# Patient Record
Sex: MALE | Race: BLACK OR AFRICAN AMERICAN | Employment: OTHER | URBAN - METROPOLITAN AREA
[De-identification: names, ages, dates, MRNs, and addresses within clinical notes are randomized per-mention and may not be internally consistent; named-entity substitution may affect disease eponyms.]

---

## 2018-04-27 ENCOUNTER — APPOINTMENT (OUTPATIENT)
Dept: CT IMAGING | Age: 78
DRG: 637 | End: 2018-04-27
Attending: EMERGENCY MEDICINE
Payer: MEDICARE

## 2018-04-27 ENCOUNTER — HOSPITAL ENCOUNTER (INPATIENT)
Age: 78
LOS: 10 days | Discharge: HOME HEALTH | DRG: 637 | End: 2018-05-07
Attending: EMERGENCY MEDICINE | Admitting: INTERNAL MEDICINE
Payer: MEDICARE

## 2018-04-27 DIAGNOSIS — G93.40 ACUTE ENCEPHALOPATHY: ICD-10-CM

## 2018-04-27 DIAGNOSIS — R73.9 HYPERGLYCEMIA: ICD-10-CM

## 2018-04-27 DIAGNOSIS — R41.3 MEMORY LOSS: ICD-10-CM

## 2018-04-27 DIAGNOSIS — E11.00 UNCONTROLLED TYPE 2 DM WITH HYPEROSMOLAR NONKETOTIC HYPERGLYCEMIA (HCC): ICD-10-CM

## 2018-04-27 DIAGNOSIS — N17.9 AKI (ACUTE KIDNEY INJURY) (HCC): Primary | ICD-10-CM

## 2018-04-27 DIAGNOSIS — I10 ESSENTIAL HYPERTENSION: ICD-10-CM

## 2018-04-27 DIAGNOSIS — E87.0 HYPEROSMOLAR SYNDROME: ICD-10-CM

## 2018-04-27 DIAGNOSIS — E87.1 HYPONATREMIA: ICD-10-CM

## 2018-04-27 LAB
ADMINISTERED INITIALS, ADMINIT: NORMAL
ALBUMIN SERPL-MCNC: 4.3 G/DL (ref 3.5–5)
ALBUMIN/GLOB SERPL: 1 {RATIO} (ref 1.1–2.2)
ALP SERPL-CCNC: 92 U/L (ref 45–117)
ALT SERPL-CCNC: 31 U/L (ref 12–78)
ANION GAP SERPL CALC-SCNC: 9 MMOL/L (ref 5–15)
ANION GAP SERPL CALC-SCNC: 9 MMOL/L (ref 5–15)
APPEARANCE UR: CLEAR
AST SERPL-CCNC: 11 U/L (ref 15–37)
BACTERIA URNS QL MICRO: NEGATIVE /HPF
BASOPHILS # BLD: 0 K/UL (ref 0–0.1)
BASOPHILS NFR BLD: 1 % (ref 0–1)
BILIRUB SERPL-MCNC: 0.7 MG/DL (ref 0.2–1)
BILIRUB UR QL: NEGATIVE
BUN SERPL-MCNC: 30 MG/DL (ref 6–20)
BUN SERPL-MCNC: 38 MG/DL (ref 6–20)
BUN/CREAT SERPL: 17 (ref 12–20)
BUN/CREAT SERPL: 19 (ref 12–20)
CALCIUM SERPL-MCNC: 8.3 MG/DL (ref 8.5–10.1)
CALCIUM SERPL-MCNC: 9.8 MG/DL (ref 8.5–10.1)
CARB RATIO, CHOR: 15
CARBOHYDRATE EATEN, CHO: 45 G
CHLORIDE SERPL-SCNC: 103 MMOL/L (ref 97–108)
CHLORIDE SERPL-SCNC: 90 MMOL/L (ref 97–108)
CO2 SERPL-SCNC: 24 MMOL/L (ref 21–32)
CO2 SERPL-SCNC: 27 MMOL/L (ref 21–32)
COLOR UR: ABNORMAL
CREAT SERPL-MCNC: 1.62 MG/DL (ref 0.7–1.3)
CREAT SERPL-MCNC: 2.27 MG/DL (ref 0.7–1.3)
D50 ADMINISTERED, D50ADM: 0 ML
D50 ORDER, D50ORD: 0 ML
DIFFERENTIAL METHOD BLD: NORMAL
EOSINOPHIL # BLD: 0.1 K/UL (ref 0–0.4)
EOSINOPHIL NFR BLD: 2 % (ref 0–7)
EPITH CASTS URNS QL MICRO: ABNORMAL /LPF
ERYTHROCYTE [DISTWIDTH] IN BLOOD BY AUTOMATED COUNT: 12.7 % (ref 11.5–14.5)
EST. AVERAGE GLUCOSE BLD GHB EST-MCNC: 318 MG/DL
GLOBULIN SER CALC-MCNC: 4.3 G/DL (ref 2–4)
GLSCOM COMMENTS: NORMAL
GLUCOSE BLD STRIP.AUTO-MCNC: 221 MG/DL (ref 65–100)
GLUCOSE BLD STRIP.AUTO-MCNC: 274 MG/DL (ref 65–100)
GLUCOSE BLD STRIP.AUTO-MCNC: 309 MG/DL (ref 65–100)
GLUCOSE BLD STRIP.AUTO-MCNC: 356 MG/DL (ref 65–100)
GLUCOSE BLD STRIP.AUTO-MCNC: 383 MG/DL (ref 65–100)
GLUCOSE BLD STRIP.AUTO-MCNC: 474 MG/DL (ref 65–100)
GLUCOSE BLD STRIP.AUTO-MCNC: 517 MG/DL (ref 65–100)
GLUCOSE BLD STRIP.AUTO-MCNC: >600 MG/DL (ref 65–100)
GLUCOSE SERPL-MCNC: 311 MG/DL (ref 65–100)
GLUCOSE SERPL-MCNC: 655 MG/DL (ref 65–100)
GLUCOSE UR STRIP.AUTO-MCNC: >1000 MG/DL
GLUCOSE, GLC: 221 MG/DL
GLUCOSE, GLC: 274 MG/DL
GLUCOSE, GLC: 309 MG/DL
GLUCOSE, GLC: 356 MG/DL
GLUCOSE, GLC: 383 MG/DL
GLUCOSE, GLC: 474 MG/DL
HBA1C MFR BLD: 12.7 % (ref 4.2–6.3)
HCT VFR BLD AUTO: 40 % (ref 36.6–50.3)
HGB BLD-MCNC: 14.1 G/DL (ref 12.1–17)
HGB UR QL STRIP: ABNORMAL
HIGH TARGET, HITG: 300 MG/DL
HYALINE CASTS URNS QL MICRO: ABNORMAL /LPF (ref 0–5)
IMM GRANULOCYTES # BLD: 0 K/UL (ref 0–0.04)
IMM GRANULOCYTES NFR BLD AUTO: 0 % (ref 0–0.5)
INSULIN ADMINSTERED, INSADM: 4.8 UNITS/HOUR
INSULIN ADMINSTERED, INSADM: 5.9 UNITS/HOUR
INSULIN ADMINSTERED, INSADM: 5.9 UNITS/HOUR
INSULIN ADMINSTERED, INSADM: 6.4 UNITS/HOUR
INSULIN ADMINSTERED, INSADM: 7.5 UNITS/HOUR
INSULIN ADMINSTERED, INSADM: 8.3 UNITS/HOUR
INSULIN BOLUS ADMINISTERED, INSBOLADM: 3 UNITS/HOUR
INSULIN BOLUS ORDERED, INSBOLORD: 3 UNITS/HOUR
INSULIN ORDER, INSORD: 4.8 UNITS/HOUR
INSULIN ORDER, INSORD: 5.9 UNITS/HOUR
INSULIN ORDER, INSORD: 5.9 UNITS/HOUR
INSULIN ORDER, INSORD: 6.4 UNITS/HOUR
INSULIN ORDER, INSORD: 7.5 UNITS/HOUR
INSULIN ORDER, INSORD: 8.3 UNITS/HOUR
KETONES UR QL STRIP.AUTO: NEGATIVE MG/DL
LEUKOCYTE ESTERASE UR QL STRIP.AUTO: NEGATIVE
LOW TARGET, LOT: 200 MG/DL
LYMPHOCYTES # BLD: 1 K/UL (ref 0.8–3.5)
LYMPHOCYTES NFR BLD: 22 % (ref 12–49)
MAGNESIUM SERPL-MCNC: 2.2 MG/DL (ref 1.6–2.4)
MCH RBC QN AUTO: 30.2 PG (ref 26–34)
MCHC RBC AUTO-ENTMCNC: 35.3 G/DL (ref 30–36.5)
MCV RBC AUTO: 85.7 FL (ref 80–99)
MINUTES UNTIL NEXT BG, NBG: 60 MIN
MONOCYTES # BLD: 0.3 K/UL (ref 0–1)
MONOCYTES NFR BLD: 6 % (ref 5–13)
MULTIPLIER, MUL: 0.02
MULTIPLIER, MUL: 0.03
NEUTS SEG # BLD: 3.1 K/UL (ref 1.8–8)
NEUTS SEG NFR BLD: 69 % (ref 32–75)
NITRITE UR QL STRIP.AUTO: NEGATIVE
NRBC # BLD: 0 K/UL (ref 0–0.01)
NRBC BLD-RTO: 0 PER 100 WBC
ORDER INITIALS, ORDINIT: NORMAL
PH UR STRIP: 5 [PH] (ref 5–8)
PHOSPHATE SERPL-MCNC: 4.2 MG/DL (ref 2.6–4.7)
PLATELET # BLD AUTO: 229 K/UL (ref 150–400)
PMV BLD AUTO: 9.8 FL (ref 8.9–12.9)
POTASSIUM SERPL-SCNC: 3.4 MMOL/L (ref 3.5–5.1)
POTASSIUM SERPL-SCNC: 4.3 MMOL/L (ref 3.5–5.1)
PROT SERPL-MCNC: 8.6 G/DL (ref 6.4–8.2)
PROT UR STRIP-MCNC: ABNORMAL MG/DL
RBC # BLD AUTO: 4.67 M/UL (ref 4.1–5.7)
RBC #/AREA URNS HPF: ABNORMAL /HPF (ref 0–5)
SERVICE CMNT-IMP: ABNORMAL
SODIUM SERPL-SCNC: 126 MMOL/L (ref 136–145)
SODIUM SERPL-SCNC: 136 MMOL/L (ref 136–145)
SP GR UR REFRACTOMETRY: 1.03 (ref 1–1.03)
UA: UC IF INDICATED,UAUC: ABNORMAL
UROBILINOGEN UR QL STRIP.AUTO: 0.2 EU/DL (ref 0.2–1)
WBC # BLD AUTO: 4.5 K/UL (ref 4.1–11.1)
WBC URNS QL MICRO: ABNORMAL /HPF (ref 0–4)

## 2018-04-27 PROCEDURE — 84100 ASSAY OF PHOSPHORUS: CPT | Performed by: INTERNAL MEDICINE

## 2018-04-27 PROCEDURE — 74011250636 HC RX REV CODE- 250/636: Performed by: EMERGENCY MEDICINE

## 2018-04-27 PROCEDURE — 83735 ASSAY OF MAGNESIUM: CPT | Performed by: INTERNAL MEDICINE

## 2018-04-27 PROCEDURE — 99285 EMERGENCY DEPT VISIT HI MDM: CPT

## 2018-04-27 PROCEDURE — 81001 URINALYSIS AUTO W/SCOPE: CPT | Performed by: EMERGENCY MEDICINE

## 2018-04-27 PROCEDURE — 85025 COMPLETE CBC W/AUTO DIFF WBC: CPT | Performed by: EMERGENCY MEDICINE

## 2018-04-27 PROCEDURE — 74011250636 HC RX REV CODE- 250/636: Performed by: INTERNAL MEDICINE

## 2018-04-27 PROCEDURE — 36415 COLL VENOUS BLD VENIPUNCTURE: CPT | Performed by: EMERGENCY MEDICINE

## 2018-04-27 PROCEDURE — 74011636637 HC RX REV CODE- 636/637: Performed by: INTERNAL MEDICINE

## 2018-04-27 PROCEDURE — 80048 BASIC METABOLIC PNL TOTAL CA: CPT | Performed by: INTERNAL MEDICINE

## 2018-04-27 PROCEDURE — 80053 COMPREHEN METABOLIC PANEL: CPT | Performed by: EMERGENCY MEDICINE

## 2018-04-27 PROCEDURE — 65660000000 HC RM CCU STEPDOWN

## 2018-04-27 PROCEDURE — 74011000258 HC RX REV CODE- 258: Performed by: INTERNAL MEDICINE

## 2018-04-27 PROCEDURE — 82962 GLUCOSE BLOOD TEST: CPT

## 2018-04-27 PROCEDURE — 70450 CT HEAD/BRAIN W/O DYE: CPT

## 2018-04-27 PROCEDURE — 83036 HEMOGLOBIN GLYCOSYLATED A1C: CPT | Performed by: EMERGENCY MEDICINE

## 2018-04-27 RX ORDER — SODIUM CHLORIDE 0.9 % (FLUSH) 0.9 %
5-10 SYRINGE (ML) INJECTION AS NEEDED
Status: DISCONTINUED | OUTPATIENT
Start: 2018-04-27 | End: 2018-05-07 | Stop reason: HOSPADM

## 2018-04-27 RX ORDER — MAGNESIUM SULFATE 100 %
4 CRYSTALS MISCELLANEOUS AS NEEDED
Status: DISCONTINUED | OUTPATIENT
Start: 2018-04-27 | End: 2018-04-28

## 2018-04-27 RX ORDER — ATORVASTATIN CALCIUM 10 MG/1
10 TABLET, FILM COATED ORAL DAILY
Status: DISCONTINUED | OUTPATIENT
Start: 2018-04-28 | End: 2018-05-07 | Stop reason: HOSPADM

## 2018-04-27 RX ORDER — INSULIN LISPRO 100 [IU]/ML
INJECTION, SOLUTION INTRAVENOUS; SUBCUTANEOUS
Status: DISCONTINUED | OUTPATIENT
Start: 2018-04-27 | End: 2018-04-27

## 2018-04-27 RX ORDER — INDAPAMIDE 2.5 MG/1
2.5 TABLET, FILM COATED ORAL DAILY
COMMUNITY
End: 2018-05-07

## 2018-04-27 RX ORDER — DEXTROSE 50 % IN WATER (D50W) INTRAVENOUS SYRINGE
12.5-25 AS NEEDED
Status: DISCONTINUED | OUTPATIENT
Start: 2018-04-27 | End: 2018-04-27

## 2018-04-27 RX ORDER — INDAPAMIDE 2.5 MG/1
2.5 TABLET, FILM COATED ORAL DAILY
Status: DISCONTINUED | OUTPATIENT
Start: 2018-04-28 | End: 2018-05-07 | Stop reason: HOSPADM

## 2018-04-27 RX ORDER — HEPARIN SODIUM 5000 [USP'U]/ML
5000 INJECTION, SOLUTION INTRAVENOUS; SUBCUTANEOUS EVERY 8 HOURS
Status: DISCONTINUED | OUTPATIENT
Start: 2018-04-27 | End: 2018-05-07 | Stop reason: HOSPADM

## 2018-04-27 RX ORDER — IMIPRAMINE HYDROCHLORIDE 10 MG/1
10 TABLET ORAL 3 TIMES DAILY
Status: DISCONTINUED | OUTPATIENT
Start: 2018-04-27 | End: 2018-04-27 | Stop reason: SDUPTHER

## 2018-04-27 RX ORDER — METFORMIN HYDROCHLORIDE 500 MG/1
1200 TABLET, FILM COATED, EXTENDED RELEASE ORAL 2 TIMES DAILY WITH MEALS
COMMUNITY
End: 2018-09-07

## 2018-04-27 RX ORDER — INSULIN LISPRO 100 [IU]/ML
INJECTION, SOLUTION INTRAVENOUS; SUBCUTANEOUS
Status: DISCONTINUED | OUTPATIENT
Start: 2018-04-27 | End: 2018-04-28

## 2018-04-27 RX ORDER — METOPROLOL SUCCINATE 50 MG/1
100 TABLET, EXTENDED RELEASE ORAL DAILY
Status: DISCONTINUED | OUTPATIENT
Start: 2018-04-28 | End: 2018-05-07 | Stop reason: HOSPADM

## 2018-04-27 RX ORDER — GUAIFENESIN 100 MG/5ML
81 LIQUID (ML) ORAL DAILY
Status: DISCONTINUED | OUTPATIENT
Start: 2018-04-28 | End: 2018-05-07 | Stop reason: HOSPADM

## 2018-04-27 RX ORDER — AMLODIPINE BESYLATE AND BENAZEPRIL HYDROCHLORIDE 10; 40 MG/1; MG/1
1 CAPSULE ORAL DAILY
COMMUNITY
End: 2018-05-07

## 2018-04-27 RX ORDER — METOPROLOL SUCCINATE 100 MG/1
100 TABLET, EXTENDED RELEASE ORAL DAILY
COMMUNITY

## 2018-04-27 RX ORDER — IMIPRAMINE HYDROCHLORIDE 10 MG/1
10 TABLET ORAL 3 TIMES DAILY
Status: DISCONTINUED | OUTPATIENT
Start: 2018-04-28 | End: 2018-05-07 | Stop reason: HOSPADM

## 2018-04-27 RX ORDER — AMLODIPINE BESYLATE 5 MG/1
10 TABLET ORAL DAILY
Status: DISCONTINUED | OUTPATIENT
Start: 2018-04-28 | End: 2018-05-07 | Stop reason: HOSPADM

## 2018-04-27 RX ORDER — SODIUM CHLORIDE 9 MG/ML
75 INJECTION, SOLUTION INTRAVENOUS CONTINUOUS
Status: DISCONTINUED | OUTPATIENT
Start: 2018-04-27 | End: 2018-04-28

## 2018-04-27 RX ORDER — DEXTROSE 50 % IN WATER (D50W) INTRAVENOUS SYRINGE
12.5-25 AS NEEDED
Status: DISCONTINUED | OUTPATIENT
Start: 2018-04-27 | End: 2018-04-28

## 2018-04-27 RX ORDER — ISOSORBIDE DINITRATE 30 MG/1
30 TABLET ORAL DAILY
Status: ON HOLD | COMMUNITY
End: 2019-07-09

## 2018-04-27 RX ORDER — MEGESTROL ACETATE 20 MG/1
20 TABLET ORAL 2 TIMES DAILY
COMMUNITY
End: 2018-12-04

## 2018-04-27 RX ORDER — MAGNESIUM SULFATE 100 %
4 CRYSTALS MISCELLANEOUS AS NEEDED
Status: DISCONTINUED | OUTPATIENT
Start: 2018-04-27 | End: 2018-04-27

## 2018-04-27 RX ORDER — SODIUM CHLORIDE 0.9 % (FLUSH) 0.9 %
5-10 SYRINGE (ML) INJECTION EVERY 8 HOURS
Status: DISCONTINUED | OUTPATIENT
Start: 2018-04-27 | End: 2018-05-07 | Stop reason: HOSPADM

## 2018-04-27 RX ORDER — IMIPRAMINE HYDROCHLORIDE 10 MG/1
10 TABLET ORAL 3 TIMES DAILY
COMMUNITY
End: 2018-12-04

## 2018-04-27 RX ADMIN — SODIUM CHLORIDE 1000 ML: 900 INJECTION, SOLUTION INTRAVENOUS at 16:22

## 2018-04-27 RX ADMIN — Medication 10 ML: at 18:50

## 2018-04-27 RX ADMIN — INSULIN LISPRO 3 UNITS: 100 INJECTION, SOLUTION INTRAVENOUS; SUBCUTANEOUS at 18:38

## 2018-04-27 RX ADMIN — SODIUM CHLORIDE 7.5 UNITS/HR: 900 INJECTION, SOLUTION INTRAVENOUS at 20:52

## 2018-04-27 RX ADMIN — SODIUM CHLORIDE 1000 ML: 900 INJECTION, SOLUTION INTRAVENOUS at 17:58

## 2018-04-27 RX ADMIN — HEPARIN SODIUM 5000 UNITS: 5000 INJECTION, SOLUTION INTRAVENOUS; SUBCUTANEOUS at 21:59

## 2018-04-27 RX ADMIN — Medication 10 ML: at 21:59

## 2018-04-27 RX ADMIN — SODIUM CHLORIDE 6.4 UNITS/HR: 900 INJECTION, SOLUTION INTRAVENOUS at 21:56

## 2018-04-27 RX ADMIN — SODIUM CHLORIDE 1000 ML: 900 INJECTION, SOLUTION INTRAVENOUS at 18:58

## 2018-04-27 RX ADMIN — SODIUM CHLORIDE 125 ML/HR: 900 INJECTION, SOLUTION INTRAVENOUS at 17:36

## 2018-04-27 RX ADMIN — SODIUM CHLORIDE 4.8 UNITS/HR: 900 INJECTION, SOLUTION INTRAVENOUS at 22:59

## 2018-04-27 RX ADMIN — SODIUM CHLORIDE 8.3 UNITS/HR: 900 INJECTION, SOLUTION INTRAVENOUS at 17:40

## 2018-04-27 NOTE — ED NOTES
Pharmacy called in regards to patients imipramine. Pharmacy stated they do not stock the dosage here and asked for the family to bring their supply from home. Spoke with pt's family and they stated they will bring it tomorrow morning.

## 2018-04-27 NOTE — ED NOTES
TRANSFER - OUT REPORT:    Verbal report given to Bard Arroyo RN(name) on Courtney Zamorano  being transferred to [address] [facility] for routine progression of care       Report consisted of patients Situation, Background, Assessment and   Recommendations(SBAR). Information from the following report(s) SBAR, Kardex, ED Summary, MAR, Recent Results and Cardiac Rhythm Sinus Tach was reviewed with the receiving nurse. Lines:   Peripheral IV 04/27/18 Right Antecubital (Active)   Site Assessment Clean, dry, & intact 4/27/2018  1:11 PM   Phlebitis Assessment 0 4/27/2018  1:11 PM   Infiltration Assessment 0 4/27/2018  1:11 PM   Dressing Type Transparent 4/27/2018  1:11 PM   Hub Color/Line Status Pink;Flushed 4/27/2018  1:11 PM   Action Taken Blood drawn 4/27/2018  1:11 PM       Peripheral IV 04/27/18 Right Hand (Active)   Site Assessment Clean, dry, & intact 4/27/2018  5:58 PM   Phlebitis Assessment 0 4/27/2018  5:58 PM   Infiltration Assessment 0 4/27/2018  5:58 PM   Dressing Status Clean, dry, & intact 4/27/2018  5:58 PM   Dressing Type Transparent 4/27/2018  5:58 PM        Opportunity for questions and clarification was provided.       Patient transported with:   Registered Nurse  Tech

## 2018-04-27 NOTE — IP AVS SNAPSHOT
[address] [facility] 
[phone] Patient: Leonardo Alejandra MRN: [id number] A check sue indicates which time of day the medication should be taken. My Medications START taking these medications Instructions Each Dose to Equal  
 Morning Noon Evening Bedtime  
 amLODIPine 10 mg tablet Commonly known as:  Ereashley Homer Start taking on:  5/8/2018 Your last dose was: Your next dose is: Take 1 Tab by mouth daily. Indications: hypertension 10 mg  
    
   
   
   
  
 linagliptin 5 mg tablet Commonly known as:  Estanislado Gala Start taking on:  5/8/2018 Your last dose was: Your next dose is: Take 1 Tab by mouth Daily (before breakfast). Indications: type 2 diabetes mellitus 5 mg  
    
   
   
   
  
 melatonin 3 mg tablet Your last dose was: Your next dose is: Take 2 Tabs by mouth nightly. 6 mg QUEtiapine 50 mg tablet Commonly known as:  SEROquel Your last dose was: Your next dose is: Take 1 Tab by mouth nightly. Take Half a pill in the morning and a FULL Pill at Bedtime 50 mg CHANGE how you take these medications Instructions Each Dose to Equal  
 Morning Noon Evening Bedtime  
 glipiZIDE 10 mg tablet Commonly known as:  Juanjo Jones What changed:   
- medication strength 
- how much to take - when to take this Your last dose was: Your next dose is: Take 2 Tabs by mouth Before breakfast and dinner. 20 mg  
    
   
   
   
  
 metFORMIN 500 mg Tg24 24 hour tablet Commonly known Juana HORTA What changed:  Another medication with the same name was removed. Continue taking this medication, and follow the directions you see here. Your last dose was: Your next dose is: Take 1,200 mg by mouth two (2) times daily (with meals). 1200 mg CONTINUE taking these medications Instructions Each Dose to Equal  
 Morning Noon Evening Bedtime ALLEREST NO DROWSINESS PO Your last dose was: Your next dose is: Take 1 Tab by mouth daily as needed (allergies). 1 Tab  
    
   
   
   
  
 aspirin 81 mg tablet Your last dose was: Your next dose is: Take 81 mg by mouth daily. 81 mg  
    
   
   
   
  
 CINNAMON 500 mg Cap Generic drug:  cinnamon bark Your last dose was: Your next dose is: Take 2,000 mg by mouth daily. 2000 mg FISH OIL 1,000 mg Cap Generic drug:  omega-3 fatty acids-vitamin e Your last dose was: Your next dose is: Take 1 Cap by mouth daily. 1 Cap  
    
   
   
   
  
 imipramine 10 mg tablet Commonly known as:  TOFRANIL Your last dose was: Your next dose is: Take 10 mg by mouth three (3) times daily. 10 mg  
    
   
   
   
  
 isosorbide dinitrate 30 mg tablet Commonly known as:  ISORDIL Your last dose was: Your next dose is: Take 30 mg by mouth daily. 30 mg  
    
   
   
   
  
 LIPITOR 10 mg tablet Generic drug:  atorvastatin Your last dose was: Your next dose is: Take 10 mg by mouth daily. 10 mg  
    
   
   
   
  
 megestrol 20 mg tablet Commonly known as:  MEGACE Your last dose was: Your next dose is: Take 20 mg by mouth two (2) times a day. 20 mg  
    
   
   
   
  
 metoprolol succinate 100 mg tablet Commonly known as:  TOPROL-XL Your last dose was: Your next dose is: Take 100 mg by mouth daily. 100 mg  
    
   
   
   
  
  
STOP taking these medications   
 amLODIPine-benazepril 10-40 mg per capsule Commonly known as:  LOTREL  
   
  
 indapamide 2.5 mg tablet Commonly known as:  Izabel Barrington NovoLOG Mix 70-30 U-100 Insuln 100 unit/mL (70-30) injection Generic drug:  insulin aspart protamine/insulin aspart ASK your doctor about these medications Instructions Each Dose to Equal  
 Morning Noon Evening Bedtime  
 metoprolol tartrate 100 mg IR tablet Commonly known as:  LOPRESSOR Your last dose was: Your next dose is: Take 100 mg by mouth daily. 100 mg Where to Get Your Medications Information on where to get these meds will be given to you by the nurse or doctor. ! Ask your nurse or doctor about these medications  
  amLODIPine 10 mg tablet  
 glipiZIDE 10 mg tablet  
 linagliptin 5 mg tablet  
 melatonin 3 mg tablet QUEtiapine 50 mg tablet

## 2018-04-27 NOTE — IP AVS SNAPSHOT
[address] [facility] 
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 
[phone] Discharge Orders None A check sue indicates which time of day the medication should be taken. My Medications START taking these medications Instructions Each Dose to Equal  
 Morning Noon Evening Bedtime  
 amLODIPine 10 mg tablet Commonly known as:  Erenest Homer Start taking on:  5/8/2018 Your last dose was: Your next dose is: Take 1 Tab by mouth daily. Indications: hypertension 10 mg  
    
   
   
   
  
 linagliptin 5 mg tablet Commonly known as:  Vannesa Fisher Start taking on:  5/8/2018 Your last dose was: Your next dose is: Take 1 Tab by mouth Daily (before breakfast). Indications: type 2 diabetes mellitus 5 mg  
    
   
   
   
  
 melatonin 3 mg tablet Your last dose was: Your next dose is: Take 2 Tabs by mouth nightly.   
 6 mg  
    
   
   
   
  
 QUEtiapine 50 mg tablet Commonly known as:  SEROquel Your last dose was: Your next dose is: Take 1 Tab by mouth nightly. Take Half a pill in the morning and a FULL Pill at Bedtime 50 mg CHANGE how you take these medications Instructions Each Dose to Equal  
 Morning Noon Evening Bedtime  
 glipiZIDE 10 mg tablet Commonly known as:  Guillermo Wyatt What changed:   
- medication strength 
- how much to take - when to take this Your last dose was: Your next dose is: Take 2 Tabs by mouth Before breakfast and dinner. 20 mg  
    
   
   
   
  
 metFORMIN 500 mg Tg24 24 hour tablet Commonly known Ivelisse HORTA What changed:  Another medication with the same name was removed. Continue taking this medication, and follow the directions you see here. Your last dose was: Your next dose is: Take 1,200 mg by mouth two (2) times daily (with meals). 1200 mg CONTINUE taking these medications Instructions Each Dose to Equal  
 Morning Noon Evening Bedtime ALLEREST NO DROWSINESS PO Your last dose was: Your next dose is: Take 1 Tab by mouth daily as needed (allergies). 1 Tab  
    
   
   
   
  
 aspirin 81 mg tablet Your last dose was: Your next dose is: Take 81 mg by mouth daily. 81 mg  
    
   
   
   
  
 CINNAMON 500 mg Cap Generic drug:  cinnamon bark Your last dose was: Your next dose is: Take 2,000 mg by mouth daily. 2000 mg FISH OIL 1,000 mg Cap Generic drug:  omega-3 fatty acids-vitamin e Your last dose was: Your next dose is: Take 1 Cap by mouth daily. 1 Cap  
    
   
   
   
  
 imipramine 10 mg tablet Commonly known as:  TOFRANIL Your last dose was: Your next dose is: Take 10 mg by mouth three (3) times daily. 10 mg  
    
   
   
   
  
 isosorbide dinitrate 30 mg tablet Commonly known as:  ISORDIL Your last dose was: Your next dose is: Take 30 mg by mouth daily. 30 mg  
    
   
   
   
  
 LIPITOR 10 mg tablet Generic drug:  atorvastatin Your last dose was: Your next dose is: Take 10 mg by mouth daily. 10 mg  
    
   
   
   
  
 megestrol 20 mg tablet Commonly known as:  MEGACE Your last dose was: Your next dose is: Take 20 mg by mouth two (2) times a day. 20 mg  
    
   
   
   
  
 metoprolol succinate 100 mg tablet Commonly known as:  TOPROL-XL Your last dose was: Your next dose is: Take 100 mg by mouth daily. 100 mg  
    
   
   
   
  
  
STOP taking these medications   
 amLODIPine-benazepril 10-40 mg per capsule Commonly known as:  LOTREL  
   
  
 indapamide 2.5 mg tablet Commonly known as:  Wandra Lanes NovoLOG Mix 70-30 U-100 Insuln 100 unit/mL (70-30) injection Generic drug:  insulin aspart protamine/insulin aspart ASK your doctor about these medications Instructions Each Dose to Equal  
 Morning Noon Evening Bedtime  
 metoprolol tartrate 100 mg IR tablet Commonly known as:  LOPRESSOR Your last dose was: Your next dose is: Take 100 mg by mouth daily. 100 mg Where to Get Your Medications Information on where to get these meds will be given to you by the nurse or doctor. ! Ask your nurse or doctor about these medications  
  amLODIPine 10 mg tablet  
 glipiZIDE 10 mg tablet  
 linagliptin 5 mg tablet  
 melatonin 3 mg tablet QUEtiapine 50 mg tablet Discharge Instructions HOSPITALIST DISCHARGE INSTRUCTIONS 
 
NAME: Larry Neville :  1940 MRN:  [id number] Date/Time:  5/7/2018 10:26 AM 
 
ADMIT DATE: 4/27/2018 DISCHARGE DATE: 5/7/2018 · It is important that you take the medication exactly as they are prescribed. · Keep your medication in the bottles provided by the pharmacist and keep a list of the medication names, dosages, and times to be taken in your wallet. · Do not take other medications without consulting your doctor. What to do at [facility] Recommended diet:  Diabetic Diet   NO CONCENTRATED SWEETS. PUSH  WATER / FLUIDS   NO SUGAR  DRINKS  TO STAY HYDRATED Recommended activity: Activity as tolerated If you have questions regarding the hospital related prescriptions or hospital related issues please call [facility] at [phone]. You can always direct your questions to your primary care doctor if you are unable to reach your hospital physician; your PCP works as an extension of your hospital doctor just like your hospital doctor is an extension of your PCP for your time at the hospital [facility]) If you experience any of the following symptoms then please call your primary care physician or return to the emergency room if you cannot get hold of your doctor: 
 
Fever, chills, nausea, vomiting, or persistent diarrhea Worsening weakness or new problems with your speech or balance Dark stools or visible blood in your stools New Leg swelling or shortness of breath as these could be signs of a clot Additional Instructions: HIS SUGARS will NOT be well controlled on ORAL MEDICATIONS but it is still Better than  NO MEDICATIONS/ INSULIN You may have to tolerate Blood sugars in the 200 to low 300 range AS his DEMENTIA PROGRESSES he may get more  Willing for you to monitor his Blood sugar at which time Dr. Ilir Ragland can reassess And Reintroduce Insuli. ENCOURAGE  Increase WATER intake  As he could be loosing fluids more via Urine and cause higher risk for dehydration Bring these papers with you to your follow up appointments. The papers will help your doctors be sure to continue the care plan from the hospital. 
 
 
 
 
 
 
Information obtained by : 
I understand that if any problems occur once I am at home I am to contact my physician. I understand and acknowledge receipt of the instructions indicated above. Physician's or R.N.'s Signature                                                                  Date/Time Patient or Representative Signature Lolabox Announcement We are excited to announce that we are making your provider's discharge notes available to you in Lolabox. You will see these notes when they are completed and signed by the physician that discharged you from your recent hospital stay. If you have any questions or concerns about any information you see in Lolabox, please call the Health Information Department where you were seen or reach out to your Primary Care Provider for more information about your plan of care. Introducing [facility] SERVICES! [facility] introduces Lolabox patient portal. Now you can access parts of your medical record, email your doctor's office, and request medication refills online. 1. In your internet browser, go to [email or web address] 2. Click on the First Time User? Click Here link in the Sign In box. You will see the New Member Sign Up page. 3. Enter your Lolabox Access Code exactly as it appears below. You will not need to use this code after youve completed the sign-up process. If you do not sign up before the expiration date, you must request a new code. · ChargeBee Access Code: [email or web address] Expires: 8/5/2018 11:37 AM 
 
4. Enter the last four digits of your Social Security Number (xxxx) and Date of Birth (mm/dd/yyyy) as indicated and click Submit. You will be taken to the next sign-up page. 5. Create a ChargeBee ID. This will be your ChargeBee login ID and cannot be changed, so think of one that is secure and easy to remember. 6. Create a ChargeBee password. You can change your password at any time. 7. Enter your Password Reset Question and Answer. This can be used at a later time if you forget your password. 8. Enter your e-mail address. You will receive e-mail notification when new information is available in [address] 9. Click Sign Up. You can now view and download portions of your medical record. 10. Click the Download Summary menu link to download a portable copy of your medical information. If you have questions, please visit the Frequently Asked Questions section of the ChargeBee website. Remember, ChargeBee is NOT to be used for urgent needs. For medical emergencies, dial 911. Now available from your iPhone and Android! Introducing Obdulio Darden As a [facility] patient, I wanted to make you aware of our electronic visit tool called Obdulio Nickocorinnechasidy. [facility] 24/7 allows you to connect within minutes with a medical provider 24 hours a day, seven days a week via a mobile device or tablet or logging into a secure website from your computer. You can access Obdulio Darden from anywhere in the [facility] Ne. A virtual visit might be right for you when you have a simple condition and feel like you just dont want to get out of bed, or cant get away from work for an appointment, when your regular [facility] provider is not available (evenings, weekends or holidays), or when youre out of town and need minor care.   Electronic visits cost only $49 and if the [facility] 24/7 provider determines a prescription is needed to treat your condition, one can be electronically transmitted to a nearby pharmacy*. Please take a moment to enroll today if you have not already done so. The enrollment process is free and takes just a few minutes. To enroll, please download the [facility] 24/7 gee to your tablet or phone, or visit [email or web address] to enroll on your computer. And, as an [address] [place] [address] patient with a [employer] account, the results of your visits will be scanned into your electronic medical record and your primary care provider will be able to view the scanned results. We urge you to continue to see your regular [employer] provider for your ongoing medical care. And while your primary care provider may not be the one available when you seek a TMS virtual visit, the peace of mind you get from getting a real diagnosis real time can be priceless. For more information on TMS, view our Frequently Asked Questions (FAQs) at [email or web address]. Sincerely, 
 
Xochitl Gomez MD 
Chief Medical Officer [employer] *:  certain medications cannot be prescribed via TMS Providers Seen During Your Hospitalization Provider Specialty Primary office phone Marty Posada MD Emergency Medicine [phone] Rupa Tapia MD Internal Medicine [phone] Estefani Richmond MD Internal Medicine [phone] Kassandra Quiles MD Internal Medicine [phone] Aleksandr Dash MD Internal Medicine [phone] Your Primary Care Physician (PCP) Primary Care Physician Office Phone Office Fax [address] [phone] You are allergic to the following No active allergies Recent Documentation Height Weight BMI Smoking Status 1.727 m 86.1 kg 28.86 kg/m2 Former Smoker Emergency Contacts Name Discharge Info Relation Home Work Mobile Julissa Hamm DISCHARGE CAREGIVER [3] Spouse [3] [phone] Patient Belongings The following personal items are in your possession at time of discharge: 
  Dental Appliances: None  Visual Aid: Glasses, At bedside      Home Medications: None   Jewelry: None  Clothing: At bedside Please provide this summary of care documentation to your next provider. Signatures-by signing, you are acknowledging that this After Visit Summary has been reviewed with you and you have received a copy. Patient Signature:  ____________________________________________________________ Date:  ____________________________________________________________  
  
Claudene Born Provider Signature:  ____________________________________________________________ Date:  ____________________________________________________________

## 2018-04-27 NOTE — ED NOTES
Spoke with Dr. Ray Bates about plan for patient in regards to insulin drip. He stated per him and the hospitalist, they want to initiate the insulin drip.

## 2018-04-27 NOTE — H&P
Hospitalist Admission Note    NAME: Benny Keller   :  1940   MRN:  [id number]     Date/Time:  2018 5:00 PM    Patient PCP: Orion Caludio MD Cardio= Dr Gricel Russo  (at [facility]), Urology- at [facility]  ______________________________________________________________________  Given the patient's current clinical presentation, I have a high level of concern for decompensation if discharged from the emergency department. Complex decision making was performed, which includes reviewing the patient's available past medical records, laboratory results, and x-ray films. My assessment of this patient's clinical condition and my plan of care is as follows. Assessment / Plan:  Hyperosmolar Non ketotic Hyperglycemia in Type 2 DM uncontrolled without coma POA  HERMILO/dehydration POA  Hyponatremia POA  Acute encephalopathy POA due to above  UA neg ketones  CT head neg acute    Admit to Stepdown unit- pt is going to be on IV insulin drip as per HHS protocol  Check BMP Q 4 hrs, check Mag, Phos- replenish if low  S/p 1 L NS in ER, give 2 L NS bolus x 1 then cont IVF at 125ml/hr  Transition to SQ insulin (is on 70/30 insulin 56 units BID at home as per wife) soon  Follow Na+ correction with correction of hyperglycemia  Follow MS improvement with correction of above abnormalities. Holding Benazepril for HERMILO  Check HbA1c  IP DTC consult  Holding Metformin, glipizide BID while on IV insulin      HTN  CAD s/p CABG  Hypercholesterolemia  Cont amlodipine, indapamide, Metoprolol  Cont ASA, statin, isordil    H/o Prostate Ca s/p resection, s/p radiation therapy  Cont Imipramine as at home  Pt is on Lupron shot as per the urologist as per the wife  OP follow up as before.         Code Status: Full  Surrogate Decision Maker: wife Lin Traylor # [phone], [phone] (cell)    DVT Prophylaxis: Sq heparin  GI Prophylaxis: not indicated    Baseline: Pt is independent at home with ADLs, lives with wife Subjective:   CHIEF COMPLAINT: AMS with High sugar     HISTORY OF PRESENT ILLNESS:     Sarbjit Cuello is a 69 y.o.  male who presents with above complains from home ambulatory. Pt was brought in by family with CC of worsening Confusion  X days/weeks as per wife. H/o High sugar on the glucometer- unable to get a reading today as per wife  H/o pt not taking his insulin for weeks as per pt's wife- pt denies it but is encephalopathic  H/o UTI 1-2m ago- took kelfex & cipro for it in feb as per PCP  Pt had missed his appointment with PCP in march due to ?storm as per pt/family & were in the process of getting another appointment but had not made it yet. Pt was found to have FS in 600's in ER with HERMILO with Cr 2.2 & low na at 126 in ER on workup with neg CT head, UA. We were asked to admit for work up and evaluation of the above problems. Past Medical History:   Diagnosis Date    CAD (coronary artery disease)     Cancer ([facility] [facility]     prostate    Diabetes ([facility] [facility]     GERD (gastroesophageal reflux disease)     Hypertension     Other ill-defined conditions(799.89)     elevated cholesterol        Past Surgical History:   Procedure Laterality Date    CARDIAC SURG PROCEDURE UNLIST      cabg x5 vessels,cardiologist  and david at [facility]    COLONOSCOPY N/A 8/31/2016    COLONOSCOPY performed by Lolita Mccauley MD at [address] [place] [address]; HI RISK IND  8/31/2016         HX PROSTATECTOMY      [place] COLONOSCOPY FLX DX W/COLLJ SPEC WHEN PFRMD  4/13/2011            Social History   Substance Use Topics    Smoking status: Former Smoker     Packs/day: 0.50     Years: 20.00     Quit date: 4/12/1982    Smokeless tobacco: Never Used    Alcohol use No      Family History  Unable to obtain as pt has AMS/confusion    No Known Allergies     Prior to Admission medications    Medication Sig Start Date End Date Taking?  Authorizing Provider   megestrol (MEGACE) 20 mg tablet Take 20 mg by mouth two (2) times a day. Sarah Hawk MD   indapamide (LOZOL) 2.5 mg tablet Take 2.5 mg by mouth daily. Sarah Hawk MD   [facility] AT [facility] ER) 500 mg TG24 24 hour tablet Take 1,000 mg by mouth two (2) times daily (with meals). Sarah Hawk MD   metoprolol succinate (TOPROL-XL) 100 mg tablet Take 100 mg by mouth daily. Sarah Hawk MD   amLODIPine-benazepril (LOTREL) 10-40 mg per capsule Take 1 Cap by mouth daily. Sarah Hawk MD   imipramine (TOFRANIL) 10 mg tablet Take 10 mg by mouth three (3) times daily. Sarah Hawk MD   isosorbide dinitrate (ISORDIL) 30 mg tablet Take 30 mg by mouth daily. Sarah Hawk MD   pseudoephedrine/acetaminophen (ALLEREST NO DROWSINESS PO) Take 1 Tab by mouth daily as needed (allergies). Sarah Hawk MD   insulin aspart protamine/insulin aspart (NOVOLOG MIX 70-30) 100 unit/mL (70-30) injection 56 Units by SubCUTAneous route Before breakfast and dinner. Historical Provider   aspirin 81 mg tablet Take 81 mg by mouth daily. 4/12/11   Historical Provider   atorvastatin (LIPITOR) 10 mg tablet Take 10 mg by mouth daily. Historical Provider   glipiZIDE (GLUCOTROL) 5 mg tablet Take 5 mg by mouth two (2) times a day. Historical Provider   omega-3 fatty acids-vitamin e (FISH OIL) 1,000 mg Cap Take 1 Cap by mouth daily. 4/12/11   Historical Provider   cinnamon bark (CINNAMON) 500 mg Cap Take 2,000 mg by mouth daily. 4/12/11   Historical Provider       REVIEW OF SYSTEMS:      limited review of systems because:    The patient is intubated and sedated   x The patient has altered mental status due to his acute medical problems    The patient has baseline aphasia from prior stroke(s)    The patient has baseline dementia and is not reliable historian    The patient is in acute medical distress and unable to provide information           Total of 12 systems reviewed as follows:       POSITIVE= underlined text  Negative = text not underlined  General:  fever, chills, sweats, generalized weakness, weight loss/gain,      loss of appetite   Eyes:    blurred vision, eye pain, loss of vision, double vision  ENT:    rhinorrhea, pharyngitis   Respiratory:   cough, sputum production, SOB, ANGUIANO, wheezing, pleuritic pain   Cardiology:   chest pain, palpitations, orthopnea, PND, edema, syncope   Gastrointestinal:  abdominal pain , N/V, diarrhea, dysphagia, constipation, bleeding   Genitourinary:  frequency, urgency, dysuria, hematuria, incontinence   Muskuloskeletal :  arthralgia, myalgia, back pain  Hematology:  easy bruising, nose or gum bleeding, lymphadenopathy   Dermatological: rash, ulceration, pruritis, color change / jaundice  Endocrine:   hot flashes or polydipsia   Neurological:  headache, dizziness, confusion, focal weakness, paresthesia,     Speech difficulties, memory loss, gait difficulty  Psychological: Feelings of anxiety, depression, agitation    Objective:   VITALS:    Visit Vitals    BP (!) 167/98    Pulse (!) 113    Temp 97.4 °F (36.3 °C)    Resp 17    Ht 5' 8\" (1.727 m)    Wt 85.8 kg (189 lb 2.5 oz)    SpO2 (!) 82%    BMI 28.76 kg/m2       PHYSICAL EXAM:    General:    Alert, cooperative, no distress, appears stated age, Obese +. HEENT: Atraumatic, anicteric sclerae, pink conjunctivae     No oral ulcers, mucosa dry +, throat clear, dentition fair  Neck:  Supple, symmetrical,  thyroid: non tender  Lungs:   Clear to auscultation bilaterally. No Wheezing or Rhonchi. No rales. Chest wall:  No tenderness  No Accessory muscle use. Heart:   Regular  rhythm,  No  murmur   No edema  Abdomen:   Soft, non-tender. Not distended. Bowel sounds normal  Extremities: No cyanosis. No clubbing,      Skin turgor normal, Capillary refill normal, Radial dial pulse 2+  Skin:     Not pale. Not Jaundiced  No rashes   Psych:  ? insight. Not depressed. Not anxious or agitated. Neurologic: EOMs intact. No facial asymmetry.  No aphasia or slurred speech. Symmetrical strength, Sensation grossly intact. Alert and oriented X 1, confused/encephalopathic, not making much sense when talking .     _______________________________________________________________________  Care Plan discussed with:    Comments   Patient x    Family  x Wife & daughter at bedside   RN x    Care Manager                    Consultant:  x ED MD Aneta Allen   _______________________________________________________________________  Expected  Disposition:   Home with Family x   HH/PT/OT/RN x   SNF/LTC    MARVIN    ________________________________________________________________________  TOTAL TIME:  64 Minutes    Critical Care Provided     Minutes non procedure based      Comments    x Reviewed previous records   >50% of visit spent in counseling and coordination of care x Discussion with patient and family and questions answered       ________________________________________________________________________  Signed: Livier Juarez MD    Procedures: see electronic medical records for all procedures/Xrays and details which were not copied into this note but were reviewed prior to creation of Plan.     LAB DATA REVIEWED:    Recent Results (from the past 24 hour(s))   GLUCOSE, POC    Collection Time: 04/27/18 12:54 PM   Result Value Ref Range    Glucose (POC) >600 (HH) 65 - 100 mg/dL    Performed by Jordan [employer]AUR WITH AUTOMATED DIFF    Collection Time: 04/27/18  1:17 PM   Result Value Ref Range    WBC 4.5 4.1 - 11.1 K/uL    RBC 4.67 4.10 - 5.70 M/uL    HGB 14.1 12.1 - 17.0 g/dL    HCT 40.0 36.6 - 50.3 %    MCV 85.7 80.0 - 99.0 FL    MCH 30.2 26.0 - 34.0 PG    MCHC 35.3 30.0 - 36.5 g/dL    RDW 12.7 11.5 - 14.5 %    PLATELET 145 848 - 760 K/uL    MPV 9.8 8.9 - 12.9 FL    NRBC 0.0 0  WBC    ABSOLUTE NRBC 0.00 0.00 - 0.01 K/uL    NEUTROPHILS 69 32 - 75 %    LYMPHOCYTES 22 12 - 49 %    MONOCYTES 6 5 - 13 %    EOSINOPHILS 2 0 - 7 %    BASOPHILS 1 0 - 1 %    IMMATURE GRANULOCYTES 0 0.0 - 0.5 %    ABS. NEUTROPHILS 3.1 1.8 - 8.0 K/UL    ABS. LYMPHOCYTES 1.0 0.8 - 3.5 K/UL    ABS. MONOCYTES 0.3 0.0 - 1.0 K/UL    ABS. EOSINOPHILS 0.1 0.0 - 0.4 K/UL    ABS. BASOPHILS 0.0 0.0 - 0.1 K/UL    ABS. IMM. GRANS. 0.0 0.00 - 0.04 K/UL    DF AUTOMATED     METABOLIC PANEL, COMPREHENSIVE    Collection Time: 04/27/18  1:17 PM   Result Value Ref Range    Sodium 126 (L) 136 - 145 mmol/L    Potassium 4.3 3.5 - 5.1 mmol/L    Chloride 90 (L) 97 - 108 mmol/L    CO2 27 21 - 32 mmol/L    Anion gap 9 5 - 15 mmol/L    Glucose 655 (HH) 65 - 100 mg/dL    BUN 38 (H) 6 - 20 MG/DL    Creatinine 2.27 (H) 0.70 - 1.30 MG/DL    BUN/Creatinine ratio 17 12 - 20      GFR est AA 34 (L) >60 ml/min/1.73m2    GFR est non-AA 28 (L) >60 ml/min/1.73m2    Calcium 9.8 8.5 - 10.1 MG/DL    Bilirubin, total 0.7 0.2 - 1.0 MG/DL    ALT (SGPT) 31 12 - 78 U/L    AST (SGOT) 11 (L) 15 - 37 U/L    Alk.  phosphatase 92 45 - 117 U/L    Protein, total 8.6 (H) 6.4 - 8.2 g/dL    Albumin 4.3 3.5 - 5.0 g/dL    Globulin 4.3 (H) 2.0 - 4.0 g/dL    A-G Ratio 1.0 (L) 1.1 - 2.2     URINALYSIS W/ REFLEX CULTURE    Collection Time: 04/27/18  1:32 PM   Result Value Ref Range    Color YELLOW/STRAW      Appearance CLEAR CLEAR      Specific gravity 1.029 1.003 - 1.030      pH (UA) 5.0 5.0 - 8.0      Protein TRACE (A) NEG mg/dL    Glucose >1000 (A) NEG mg/dL    Ketone NEGATIVE  NEG mg/dL    Bilirubin NEGATIVE  NEG      Blood TRACE (A) NEG      Urobilinogen 0.2 0.2 - 1.0 EU/dL    Nitrites NEGATIVE  NEG      Leukocyte Esterase NEGATIVE  NEG      WBC 0-4 0 - 4 /hpf    RBC 0-5 0 - 5 /hpf    Epithelial cells FEW FEW /lpf    Bacteria NEGATIVE  NEG /hpf    UA:UC IF INDICATED CULTURE NOT INDICATED BY UA RESULT CNI      Hyaline cast 0-2 0 - 5 /lpf   GLUCOSE, POC    Collection Time: 04/27/18  3:27 PM   Result Value Ref Range    Glucose (POC) 517 (H) 65 - 100 mg/dL    Performed by [employer]

## 2018-04-27 NOTE — PROGRESS NOTES
Pharmacy Clarification of Prior to Admission Medication Regimen     The patient was not interviewed regarding clarification of the prior to admission medication regimen due to AMS. Patient's wife was present in room and obtained permission from patient to discuss drug regimen with visitor(s) present. Patient's wife was questioned regarding use of any other inhalers, topical products, over the counter medications, herbal medications, vitamin products or ophthalmic/nasal/otic medication use. MHT called the patient's two outpatient's pharmacies to clarify the patein's refill history. MHT called [facility], [phone], and spoke with Melly London PharmD, and called [facility] [address] [facility], [phone], and spoke with Chaka Sheppard PharmD, who were able to confirm the patient's refill history. Information Obtained From: Patient's wife, personal med list, RX Query    Pertinent Pharmacy Findings:   Updated patients preferred outpatient pharmacy to: [facility] #[id number]- [address]. [address] Patient was not able to clarify the last time he took his medications.  Per patient's wife, the patient has not taken his insulin in '5 weeks'. PTA medication list was corrected to the following:     Prior to Admission Medications   Prescriptions Last Dose Informant Patient Reported? Taking? amLODIPine-benazepril (LOTREL) 10-40 mg per capsule Unknown at Unknown time Other Yes No   Sig: Take 1 Cap by mouth daily. aspirin 81 mg tablet Unknown at Unknown time Significant Other Yes No   Sig: Take 81 mg by mouth daily. atorvastatin (LIPITOR) 10 mg tablet Unknown at Unknown time Significant Other Yes No   Sig: Take 10 mg by mouth daily. cinnamon bark (CINNAMON) 500 mg Cap Unknown at Unknown time Significant Other Yes No   Sig: Take 2,000 mg by mouth daily. glipiZIDE (GLUCOTROL) 5 mg tablet Unknown at Unknown time Significant Other Yes No   Sig: Take 5 mg by mouth two (2) times a day.    imipramine (TOFRANIL) 10 mg tablet Unknown at Unknown time Significant Other Yes No   Sig: Take 10 mg by mouth three (3) times daily. indapamide (LOZOL) 2.5 mg tablet Unknown at Unknown time Other Yes No   Sig: Take 2.5 mg by mouth daily. insulin aspart protamine/insulin aspart (NOVOLOG MIX 70-30) 100 unit/mL (70-30) injection Unknown at Unknown time Other Yes No   Si Units by SubCUTAneous route Before breakfast and dinner. isosorbide dinitrate (ISORDIL) 30 mg tablet Unknown at Unknown time Other Yes No   Sig: Take 30 mg by mouth daily. megestrol (MEGACE) 20 mg tablet Unknown at Unknown time Significant Other Yes No   Sig: Take 20 mg by mouth two (2) times a day. metFORMIN (GLUMETZA ER) 500 mg TG24 24 hour tablet Unknown at Unknown time Other Yes No   Sig: Take 1,000 mg by mouth two (2) times daily (with meals). metoprolol succinate (TOPROL-XL) 100 mg tablet Unknown at Unknown time Other Yes No   Sig: Take 100 mg by mouth daily. omega-3 fatty acids-vitamin e (FISH OIL) 1,000 mg Cap Unknown at Unknown time Significant Other Yes No   Sig: Take 1 Cap by mouth daily. pseudoephedrine/acetaminophen (ALLEREST NO DROWSINESS PO) Unknown at Unknown time Significant Other Yes No   Sig: Take 1 Tab by mouth daily as needed (allergies).       Facility-Administered Medications: None          Thank you,  Alex Denise CPhT  Medication History Pharmacy Technician

## 2018-04-27 NOTE — ED NOTES
Pharmacy tech asking pt about his medicines; came out to get one of us nurses to check his blood sugar  As he's coming in and out of it and not making any since right now.

## 2018-04-27 NOTE — ED NOTES
Assumed care of pt from Wilbert Montilla RN at bedside with verbal report consisting of Situation, Background, Assessment, and Recommendations (SBAR). Pt is A&O x 4. Pt resting comfortably on the stretcher in a position of comfort. Call bell within reach. Side rails x 2. Cardiac monitor x 3. Stretcher locked in the lowest position. Concerns and questions addressed at this time. Pt in no acute distress at this the time. Will continue to monitor.

## 2018-04-27 NOTE — ED NOTES
Pt reports he has a wet depend; asked pt if he would like to go to the bathroom to empty his bladder  He said that he does not have his usual depend. Pt trying to explain that he thought his pants were wet. Assist pt to bathroom steady on his feet  He had a full wet depend  He did void; as well as changed his depend to adult diaper. Wife and daughter report he gets agitated easily sometimes.

## 2018-04-27 NOTE — ED PROVIDER NOTES
EMERGENCY DEPARTMENT HISTORY AND PHYSICAL EXAM      Date: 4/27/2018  Patient Name: Alistair Zamarripa    History of Presenting Illness     Chief Complaint   Patient presents with    High Blood Sugar     Ambulatory into the ED with c/o hyperglycemia. Per family, \"it wouldn't register. \" Has not been taking his insulin x several weeks.  Altered mental status     Per family x 2 months       History Provided By: Patient, Patient's Wife and Patient's Daughter    HPI: Alistair Zamarripa, 69 y.o. male with PMHx significant for HTN, HLD, diabetes, CAD, and prostate cancer s/p prostatectomy, presents ambulatory to the ED with cc of gradually worsening altered mental status over the past 24 hours. Per wife, the patient has been altered over the past 2 months, but she noticed a significant chance since yesterday. Per daughter, the patient is unable to make complete, logical sentences or recall daily events. Per daughter, the patient also c/o intermittent episodes of tremors to his right hand since yesterday. Per wife, the patient's BG was evaluated, and it was > 600 on his home glucometer. Per wife, the patient denies taking any doses of his insulin over the past 2 weeks. Per wife, the patient was diagnosed with a UTI 1 month ago. The patient's daughter called the patient's PCP 1 week ago for an appointment, but was unable to schedule one. Per wife, the patient denies taking anticoagulants. He specifically denies any fevers, chills, nausea, vomiting, chest pain, shortness of breath, headache, rash, diarrhea, sweating or weight loss. Chief Complaint: Altered mental status  Duration: 24 Hours  Timing:  Gradual  Severity: Moderate  Modifying Factors: Denies modifying factors  Associated Symptoms: denies any other associated signs or symptoms    There are no other complaints, changes, or physical findings at this time.     PCP: Estefany Leiva MD    Current Facility-Administered Medications   Medication Dose Route Frequency Provider Last Rate Last Dose    amLODIPine (NORVASC) tablet 10 mg  10 mg Oral DAILY Zoe Alvarez MD        aspirin chewable tablet 81 mg  81 mg Oral DAILY Zoe Alvarez MD        atorvastatin (LIPITOR) tablet 10 mg  10 mg Oral DAILY Zoe Alvarez MD        indapamide (LOZOL) tablet 2.5 mg  2.5 mg Oral DAILY Zoe Alvarez MD        isosorbide dinitrate (ISORDIL) tablet 30 mg  30 mg Oral DAILY Zoe Alvarez MD        metoprolol succinate (TOPROL-XL) XL tablet 100 mg  100 mg Oral DAILY Zoe Alvarez MD        sodium chloride (NS) flush 5-10 mL  5-10 mL IntraVENous Q8H Zoe Alvarez MD   10 mL at 04/28/18 0413    sodium chloride (NS) flush 5-10 mL  5-10 mL IntraVENous PRN Zoe Alvarez MD        insulin regular (NOVOLIN R, HUMULIN R) 100 Units in 0.9% sodium chloride 100 mL infusion  0-50 Units/hr IntraVENous TITRATE Zoe Alvarez MD 2.6 mL/hr at 04/28/18 0634 2.6 Units/hr at 04/28/18 5855    insulin lispro (HUMALOG) injection   SubCUTAneous Faina Morales MD   3 Units at 04/27/18 1838    glucose chewable tablet 16 g  4 Tab Oral PRN Zoe Alvarez MD        dextrose (D50W) injection syrg 12.5-25 g  12.5-25 g IntraVENous PRN Zoe Alvarez MD        glucagon (GLUCAGEN) injection 1 mg  1 mg IntraMUSCular PRN Zoe Alvarez MD        heparin (porcine) injection 5,000 Units  5,000 Units SubCUTAneous Vianney Pacheco MD   5,000 Units at 04/28/18 6140    0.9% sodium chloride infusion  125 mL/hr IntraVENous CONTINUOUS Zoe Alvarez  mL/hr at 04/28/18 0107 125 mL/hr at 04/28/18 0107    imipramine (TOFRANIL) tablet 10 mg  10 mg Oral TID Zoe Alvarez MD           Past History     Past Medical History:  Past Medical History:   Diagnosis Date    CAD (coronary artery disease)     Cancer ([facility] [address]     prostate    [facility]     GERD (gastroesophageal reflux disease)     Hypertension     Other ill-defined conditions(799.89) elevated cholesterol       Past Surgical History:  Past Surgical History:   Procedure Laterality Date    CARDIAC SURG PROCEDURE UNLIST      cabg x5 vessels,cardiologist  and david at [facility]    COLONOSCOPY N/A 8/31/2016    COLONOSCOPY performed by Nani Grady MD at [facility]OSCOPY    COLORECTAL SCRN; HI RISK IND  8/31/2016         HX PROSTATECTOMY      [place] COLONOSCOPY FLX DX W/COLLJ SPEC WHEN PFRMD  4/13/2011            Family History:  History reviewed. No pertinent family history. Social History:  Social History   Substance Use Topics    Smoking status: Former Smoker     Packs/day: 0.50     Years: 20.00     Quit date: 4/12/1982    Smokeless tobacco: Never Used    Alcohol use No       Allergies:  No Known Allergies    Review of Systems   Review of Systems   Constitutional: Negative. Negative for activity change, appetite change, chills, fatigue, fever and unexpected weight change. HENT: Negative. Negative for congestion, hearing loss, rhinorrhea, sneezing and voice change. Eyes: Negative. Negative for pain and visual disturbance. Respiratory: Negative. Negative for apnea, cough, choking, chest tightness and shortness of breath. Cardiovascular: Negative. Negative for chest pain and palpitations. Gastrointestinal: Negative. Negative for abdominal distention, abdominal pain, blood in stool, diarrhea, nausea and vomiting. Endocrine:        Positive for hyperglycemia   Genitourinary: Negative. Negative for difficulty urinating, flank pain, frequency and urgency. No discharge   Musculoskeletal: Negative. Negative for arthralgias, back pain, myalgias and neck stiffness. Skin: Negative. Negative for color change and rash. Neurological: Positive for tremors (Right hand). Negative for dizziness, seizures, syncope, speech difficulty, weakness, numbness and headaches. Hematological: Negative for adenopathy.    Psychiatric/Behavioral: Positive for confusion. Negative for agitation, behavioral problems, dysphoric mood and suicidal ideas. The patient is not nervous/anxious. Physical Exam   Physical Exam   Constitutional: He appears well-developed and well-nourished. No distress. HENT:   Head: Normocephalic and atraumatic. Mouth/Throat: Oropharynx is clear and moist. No oropharyngeal exudate. Eyes: Conjunctivae and EOM are normal. Pupils are equal, round, and reactive to light. Right eye exhibits no discharge. Left eye exhibits no discharge. Neck: Normal range of motion. Neck supple. Cardiovascular: Regular rhythm and intact distal pulses. Tachycardia present. Exam reveals no gallop and no friction rub. No murmur heard. Pulmonary/Chest: Effort normal and breath sounds normal. No respiratory distress. He has no wheezes. He has no rales. He exhibits no tenderness. Abdominal: Soft. Bowel sounds are normal. He exhibits no distension and no mass. There is no tenderness. There is no rebound and no guarding. Musculoskeletal: Normal range of motion. He exhibits no edema. Lymphadenopathy:     He has no cervical adenopathy. Neurological: He is alert. No cranial nerve deficit. Coordination normal.   Oriented x 1, no neurological embarrassment noted to right hand. Skin: Skin is warm and dry. No rash noted. No erythema. Psychiatric: He has a normal mood and affect. Nursing note and vitals reviewed.       Diagnostic Study Results   Labs -     Recent Results (from the past 12 hour(s))   GLUCOSE, POC    Collection Time: 04/27/18  7:52 PM   Result Value Ref Range    Glucose (POC) 383 (H) 65 - 100 mg/dL    Performed by Magdaleno Reyes    Collection Time: 04/27/18  7:52 PM   Result Value Ref Range    Glucose 383 mg/dL    Insulin order 5.9 units/hour    Insulin adminstered 5.9 units/hour    Multiplier 0.020     Low target 200 mg/dL    High target 300 mg/dL    D50 order 0.0 ml    D50 administered 0.00 ml    Minutes until next BG 60 min    Order initials RKJ     Administered initials BE     GLSCOM Comments     GLUCOSE, POC    Collection Time: 04/27/18  8:52 PM   Result Value Ref Range    Glucose (POC) 309 (H) 65 - 100 mg/dL    Performed by Tanya Tse    Collection Time: 04/27/18  8:54 PM   Result Value Ref Range    Glucose 309 mg/dL    Insulin order 7.5 units/hour    Insulin adminstered 7.5 units/hour    Multiplier 0.030     Low target 200 mg/dL    High target 300 mg/dL    D50 order 0.0 ml    D50 administered 0.00 ml    Minutes until next BG 60 min    Order initials sbh     Administered initials [facility]     GLSCOM Comments     METABOLIC PANEL, BASIC    Collection Time: 04/27/18  9:03 PM   Result Value Ref Range    Sodium 136 136 - 145 mmol/L    Potassium 3.4 (L) 3.5 - 5.1 mmol/L    Chloride 103 97 - 108 mmol/L    CO2 24 21 - 32 mmol/L    Anion gap 9 5 - 15 mmol/L    Glucose 311 (H) 65 - 100 mg/dL    BUN 30 (H) 6 - 20 MG/DL    Creatinine 1.62 (H) 0.70 - 1.30 MG/DL    BUN/Creatinine ratio 19 12 - 20      GFR est AA 50 (L) >60 ml/min/1.73m2    GFR est non-AA 42 (L) >60 ml/min/1.73m2    Calcium 8.3 (L) 8.5 - 10.1 MG/DL   MAGNESIUM    Collection Time: 04/27/18  9:03 PM   Result Value Ref Range    Magnesium 2.2 1.6 - 2.4 mg/dL   GLUCOSE, POC    Collection Time: 04/27/18  9:54 PM   Result Value Ref Range    Glucose (POC) 274 (H) 65 - 100 mg/dL    Performed by Enrique Garcia    Collection Time: 04/27/18  9:55 PM   Result Value Ref Range    Glucose 274 mg/dL    Insulin order 6.4 units/hour    Insulin adminstered 6.4 units/hour    Multiplier 0.030     Low target 200 mg/dL    High target 300 mg/dL    D50 order 0.0 ml    D50 administered 0.00 ml    Minutes until next BG 60 min    Order initials sbh     Administered initials sbh     GLSCOM Comments     GLUCOSE, POC    Collection Time: 04/27/18 10:56 PM   Result Value Ref Range    Glucose (POC) 221 (H) 65 - 100 mg/dL    Performed by Verenice Cleveland GLUCOSTABILIZER    Collection Time: 04/27/18 10:58 PM   Result Value Ref Range    Glucose 221 mg/dL    Insulin order 4.8 units/hour    Insulin adminstered 4.8 units/hour    Multiplier 0.030     Low target 200 mg/dL    High target 300 mg/dL    D50 order 0.0 ml    D50 administered 0.00 ml    Minutes until next BG 60 min    Order initials sbh     Administered initials sb     GLSCOM Comments     GLUCOSE, POC    Collection Time: 04/27/18 11:59 PM   Result Value Ref Range    Glucose (POC) 208 (H) 65 - 100 mg/dL    Performed by Garlin Geneva    Collection Time: 04/28/18 12:00 AM   Result Value Ref Range    Glucose 208 mg/dL    Insulin order 4.4 units/hour    Insulin adminstered 4.4 units/hour    Multiplier 0.030     Low target 200 mg/dL    High target 300 mg/dL    D50 order 0.0 ml    D50 administered 0.00 ml    Minutes until next BG 60 min    Order initials [facility]     Administered initials [facility]     GLSCOM Comments     GLUCOSE, POC    Collection Time: 04/28/18  1:04 AM   Result Value Ref Range    Glucose (POC) 170 (H) 65 - 100 mg/dL    Performed by Garlin Geneva    Collection Time: 04/28/18  1:05 AM   Result Value Ref Range    Glucose 170 mg/dL    Insulin order 2.2 units/hour    Insulin adminstered 2.2 units/hour    Multiplier 0.020     Low target 200 mg/dL    High target 300 mg/dL    D50 order 0.0 ml    D50 administered 0.00 ml    Minutes until next BG 60 min    Order initials      Administered initials      GLSCOM Comments     METABOLIC PANEL, BASIC    Collection Time: 04/28/18  1:09 AM   Result Value Ref Range    Sodium 137 136 - 145 mmol/L    Potassium 3.2 (L) 3.5 - 5.1 mmol/L    Chloride 106 97 - 108 mmol/L    CO2 24 21 - 32 mmol/L    Anion gap 7 5 - 15 mmol/L    Glucose 171 (H) 65 - 100 mg/dL    BUN 25 (H) 6 - 20 MG/DL    Creatinine 1.31 (H) 0.70 - 1.30 MG/DL    BUN/Creatinine ratio 19 12 - 20      GFR est AA >60 >60 ml/min/1.73m2    GFR est non-AA 53 (L) >60 ml/min/1.73m2 Calcium 8.6 8.5 - 10.1 MG/DL   MAGNESIUM    Collection Time: 04/28/18  1:09 AM   Result Value Ref Range    Magnesium 2.1 1.6 - 2.4 mg/dL   GLUCOSE, POC    Collection Time: 04/28/18  2:08 AM   Result Value Ref Range    Glucose (POC) 186 (H) 65 - 100 mg/dL    Performed by Jannette Camarena    Collection Time: 04/28/18  2:09 AM   Result Value Ref Range    Glucose 186 mg/dL    Insulin order 1.3 units/hour    Insulin adminstered 1.3 units/hour    Multiplier 0.010     Low target 200 mg/dL    High target 300 mg/dL    D50 order 0.0 ml    D50 administered 0.00 ml    Minutes until next BG 60 min    Order initials      Administered initials      GLSCOM Comments     GLUCOSE, POC    Collection Time: 04/28/18  3:18 AM   Result Value Ref Range    Glucose (POC) 174 (H) 65 - 100 mg/dL    Performed by Leo Loja    Collection Time: 04/28/18  3:19 AM   Result Value Ref Range    Glucose 174 mg/dL    Insulin order 0.0 units/hour    Insulin adminstered 0.0 units/hour    Multiplier 0.000     Low target 200 mg/dL    High target 300 mg/dL    D50 order 0.0 ml    D50 administered 0.00 ml    Minutes until next BG 60 min    Order initials      Administered initials      GLSCOM Comments     GLUCOSE, POC    Collection Time: 04/28/18  4:18 AM   Result Value Ref Range    Glucose (POC) 251 (H) 65 - 100 mg/dL    Performed by Leo Loja    Collection Time: 04/28/18  4:19 AM   Result Value Ref Range    Glucose 251 mg/dL    Insulin order 0.1 units/hour    Insulin adminstered 0.1 units/hour    Multiplier 0.000     Low target 200 mg/dL    High target 300 mg/dL    D50 order 0.0 ml    D50 administered 0.00 ml    Minutes until next BG 60 min    Order initials      Administered initials      GLSCOM Comments     GLUCOSE, POC    Collection Time: 04/28/18  5:28 AM   Result Value Ref Range    Glucose (POC) 272 (H) 65 - 100 mg/dL    Performed by Leo Loja    Collection Time: 04/28/18  5:29 AM   Result Value Ref Range    Glucose 272 mg/dL    Insulin order 0.1 units/hour    Insulin adminstered 0.1 units/hour    Multiplier 0.000     Low target 200 mg/dL    High target 300 mg/dL    D50 order 0.0 ml    D50 administered 0.00 ml    Minutes until next BG 60 min    Order initials      Administered initials      GLSCOM Comments     METABOLIC PANEL, BASIC    Collection Time: 04/28/18  6:06 AM   Result Value Ref Range    Sodium 134 (L) 136 - 145 mmol/L    Potassium 3.8 3.5 - 5.1 mmol/L    Chloride 102 97 - 108 mmol/L    CO2 23 21 - 32 mmol/L    Anion gap 9 5 - 15 mmol/L    Glucose 272 (H) 65 - 100 mg/dL    BUN 22 (H) 6 - 20 MG/DL    Creatinine 1.37 (H) 0.70 - 1.30 MG/DL    BUN/Creatinine ratio 16 12 - 20      GFR est AA >60 >60 ml/min/1.73m2    GFR est non-AA 51 (L) >60 ml/min/1.73m2    Calcium 8.8 8.5 - 10.1 MG/DL   MAGNESIUM    Collection Time: 04/28/18  6:06 AM   Result Value Ref Range    Magnesium 1.9 1.6 - 2.4 mg/dL   GLUCOSE, POC    Collection Time: 04/28/18  6:33 AM   Result Value Ref Range    Glucose (POC) 315 (H) 65 - 100 mg/dL    Performed by Chiquis Oshea    Collection Time: 04/28/18  6:33 AM   Result Value Ref Range    Glucose 315 mg/dL    Insulin order 2.6 units/hour    Insulin adminstered 2.6 units/hour    Multiplier 0.010     Low target 200 mg/dL    High target 300 mg/dL    D50 order 0.0 ml    D50 administered 0.00 ml    Minutes until next BG 60 min    Order initials      Administered initials      GLSCOM Comments     GLUCOSE, POC    Collection Time: 04/28/18  7:30 AM   Result Value Ref Range    Glucose (POC) 279 (H) 65 - 100 mg/dL    Performed by Diane Ballard        Radiologic Studies -     CT Results  (Last 48 hours)               04/27/18 1519  CT HEAD WO CONT Final result    Impression:  Impression:    1. No evidence of acute infarct or intracranial hemorrhage.    2. Minimal periventricular white matter disease is likely secondary to chronic small vessel ischemic changes. Narrative: Indication:  Confusion, delirium        Comparison: CT 2/27/2016       Findings: 5 mm axial images were obtained from the skull base through the   vertex. CT dose reduction was achieved through the use of a standardized protocol   tailored for this examination and automatic exposure control for dose   modulation. The ventricles and cortical sulci are prominent, compatible with age related   volume loss. There is no evidence of intracranial hemorrhage, mass, mass effect,   or acute infarct. There is periventricular white matter disease. No extra-axial   fluid collections are seen. There is chronic partial opacification of the left   maxillary sinus. The orbital structures are unremarkable. No osseous   abnormalities are seen. Medical Decision Making   I am the first provider for this patient. I reviewed the vital signs, available nursing notes, past medical history, past surgical history, family history and social history. Vital Signs-Reviewed the patient's vital signs. Patient Vitals for the past 12 hrs:   Temp Pulse Resp BP SpO2   04/28/18 0306 98.8 °F (37.1 °C) 68 18 168/89 100 %   04/28/18 0014 98.9 °F (37.2 °C) 77 18 157/79 97 %   04/27/18 2015 99 °F (37.2 °C) (!) 109 18 156/64 99 %   04/27/18 2000 - (!) 106 17 149/73 100 %   04/27/18 1939 - (!) 104 18 137/61 99 %       Pulse Oximetry Analysis - 100% on RA    Cardiac Monitor:   Rate: 109 bpm  Rhythm: Sinus Tachycardia     Records Reviewed: Nursing Notes, Old Medical Records, Previous Radiology Studies and Previous Laboratory Studies    Provider Notes (Medical Decision Making):   DDx: CVA, electrolyte abnormality, UTI, ARF, hyperglycemia, DKA, HHS. ED Course:   Initial assessment performed. The patients presenting problems have been discussed, and they are in agreement with the care plan formulated and outlined with them.   I have encouraged them to ask questions as they arise throughout their visit. Progress Note:  1:00 PM  The patient's POC BG is > 600. Written by ANYI Lindsey, as dictated by Jasmin Patel MD.    Progress Note:  2:20 PM  The patient's BG is 655. Written by ANYI Lindsey, as dictated by Jasmin Patel MD.    Progress Note:  4:21 PM  The patient and family were updated on the plan of care. They convey their understanding and agreement. Written by ANYI Lindsey, as dictated by Jasmin Patel MD.    CONSULT NOTE:   4:30 PM  Gilberto Patel MD spoke with Dr. Rashad Langley,   Specialty: Hospitalist  Discussed pt's hx, disposition, and available diagnostic and imaging results. Reviewed care plans. Consultant will evaluate pt for admission. Dr. Rashad Langley requests that the pt is started on an insulin drip. Written by ANYI Lindsey, as dictated by Jasmin Patel MD.    CRITICAL CARE NOTE :  IMPENDING DETERIORATION -CNS and Metabolic  ASSOCIATED RISK FACTORS - Metabolic changes and CNS Decompensation  MANAGEMENT- Bedside Assessment and Supervision of Care  INTERPRETATION -  CT Scan, ECG and Blood Pressure  INTERVENTIONS - hemodynamic mngmt, Neurologic interventions  and Metobolic interventions  CASE REVIEW - Hospitalist  TREATMENT RESPONSE -Stable  PERFORMED BY - Self    NOTES   :  I have spent 45 minutes of critical care time involved in lab review, consultations with specialist, family decision- making, bedside attention and documentation. During this entire length of time I was immediately available to the patient . Disposition:  Admit Note:  4:30 PM  Pt is being admitted by Dr. Rashad Langley. The results of their tests and reason(s) for their admission have been discussed with pt and/or available family. They convey agreement and understanding for the need to be admitted and for admission diagnosis. Diagnosis     Clinical Impression:   1. HERMILO (acute kidney injury) ([facility] [address]    2. Acute encephalopathy    3. Hyponatremia    4. Hyperglycemia    5. Hyperosmolar syndrome        Attestations: This note is prepared by Magdalena Garcia, acting as a Scribe for [employer] Ryan Ding, [address] [facility] Ryan Ding MD: The scribe's documentation has been prepared under my direction and personally reviewed by me in its entirety. I confirm that the notes above accurately reflects all work, treatment, procedures, and medical decision making performed by me. This note will not be viewable in [address]

## 2018-04-27 NOTE — ED NOTES
Attempted to give report to [facility]. She stated she would call me back after receiving her report.

## 2018-04-28 LAB
ADMINISTERED INITIALS, ADMINIT: NORMAL
AMMONIA PLAS-SCNC: 16 UMOL/L
ANION GAP SERPL CALC-SCNC: 7 MMOL/L (ref 5–15)
ANION GAP SERPL CALC-SCNC: 9 MMOL/L (ref 5–15)
BUN SERPL-MCNC: 22 MG/DL (ref 6–20)
BUN SERPL-MCNC: 25 MG/DL (ref 6–20)
BUN/CREAT SERPL: 16 (ref 12–20)
BUN/CREAT SERPL: 19 (ref 12–20)
CALCIUM SERPL-MCNC: 8.6 MG/DL (ref 8.5–10.1)
CALCIUM SERPL-MCNC: 8.8 MG/DL (ref 8.5–10.1)
CHLORIDE SERPL-SCNC: 102 MMOL/L (ref 97–108)
CHLORIDE SERPL-SCNC: 106 MMOL/L (ref 97–108)
CO2 SERPL-SCNC: 23 MMOL/L (ref 21–32)
CO2 SERPL-SCNC: 24 MMOL/L (ref 21–32)
CREAT SERPL-MCNC: 1.31 MG/DL (ref 0.7–1.3)
CREAT SERPL-MCNC: 1.37 MG/DL (ref 0.7–1.3)
D50 ADMINISTERED, D50ADM: 0 ML
D50 ADMINISTERED, D50ADM: NORMAL ML
D50 ORDER, D50ORD: 0 ML
D50 ORDER, D50ORD: NORMAL ML
GLSCOM COMMENTS: NORMAL
GLUCOSE BLD STRIP.AUTO-MCNC: 170 MG/DL (ref 65–100)
GLUCOSE BLD STRIP.AUTO-MCNC: 174 MG/DL (ref 65–100)
GLUCOSE BLD STRIP.AUTO-MCNC: 186 MG/DL (ref 65–100)
GLUCOSE BLD STRIP.AUTO-MCNC: 208 MG/DL (ref 65–100)
GLUCOSE BLD STRIP.AUTO-MCNC: 250 MG/DL (ref 65–100)
GLUCOSE BLD STRIP.AUTO-MCNC: 251 MG/DL (ref 65–100)
GLUCOSE BLD STRIP.AUTO-MCNC: 272 MG/DL (ref 65–100)
GLUCOSE BLD STRIP.AUTO-MCNC: 279 MG/DL (ref 65–100)
GLUCOSE BLD STRIP.AUTO-MCNC: 315 MG/DL (ref 65–100)
GLUCOSE BLD STRIP.AUTO-MCNC: 322 MG/DL (ref 65–100)
GLUCOSE BLD STRIP.AUTO-MCNC: 360 MG/DL (ref 65–100)
GLUCOSE BLD STRIP.AUTO-MCNC: 365 MG/DL (ref 65–100)
GLUCOSE BLD STRIP.AUTO-MCNC: 376 MG/DL (ref 65–100)
GLUCOSE BLD STRIP.AUTO-MCNC: 455 MG/DL (ref 65–100)
GLUCOSE BLD STRIP.AUTO-MCNC: 485 MG/DL (ref 65–100)
GLUCOSE SERPL-MCNC: 171 MG/DL (ref 65–100)
GLUCOSE SERPL-MCNC: 272 MG/DL (ref 65–100)
GLUCOSE, GLC: 170 MG/DL
GLUCOSE, GLC: 174 MG/DL
GLUCOSE, GLC: 186 MG/DL
GLUCOSE, GLC: 208 MG/DL
GLUCOSE, GLC: 250 MG/DL
GLUCOSE, GLC: 251 MG/DL
GLUCOSE, GLC: 272 MG/DL
GLUCOSE, GLC: 279 MG/DL
GLUCOSE, GLC: 315 MG/DL
GLUCOSE, GLC: 365 MG/DL
GLUCOSE, GLC: 376 MG/DL
GLUCOSE, GLC: 455 MG/DL
GLUCOSE, GLC: NORMAL MG/DL
HIGH TARGET, HITG: 300 MG/DL
HIGH TARGET, HITG: NORMAL MG/DL
INSULIN ADMINSTERED, INSADM: 0 UNITS/HOUR
INSULIN ADMINSTERED, INSADM: 0.1 UNITS/HOUR
INSULIN ADMINSTERED, INSADM: 0.1 UNITS/HOUR
INSULIN ADMINSTERED, INSADM: 1.3 UNITS/HOUR
INSULIN ADMINSTERED, INSADM: 1.9 UNITS/HOUR
INSULIN ADMINSTERED, INSADM: 12.6 UNITS/HOUR
INSULIN ADMINSTERED, INSADM: 2.2 UNITS/HOUR
INSULIN ADMINSTERED, INSADM: 2.2 UNITS/HOUR
INSULIN ADMINSTERED, INSADM: 2.6 UNITS/HOUR
INSULIN ADMINSTERED, INSADM: 4.4 UNITS/HOUR
INSULIN ADMINSTERED, INSADM: 7.9 UNITS/HOUR
INSULIN ADMINSTERED, INSADM: 9.2 UNITS/HOUR
INSULIN ADMINSTERED, INSADM: NORMAL UNITS/HOUR
INSULIN ORDER, INSORD: 0 UNITS/HOUR
INSULIN ORDER, INSORD: 0.1 UNITS/HOUR
INSULIN ORDER, INSORD: 0.1 UNITS/HOUR
INSULIN ORDER, INSORD: 1.3 UNITS/HOUR
INSULIN ORDER, INSORD: 1.9 UNITS/HOUR
INSULIN ORDER, INSORD: 12.6 UNITS/HOUR
INSULIN ORDER, INSORD: 2.2 UNITS/HOUR
INSULIN ORDER, INSORD: 2.2 UNITS/HOUR
INSULIN ORDER, INSORD: 2.6 UNITS/HOUR
INSULIN ORDER, INSORD: 4.4 UNITS/HOUR
INSULIN ORDER, INSORD: 7.9 UNITS/HOUR
INSULIN ORDER, INSORD: 9.2 UNITS/HOUR
INSULIN ORDER, INSORD: NORMAL UNITS/HOUR
LOW TARGET, LOT: 200 MG/DL
LOW TARGET, LOT: NORMAL MG/DL
MAGNESIUM SERPL-MCNC: 1.9 MG/DL (ref 1.6–2.4)
MAGNESIUM SERPL-MCNC: 2.1 MG/DL (ref 1.6–2.4)
MINUTES UNTIL NEXT BG, NBG: 60 MIN
MINUTES UNTIL NEXT BG, NBG: NORMAL MIN
MULTIPLIER, MUL: 0
MULTIPLIER, MUL: 0.01
MULTIPLIER, MUL: 0.02
MULTIPLIER, MUL: 0.02
MULTIPLIER, MUL: 0.03
MULTIPLIER, MUL: 0.03
MULTIPLIER, MUL: 0.04
MULTIPLIER, MUL: NORMAL
ORDER INITIALS, ORDINIT: NORMAL
POTASSIUM SERPL-SCNC: 3.2 MMOL/L (ref 3.5–5.1)
POTASSIUM SERPL-SCNC: 3.8 MMOL/L (ref 3.5–5.1)
SERVICE CMNT-IMP: ABNORMAL
SODIUM SERPL-SCNC: 134 MMOL/L (ref 136–145)
SODIUM SERPL-SCNC: 137 MMOL/L (ref 136–145)

## 2018-04-28 PROCEDURE — 80048 BASIC METABOLIC PNL TOTAL CA: CPT | Performed by: INTERNAL MEDICINE

## 2018-04-28 PROCEDURE — 82140 ASSAY OF AMMONIA: CPT | Performed by: INTERNAL MEDICINE

## 2018-04-28 PROCEDURE — 74011250636 HC RX REV CODE- 250/636

## 2018-04-28 PROCEDURE — 36415 COLL VENOUS BLD VENIPUNCTURE: CPT | Performed by: INTERNAL MEDICINE

## 2018-04-28 PROCEDURE — 74011636637 HC RX REV CODE- 636/637: Performed by: INTERNAL MEDICINE

## 2018-04-28 PROCEDURE — 74011250636 HC RX REV CODE- 250/636: Performed by: INTERNAL MEDICINE

## 2018-04-28 PROCEDURE — 74011000258 HC RX REV CODE- 258: Performed by: INTERNAL MEDICINE

## 2018-04-28 PROCEDURE — 65660000000 HC RM CCU STEPDOWN

## 2018-04-28 PROCEDURE — 74011250637 HC RX REV CODE- 250/637: Performed by: INTERNAL MEDICINE

## 2018-04-28 PROCEDURE — 83735 ASSAY OF MAGNESIUM: CPT | Performed by: INTERNAL MEDICINE

## 2018-04-28 PROCEDURE — 82962 GLUCOSE BLOOD TEST: CPT

## 2018-04-28 RX ORDER — INSULIN GLARGINE 100 [IU]/ML
60 INJECTION, SOLUTION SUBCUTANEOUS
Status: DISCONTINUED | OUTPATIENT
Start: 2018-04-28 | End: 2018-04-28

## 2018-04-28 RX ORDER — LORAZEPAM 2 MG/ML
INJECTION INTRAMUSCULAR
Status: COMPLETED
Start: 2018-04-28 | End: 2018-04-28

## 2018-04-28 RX ORDER — METOPROLOL TARTRATE 5 MG/5ML
1.25 INJECTION INTRAVENOUS
Status: DISCONTINUED | OUTPATIENT
Start: 2018-04-28 | End: 2018-05-07 | Stop reason: HOSPADM

## 2018-04-28 RX ORDER — HALOPERIDOL 5 MG/ML
1 INJECTION INTRAMUSCULAR
Status: DISCONTINUED | OUTPATIENT
Start: 2018-04-28 | End: 2018-04-29

## 2018-04-28 RX ORDER — SODIUM CHLORIDE 9 MG/ML
75 INJECTION, SOLUTION INTRAVENOUS CONTINUOUS
Status: DISCONTINUED | OUTPATIENT
Start: 2018-04-28 | End: 2018-04-29

## 2018-04-28 RX ORDER — GLIPIZIDE 5 MG/1
5 TABLET ORAL
Status: DISCONTINUED | OUTPATIENT
Start: 2018-04-28 | End: 2018-05-03

## 2018-04-28 RX ORDER — LORAZEPAM 2 MG/ML
2 INJECTION INTRAMUSCULAR ONCE
Status: COMPLETED | OUTPATIENT
Start: 2018-04-28 | End: 2018-04-28

## 2018-04-28 RX ORDER — INSULIN GLARGINE 100 [IU]/ML
60 INJECTION, SOLUTION SUBCUTANEOUS DAILY
Status: DISCONTINUED | OUTPATIENT
Start: 2018-04-28 | End: 2018-04-29

## 2018-04-28 RX ORDER — DEXTROSE 50 % IN WATER (D50W) INTRAVENOUS SYRINGE
12.5-25 AS NEEDED
Status: DISCONTINUED | OUTPATIENT
Start: 2018-04-28 | End: 2018-05-07 | Stop reason: HOSPADM

## 2018-04-28 RX ORDER — INSULIN LISPRO 100 [IU]/ML
INJECTION, SOLUTION INTRAVENOUS; SUBCUTANEOUS
Status: DISPENSED | OUTPATIENT
Start: 2018-04-28 | End: 2018-05-05

## 2018-04-28 RX ORDER — HALOPERIDOL 5 MG/ML
1 INJECTION INTRAMUSCULAR
Status: DISCONTINUED | OUTPATIENT
Start: 2018-04-28 | End: 2018-04-28

## 2018-04-28 RX ORDER — INSULIN LISPRO 100 [IU]/ML
11 INJECTION, SOLUTION INTRAVENOUS; SUBCUTANEOUS
Status: DISCONTINUED | OUTPATIENT
Start: 2018-04-28 | End: 2018-05-03

## 2018-04-28 RX ORDER — MAGNESIUM SULFATE 100 %
4 CRYSTALS MISCELLANEOUS AS NEEDED
Status: DISCONTINUED | OUTPATIENT
Start: 2018-04-28 | End: 2018-05-07 | Stop reason: HOSPADM

## 2018-04-28 RX ADMIN — ISOSORBIDE DINITRATE 30 MG: 20 TABLET ORAL at 08:38

## 2018-04-28 RX ADMIN — HALOPERIDOL LACTATE 1 MG: 5 INJECTION, SOLUTION INTRAMUSCULAR at 14:48

## 2018-04-28 RX ADMIN — INSULIN LISPRO 4 UNITS: 100 INJECTION, SOLUTION INTRAVENOUS; SUBCUTANEOUS at 21:24

## 2018-04-28 RX ADMIN — INDAPAMIDE 2.5 MG: 2.5 TABLET, FILM COATED ORAL at 08:31

## 2018-04-28 RX ADMIN — INSULIN LISPRO 7 UNITS: 100 INJECTION, SOLUTION INTRAVENOUS; SUBCUTANEOUS at 17:09

## 2018-04-28 RX ADMIN — HALOPERIDOL LACTATE 1 MG: 5 INJECTION, SOLUTION INTRAMUSCULAR at 20:08

## 2018-04-28 RX ADMIN — SODIUM CHLORIDE 125 ML/HR: 900 INJECTION, SOLUTION INTRAVENOUS at 01:07

## 2018-04-28 RX ADMIN — SODIUM CHLORIDE 75 ML/HR: 900 INJECTION, SOLUTION INTRAVENOUS at 11:45

## 2018-04-28 RX ADMIN — AMLODIPINE BESYLATE 10 MG: 5 TABLET ORAL at 08:32

## 2018-04-28 RX ADMIN — SODIUM CHLORIDE 9.2 UNITS/HR: 900 INJECTION, SOLUTION INTRAVENOUS at 13:18

## 2018-04-28 RX ADMIN — GLIPIZIDE 5 MG: 5 TABLET ORAL at 11:43

## 2018-04-28 RX ADMIN — Medication 10 ML: at 13:17

## 2018-04-28 RX ADMIN — HEPARIN SODIUM 5000 UNITS: 5000 INJECTION, SOLUTION INTRAVENOUS; SUBCUTANEOUS at 21:24

## 2018-04-28 RX ADMIN — Medication 10 ML: at 21:24

## 2018-04-28 RX ADMIN — ASPIRIN 81 MG CHEWABLE TABLET 81 MG: 81 TABLET CHEWABLE at 08:32

## 2018-04-28 RX ADMIN — INSULIN LISPRO 11 UNITS: 100 INJECTION, SOLUTION INTRAVENOUS; SUBCUTANEOUS at 13:15

## 2018-04-28 RX ADMIN — HEPARIN SODIUM 5000 UNITS: 5000 INJECTION, SOLUTION INTRAVENOUS; SUBCUTANEOUS at 13:17

## 2018-04-28 RX ADMIN — SODIUM CHLORIDE 0.1 UNITS/HR: 900 INJECTION, SOLUTION INTRAVENOUS at 04:20

## 2018-04-28 RX ADMIN — LORAZEPAM 2 MG: 2 INJECTION, SOLUTION INTRAMUSCULAR; INTRAVENOUS at 20:50

## 2018-04-28 RX ADMIN — LORAZEPAM 2 MG: 2 INJECTION INTRAMUSCULAR at 20:50

## 2018-04-28 RX ADMIN — SODIUM CHLORIDE 2.2 UNITS/HR: 900 INJECTION, SOLUTION INTRAVENOUS at 01:05

## 2018-04-28 RX ADMIN — INSULIN GLARGINE 60 UNITS: 100 INJECTION, SOLUTION SUBCUTANEOUS at 13:15

## 2018-04-28 RX ADMIN — METOPROLOL SUCCINATE 100 MG: 50 TABLET, EXTENDED RELEASE ORAL at 08:32

## 2018-04-28 RX ADMIN — SODIUM CHLORIDE 1.3 UNITS/HR: 900 INJECTION, SOLUTION INTRAVENOUS at 02:09

## 2018-04-28 RX ADMIN — ATORVASTATIN CALCIUM 10 MG: 40 TABLET, FILM COATED ORAL at 08:32

## 2018-04-28 RX ADMIN — SODIUM CHLORIDE 4.4 UNITS/HR: 900 INJECTION, SOLUTION INTRAVENOUS at 00:00

## 2018-04-28 RX ADMIN — GLIPIZIDE 5 MG: 5 TABLET ORAL at 17:10

## 2018-04-28 RX ADMIN — INSULIN LISPRO 2 UNITS: 100 INJECTION, SOLUTION INTRAVENOUS; SUBCUTANEOUS at 08:46

## 2018-04-28 RX ADMIN — SODIUM CHLORIDE 2.6 UNITS/HR: 900 INJECTION, SOLUTION INTRAVENOUS at 06:34

## 2018-04-28 RX ADMIN — SODIUM CHLORIDE 0 UNITS/HR: 900 INJECTION, SOLUTION INTRAVENOUS at 03:20

## 2018-04-28 RX ADMIN — Medication 10 ML: at 04:13

## 2018-04-28 RX ADMIN — HEPARIN SODIUM 5000 UNITS: 5000 INJECTION, SOLUTION INTRAVENOUS; SUBCUTANEOUS at 04:12

## 2018-04-28 RX ADMIN — INSULIN LISPRO 11 UNITS: 100 INJECTION, SOLUTION INTRAVENOUS; SUBCUTANEOUS at 17:09

## 2018-04-28 NOTE — PROGRESS NOTES
0720: Bedside shift change report given to Keyana Duque RN (oncoming nurse) by West Dance, RN (offgoing nurse). Report included the following information SBAR, Kardex, ED Summary, Procedure Summary, Intake/Output, MAR, Recent Results and Cardiac Rhythm NSR.     1130: Patient confused and attempting to get out of bed. Patient has a bed alarm. 1140: Patient's wife at bedside. Patient still remains anxious and attempting to get out of bed. Bed alarm under patient and tele sitter in room. 1330: Patient stating he is going to [employer] and is leaving. Patient is agitated and not following commands. Spoke with Dr. Chiqui Bowden and updated on patient situation. Orders received for Haldol IM. 1448: Haldol given, will continue to monitor patient. 1515: Patient states he is leaving and walking home. Dr. Chiqui Bowden updated that patient remains agitated and not following commands. Per MD will continue haldol and if pt remain non complaints PRN sitter order is placed. 1600: Patient visiting with family and is less agitated. 1930: Bedside shift change report given to Dia Berry RN (oncoming nurse) by Keyana Duque RN  (offgoing nurse). Report included the following information SBAR, Kardex, ED Summary, Procedure Summary, Intake/Output, MAR, Recent Results and Cardiac Rhythm NSR. '    2000: Code atlas called, patient is refusing to stay in room and walking through the halls and entered into CCU. Patient has become combative. Patient was escorted back to room. Patient remains agitated and not wanting to follow commands or be redirected.

## 2018-04-28 NOTE — PROGRESS NOTES
Hospitalist Progress Note    NAME: Meet Friend   :  1940   MRN:  [id number]       Assessment / Plan:  Hyperosmolar Non ketotic Hyperglycemia in Type 2 DM uncontrolled without coma POA  HERMILO/dehydration POA  psueudohyponatremia POA, resolved  Acute encephalopathy POA  -due to noncompliance with meds. Wife reports pt stopped taking insulin for over 6 weeks  -UA neg  -CT head neg  -bg improved on  Insulin gtt. Will resume home dose insulin and glipizide. Wean insulin gtt  -decrease IVF  -repeat bmp in the AM  -DTC consulted    HERMILO  -resolved with IVF  -cont' gentle IVF for another 24 hr    HTN  CAD s/p CABG  Hypercholesterolemia  -cont amlodipine, indapamide, Metoprolol  -cont ASA, statin, isordil     H/o Prostate Ca s/p resection, s/p radiation therapy  -cont Imipramine as at home  -pt is on Lupron shot monthyly with urologist (Dr Flaco Cardenas) as per the wife  -OP follow up as before.         Code Status: Full  Surrogate Decision Maker: wife Odalys Stager # [phone]   DVT Prophylaxis: Sq heparin  GI Prophylaxis: not indicated   Baseline: Pt is independent at home with ADLs, lives with wife  Body mass index is 28.76 kg/(m^2). Subjective:     Chief Complaint / Reason for Physician Visit  Patient seen at bedside. Mildly confused with slow speech. He denies complaints. I called his wife who states pt hasn't been taking any of his meds for over 6 weeks. Wife reports pt usually manages his own meds. Discussed with RN events overnight. Review of Systems:  Symptom Y/N Comments  Symptom Y/N Comments   Fever/Chills    Chest Pain     Poor Appetite    Edema     Cough    Abdominal Pain     Sputum    Joint Pain     SOB/ANGUIANO    Pruritis/Rash     Nausea/vomit    Tolerating PT/OT     Diarrhea    Tolerating Diet     Constipation    Other       Could NOT obtain due to: confused     Objective:     VITALS:   Last 24hrs VS reviewed since prior progress note.  Most recent are:  Patient Vitals for the past 24 hrs:   Temp Pulse Resp BP SpO2   04/28/18 0746 98.6 °F (37 °C) 100 16 171/87 100 %   04/28/18 0306 98.8 °F (37.1 °C) 68 18 168/89 100 %   04/28/18 0014 98.9 °F (37.2 °C) 77 18 157/79 97 %   04/27/18 2015 99 °F (37.2 °C) (!) 109 18 156/64 99 %   04/27/18 2000 - (!) 106 17 149/73 100 %   04/27/18 1939 - (!) 104 18 137/61 99 %   04/27/18 1900 - (!) 119 19 148/80 100 %   04/27/18 1800 - (!) 102 15 144/89 100 %   04/27/18 1758 - 88 16 144/89 100 %   04/27/18 1757 - 96 16 153/89 100 %   04/27/18 1700 - (!) 105 14 (!) 168/92 99 %   04/27/18 1628 - (!) 113 17 - 100 %   04/27/18 1626 - - - (!) 167/98 -   04/27/18 1600 - 87 15 160/90 100 %   04/27/18 1530 - 86 15 177/88 100 %   04/27/18 1430 - 84 16 (!) 148/93 100 %   04/27/18 1330 - 76 13 141/71 100 %   04/27/18 1252 97.4 °F (36.3 °C) (!) 106 14 (!) 153/100 99 %       Intake/Output Summary (Last 24 hours) at 04/28/18 0945  Last data filed at 04/28/18 1206   Gross per 24 hour   Intake             2240 ml   Output              850 ml   Net             1390 ml        PHYSICAL EXAM:  General: WD, WN. Alert, cooperative, no acute distress    EENT:  EOMI. Anicteric sclerae. MMM  Resp:  CTA bilaterally, no wheezing or rales. No accessory muscle use  CV:  tachycardic,  No edema  GI:  Soft, Non distended, Non tender.  +Bowel sounds  Neurologic:  Alert and oriented X 2, speech is slow but normal   Psych:    Not anxious nor agitated  Skin:  No rashes.   No jaundice    Reviewed most current lab test results and cultures  YES  Reviewed most current radiology test results   YES  Review and summation of old records today    NO  Reviewed patient's current orders and MAR    YES  PMH/SH reviewed - no change compared to H&P  ________________________________________________________________________  Care Plan discussed with:    Comments   Patient x    Family  x Wife via phone   RN x    Care Manager     Consultant                        Multidiciplinary team rounds were held today with , nursing, pharmacist and clinical coordinator. Patient's plan of care was discussed; medications were reviewed and discharge planning was addressed. ________________________________________________________________________  Total NON critical care TIME:  35   Minutes    Total CRITICAL CARE TIME Spent:   Minutes non procedure based      Comments   >50% of visit spent in counseling and coordination of care     ________________________________________________________________________  Shefali Brooks MD     Procedures: see electronic medical records for all procedures/Xrays and details which were not copied into this note but were reviewed prior to creation of Plan. LABS:  I reviewed today's most current labs and imaging studies.   Pertinent labs include:  Recent Labs      04/27/18   1317   WBC  4.5   HGB  14.1   HCT  40.0   PLT  229     Recent Labs      04/28/18   0606  04/28/18   0109  04/27/18   2103  04/27/18   1317   NA  134*  137  136  126*   K  3.8  3.2*  3.4*  4.3   CL  102  106  103  90*   CO2  23  24  24  27   GLU  272*  171*  311*  655*   BUN  22*  25*  30*  38*   CREA  1.37*  1.31*  1.62*  2.27*   CA  8.8  8.6  8.3*  9.8   MG  1.9  2.1  2.2   --    PHOS   --    --    --   4.2   ALB   --    --    --   4.3   TBILI   --    --    --   0.7   SGOT   --    --    --   11*   ALT   --    --    --   31       Signed: Shefali Brooks MD

## 2018-04-28 NOTE — PROGRESS NOTES
PCU SHIFT NURSING NOTE      Bedside and Verbal shift change report given to Tushar Ayon RN (oncoming nurse) by Jessica Toth ER (offgoing nurse). Report included the following information SBAR, Kardex, ED Summary, Intake/Output, MAR, Recent Results and Cardiac Rhythm Sinus Tach. Shift Summary: Received pt on er stretcher and pt moved to bed 2251. Wife and dght with pt but leaving to go home. Admission Date 4/27/2018   Admission Diagnosis Uncontrolled type 2 DM with hyperosmolar nonketotic hyperglycemia (HCC)  Hyponatremia  HERMILO (acute kidney injury) ([facility] [address]  Acute encephalopathy   Consults None        Consults   []PT   []OT   []Speech   []Case Management      [] Palliative      Cardiac Monitoring Order   []Yes   []No     IV drips   []Yes    Drip:                            Dose:  Drip:                            Dose:  Drip:                            Dose:   []No     GI Prophylaxis   []Yes   []No         DVT Prophylaxis   SCDs:             Jas stockings:         [] Medication   []Contraindicated   []None      Activity Level Activity Level: Bed Rest     Activity Assistance: Partial (one person)   Purposeful Rounding every 1-2 hour? []Yes   Kumar Score  Total Score: 4   Bed Alarm (If score 3 or >)   []Yes   [] Refused (See signed refusal form in chart)   Finn Score  Finn Score: 17   Finn Score (if score 14 or less)   []PMT consult   []Wound Care consult      []Specialty bed   [] Nutrition consult          Needs prior to discharge:   Home O2 required:    []Yes   []No    If yes, how much O2 required?     Other:    Last Bowel Movement: Last Bowel Movement Date: 04/26/18      Influenza Vaccine          Pneumonia Vaccine           Diet Active Orders   Diet    DIET DIABETIC CONSISTENT CARB Regular; AHA-LOW-CHOL FAT      LDAs               Peripheral IV 04/27/18 Right Antecubital (Active)   Site Assessment Clean, dry, & intact 4/27/2018  8:15 PM   Phlebitis Assessment 0 4/27/2018  8:15 PM   Infiltration Assessment 0 4/27/2018  8:15 PM   Dressing Status Clean, dry, & intact 4/27/2018  8:15 PM   Dressing Type Transparent 4/27/2018  8:15 PM   Hub Color/Line Status Pink; Infusing;Flushed 4/27/2018  8:15 PM   Action Taken Blood drawn 4/27/2018  1:11 PM   Alcohol Cap Used Yes 4/27/2018  8:15 PM       Peripheral IV 04/27/18 Right Hand (Active)   Site Assessment Clean, dry, & intact 4/27/2018  8:15 PM   Phlebitis Assessment 0 4/27/2018  8:15 PM   Infiltration Assessment 0 4/27/2018  8:15 PM   Dressing Status Clean, dry, & intact 4/27/2018  8:15 PM   Dressing Type Transparent 4/27/2018  8:15 PM   Hub Color/Line Status Pink;Flushed 4/27/2018  8:15 PM   Alcohol Cap Used Yes 4/27/2018  8:15 PM                      Urinary Catheter      Intake & Output   Date 04/27/18 0700 - 04/28/18 0659 04/28/18 0700 - 04/29/18 0659   Shift 4554-9664 2839-6312 24 Hour Total 2477-3649 9989-5963 24 Hour Total   I  N  T  A  K  E   P.O.  240 240         P. O.  240 240       I.V.  (mL/kg/hr)  2000 2000         I.V.  2000 2000       Shift Total  (mL/kg)  2240  (26.1) 2240  (26.1)      O  U  T  P  U  T   Urine  (mL/kg/hr)  400 400         Urine Voided  400 400         Urine Occurrence(s)  2 x 2 x       Shift Total  (mL/kg)  400  (4.7) 400  (4.7)      NET  1840 1840      Weight (kg) 85.8 85.8 85.8 85.8 85.8 85.8         Readmission Risk Assessment Tool Score Low Risk            9       Total Score        5 Pt. Coverage (Medicare=5 , Medicaid, or Self-Pay=4)    4 Charlson Comorbidity Score (Age + Comorbid Conditions)        Criteria that do not apply:    Has Seen PCP in Last 6 Months (Yes=3, No=0)    . Living with Significant Other. Assisted Living. LTAC. SNF.  or   Rehab    Patient Length of Stay (>5 days = 3)    IP Visits Last 12 Months (1-3=4, 4=9, >4=11)       Expected Length of Stay - - -   Actual Length of Stay 1

## 2018-04-29 LAB
ANION GAP SERPL CALC-SCNC: 9 MMOL/L (ref 5–15)
BASOPHILS # BLD: 0 K/UL (ref 0–0.1)
BASOPHILS NFR BLD: 1 % (ref 0–1)
BUN SERPL-MCNC: 20 MG/DL (ref 6–20)
BUN/CREAT SERPL: 15 (ref 12–20)
CALCIUM SERPL-MCNC: 9.3 MG/DL (ref 8.5–10.1)
CHLORIDE SERPL-SCNC: 103 MMOL/L (ref 97–108)
CO2 SERPL-SCNC: 25 MMOL/L (ref 21–32)
CREAT SERPL-MCNC: 1.3 MG/DL (ref 0.7–1.3)
DIFFERENTIAL METHOD BLD: ABNORMAL
EOSINOPHIL # BLD: 0.1 K/UL (ref 0–0.4)
EOSINOPHIL NFR BLD: 2 % (ref 0–7)
ERYTHROCYTE [DISTWIDTH] IN BLOOD BY AUTOMATED COUNT: 13.1 % (ref 11.5–14.5)
GLUCOSE BLD STRIP.AUTO-MCNC: 215 MG/DL (ref 65–100)
GLUCOSE BLD STRIP.AUTO-MCNC: 293 MG/DL (ref 65–100)
GLUCOSE BLD STRIP.AUTO-MCNC: 334 MG/DL (ref 65–100)
GLUCOSE BLD STRIP.AUTO-MCNC: 352 MG/DL (ref 65–100)
GLUCOSE BLD STRIP.AUTO-MCNC: 366 MG/DL (ref 65–100)
GLUCOSE SERPL-MCNC: 192 MG/DL (ref 65–100)
HCT VFR BLD AUTO: 39.8 % (ref 36.6–50.3)
HGB BLD-MCNC: 13.7 G/DL (ref 12.1–17)
IMM GRANULOCYTES # BLD: 0 K/UL (ref 0–0.04)
IMM GRANULOCYTES NFR BLD AUTO: 1 % (ref 0–0.5)
LYMPHOCYTES # BLD: 1.4 K/UL (ref 0.8–3.5)
LYMPHOCYTES NFR BLD: 32 % (ref 12–49)
MCH RBC QN AUTO: 29.7 PG (ref 26–34)
MCHC RBC AUTO-ENTMCNC: 34.4 G/DL (ref 30–36.5)
MCV RBC AUTO: 86.1 FL (ref 80–99)
MONOCYTES # BLD: 0.3 K/UL (ref 0–1)
MONOCYTES NFR BLD: 8 % (ref 5–13)
NEUTS SEG # BLD: 2.5 K/UL (ref 1.8–8)
NEUTS SEG NFR BLD: 57 % (ref 32–75)
NRBC # BLD: 0 K/UL (ref 0–0.01)
NRBC BLD-RTO: 0 PER 100 WBC
PLATELET # BLD AUTO: 219 K/UL (ref 150–400)
PMV BLD AUTO: 9.9 FL (ref 8.9–12.9)
POTASSIUM SERPL-SCNC: 3.3 MMOL/L (ref 3.5–5.1)
RBC # BLD AUTO: 4.62 M/UL (ref 4.1–5.7)
SERVICE CMNT-IMP: ABNORMAL
SODIUM SERPL-SCNC: 137 MMOL/L (ref 136–145)
WBC # BLD AUTO: 4.3 K/UL (ref 4.1–11.1)

## 2018-04-29 PROCEDURE — 65660000000 HC RM CCU STEPDOWN

## 2018-04-29 PROCEDURE — 74011636637 HC RX REV CODE- 636/637: Performed by: INTERNAL MEDICINE

## 2018-04-29 PROCEDURE — 74011250636 HC RX REV CODE- 250/636: Performed by: INTERNAL MEDICINE

## 2018-04-29 PROCEDURE — 36415 COLL VENOUS BLD VENIPUNCTURE: CPT | Performed by: INTERNAL MEDICINE

## 2018-04-29 PROCEDURE — 80048 BASIC METABOLIC PNL TOTAL CA: CPT | Performed by: INTERNAL MEDICINE

## 2018-04-29 PROCEDURE — 74011250637 HC RX REV CODE- 250/637: Performed by: INTERNAL MEDICINE

## 2018-04-29 PROCEDURE — 85025 COMPLETE CBC W/AUTO DIFF WBC: CPT | Performed by: INTERNAL MEDICINE

## 2018-04-29 PROCEDURE — 82962 GLUCOSE BLOOD TEST: CPT

## 2018-04-29 RX ORDER — LANOLIN ALCOHOL/MO/W.PET/CERES
6 CREAM (GRAM) TOPICAL
Status: DISCONTINUED | OUTPATIENT
Start: 2018-04-29 | End: 2018-04-29

## 2018-04-29 RX ORDER — QUETIAPINE FUMARATE 25 MG/1
50 TABLET, FILM COATED ORAL
Status: DISCONTINUED | OUTPATIENT
Start: 2018-04-29 | End: 2018-05-07 | Stop reason: HOSPADM

## 2018-04-29 RX ORDER — LANOLIN ALCOHOL/MO/W.PET/CERES
6 CREAM (GRAM) TOPICAL
Status: DISCONTINUED | OUTPATIENT
Start: 2018-04-29 | End: 2018-05-06

## 2018-04-29 RX ORDER — LORAZEPAM 2 MG/ML
1 INJECTION INTRAMUSCULAR
Status: DISCONTINUED | OUTPATIENT
Start: 2018-04-29 | End: 2018-05-07 | Stop reason: HOSPADM

## 2018-04-29 RX ORDER — INSULIN GLARGINE 100 [IU]/ML
65 INJECTION, SOLUTION SUBCUTANEOUS DAILY
Status: DISCONTINUED | OUTPATIENT
Start: 2018-04-30 | End: 2018-05-01

## 2018-04-29 RX ORDER — POTASSIUM CHLORIDE 20 MEQ/1
40 TABLET, EXTENDED RELEASE ORAL
Status: COMPLETED | OUTPATIENT
Start: 2018-04-29 | End: 2018-04-29

## 2018-04-29 RX ADMIN — AMLODIPINE BESYLATE 10 MG: 5 TABLET ORAL at 10:18

## 2018-04-29 RX ADMIN — ASPIRIN 81 MG CHEWABLE TABLET 81 MG: 81 TABLET CHEWABLE at 10:19

## 2018-04-29 RX ADMIN — Medication 10 ML: at 21:04

## 2018-04-29 RX ADMIN — GLIPIZIDE 5 MG: 5 TABLET ORAL at 17:00

## 2018-04-29 RX ADMIN — IMIPRAMINE HYDROCHLORIDE 10 MG: 10 TABLET ORAL at 21:05

## 2018-04-29 RX ADMIN — ISOSORBIDE DINITRATE 30 MG: 20 TABLET ORAL at 10:19

## 2018-04-29 RX ADMIN — INSULIN GLARGINE 60 UNITS: 100 INJECTION, SOLUTION SUBCUTANEOUS at 12:17

## 2018-04-29 RX ADMIN — MELATONIN TAB 3 MG 6 MG: 3 TAB at 21:18

## 2018-04-29 RX ADMIN — METOPROLOL SUCCINATE 100 MG: 50 TABLET, EXTENDED RELEASE ORAL at 10:18

## 2018-04-29 RX ADMIN — Medication 10 ML: at 17:02

## 2018-04-29 RX ADMIN — INSULIN LISPRO 11 UNITS: 100 INJECTION, SOLUTION INTRAVENOUS; SUBCUTANEOUS at 12:16

## 2018-04-29 RX ADMIN — INSULIN LISPRO 11 UNITS: 100 INJECTION, SOLUTION INTRAVENOUS; SUBCUTANEOUS at 09:27

## 2018-04-29 RX ADMIN — LORAZEPAM 1 MG: 2 INJECTION INTRAMUSCULAR; INTRAVENOUS at 23:50

## 2018-04-29 RX ADMIN — POTASSIUM CHLORIDE 40 MEQ: 20 TABLET, EXTENDED RELEASE ORAL at 10:18

## 2018-04-29 RX ADMIN — QUETIAPINE FUMARATE 50 MG: 25 TABLET ORAL at 21:04

## 2018-04-29 RX ADMIN — INSULIN LISPRO 4 UNITS: 100 INJECTION, SOLUTION INTRAVENOUS; SUBCUTANEOUS at 21:17

## 2018-04-29 RX ADMIN — IMIPRAMINE HYDROCHLORIDE 10 MG: 10 TABLET ORAL at 17:02

## 2018-04-29 RX ADMIN — INSULIN LISPRO 5 UNITS: 100 INJECTION, SOLUTION INTRAVENOUS; SUBCUTANEOUS at 17:00

## 2018-04-29 RX ADMIN — INSULIN LISPRO 11 UNITS: 100 INJECTION, SOLUTION INTRAVENOUS; SUBCUTANEOUS at 17:00

## 2018-04-29 RX ADMIN — HEPARIN SODIUM 5000 UNITS: 5000 INJECTION, SOLUTION INTRAVENOUS; SUBCUTANEOUS at 06:00

## 2018-04-29 RX ADMIN — INDAPAMIDE 2.5 MG: 2.5 TABLET, FILM COATED ORAL at 10:19

## 2018-04-29 RX ADMIN — Medication 10 ML: at 06:00

## 2018-04-29 RX ADMIN — HEPARIN SODIUM 5000 UNITS: 5000 INJECTION, SOLUTION INTRAVENOUS; SUBCUTANEOUS at 21:04

## 2018-04-29 RX ADMIN — ATORVASTATIN CALCIUM 10 MG: 40 TABLET, FILM COATED ORAL at 10:19

## 2018-04-29 RX ADMIN — INSULIN LISPRO 3 UNITS: 100 INJECTION, SOLUTION INTRAVENOUS; SUBCUTANEOUS at 09:27

## 2018-04-29 RX ADMIN — GLIPIZIDE 5 MG: 5 TABLET ORAL at 10:19

## 2018-04-29 RX ADMIN — INSULIN LISPRO 7 UNITS: 100 INJECTION, SOLUTION INTRAVENOUS; SUBCUTANEOUS at 12:15

## 2018-04-29 RX ADMIN — HEPARIN SODIUM 5000 UNITS: 5000 INJECTION, SOLUTION INTRAVENOUS; SUBCUTANEOUS at 16:59

## 2018-04-29 NOTE — PROGRESS NOTES
PCU SHIFT NURSING NOTE      Bedside and Verbal shift change report given to Hitesh Terry RN (oncoming nurse) by Chidi Ashley RN (offgoing nurse). Report included the following information SBAR, Kardex, Intake/Output, MAR, Recent Results and Cardiac Rhythm SR. Shift Summary:   1000 - Pt very confused; oriented to self only and trying to get out of bed. Provided incontinence care. Pt requesting to walk in hallway and still trying to get out of bed despite nursing tech's redirection. This nurse intervened and attempted to redirect pt's behavior to remain in pt's room as previous nurse stated that pt requested a walk in the hallway last night and disappeared from staff member walking in the foote. Pt requires a lot of encouragement to be redirected. Pt assisted back in bed to eat breakfast; call bell within reach and bed alarm intact. Telesitter remains at the bedside to promote safety. 1116 - Paged Dr. Micaela Henry regarding new neurology consult regarding confusion, unclear etiology; waiting for return call. 1120 - Dr. Micaela Henry notified of new consult and need to see pt. Admission Date 4/27/2018   Admission Diagnosis Uncontrolled type 2 DM with hyperosmolar nonketotic hyperglycemia (HCC)  Hyponatremia  HERMILO (acute kidney injury) ([facility] [address]  Acute encephalopathy   Consults None        Consults   []PT   []OT   []Speech   []Case Management      [] Palliative      Cardiac Monitoring Order   []Yes   []No     IV drips   []Yes    Drip:                            Dose:  Drip:                            Dose:  Drip:                            Dose:   []No     GI Prophylaxis   []Yes   []No         DVT Prophylaxis   SCDs:             Jas stockings:         [] Medication   []Contraindicated   []None      Activity Level Activity Level: Up with Assistance     Activity Assistance: Partial (one person)   Purposeful Rounding every 1-2 hour?    []Yes   Kumar Score  Total Score: 4   Bed Alarm (If score 3 or >)   []Yes   [] Refused (See signed refusal form in chart)   Finn Score  Finn Score: 18   Finn Score (if score 14 or less)   []PMT consult   []Wound Care consult      []Specialty bed   [] Nutrition consult          Needs prior to discharge:   Home O2 required:    []Yes   []No    If yes, how much O2 required? Other:    Last Bowel Movement: Last Bowel Movement Date: 04/26/17      Influenza Vaccine Received Flu Vaccine for Current Season (usually Sept-March): Yes        Pneumonia Vaccine           Diet Active Orders   Diet    DIET DIABETIC CONSISTENT CARB Regular; AHA-LOW-CHOL FAT      LDAs               Peripheral IV 04/27/18 Right Antecubital (Active)   Site Assessment Clean, dry, & intact 4/28/2018  8:54 PM   Phlebitis Assessment 0 4/28/2018  8:54 PM   Infiltration Assessment 0 4/28/2018  8:54 PM   Dressing Status Clean, dry, & intact 4/28/2018  8:54 PM   Dressing Type Transparent;Tape 4/28/2018  8:54 PM   Hub Color/Line Status Patent; Flushed; Infusing 4/28/2018  8:54 PM   Action Taken Open ports on tubing capped 4/28/2018  8:54 PM   Alcohol Cap Used Yes 4/28/2018  8:54 PM       Peripheral IV 04/27/18 Right Hand (Active)   Site Assessment Clean, dry, & intact 4/28/2018  8:54 PM   Phlebitis Assessment 0 4/28/2018  8:54 PM   Infiltration Assessment 0 4/28/2018  8:54 PM   Dressing Status Clean, dry, & intact 4/28/2018  8:54 PM   Dressing Type Transparent 4/28/2018  8:54 PM   Hub Color/Line Status Patent 4/28/2018  8:54 PM   Action Taken Open ports on tubing capped 4/28/2018  8:54 PM   Alcohol Cap Used Yes 4/28/2018  8:54 PM                      Urinary Catheter [REMOVED] Condom Catheter 04/28/18-Indications for Use: Strict I/Os, every 1-2 hours    Intake & Output   Date 04/28/18 0700 - 04/29/18 0659 04/29/18 0700 - 04/30/18 0659   Shift 7945-1769 7267-9943 24 Hour Total 1260-4266 1615-5885 24 Hour Total   I  N  T  A  K  E   Other 500  500         Irrigation Volume Input (mL) ([REMOVED] Condom Catheter 04/28/18) 500  500       Shift Total  (mL/kg) 500  (5.8)  500  (5.8)      O  U  T  P  U  T   Urine  (mL/kg/hr) 350  (0.3)  350  (0.2)         Urine Occurrence(s) 3 x  3 x         Urine Output (mL) ([REMOVED] Condom Catheter 04/28/18) 350  350       Stool            Stool Occurrence(s) 1 x  1 x       Shift Total  (mL/kg) 350  (4.1)  350  (4.1)        150      Weight (kg) 85.8 85.8 85.8 85.8 85.8 85.8         Readmission Risk Assessment Tool Score Low Risk            9       Total Score        5 Pt. Coverage (Medicare=5 , Medicaid, or Self-Pay=4)    4 Charlson Comorbidity Score (Age + Comorbid Conditions)        Criteria that do not apply:    Has Seen PCP in Last 6 Months (Yes=3, No=0)    . Living with Significant Other. Assisted Living. LTAC. SNF.  or   Rehab    Patient Length of Stay (>5 days = 3)    IP Visits Last 12 Months (1-3=4, 4=9, >4=11)       Expected Length of Stay - - -   Actual Length of Stay 2

## 2018-04-29 NOTE — PROGRESS NOTES
Hospitalist Progress Note    NAME: Sandie Medina   :  1940   MRN:  [id number]       Assessment / Plan:  Hyperosmolar Non ketotic Hyperglycemia in Type 2 DM uncontrolled without coma POA  HERMILO/dehydration POA  Psueudohyponatremia POA, resolved  -due to noncompliance with meds. Wife reports pt stopped taking insulin for over 6 weeks  -UA neg  -CT head neg  -cont' SSQ insulin, SSI  -decrease IVF  -repeat bmp in the AM  -DTC consulted    HERMILO, improving  Hypokalemia  -KCl repleted  -encourage PO intake    Acute encephalopathy POA]  ? Underlying dementia  -unlikely CVA as there is no neurological deficit other than confusion  -CT head neg, ammonia neg, no evidence of infection  -check B12, TSH  -start seroquel, ativan prn, melatonin qhs prn  -cont' tele sitter  -will consult neurology for further evaluation    HTN  CAD s/p CABG  Hypercholesterolemia  -cont amlodipine, indapamide, Metoprolol  -cont ASA, statin, isordil     H/o Prostate Ca s/p resection, s/p radiation therapy  -cont Imipramine as at home  -pt is on Lupron shot monthyly with urologist (Dr Hema Lucas) as per the wife  -OP follow up as before.         Code Status: Full  Surrogate Decision Maker: wife Claribel Carrera # [phone]   DVT Prophylaxis: Sq heparin  GI Prophylaxis: not indicated   Baseline: Pt is independent at home with ADLs, lives with wife  Body mass index is 28.76 kg/(m^2). Subjective:     Chief Complaint / Reason for Physician Visit  Patient seen at bedside. Confused overnight. Code atlas called. Pt received IV ativan, rested well afterward. Remains disoriented today. Discussed with RN events overnight.      Review of Systems:  Symptom Y/N Comments  Symptom Y/N Comments   Fever/Chills    Chest Pain     Poor Appetite    Edema     Cough    Abdominal Pain     Sputum    Joint Pain     SOB/ANGUIANO    Pruritis/Rash     Nausea/vomit    Tolerating PT/OT     Diarrhea    Tolerating Diet     Constipation    Other       Could NOT obtain due to: confused Objective:     VITALS:   Last 24hrs VS reviewed since prior progress note. Most recent are:  Patient Vitals for the past 24 hrs:   Temp Pulse Resp BP SpO2   04/29/18 0732 98 °F (36.7 °C) (!) 102 18 (!) 148/93 98 %   04/29/18 0354 97.8 °F (36.6 °C) 79 16 142/70 98 %   04/28/18 2325 97.8 °F (36.6 °C) 78 16 - 99 %   04/28/18 1944 98.3 °F (36.8 °C) 89 16 159/90 100 %   04/28/18 1429 98.6 °F (37 °C) 80 16 150/74 100 %   04/28/18 1123 98.7 °F (37.1 °C) 84 16 (!) 142/95 100 %       Intake/Output Summary (Last 24 hours) at 04/29/18 1104  Last data filed at 04/29/18 0826   Gross per 24 hour   Intake          2051.25 ml   Output              350 ml   Net          1701.25 ml        PHYSICAL EXAM:  General: WD, WN. Alert, cooperative, no acute distress    EENT:  EOMI. Anicteric sclerae. MMM  Resp:  CTA bilaterally, no wheezing or rales. No accessory muscle use  CV:  tachycardic,  No edema  GI:  Soft, Non distended, Non tender.  +BS  Neurologic:  Alert and oriented X 2, speech is slow but normal   Psych:    Not anxious nor agitated  Skin:  No rashes. No jaundice    Reviewed most current lab test results and cultures  YES  Reviewed most current radiology test results   YES  Review and summation of old records today    NO  Reviewed patient's current orders and MAR    YES  PMH/SH reviewed - no change compared to H&P  ________________________________________________________________________  Care Plan discussed with:    Comments   Patient x    Family      RN x    Care Manager     Consultant                        Multidiciplinary team rounds were held today with , nursing, pharmacist and clinical coordinator. Patient's plan of care was discussed; medications were reviewed and discharge planning was addressed.      ________________________________________________________________________  Total NON critical care TIME:  35   Minutes    Total CRITICAL CARE TIME Spent:   Minutes non procedure based      Comments   >50% of visit spent in counseling and coordination of care     ________________________________________________________________________  Gabi Hughes MD     Procedures: see electronic medical records for all procedures/Xrays and details which were not copied into this note but were reviewed prior to creation of Plan. LABS:  I reviewed today's most current labs and imaging studies.   Pertinent labs include:  Recent Labs      04/29/18   0235  04/27/18   1317   WBC  4.3  4.5   HGB  13.7  14.1   HCT  39.8  40.0   PLT  219  229     Recent Labs      04/29/18   0235  04/28/18   0606  04/28/18   0109  04/27/18   2103  04/27/18   1317   NA  137  134*  137  136  126*   K  3.3*  3.8  3.2*  3.4*  4.3   CL  103  102  106  103  90*   CO2  25  23  24  24  27   GLU  192*  272*  171*  311*  655*   BUN  20  22*  25*  30*  38*   CREA  1.30  1.37*  1.31*  1.62*  2.27*   CA  9.3  8.8  8.6  8.3*  9.8   MG   --   1.9  2.1  2.2   --    PHOS   --    --    --    --   4.2   ALB   --    --    --    --   4.3   TBILI   --    --    --    --   0.7   SGOT   --    --    --    --   11*   ALT   --    --    --    --   31       Signed: Gabi Hughes MD

## 2018-04-29 NOTE — PROGRESS NOTES

## 2018-04-29 NOTE — PROGRESS NOTES
1900  Bedside shift report received from [facility]. MILO, MAR,VS, KARDEX reviewed. Family at bedside visiting. 2015  Family left for the night. Pt is confused and agitated. Attempts to leave his room and floor. Code EMMANUEL called. Pt ran into CCU. Several staffers followed and brought him back to his room. All attempts to reorient pt futile. Hospitalist paged. New orders received. 2130 Ativan 2mg IV given x1 per orders. Pt assisted to bed, incontinence care given. Resting at this time. 0000 VSS. Pt sleeping/snoring at this time. 0400 Labs drawn and sent. Incontinence care given. Pt continues to be confused. 0715  Bedside shift report report given to Argenis ZAMORA. MILO,MAR, VS, KARDEX reviewed.

## 2018-04-29 NOTE — CONSULTS
NEUROLOGY NOTE     CONSULTED BY: Tomasita Lennox, MD    Chief Complaint   Patient presents with    High Blood Sugar     Ambulatory into the ED with c/o hyperglycemia. Per family, \"it wouldn't register. \" Has not been taking his insulin x several weeks.  Altered mental status     Per family x 2 months       Reason for Consult  I have been asked to see the patient in neurological consultation to render advice and opinion regarding altered mental status    HPI  Dex Acevedo is a 69 y.o. male who presents to the hospital because of altered mental status. Pt cannot provide any hx and states that he was sick and he came here and he is doing better. Hx obtained by chart review - \"Pt was brought in by family with CC of worsening Confusion  X days/weeks as per wife. H/o High sugar on the glucometer- unable to get a reading today as per wife H/o pt not taking his insulin for weeks as per pt's wife- pt denies it but is encephalopathic  H/o UTI 1-2m ago- took kelfex & cipro for it in feb as per PCP\"    Pt was hx with DKA and that has resolved. He continues to have memory problem and is agitated. I tried calling the wife to get collateral hx but the call was not answered. ROS  A ten system review of constitutional, cardiovascular, respiratory, musculoskeletal, endocrine, skin, SHEENT, genitourinary, psychiatric and neurologic systems was obtained and is unremarkable except as stated in HPI     PMH  Past Medical History:   Diagnosis Date    CAD (coronary artery disease)     Cancer ([facility]     prostate    Diabetes ([facility]     GERD (gastroesophageal reflux disease)     Hypertension     Other ill-defined conditions(799.89)     elevated cholesterol       FH  History reviewed. No pertinent family history.     SH  Social History     Social History    Marital status:      Spouse name: N/A    Number of children: N/A    Years of education: N/A     Social History Main Topics    Smoking status: Former Smoker Packs/day: 0.50     Years: 20.00     Quit date: 4/12/1982    Smokeless tobacco: Never Used    Alcohol use No    Drug use: No    Sexual activity: Not Asked     Other Topics Concern    None     Social History Narrative       ALLERGIES  No Known Allergies    PHYSICAL EXAM  EXAMINATION:   Patient Vitals for the past 24 hrs:   Temp Pulse Resp BP SpO2   04/29/18 1222 98.2 °F (36.8 °C) 77 20 131/80 97 %   04/29/18 0732 98 °F (36.7 °C) (!) 102 18 (!) 148/93 98 %   04/29/18 0354 97.8 °F (36.6 °C) 79 16 142/70 98 %   04/28/18 2325 97.8 °F (36.6 °C) 78 16 - 99 %   04/28/18 1944 98.3 °F (36.8 °C) 89 16 159/90 100 %   04/28/18 1429 98.6 °F (37 °C) 80 16 150/74 100 %        General:   General appearance: Pt is in no acute distress   Distal pulses are preserved  Fundoscopic exam: attempted    Neurological Examination:   Mental Status:  AAO x2 (place and self). Speech is fluent. Follows commands, has poor fund of knowledge, attention, short term recall, comprehension and insight. Cranial Nerves: Visual fields are full. PERRL, Extraocular movements are full. Facial sensation intact. Facial movement intact. Hearing intact to conversation. Palate elevates symmetrically. Shoulder shrug symmetric. Tongue midline. Motor: Strength is 5/5 in all 4 ext. Normal tone. No atrophy. Sensation: Normal to light touch    Reflexes: DTRs 2+ throughout. Coordination/Cerebellar: Intact to finger-nose-finger     Gait: deferred    Skin: No significant bruising or lacerations.     Mini Mental State Exam 4/29/2018   What is the Year 0   What is the Season 0   What is the Date 0   What is the Day 0   What is the Month 0   Where are we State 1   Where are we Country 1   Where are we [place] [facility] 1   [place] are we Floor 1   Name three objects, then ask the patient to say them 2   Serial sevens Subtract 7 from 100 in increments 2   Ask for the three objects repeated above 0   Name a pencil 1   Name a watch 1   Have the patient repeat this phrase \"No ifs, ands, or buts\" 0   Three stage command: Take the paper in your right hand 1   Fold the paper in half 1   Put the paper on the floor 0   Read and obey the following: CLOSE YOUR EYES 1   Have the patient write a sentence 0   Have the patient copy a figure 0   Mini Mental Score 13   Some recent data might be hidden       LAB DATA REVIEWED:    Recent Results (from the past 24 hour(s))   GLUCOSE, POC    Collection Time: 04/28/18  2:39 PM   Result Value Ref Range    Glucose (POC) 376 (H) 65 - 100 mg/dL    Performed by [facility]    Collection Time: 04/28/18  2:40 PM   Result Value Ref Range    Glucose 376 mg/dL    Insulin order 12.6 units/hour    Insulin adminstered 12.6 units/hour    Multiplier 0.040     Low target 200 mg/dL    High target 300 mg/dL    D50 order 0.0 ml    D50 administered 0.00 ml    Minutes until next BG 60 min    Order initials LF     Administered initials LF     GLSCOM Comments     GLUCOSE, POC    Collection Time: 04/28/18  4:58 PM   Result Value Ref Range    Glucose (POC) 322 (H) 65 - 100 mg/dL    Performed by Carlitos Zepeda    AMMONIA    Collection Time: 04/28/18  6:01 PM   Result Value Ref Range    Ammonia 16 <32 UMOL/L   GLUCOSE, POC    Collection Time: 04/28/18  8:21 PM   Result Value Ref Range    Glucose (POC) 352 (H) 65 - 100 mg/dL    Performed by Dakota Cortez, BASIC    Collection Time: 04/29/18  2:35 AM   Result Value Ref Range    Sodium 137 136 - 145 mmol/L    Potassium 3.3 (L) 3.5 - 5.1 mmol/L    Chloride 103 97 - 108 mmol/L    CO2 25 21 - 32 mmol/L    Anion gap 9 5 - 15 mmol/L    Glucose 192 (H) 65 - 100 mg/dL    BUN 20 6 - 20 MG/DL    Creatinine 1.30 0.70 - 1.30 MG/DL    BUN/Creatinine ratio 15 12 - 20      GFR est AA >60 >60 ml/min/1.73m2    GFR est non-AA 54 (L) >60 ml/min/1.73m2    Calcium 9.3 8.5 - 10.1 MG/DL   CBC WITH AUTOMATED DIFF    Collection Time: 04/29/18  2:35 AM   Result Value Ref Range    WBC 4.3 4.1 - 11.1 K/uL    RBC 4.62 4.10 - 5.70 M/uL    HGB 13.7 12.1 - 17.0 g/dL    HCT 39.8 36.6 - 50.3 %    MCV 86.1 80.0 - 99.0 FL    MCH 29.7 26.0 - 34.0 PG    MCHC 34.4 30.0 - 36.5 g/dL    RDW 13.1 11.5 - 14.5 %    PLATELET 759 805 - 315 K/uL    MPV 9.9 8.9 - 12.9 FL    NRBC 0.0 0  WBC    ABSOLUTE NRBC 0.00 0.00 - 0.01 K/uL    NEUTROPHILS 57 32 - 75 %    LYMPHOCYTES 32 12 - 49 %    MONOCYTES 8 5 - 13 %    EOSINOPHILS 2 0 - 7 %    BASOPHILS 1 0 - 1 %    IMMATURE GRANULOCYTES 1 (H) 0.0 - 0.5 %    ABS. NEUTROPHILS 2.5 1.8 - 8.0 K/UL    ABS. LYMPHOCYTES 1.4 0.8 - 3.5 K/UL    ABS. MONOCYTES 0.3 0.0 - 1.0 K/UL    ABS. EOSINOPHILS 0.1 0.0 - 0.4 K/UL    ABS. BASOPHILS 0.0 0.0 - 0.1 K/UL    ABS. IMM. GRANS. 0.0 0.00 - 0.04 K/UL    DF AUTOMATED     GLUCOSE, POC    Collection Time: 04/29/18  7:29 AM   Result Value Ref Range    Glucose (POC) 215 (H) 65 - 100 mg/dL    Performed by [address] [place] [address], POC    Collection Time: 04/29/18 11:33 AM   Result Value Ref Range    Glucose (POC) 334 (H) 65 - 100 mg/dL    Performed by [facility]         Imaging review:  CT Head  1. No evidence of acute infarct or intracranial hemorrhage. 2. Minimal periventricular white matter disease is likely secondary to chronic  small vessel ischemic changes. HOME MEDS  Prior to Admission Medications   Prescriptions Last Dose Informant Patient Reported? Taking? amLODIPine-benazepril (LOTREL) 10-40 mg per capsule Unknown at Unknown time Other Yes No   Sig: Take 1 Cap by mouth daily. aspirin 81 mg tablet Unknown at Unknown time Significant Other Yes No   Sig: Take 81 mg by mouth daily. atorvastatin (LIPITOR) 10 mg tablet Unknown at Unknown time Significant Other Yes No   Sig: Take 10 mg by mouth daily. cinnamon bark (CINNAMON) 500 mg Cap Unknown at Unknown time Significant Other Yes No   Sig: Take 2,000 mg by mouth daily.    glipiZIDE (GLUCOTROL) 5 mg tablet Unknown at Unknown time Significant Other Yes No   Sig: Take 5 mg by mouth two (2) times a day. imipramine (TOFRANIL) 10 mg tablet Unknown at Unknown time Significant Other Yes No   Sig: Take 10 mg by mouth three (3) times daily. indapamide (LOZOL) 2.5 mg tablet Unknown at Unknown time Other Yes No   Sig: Take 2.5 mg by mouth daily. insulin aspart protamine/insulin aspart (NOVOLOG MIX 70-30) 100 unit/mL (70-30) injection Unknown at Unknown time Other Yes No   Si Units by SubCUTAneous route Before breakfast and dinner. isosorbide dinitrate (ISORDIL) 30 mg tablet Unknown at Unknown time Other Yes No   Sig: Take 30 mg by mouth daily. megestrol (MEGACE) 20 mg tablet Unknown at Unknown time Significant Other Yes No   Sig: Take 20 mg by mouth two (2) times a day. metFORMIN (GLUMETZA ER) 500 mg TG24 24 hour tablet Unknown at Unknown time Other Yes No   Sig: Take 1,200 mg by mouth two (2) times daily (with meals). metoprolol succinate (TOPROL-XL) 100 mg tablet Unknown at Unknown time Other Yes No   Sig: Take 100 mg by mouth daily. omega-3 fatty acids-vitamin e (FISH OIL) 1,000 mg Cap Unknown at Unknown time Significant Other Yes No   Sig: Take 1 Cap by mouth daily. pseudoephedrine/acetaminophen (ALLEREST NO DROWSINESS PO) Unknown at Unknown time Significant Other Yes No   Sig: Take 1 Tab by mouth daily as needed (allergies).       Facility-Administered Medications: None       CURRENT MEDS  Current Facility-Administered Medications   Medication Dose Route Frequency    QUEtiapine (SEROquel) tablet 50 mg  50 mg Oral QHS    glipiZIDE (GLUCOTROL) tablet 5 mg  5 mg Oral ACB&D    insulin lispro (HUMALOG) injection   SubCUTAneous AC&HS    insulin lispro (HUMALOG) injection 11 Units  11 Units SubCUTAneous TIDAC    insulin glargine (LANTUS) injection 60 Units  60 Units SubCUTAneous DAILY    amLODIPine (NORVASC) tablet 10 mg  10 mg Oral DAILY    aspirin chewable tablet 81 mg  81 mg Oral DAILY    atorvastatin (LIPITOR) tablet 10 mg  10 mg Oral DAILY    indapamide (LOZOL) tablet 2.5 mg 2.5 mg Oral DAILY    isosorbide dinitrate (ISORDIL) tablet 30 mg  30 mg Oral DAILY    metoprolol succinate (TOPROL-XL) XL tablet 100 mg  100 mg Oral DAILY    sodium chloride (NS) flush 5-10 mL  5-10 mL IntraVENous Q8H    heparin (porcine) injection 5,000 Units  5,000 Units SubCUTAneous Q8H    imipramine (TOFRANIL) tablet 10 mg  10 mg Oral TID       IMPRESSION:  Shraddha Frausto is a 69 y.o. male who presents with increased confusion and agitation and was found to have hyperglycemai. Now that has resolved. Pt continues to have confusion. I tried calling wife but the call was not answered and hence could not get addition hx. His mmse is 13/30. I do suspect underlying dementia which was worsened by hyperglycemia and HERMILO. RECOMMENDATIONS:  1. Will get MRI brain  2. EEG  3. Neuropsychological testing as outpatient with Dr. Hi Wells or [facility], [address] [place] [address]. Phone: [phone] x [phone] -   Fax [phone]    Will follow up on imaging and eeg and then see him as outpatient. Thank you very much for this consultation.      Jimmie Rocha MD  Neurologist

## 2018-04-30 ENCOUNTER — APPOINTMENT (OUTPATIENT)
Dept: MRI IMAGING | Age: 78
DRG: 637 | End: 2018-04-30
Attending: PSYCHIATRY & NEUROLOGY
Payer: MEDICARE

## 2018-04-30 LAB
GLUCOSE BLD STRIP.AUTO-MCNC: 155 MG/DL (ref 65–100)
GLUCOSE BLD STRIP.AUTO-MCNC: 286 MG/DL (ref 65–100)
GLUCOSE BLD STRIP.AUTO-MCNC: 310 MG/DL (ref 65–100)
GLUCOSE BLD STRIP.AUTO-MCNC: 327 MG/DL (ref 65–100)
GLUCOSE BLD STRIP.AUTO-MCNC: >600 MG/DL (ref 65–100)
MAGNESIUM SERPL-MCNC: 2 MG/DL (ref 1.6–2.4)
SERVICE CMNT-IMP: ABNORMAL
TSH SERPL DL<=0.05 MIU/L-ACNC: 3.13 UIU/ML (ref 0.36–3.74)
VIT B12 SERPL-MCNC: 909 PG/ML (ref 193–986)

## 2018-04-30 PROCEDURE — 74011250637 HC RX REV CODE- 250/637: Performed by: INTERNAL MEDICINE

## 2018-04-30 PROCEDURE — 65660000000 HC RM CCU STEPDOWN

## 2018-04-30 PROCEDURE — 82607 VITAMIN B-12: CPT | Performed by: INTERNAL MEDICINE

## 2018-04-30 PROCEDURE — 84443 ASSAY THYROID STIM HORMONE: CPT | Performed by: INTERNAL MEDICINE

## 2018-04-30 PROCEDURE — 36415 COLL VENOUS BLD VENIPUNCTURE: CPT | Performed by: INTERNAL MEDICINE

## 2018-04-30 PROCEDURE — 70551 MRI BRAIN STEM W/O DYE: CPT

## 2018-04-30 PROCEDURE — 83735 ASSAY OF MAGNESIUM: CPT | Performed by: INTERNAL MEDICINE

## 2018-04-30 PROCEDURE — 74011636637 HC RX REV CODE- 636/637: Performed by: INTERNAL MEDICINE

## 2018-04-30 PROCEDURE — 74011250636 HC RX REV CODE- 250/636: Performed by: INTERNAL MEDICINE

## 2018-04-30 RX ADMIN — Medication 10 ML: at 12:04

## 2018-04-30 RX ADMIN — Medication 10 ML: at 21:30

## 2018-04-30 RX ADMIN — METOPROLOL SUCCINATE 100 MG: 50 TABLET, EXTENDED RELEASE ORAL at 08:01

## 2018-04-30 RX ADMIN — Medication 10 ML: at 05:33

## 2018-04-30 RX ADMIN — HEPARIN SODIUM 5000 UNITS: 5000 INJECTION, SOLUTION INTRAVENOUS; SUBCUTANEOUS at 16:27

## 2018-04-30 RX ADMIN — INSULIN GLARGINE 65 UNITS: 100 INJECTION, SOLUTION SUBCUTANEOUS at 12:03

## 2018-04-30 RX ADMIN — INSULIN LISPRO 5 UNITS: 100 INJECTION, SOLUTION INTRAVENOUS; SUBCUTANEOUS at 12:04

## 2018-04-30 RX ADMIN — INSULIN LISPRO 7 UNITS: 100 INJECTION, SOLUTION INTRAVENOUS; SUBCUTANEOUS at 16:44

## 2018-04-30 RX ADMIN — GLIPIZIDE 5 MG: 5 TABLET ORAL at 16:27

## 2018-04-30 RX ADMIN — HEPARIN SODIUM 5000 UNITS: 5000 INJECTION, SOLUTION INTRAVENOUS; SUBCUTANEOUS at 05:20

## 2018-04-30 RX ADMIN — INSULIN LISPRO 11 UNITS: 100 INJECTION, SOLUTION INTRAVENOUS; SUBCUTANEOUS at 16:44

## 2018-04-30 RX ADMIN — Medication 10 ML: at 05:20

## 2018-04-30 RX ADMIN — INSULIN LISPRO 11 UNITS: 100 INJECTION, SOLUTION INTRAVENOUS; SUBCUTANEOUS at 12:04

## 2018-04-30 RX ADMIN — IMIPRAMINE HYDROCHLORIDE 10 MG: 10 TABLET ORAL at 08:01

## 2018-04-30 RX ADMIN — HEPARIN SODIUM 5000 UNITS: 5000 INJECTION, SOLUTION INTRAVENOUS; SUBCUTANEOUS at 21:30

## 2018-04-30 RX ADMIN — LORAZEPAM 1 MG: 2 INJECTION INTRAMUSCULAR; INTRAVENOUS at 22:43

## 2018-04-30 RX ADMIN — ISOSORBIDE DINITRATE 30 MG: 20 TABLET ORAL at 08:00

## 2018-04-30 RX ADMIN — INSULIN LISPRO 11 UNITS: 100 INJECTION, SOLUTION INTRAVENOUS; SUBCUTANEOUS at 07:57

## 2018-04-30 RX ADMIN — INSULIN LISPRO 2 UNITS: 100 INJECTION, SOLUTION INTRAVENOUS; SUBCUTANEOUS at 07:58

## 2018-04-30 RX ADMIN — AMLODIPINE BESYLATE 10 MG: 5 TABLET ORAL at 08:00

## 2018-04-30 RX ADMIN — IMIPRAMINE HYDROCHLORIDE 10 MG: 10 TABLET ORAL at 16:27

## 2018-04-30 RX ADMIN — QUETIAPINE FUMARATE 50 MG: 25 TABLET ORAL at 21:30

## 2018-04-30 RX ADMIN — IMIPRAMINE HYDROCHLORIDE 10 MG: 10 TABLET ORAL at 21:30

## 2018-04-30 RX ADMIN — ASPIRIN 81 MG CHEWABLE TABLET 81 MG: 81 TABLET CHEWABLE at 08:00

## 2018-04-30 RX ADMIN — GLIPIZIDE 5 MG: 5 TABLET ORAL at 07:59

## 2018-04-30 RX ADMIN — INDAPAMIDE 2.5 MG: 2.5 TABLET, FILM COATED ORAL at 08:01

## 2018-04-30 RX ADMIN — ATORVASTATIN CALCIUM 10 MG: 40 TABLET, FILM COATED ORAL at 08:01

## 2018-04-30 RX ADMIN — INSULIN LISPRO 2 UNITS: 100 INJECTION, SOLUTION INTRAVENOUS; SUBCUTANEOUS at 21:30

## 2018-04-30 NOTE — PROGRESS NOTES
Reason for Admission:   Patient came to hospital for worsening ams and elevated blood sugar. Per wife patient has been altered over past two months but she noticed significant change since day before admission. Blood glucose was evaluated and was reading over 600 on his home glucometer. He has not been taking his insulin per wife. She states prior to this patient was independent and able to ambulate without using assistive devices. He does not use home oxygen. He has never had home health or rehab in the past. She states he was driving up until Thursday. He gets his prescriptions filled at [facility] in [place]. RRAT Score:    9                 Plan for utilizing home health:    He and his family is open to home health/snf if needed when assessed. Likelihood of Readmission:  Low                         Transition of Care Plan:        Patient will follow up with pcp and other healthcare team when discharged. Care Management Interventions  PCP Verified by CM:  Yes  Transition of Care Consult (CM Consult): Discharge Planning  Discharge Durable Medical Equipment:  (Patient has glaucometer at home. )  Current Support Network: Lives with Spouse  Confirm Follow Up Transport: Family  Plan discussed with Pt/Family/Caregiver: Yes  Discharge Location  Discharge Placement: Home

## 2018-04-30 NOTE — PROGRESS NOTES
PCU SHIFT NURSING NOTE      Bedside and Verbal shift change report given to Beth Duverney, RN (oncoming nurse) by Jordan Jacobson RN (offgoing nurse). Report included the following information SBAR, Kardex, Intake/Output, MAR, Recent Results and Cardiac Rhythm SR. Shift Summary:   200 - Spoke with pt's wife via phone; wife unable to answer MRI screening questions at this time. She said she would be here around 1115 and answer those questions upon arrival. Made Dr. Brett Pabon aware. 1209 - MRI screening completed with wife at bedside and faxed to MRI. Admission Date 4/27/2018   Admission Diagnosis Uncontrolled type 2 DM with hyperosmolar nonketotic hyperglycemia (HCC)  Hyponatremia  HERMILO (acute kidney injury) ([facility]  Acute encephalopathy   Consults IP CONSULT TO NEUROLOGY        Consults   []PT   []OT   []Speech   []Case Management      [] Palliative      Cardiac Monitoring Order   []Yes   []No     IV drips   []Yes    Drip:                            Dose:  Drip:                            Dose:  Drip:                            Dose:   []No     GI Prophylaxis   []Yes   []No         DVT Prophylaxis   SCDs:             Jas stockings:         [] Medication   []Contraindicated   []None      Activity Level Activity Level: Up with Assistance     Activity Assistance: Partial (one person)   Purposeful Rounding every 1-2 hour? []Yes   Kumar Score  Total Score: 4   Bed Alarm (If score 3 or >)   []Yes   [] Refused (See signed refusal form in chart)   Finn Score  Finn Score: 18   Finn Score (if score 14 or less)   []PMT consult   []Wound Care consult      []Specialty bed   [] Nutrition consult          Needs prior to discharge:   Home O2 required:    []Yes   []No    If yes, how much O2 required?     Other:    Last Bowel Movement: Last Bowel Movement Date: 04/26/17      Influenza Vaccine Received Flu Vaccine for Current Season (usually Sept-March): Yes        Pneumonia Vaccine           Diet Active Orders   Diet    DIET DIABETIC CONSISTENT CARB Regular; AHA-LOW-CHOL FAT      LDAs               Peripheral IV 04/27/18 Right Antecubital (Active)   Site Assessment Clean, dry, & intact 4/29/2018  3:12 PM   Phlebitis Assessment 0 4/29/2018  3:12 PM   Infiltration Assessment 0 4/29/2018  3:12 PM   Dressing Status Clean, dry, & intact 4/29/2018  3:12 PM   Dressing Type Tape;Transparent 4/29/2018  3:12 PM   Hub Color/Line Status Pink;Capped 4/29/2018  3:12 PM   Action Taken Open ports on tubing capped 4/28/2018  8:54 PM   Alcohol Cap Used Yes 4/28/2018  8:54 PM       Peripheral IV 04/27/18 Right Hand (Active)   Site Assessment Clean, dry, & intact 4/29/2018  3:12 PM   Phlebitis Assessment 0 4/29/2018  3:12 PM   Infiltration Assessment 0 4/29/2018  3:12 PM   Dressing Status Clean, dry, & intact 4/29/2018  3:12 PM   Dressing Type Tape;Transparent 4/29/2018  3:12 PM   Hub Color/Line Status Pink;Capped 4/29/2018  3:12 PM   Action Taken Open ports on tubing capped 4/28/2018  8:54 PM   Alcohol Cap Used Yes 4/28/2018  8:54 PM                      Urinary Catheter [REMOVED] Condom Catheter 04/28/18-Indications for Use: Strict I/Os, every 1-2 hours    Intake & Output   Date 04/29/18 0700 - 04/30/18 0659 04/30/18 0700 - 05/01/18 0659   Shift 0700-1859 1900-0659 24 Hour Total 0700-1859 1900-0659 24 Hour Total   I  N  T  A  K  E   I.V.  (mL/kg/hr) 108.8  (0.1)  108.8  (0.1)         Volume (0.9% sodium chloride infusion) 108.8  108.8       Shift Total  (mL/kg) 108.8  (1.3)  108.8  (1.3)      O  U  T  P  U  T   Urine  (mL/kg/hr)            Urine Occurrence(s)  3 x 3 x       Shift Total  (mL/kg)         .8  108.8      Weight (kg) 85.8 85.8 85.8 85.8 85.8 85.8         Readmission Risk Assessment Tool Score Low Risk            9       Total Score        5 Pt.  Coverage (Medicare=5 , Medicaid, or Self-Pay=4)    4 Charlson Comorbidity Score (Age + Comorbid Conditions)        Criteria that do not apply:    Has Seen PCP in Last 6 Months (Yes=3, No=0) . Living with Significant Other. Assisted Living. LTAC. SNF.  or   Rehab    Patient Length of Stay (>5 days = 3)    IP Visits Last 12 Months (1-3=4, 4=9, >4=11)       Expected Length of Stay - - -   Actual Length of Stay 3

## 2018-04-30 NOTE — PROGRESS NOTES

## 2018-04-30 NOTE — PROGRESS NOTES
NEUROLOGY NOTE       Chief Complaint   Patient presents with    High Blood Sugar     Ambulatory into the ED with c/o hyperglycemia. Per family, \"it wouldn't register. \" Has not been taking his insulin x several weeks.  Altered mental status     Per family x 2 months       SUBJECTIVE:  Memory slightly better    HPI  Sandie Medina is a 69 y.o. male who presents to the hospital because of altered mental status. Pt cannot provide any hx and states that he was sick and he came here and he is doing better. Hx obtained by chart review - \"Pt was brought in by family with CC of worsening Confusion  X days/weeks as per wife. H/o High sugar on the glucometer- unable to get a reading today as per wife H/o pt not taking his insulin for weeks as per pt's wife- pt denies it but is encephalopathic  H/o UTI 1-2m ago- took kelfex & cipro for it in feb as per PCP\"    Pt was hx with DKA and that has resolved. He continues to have memory problem and is agitated. I tried calling the wife to get collateral hx but the call was not answered. ROS  A ten system review of constitutional, cardiovascular, respiratory, musculoskeletal, endocrine, skin, SHEENT, genitourinary, psychiatric and neurologic systems was obtained and is unremarkable except as stated in HPI     PMH  Past Medical History:   Diagnosis Date    CAD (coronary artery disease)     Cancer ([facility]     prostate    Diabetes ([facility]     GERD (gastroesophageal reflux disease)     Hypertension     Other ill-defined conditions(799.89)     elevated cholesterol       FH  History reviewed. No pertinent family history.     SH  Social History     Social History    Marital status:      Spouse name: N/A    Number of children: N/A    Years of education: N/A     Social History Main Topics    Smoking status: Former Smoker     Packs/day: 0.50     Years: 20.00     Quit date: 4/12/1982    Smokeless tobacco: Never Used    Alcohol use No    Drug use: No    Sexual activity: Not Asked     Other Topics Concern    None     Social History Narrative       ALLERGIES  No Known Allergies    PHYSICAL EXAM  EXAMINATION:   Patient Vitals for the past 24 hrs:   Temp Pulse Resp BP SpO2   04/30/18 1056 98.2 °F (36.8 °C) 85 20 101/61 -   04/30/18 0711 98 °F (36.7 °C) 78 20 150/81 -   04/30/18 0307 97.8 °F (36.6 °C) 81 20 138/79 -   04/29/18 2309 98.2 °F (36.8 °C) (!) 107 20 111/62 98 %   04/29/18 1914 98.6 °F (37 °C) (!) 108 20 154/80 -   04/29/18 1512 98 °F (36.7 °C) (!) 104 20 142/82 97 %   04/29/18 1222 98.2 °F (36.8 °C) 77 20 131/80 97 %        General:   General appearance: Pt is in no acute distress   Distal pulses are preserved  Fundoscopic exam: attempted    Neurological Examination:   Mental Status:  AAO x3 (place and self and year. Does not know the month). Speech is fluent. Follows commands, has poor fund of knowledge, attention, short term recall, comprehension and insight. Cranial Nerves: Visual fields are full. PERRL, Extraocular movements are full. Facial sensation intact. Facial movement intact. Hearing intact to conversation. Palate elevates symmetrically. Shoulder shrug symmetric. Tongue midline. Motor: Strength is 5/5 in all 4 ext. Normal tone. No atrophy. Sensation: Normal to light touch    Coordination/Cerebellar: Intact to finger-nose-finger     Gait: deferred    Skin: No significant bruising or lacerations.     Mini Mental State Exam 4/29/2018   What is the Year 0   What is the Season 0   What is the Date 0   What is the Day 0   What is the Month 0   Where are we State 1   Where are we Country 1   Where are we [place] or [facility] ED 1   [place] are we Floor 1   Name three objects, then ask the patient to say them 2   Serial sevens Subtract 7 from 100 in increments 2   Ask for the three objects repeated above 0   Name a pencil 1   Name a watch 1   Have the patient repeat this phrase \"No ifs, ands, or buts\" 0   Three stage command: Take the paper in your right hand 1   Fold the paper in half 1   Put the paper on the floor 0   Read and obey the following: CLOSE YOUR EYES 1   Have the patient write a sentence 0   Have the patient copy a figure 0   Mini Mental Score 13   Some recent data might be hidden       LAB DATA REVIEWED:    Recent Results (from the past 24 hour(s))   GLUCOSE, POC    Collection Time: 04/29/18 11:33 AM   Result Value Ref Range    Glucose (POC) 334 (H) 65 - 100 mg/dL    Performed by [address] [place] [address], POC    Collection Time: 04/29/18  4:50 PM   Result Value Ref Range    Glucose (POC) 293 (H) 65 - 100 mg/dL    Performed by Alejandro Michelle, POC    Collection Time: 04/29/18  9:11 PM   Result Value Ref Range    Glucose (POC) 366 (H) 65 - 100 mg/dL    Performed by Edison Watts    MAGNESIUM    Collection Time: 04/30/18  3:04 AM   Result Value Ref Range    Magnesium 2.0 1.6 - 2.4 mg/dL   VITAMIN B12    Collection Time: 04/30/18  3:04 AM   Result Value Ref Range    Vitamin B12 909 193 - 986 pg/mL   TSH 3RD GENERATION    Collection Time: 04/30/18  3:04 AM   Result Value Ref Range    TSH 3.13 0.36 - 3.74 uIU/mL   GLUCOSE, POC    Collection Time: 04/30/18  7:14 AM   Result Value Ref Range    Glucose (POC) 155 (H) 65 - 100 mg/dL    Performed by Barbara Davis    GLUCOSE, POC    Collection Time: 04/30/18 11:03 AM   Result Value Ref Range    Glucose (POC) 286 (H) 65 - 100 mg/dL    Performed by Barbara Davis         Imaging review:  CT Head  1. No evidence of acute infarct or intracranial hemorrhage. 2. Minimal periventricular white matter disease is likely secondary to chronic  small vessel ischemic changes. HOME MEDS  Prior to Admission Medications   Prescriptions Last Dose Informant Patient Reported? Taking? amLODIPine-benazepril (LOTREL) 10-40 mg per capsule Unknown at Unknown time Other Yes No   Sig: Take 1 Cap by mouth daily. aspirin 81 mg tablet Unknown at Unknown time Significant Other Yes No   Sig: Take 81 mg by mouth daily.    atorvastatin (LIPITOR) 10 mg tablet Unknown at Unknown time Significant Other Yes No   Sig: Take 10 mg by mouth daily. cinnamon bark (CINNAMON) 500 mg Cap Unknown at Unknown time Significant Other Yes No   Sig: Take 2,000 mg by mouth daily. glipiZIDE (GLUCOTROL) 5 mg tablet Unknown at Unknown time Significant Other Yes No   Sig: Take 5 mg by mouth two (2) times a day. imipramine (TOFRANIL) 10 mg tablet Unknown at Unknown time Significant Other Yes No   Sig: Take 10 mg by mouth three (3) times daily. indapamide (LOZOL) 2.5 mg tablet Unknown at Unknown time Other Yes No   Sig: Take 2.5 mg by mouth daily. insulin aspart protamine/insulin aspart (NOVOLOG MIX 70-30) 100 unit/mL (70-30) injection Unknown at Unknown time Other Yes No   Si Units by SubCUTAneous route Before breakfast and dinner. isosorbide dinitrate (ISORDIL) 30 mg tablet Unknown at Unknown time Other Yes No   Sig: Take 30 mg by mouth daily. megestrol (MEGACE) 20 mg tablet Unknown at Unknown time Significant Other Yes No   Sig: Take 20 mg by mouth two (2) times a day. metFORMIN (GLUMETZA ER) 500 mg TG24 24 hour tablet Unknown at Unknown time Other Yes No   Sig: Take 1,200 mg by mouth two (2) times daily (with meals). metoprolol succinate (TOPROL-XL) 100 mg tablet Unknown at Unknown time Other Yes No   Sig: Take 100 mg by mouth daily. omega-3 fatty acids-vitamin e (FISH OIL) 1,000 mg Cap Unknown at Unknown time Significant Other Yes No   Sig: Take 1 Cap by mouth daily. pseudoephedrine/acetaminophen (ALLEREST NO DROWSINESS PO) Unknown at Unknown time Significant Other Yes No   Sig: Take 1 Tab by mouth daily as needed (allergies).       Facility-Administered Medications: None       CURRENT MEDS  Current Facility-Administered Medications   Medication Dose Route Frequency    QUEtiapine (SEROquel) tablet 50 mg  50 mg Oral QHS    insulin glargine (LANTUS) injection 65 Units  65 Units SubCUTAneous DAILY    glipiZIDE (GLUCOTROL) tablet 5 mg  5 mg Oral ACB&D    insulin lispro (HUMALOG) injection   SubCUTAneous AC&HS    insulin lispro (HUMALOG) injection 11 Units  11 Units SubCUTAneous TIDAC    amLODIPine (NORVASC) tablet 10 mg  10 mg Oral DAILY    aspirin chewable tablet 81 mg  81 mg Oral DAILY    atorvastatin (LIPITOR) tablet 10 mg  10 mg Oral DAILY    indapamide (LOZOL) tablet 2.5 mg  2.5 mg Oral DAILY    isosorbide dinitrate (ISORDIL) tablet 30 mg  30 mg Oral DAILY    metoprolol succinate (TOPROL-XL) XL tablet 100 mg  100 mg Oral DAILY    sodium chloride (NS) flush 5-10 mL  5-10 mL IntraVENous Q8H    heparin (porcine) injection 5,000 Units  5,000 Units SubCUTAneous Q8H    imipramine (TOFRANIL) tablet 10 mg  (Patient Supplied)  10 mg Oral TID       IMPRESSION:  Monica Patterson is a 69 y.o. male who presents with increased confusion and agitation and was found to have hyperglycemai. Now that has resolved. Pt continues to have confusion. I tried calling wife but the call was not answered and hence could not get addition hx. His mmse is 13/30. I do suspect underlying dementia which was worsened by hyperglycemia and HERMILO. RECOMMENDATIONS:  1. MRI brain - pending  2. EEG - pending  3. Neuropsychological testing as outpatient with Dr. Regla Cuevas or Dr. VIDYA Carrillo at :  [facility], [address] [place] [address]. Phone: [phone]   Fax [phone]    Will follow up on imaging and eeg and then see him as outpatient. Call with questions.          Connie Tracey MD  Neurologist

## 2018-04-30 NOTE — PROGRESS NOTES
Hospitalist Progress Note    NAME:  Darnell Meza  :  1940  MRN:  [id number]  PCP:  Suzanne Pendleton MD  Date of Service:  2018    Assessment & Plan:     Hyperosmolar Non ketotic Hyperglycemia in Type 2 DM uncontrolled without coma POA  HERMILO/dehydration POA  Psueudohyponatremia POA, resolved  - Due to medication noncompliance for over 6 weeks per wife. - Diabetic management with accuchecks with ISS coverage.  - Continue IVF  - Monitor labs  - DTC consulted     HERMILO, resolved  Hypokalemia  - monitor labs  - replace lytes as needed     Acute encephalopathy POA possible undiagnosed dementia  - Unlikely CVA as there is no neurological deficit other than confusion  - CT head neg, ammonia neg, no evidence of infection  - check RPR  - UA neg  - CT head neg  -  MRI brain: Mild atrophy & nonspecific white matter disease. No acute findings. - Start seroquel, ativan prn, melatonin qhs prn  - Continue tele-sitter  - Neurology consulted     HTN  CAD s/p CABG  Hypercholesterolemia  - Cont amlodipine, indapamide, Metoprolol  - Continue ASA, statin, isordil      H/o Prostate Ca s/p resection, s/p radiation therapy  - Cont Imipramine as at home  - Pt is on Lupron shot monthyly with urologist (Dr Eloisa Aquino) as per the wife  - OP follow up as before.      Code Status: Full  Surrogate Decision Maker: wife Hamzah Freire # [phone]   DVT Prophylaxis: Sq heparin  GI Prophylaxis: not indicated   Baseline: Pt is independent at home with ADLs, lives with wife  Body mass index is 28.76 kg/(m^2). Reason for visit:   Recheck AMS, DM, HERMILO    Interval history / Subjective:     Seen and examined. He is in NAD but very confused. MRI done with no acute changes.      Review of Systems:     Symptom Y/N Comments   Symptom Y/N Comments   Fever/Chills n     Chest Pain n      Poor Appetite       Edema        Cough       Abdominal Pain n      Sputum       Joint Pain        SOB/ANGUIANO n     Pruritis/Rash        Nausea/vomit  n     Tolerating PT/OT        Diarrhea      Tolerating Diet        Constipation       Other           Could NOT obtain due to:       Vital Signs:   Last 24hrs VS reviewed since prior progress note. Most recent are:  Visit Vitals    /81    Pulse 78    Temp 98 °F (36.7 °C)    Resp 20    Ht 5' 8\" (1.727 m)    Wt 85.8 kg (189 lb 2.5 oz)    SpO2 98%    BMI 28.76 kg/m2       No intake or output data in the 24 hours ending 04/30/18 1028     Physical Examination:   General: Sleepy, cooperative, NAD, confused   EENT:          EOMI. Anicteric sclerae. MMM  Resp:  CTA bilaterally with normal effort and good BS  CV:                 S1-S2, RRR, No edema  GI:                 SNTBS+, No HSM  Neurologic: Oriented only to self, normal speech  Psych:         Not anxious nor agitated  Skin:        No rashes. No jaundice  Additional pertinent findings:    Data Review:   Review and/or order of clinical lab test    Labs:     Recent Labs      04/29/18   0235  04/27/18   1317   WBC  4.3  4.5   HGB  13.7  14.1   HCT  39.8  40.0   PLT  219  229     Recent Labs      04/30/18   0304  04/29/18   0235  04/28/18   0606  04/28/18   0109   04/27/18   1317   NA   --   137  134*  137   < >  126*   K   --   3.3*  3.8  3.2*   < >  4.3   CL   --   103  102  106   < >  90*   CO2   --   25  23  24   < >  27   BUN   --   20  22*  25*   < >  38*   CREA   --   1.30  1.37*  1.31*   < >  2.27*   GLU   --   192*  272*  171*   < >  655*   CA   --   9.3  8.8  8.6   < >  9.8   MG  2.0   --   1.9  2.1   < >   --    PHOS   --    --    --    --    --   4.2    < > = values in this interval not displayed. Recent Labs      04/27/18   1317   SGOT  11*   ALT  31   AP  92   TBILI  0.7   TP  8.6*   ALB  4.3   GLOB  4.3*     No results for input(s): INR, PTP, APTT in the last 72 hours. No lab exists for component: INREXT   No results for input(s): FE, TIBC, PSAT, FERR in the last 72 hours.    No results found for: FOL, RBCF   No results for input(s): PH, PCO2, PO2 in the last 72 hours. No results for input(s): CPK, CKNDX, TROIQ in the last 72 hours.     No lab exists for component: CPKMB  No results found for: CHOL, CHOLX, CHLST, CHOLV, HDL, LDL, LDLC, DLDLP, TGLX, TRIGL, TRIGP, CHHD, CHHDX  Lab Results   Component Value Date/Time    Glucose (POC) 155 (H) 04/30/2018 07:14 AM    Glucose (POC) 366 (H) 04/29/2018 09:11 PM    Glucose (POC) 293 (H) 04/29/2018 04:50 PM    Glucose (POC) 334 (H) 04/29/2018 11:33 AM    Glucose (POC) 215 (H) 04/29/2018 07:29 AM     Lab Results   Component Value Date/Time    Color YELLOW/STRAW 04/27/2018 01:32 PM    Appearance CLEAR 04/27/2018 01:32 PM    Specific gravity 1.029 04/27/2018 01:32 PM    pH (UA) 5.0 04/27/2018 01:32 PM    Protein TRACE (A) 04/27/2018 01:32 PM    Glucose >1000 (A) 04/27/2018 01:32 PM    Ketone NEGATIVE  04/27/2018 01:32 PM    Bilirubin NEGATIVE  04/27/2018 01:32 PM    Urobilinogen 0.2 04/27/2018 01:32 PM    Nitrites NEGATIVE  04/27/2018 01:32 PM    Leukocyte Esterase NEGATIVE  04/27/2018 01:32 PM    Epithelial cells FEW 04/27/2018 01:32 PM    Bacteria NEGATIVE  04/27/2018 01:32 PM    WBC 0-4 04/27/2018 01:32 PM    RBC 0-5 04/27/2018 01:32 PM       Medications Reviewed:     Current Facility-Administered Medications   Medication Dose Route Frequency    QUEtiapine (SEROquel) tablet 50 mg  50 mg Oral QHS    LORazepam (ATIVAN) injection 1 mg  1 mg IntraVENous Q6H PRN    melatonin tablet 6 mg  6 mg Oral QHS PRN    insulin glargine (LANTUS) injection 65 Units  65 Units SubCUTAneous DAILY    glipiZIDE (GLUCOTROL) tablet 5 mg  5 mg Oral ACB&D    insulin lispro (HUMALOG) injection   SubCUTAneous AC&HS    glucose chewable tablet 16 g  4 Tab Oral PRN    dextrose (D50W) injection syrg 12.5-25 g  12.5-25 g IntraVENous PRN    glucagon (GLUCAGEN) injection 1 mg  1 mg IntraMUSCular PRN    metoprolol (LOPRESSOR) injection 1.25 mg  1.25 mg IntraVENous Q6H PRN    insulin lispro (HUMALOG) injection 11 Units  11 Units SubCUTAneous TIDAC    amLODIPine (NORVASC) tablet 10 mg  10 mg Oral DAILY    aspirin chewable tablet 81 mg  81 mg Oral DAILY    atorvastatin (LIPITOR) tablet 10 mg  10 mg Oral DAILY    indapamide (LOZOL) tablet 2.5 mg  2.5 mg Oral DAILY    isosorbide dinitrate (ISORDIL) tablet 30 mg  30 mg Oral DAILY    metoprolol succinate (TOPROL-XL) XL tablet 100 mg  100 mg Oral DAILY    sodium chloride (NS) flush 5-10 mL  5-10 mL IntraVENous Q8H    sodium chloride (NS) flush 5-10 mL  5-10 mL IntraVENous PRN    heparin (porcine) injection 5,000 Units  5,000 Units SubCUTAneous Q8H    imipramine (TOFRANIL) tablet 10 mg  (Patient Supplied)  10 mg Oral TID     ______________________________________________________________________  EXPECTED LENGTH OF STAY: - - -  ACTUAL LENGTH OF STAY:          3                 Alley Chicas MD

## 2018-05-01 LAB
ANION GAP SERPL CALC-SCNC: 11 MMOL/L (ref 5–15)
BASOPHILS # BLD: 0 K/UL (ref 0–0.1)
BASOPHILS NFR BLD: 1 % (ref 0–1)
BUN SERPL-MCNC: 24 MG/DL (ref 6–20)
BUN/CREAT SERPL: 16 (ref 12–20)
CALCIUM SERPL-MCNC: 9.5 MG/DL (ref 8.5–10.1)
CHLORIDE SERPL-SCNC: 103 MMOL/L (ref 97–108)
CO2 SERPL-SCNC: 25 MMOL/L (ref 21–32)
CREAT SERPL-MCNC: 1.53 MG/DL (ref 0.7–1.3)
DIFFERENTIAL METHOD BLD: NORMAL
EOSINOPHIL # BLD: 0.1 K/UL (ref 0–0.4)
EOSINOPHIL NFR BLD: 3 % (ref 0–7)
ERYTHROCYTE [DISTWIDTH] IN BLOOD BY AUTOMATED COUNT: 13.3 % (ref 11.5–14.5)
GLUCOSE BLD STRIP.AUTO-MCNC: 182 MG/DL (ref 65–100)
GLUCOSE BLD STRIP.AUTO-MCNC: 224 MG/DL (ref 65–100)
GLUCOSE BLD STRIP.AUTO-MCNC: 228 MG/DL (ref 65–100)
GLUCOSE BLD STRIP.AUTO-MCNC: 385 MG/DL (ref 65–100)
GLUCOSE BLD STRIP.AUTO-MCNC: 408 MG/DL (ref 65–100)
GLUCOSE BLD STRIP.AUTO-MCNC: 432 MG/DL (ref 65–100)
GLUCOSE SERPL-MCNC: 211 MG/DL (ref 65–100)
HCT VFR BLD AUTO: 39.4 % (ref 36.6–50.3)
HGB BLD-MCNC: 13.2 G/DL (ref 12.1–17)
IMM GRANULOCYTES # BLD: 0 K/UL (ref 0–0.04)
IMM GRANULOCYTES NFR BLD AUTO: 0 % (ref 0–0.5)
LYMPHOCYTES # BLD: 1.3 K/UL (ref 0.8–3.5)
LYMPHOCYTES NFR BLD: 30 % (ref 12–49)
MAGNESIUM SERPL-MCNC: 2.1 MG/DL (ref 1.6–2.4)
MAGNESIUM SERPL-MCNC: 2.1 MG/DL (ref 1.6–2.4)
MCH RBC QN AUTO: 29.3 PG (ref 26–34)
MCHC RBC AUTO-ENTMCNC: 33.5 G/DL (ref 30–36.5)
MCV RBC AUTO: 87.6 FL (ref 80–99)
MONOCYTES # BLD: 0.3 K/UL (ref 0–1)
MONOCYTES NFR BLD: 8 % (ref 5–13)
NEUTS SEG # BLD: 2.6 K/UL (ref 1.8–8)
NEUTS SEG NFR BLD: 59 % (ref 32–75)
NRBC # BLD: 0 K/UL (ref 0–0.01)
NRBC BLD-RTO: 0 PER 100 WBC
PLATELET # BLD AUTO: 204 K/UL (ref 150–400)
PMV BLD AUTO: 10 FL (ref 8.9–12.9)
POTASSIUM SERPL-SCNC: 3.8 MMOL/L (ref 3.5–5.1)
RBC # BLD AUTO: 4.5 M/UL (ref 4.1–5.7)
RPR SER QL: NONREACTIVE
SERVICE CMNT-IMP: ABNORMAL
SODIUM SERPL-SCNC: 139 MMOL/L (ref 136–145)
WBC # BLD AUTO: 4.4 K/UL (ref 4.1–11.1)

## 2018-05-01 PROCEDURE — 80048 BASIC METABOLIC PNL TOTAL CA: CPT | Performed by: INTERNAL MEDICINE

## 2018-05-01 PROCEDURE — 65660000000 HC RM CCU STEPDOWN

## 2018-05-01 PROCEDURE — 82962 GLUCOSE BLOOD TEST: CPT

## 2018-05-01 PROCEDURE — 77030008027

## 2018-05-01 PROCEDURE — 85025 COMPLETE CBC W/AUTO DIFF WBC: CPT | Performed by: INTERNAL MEDICINE

## 2018-05-01 PROCEDURE — 74011250637 HC RX REV CODE- 250/637: Performed by: INTERNAL MEDICINE

## 2018-05-01 PROCEDURE — 74011636637 HC RX REV CODE- 636/637: Performed by: INTERNAL MEDICINE

## 2018-05-01 PROCEDURE — 86592 SYPHILIS TEST NON-TREP QUAL: CPT | Performed by: INTERNAL MEDICINE

## 2018-05-01 PROCEDURE — 36415 COLL VENOUS BLD VENIPUNCTURE: CPT | Performed by: INTERNAL MEDICINE

## 2018-05-01 PROCEDURE — 83735 ASSAY OF MAGNESIUM: CPT | Performed by: INTERNAL MEDICINE

## 2018-05-01 PROCEDURE — 74011636637 HC RX REV CODE- 636/637: Performed by: HOSPITALIST

## 2018-05-01 PROCEDURE — 74011250636 HC RX REV CODE- 250/636: Performed by: HOSPITALIST

## 2018-05-01 PROCEDURE — 74011250636 HC RX REV CODE- 250/636: Performed by: INTERNAL MEDICINE

## 2018-05-01 PROCEDURE — 86592 SYPHILIS TEST NON-TREP QUAL: CPT

## 2018-05-01 RX ORDER — INSULIN LISPRO 100 [IU]/ML
9 INJECTION, SOLUTION INTRAVENOUS; SUBCUTANEOUS ONCE
Status: COMPLETED | OUTPATIENT
Start: 2018-05-01 | End: 2018-05-01

## 2018-05-01 RX ORDER — SODIUM CHLORIDE AND POTASSIUM CHLORIDE .9; .15 G/100ML; G/100ML
SOLUTION INTRAVENOUS CONTINUOUS
Status: DISCONTINUED | OUTPATIENT
Start: 2018-05-01 | End: 2018-05-02

## 2018-05-01 RX ORDER — INSULIN LISPRO 100 [IU]/ML
10 INJECTION, SOLUTION INTRAVENOUS; SUBCUTANEOUS ONCE
Status: COMPLETED | OUTPATIENT
Start: 2018-05-01 | End: 2018-05-01

## 2018-05-01 RX ORDER — INSULIN GLARGINE 100 [IU]/ML
75 INJECTION, SOLUTION SUBCUTANEOUS
Status: DISCONTINUED | OUTPATIENT
Start: 2018-05-01 | End: 2018-05-04

## 2018-05-01 RX ADMIN — HEPARIN SODIUM 5000 UNITS: 5000 INJECTION, SOLUTION INTRAVENOUS; SUBCUTANEOUS at 14:10

## 2018-05-01 RX ADMIN — IMIPRAMINE HYDROCHLORIDE 10 MG: 10 TABLET ORAL at 22:23

## 2018-05-01 RX ADMIN — INSULIN GLARGINE 75 UNITS: 100 INJECTION, SOLUTION SUBCUTANEOUS at 22:22

## 2018-05-01 RX ADMIN — ASPIRIN 81 MG CHEWABLE TABLET 81 MG: 81 TABLET CHEWABLE at 08:37

## 2018-05-01 RX ADMIN — IMIPRAMINE HYDROCHLORIDE 10 MG: 10 TABLET ORAL at 08:46

## 2018-05-01 RX ADMIN — INSULIN LISPRO 11 UNITS: 100 INJECTION, SOLUTION INTRAVENOUS; SUBCUTANEOUS at 16:49

## 2018-05-01 RX ADMIN — INSULIN LISPRO 9 UNITS: 100 INJECTION, SOLUTION INTRAVENOUS; SUBCUTANEOUS at 14:10

## 2018-05-01 RX ADMIN — INSULIN LISPRO 3 UNITS: 100 INJECTION, SOLUTION INTRAVENOUS; SUBCUTANEOUS at 08:36

## 2018-05-01 RX ADMIN — GLIPIZIDE 5 MG: 5 TABLET ORAL at 16:49

## 2018-05-01 RX ADMIN — INSULIN LISPRO 10 UNITS: 100 INJECTION, SOLUTION INTRAVENOUS; SUBCUTANEOUS at 16:49

## 2018-05-01 RX ADMIN — Medication 10 ML: at 22:23

## 2018-05-01 RX ADMIN — IMIPRAMINE HYDROCHLORIDE 10 MG: 10 TABLET ORAL at 16:52

## 2018-05-01 RX ADMIN — HEPARIN SODIUM 5000 UNITS: 5000 INJECTION, SOLUTION INTRAVENOUS; SUBCUTANEOUS at 06:00

## 2018-05-01 RX ADMIN — INDAPAMIDE 2.5 MG: 2.5 TABLET, FILM COATED ORAL at 08:37

## 2018-05-01 RX ADMIN — QUETIAPINE FUMARATE 50 MG: 25 TABLET ORAL at 22:10

## 2018-05-01 RX ADMIN — AMLODIPINE BESYLATE 10 MG: 5 TABLET ORAL at 08:38

## 2018-05-01 RX ADMIN — GLIPIZIDE 5 MG: 5 TABLET ORAL at 08:38

## 2018-05-01 RX ADMIN — ISOSORBIDE DINITRATE 30 MG: 20 TABLET ORAL at 08:37

## 2018-05-01 RX ADMIN — INSULIN GLARGINE 65 UNITS: 100 INJECTION, SOLUTION SUBCUTANEOUS at 14:08

## 2018-05-01 RX ADMIN — LORAZEPAM 1 MG: 2 INJECTION INTRAMUSCULAR; INTRAVENOUS at 04:27

## 2018-05-01 RX ADMIN — MELATONIN TAB 3 MG 6 MG: 3 TAB at 22:10

## 2018-05-01 RX ADMIN — Medication 10 ML: at 14:10

## 2018-05-01 RX ADMIN — MELATONIN TAB 3 MG 6 MG: 3 TAB at 01:01

## 2018-05-01 RX ADMIN — INSULIN LISPRO 2 UNITS: 100 INJECTION, SOLUTION INTRAVENOUS; SUBCUTANEOUS at 22:22

## 2018-05-01 RX ADMIN — INSULIN LISPRO 11 UNITS: 100 INJECTION, SOLUTION INTRAVENOUS; SUBCUTANEOUS at 14:09

## 2018-05-01 RX ADMIN — METOPROLOL SUCCINATE 100 MG: 50 TABLET, EXTENDED RELEASE ORAL at 08:37

## 2018-05-01 RX ADMIN — Medication 10 ML: at 04:28

## 2018-05-01 RX ADMIN — ATORVASTATIN CALCIUM 10 MG: 40 TABLET, FILM COATED ORAL at 08:38

## 2018-05-01 RX ADMIN — INSULIN LISPRO 11 UNITS: 100 INJECTION, SOLUTION INTRAVENOUS; SUBCUTANEOUS at 08:38

## 2018-05-01 RX ADMIN — SODIUM CHLORIDE AND POTASSIUM CHLORIDE: 9; 1.49 INJECTION, SOLUTION INTRAVENOUS at 17:20

## 2018-05-01 RX ADMIN — HEPARIN SODIUM 5000 UNITS: 5000 INJECTION, SOLUTION INTRAVENOUS; SUBCUTANEOUS at 22:10

## 2018-05-01 NOTE — PROCEDURES
Patient Name: Malik Mora  : 1940  Age: 69 y.o. Ordering physician: No ref. provider found  Date of EE18  EEG procedure number: [id number]  Diagnosis: altered mental status  Interpreting physician: Ranulfo Mccormack MD      ELECTROENCEPHALOGRAM REPORT     PROCEDURE: EEG. CLINICAL INDICATION: The patient is a 69 y.o. male who is being evaluated for baseline electro cerebral activities and to rule out seizure focus. EEG CLASSIFICATION: Normal    DESCRIPTION OF THE RECORD:   The background of this recording contains a posteriorly-located occipital alpha rhythm of 8 Hz that attenuates with eye opening. Throughout the recording, there were no clear areas of focal slowing nor spike or spike-and-wave discharges seen. Hyperventilation was not performed. Photic stimulation produced no response in the posterior head regions. During the recording the patient did not achieve stage II sleep    INTERPRETATION: This is a normal electroencephalogram with the patient awake, showing no clear focal abnormalities or epileptiform activity. A normal EEG doesn't not rule out seizures. Clinical correlation recommended.       Ranulfo Mccormack MD  Neurologist

## 2018-05-01 NOTE — PROGRESS NOTES
Hospitalist Progress Note    NAME:  Alec Flores  :  1940  MRN:  [id number]  PCP:  Jj Jean MD  Date of Service:  2018    Assessment & Plan:     Hyperosmolar Non ketotic Hyperglycemia in Type 2 DM uncontrolled without coma POA  HERMILO/dehydration POA  Psueudohyponatremia POA, resolved  - Due to medication noncompliance for over 6 weeks per wife. - Diabetic management with accuchecks with ISS coverage and basal coverage  - Incrase lantus to 75 units and change to QHS  - Monitor labs  - DTC consulted     HERMILO:  worsening  Hypokalemia  - monitor labs  - Restart IVFs  - replace lytes as needed     Acute encephalopathy POA possible undiagnosed dementia  - Unlikely CVA as there is no neurological deficit other than confusion  - CT head neg, ammonia neg, no evidence of infection  - RPR pending  - UA neg  - CT head neg  -  MRI brain: Mild atrophy & nonspecific white matter disease. No acute findings. - Start seroquel, ativan prn, melatonin qhs prn  - Continue tele-sitter  - Neurology consulted     HTN  CAD s/p CABG  Hypercholesterolemia  - Cont amlodipine, indapamide, Metoprolol  - Continue ASA, statin, isordil      H/o Prostate Ca s/p resection, s/p radiation therapy  - Cont Imipramine as at home  - Pt is on Lupron shot monthyly with urologist (Dr Leesa Reese) as per the wife  - OP follow up as before.      Code Status: Full  Surrogate Decision Maker: wife Karmen Arias # [phone]   DVT Prophylaxis: Sq heparin  GI Prophylaxis: not indicated   Baseline: Pt is independent at home with ADLs, lives with wife  Body mass index is 28.76 kg/(m^2). Reason for visit:   Recheck AMS, DM, HERMILO    Interval history / Subjective:     Still confused but denies complaints. BS elevated.   Seen and examined. He is in NAD but very confused. MRI done with no acute changes.      Review of Systems:     Symptom Y/N Comments   Symptom Y/N Comments   Fever/Chills n     Chest Pain n      Poor Appetite       Edema        Cough       Abdominal Pain n      Sputum       Joint Pain        SOB/ANGUIANO n     Pruritis/Rash        Nausea/vomit  n     Tolerating PT/OT        Diarrhea      Tolerating Diet        Constipation       Other           Could NOT obtain due to:       Vital Signs:   Last 24hrs VS reviewed since prior progress note. Most recent are:  Visit Vitals    /73 (BP 1 Location: Left arm, BP Patient Position: At rest)    Pulse 78    Temp 98.4 °F (36.9 °C)    Resp 18    Ht 5' 8\" (1.727 m)    Wt 85.8 kg (189 lb 2.5 oz)    SpO2 97%    BMI 28.76 kg/m2         Intake/Output Summary (Last 24 hours) at 05/01/18 1242  Last data filed at 04/30/18 1659   Gross per 24 hour   Intake                0 ml   Output              150 ml   Net             -150 ml        Physical Examination:   General: Sleeping but wakes up, cooperative, NAD, confused   EENT:          EOMI. Anicteric sclerae. MMM  Resp:  CTA bilaterally with normal effort and good BS  CV:                 S1-S2, RRR, No edema  GI:                 SNTBS+, No HSM  Neurologic: Oriented only to self, normal speech  Psych:         Not anxious nor agitated  Skin:        No rashes. No jaundice  Additional pertinent findings:    Data Review:   Review and/or order of clinical lab test    Labs:     Recent Labs      05/01/18   0320  04/29/18   0235   WBC  4.4  4.3   HGB  13.2  13.7   HCT  39.4  39.8   PLT  204  219     Recent Labs      05/01/18   0317  04/30/18   0304  04/29/18   0235   NA  139   --   137   K  3.8   --   3.3*   CL  103   --   103   CO2  25   --   25   BUN  24*   --   20   CREA  1.53*   --   1.30   GLU  211*   --   192*   CA  9.5   --   9.3   MG  2.1  2.0   --      No results for input(s): SGOT, GPT, ALT, AP, TBIL, TBILI, TP, ALB, GLOB, GGT, AML, LPSE in the last 72 hours. No lab exists for component: AMYP, HLPSE  No results for input(s): INR, PTP, APTT in the last 72 hours.     No lab exists for component: INREXT, INREXT   No results for input(s): FE, TIBC, PSAT, FERR in the last 72 hours. No results found for: FOL, RBCF   No results for input(s): PH, PCO2, PO2 in the last 72 hours. No results for input(s): CPK, CKNDX, TROIQ in the last 72 hours.     No lab exists for component: CPKMB  No results found for: CHOL, CHOLX, CHLST, CHOLV, HDL, LDL, LDLC, DLDLP, TGLX, TRIGL, TRIGP, CHHD, CHHDX  Lab Results   Component Value Date/Time    Glucose (POC) 408 (H) 05/01/2018 12:23 PM    Glucose (POC) 432 (H) 05/01/2018 12:08 PM    Glucose (POC) 182 (H) 05/01/2018 11:36 AM    Glucose (POC) 224 (H) 05/01/2018 07:55 AM    Glucose (POC) 327 (H) 04/30/2018 08:26 PM     Lab Results   Component Value Date/Time    Color YELLOW/STRAW 04/27/2018 01:32 PM    Appearance CLEAR 04/27/2018 01:32 PM    Specific gravity 1.029 04/27/2018 01:32 PM    pH (UA) 5.0 04/27/2018 01:32 PM    Protein TRACE (A) 04/27/2018 01:32 PM    Glucose >1000 (A) 04/27/2018 01:32 PM    Ketone NEGATIVE  04/27/2018 01:32 PM    Bilirubin NEGATIVE  04/27/2018 01:32 PM    Urobilinogen 0.2 04/27/2018 01:32 PM    Nitrites NEGATIVE  04/27/2018 01:32 PM    Leukocyte Esterase NEGATIVE  04/27/2018 01:32 PM    Epithelial cells FEW 04/27/2018 01:32 PM    Bacteria NEGATIVE  04/27/2018 01:32 PM    WBC 0-4 04/27/2018 01:32 PM    RBC 0-5 04/27/2018 01:32 PM       Medications Reviewed:     Current Facility-Administered Medications   Medication Dose Route Frequency    QUEtiapine (SEROquel) tablet 50 mg  50 mg Oral QHS    LORazepam (ATIVAN) injection 1 mg  1 mg IntraVENous Q6H PRN    melatonin tablet 6 mg  6 mg Oral QHS PRN    insulin glargine (LANTUS) injection 65 Units  65 Units SubCUTAneous DAILY    glipiZIDE (GLUCOTROL) tablet 5 mg  5 mg Oral ACB&D    insulin lispro (HUMALOG) injection   SubCUTAneous AC&HS    glucose chewable tablet 16 g  4 Tab Oral PRN    dextrose (D50W) injection syrg 12.5-25 g  12.5-25 g IntraVENous PRN    glucagon (GLUCAGEN) injection 1 mg  1 mg IntraMUSCular PRN    metoprolol (LOPRESSOR) injection 1.25 mg  1.25 mg IntraVENous Q6H PRN    insulin lispro (HUMALOG) injection 11 Units  11 Units SubCUTAneous TIDAC    amLODIPine (NORVASC) tablet 10 mg  10 mg Oral DAILY    aspirin chewable tablet 81 mg  81 mg Oral DAILY    atorvastatin (LIPITOR) tablet 10 mg  10 mg Oral DAILY    indapamide (LOZOL) tablet 2.5 mg  2.5 mg Oral DAILY    isosorbide dinitrate (ISORDIL) tablet 30 mg  30 mg Oral DAILY    metoprolol succinate (TOPROL-XL) XL tablet 100 mg  100 mg Oral DAILY    sodium chloride (NS) flush 5-10 mL  5-10 mL IntraVENous Q8H    sodium chloride (NS) flush 5-10 mL  5-10 mL IntraVENous PRN    heparin (porcine) injection 5,000 Units  5,000 Units SubCUTAneous Q8H    imipramine (TOFRANIL) tablet 10 mg  (Patient Supplied)  10 mg Oral TID     ______________________________________________________________________  EXPECTED LENGTH OF STAY: 3d 19h  ACTUAL LENGTH OF STAY:          4                 Tom Ding MD

## 2018-05-01 NOTE — DIABETES MGMT
[facility]:    Called and spoke with pt wife. Plan to meet tomorrow, 5/2/18 around 10:30am to complete DM education.      Thank you,  Penny Wilhelm, [address] [place] [address], [address]  Office: [phone]

## 2018-05-01 NOTE — DIABETES MGMT
DTC Consult/Progress Note    Recommendations/ Comments: Attempted to discuss DM management with pt's wife. No family in room at time of attempted consult. DTC to follow-up when pt more appropriate or family available to discuss DM. If appropriate, please consider:  1. Increasing mealtime insulin dose to Humalog 15 units ac tid   2. Also may consider discontinuing Glipizide at this time as it is likely not providing any additional benefit   3. Please add no concentrated sweet restrictions to diet   4. Please document PO intake to better assess DM management needs    Current hospital DM medication:   -Glipizide 5mg bid  -Humalog normal sensitivity correction + 11 units ac tid  -lantus 65 units daily     Chart reviewed on Hanna Tiwari for hyperglycemia. Patient is a 69 y.o. male with known history of Type 2 Diabetes on Metformin ER 500mg bid, Novolog Mix 70-30 56 units before breakfast and dinner, and Glipizide 5mg bid at home. A1c:   Lab Results   Component Value Date/Time    Hemoglobin A1c 12.7 (H) 04/27/2018 01:17 PM       Recent Glucose Results:   Lab Results   Component Value Date/Time     (H) 05/01/2018 03:17 AM    GLUCPOC 182 (H) 05/01/2018 11:36 AM    GLUCPOC 224 (H) 05/01/2018 07:55 AM    GLUCPOC 327 (H) 04/30/2018 08:26 PM        Lab Results   Component Value Date/Time    Creatinine 1.53 (H) 05/01/2018 03:17 AM     Estimated Creatinine Clearance: 43.8 mL/min (based on Cr of 1.53). Active Orders   Diet    DIET DIABETIC CONSISTENT CARB Regular; AHA-LOW-CHOL FAT        PO intake: No data found. Will continue to follow as needed.     Thank you    Patti Peterson, [address] [place] [address]  Office: [phone]

## 2018-05-01 NOTE — PROGRESS NOTES
NEUROLOGY NOTE       Chief Complaint   Patient presents with    High Blood Sugar     Ambulatory into the ED with c/o hyperglycemia. Per family, \"it wouldn't register. \" Has not been taking his insulin x several weeks.  Altered mental status     Per family x 2 months       SUBJECTIVE:  No overnight events    HPI  Leonardo Alejandra is a 69 y.o. male who presents to the hospital because of altered mental status. Pt cannot provide any hx and states that he was sick and he came here and he is doing better. Hx obtained by chart review - \"Pt was brought in by family with CC of worsening Confusion  X days/weeks as per wife. H/o High sugar on the glucometer- unable to get a reading today as per wife H/o pt not taking his insulin for weeks as per pt's wife- pt denies it but is encephalopathic  H/o UTI 1-2m ago- took kelfex & cipro for it in feb as per PCP\"    Pt was hx with DKA and that has resolved. He continues to have memory problem and is agitated. I tried calling the wife to get collateral hx but the call was not answered. ROS  A ten system review of constitutional, cardiovascular, respiratory, musculoskeletal, endocrine, skin, SHEENT, genitourinary, psychiatric and neurologic systems was obtained and is unremarkable except as stated in HPI     PMH  Past Medical History:   Diagnosis Date    CAD (coronary artery disease)     Cancer ([facility]     prostate    Diabetes ([facility]     GERD (gastroesophageal reflux disease)     Hypertension     Other ill-defined conditions(799.89)     elevated cholesterol       FH  History reviewed. No pertinent family history.     SH  Social History     Social History    Marital status:      Spouse name: N/A    Number of children: N/A    Years of education: N/A     Social History Main Topics    Smoking status: Former Smoker     Packs/day: 0.50     Years: 20.00     Quit date: 4/12/1982    Smokeless tobacco: Never Used    Alcohol use No    Drug use: No    Sexual activity: Not Asked     Other Topics Concern    None     Social History Narrative       ALLERGIES  No Known Allergies    PHYSICAL EXAM  EXAMINATION:   Patient Vitals for the past 24 hrs:   Temp Pulse Resp BP SpO2   05/01/18 0710 - 78 18 132/73 -   05/01/18 0325 98.4 °F (36.9 °C) 85 20 114/75 -   04/30/18 2245 98.2 °F (36.8 °C) (!) 117 20 153/89 -   04/30/18 1928 98.4 °F (36.9 °C) (!) 112 20 111/76 97 %   04/30/18 1625 98 °F (36.7 °C) (!) 107 20 135/79 97 %        General:   General appearance: Pt is in no acute distress   Distal pulses are preserved  Fundoscopic exam: attempted    Neurological Examination:   Mental Status:  AAO x1 (self). Speech is slow. Follows commands, has poor fund of knowledge, attention, short term recall, comprehension and insight. Cranial Nerves: Visual fields are full. PERRL, Extraocular movements are full. Facial sensation intact. Facial movement intact. Hearing intact to conversation. Palate elevates symmetrically. Shoulder shrug symmetric. Tongue midline. Motor: Strength is 5/5 in all 4 ext. Normal tone. No atrophy. Sensation: Normal to light touch    Coordination/Cerebellar: Intact to finger-nose-finger     Gait: deferred    Skin: No significant bruising or lacerations.     Mini Mental State Exam 4/29/2018   What is the Year 0   What is the Season 0   What is the Date 0   What is the Day 0   What is the Month 0   Where are we State 1   Where are we Country 1   Where are we [address] [place] [address] or [facility] 1   [facility] are we Floor 1   Name three objects, then ask the patient to say them 2   Serial sevens Subtract 7 from 100 in increments 2   Ask for the three objects repeated above 0   Name a pencil 1   Name a watch 1   Have the patient repeat this phrase \"No ifs, ands, or buts\" 0   Three stage command: Take the paper in your right hand 1   Fold the paper in half 1   Put the paper on the floor 0   Read and obey the following: CLOSE YOUR EYES 1   Have the patient write a sentence 0   Have the patient copy a figure 0   Mini Mental Score 13   Some recent data might be hidden       LAB DATA REVIEWED:    Recent Results (from the past 24 hour(s))   GLUCOSE, POC    Collection Time: 04/30/18  4:29 PM   Result Value Ref Range    Glucose (POC) 310 (H) 65 - 100 mg/dL    Performed by TENA GALINDO(CON)    GLUCOSE, POC    Collection Time: 04/30/18  8:26 PM   Result Value Ref Range    Glucose (POC) 327 (H) 65 - 100 mg/dL    Performed by TENA GALINDO(CON)    MAGNESIUM    Collection Time: 05/01/18  3:17 AM   Result Value Ref Range    Magnesium 2.1 1.6 - 2.4 mg/dL   METABOLIC PANEL, BASIC    Collection Time: 05/01/18  3:17 AM   Result Value Ref Range    Sodium 139 136 - 145 mmol/L    Potassium 3.8 3.5 - 5.1 mmol/L    Chloride 103 97 - 108 mmol/L    CO2 25 21 - 32 mmol/L    Anion gap 11 5 - 15 mmol/L    Glucose 211 (H) 65 - 100 mg/dL    BUN 24 (H) 6 - 20 MG/DL    Creatinine 1.53 (H) 0.70 - 1.30 MG/DL    BUN/Creatinine ratio 16 12 - 20      GFR est AA 54 (L) >60 ml/min/1.73m2    GFR est non-AA 44 (L) >60 ml/min/1.73m2    Calcium 9.5 8.5 - 10.1 MG/DL   CBC WITH AUTOMATED DIFF    Collection Time: 05/01/18  3:20 AM   Result Value Ref Range    WBC 4.4 4.1 - 11.1 K/uL    RBC 4.50 4. 10 - 5.70 M/uL    HGB 13.2 12.1 - 17.0 g/dL    HCT 39.4 36.6 - 50.3 %    MCV 87.6 80.0 - 99.0 FL    MCH 29.3 26.0 - 34.0 PG    MCHC 33.5 30.0 - 36.5 g/dL    RDW 13.3 11.5 - 14.5 %    PLATELET 958 874 - 049 K/uL    MPV 10.0 8.9 - 12.9 FL    NRBC 0.0 0  WBC    ABSOLUTE NRBC 0.00 0.00 - 0.01 K/uL    NEUTROPHILS 59 32 - 75 %    LYMPHOCYTES 30 12 - 49 %    MONOCYTES 8 5 - 13 %    EOSINOPHILS 3 0 - 7 %    BASOPHILS 1 0 - 1 %    IMMATURE GRANULOCYTES 0 0.0 - 0.5 %    ABS. NEUTROPHILS 2.6 1.8 - 8.0 K/UL    ABS. LYMPHOCYTES 1.3 0.8 - 3.5 K/UL    ABS. MONOCYTES 0.3 0.0 - 1.0 K/UL    ABS. EOSINOPHILS 0.1 0.0 - 0.4 K/UL    ABS. BASOPHILS 0.0 0.0 - 0.1 K/UL    ABS. IMM.  GRANS. 0.0 0.00 - 0.04 K/UL    DF AUTOMATED     GLUCOSE, POC    Collection Time: 05/01/18  7:55 AM   Result Value Ref Range    Glucose (POC) 224 (H) 65 - 100 mg/dL    Performed by [address], POC    Collection Time: 05/01/18 11:36 AM   Result Value Ref Range    Glucose (POC) 182 (H) 65 - 100 mg/dL    Performed by Jesi Hester    GLUCOSE, POC    Collection Time: 05/01/18 12:08 PM   Result Value Ref Range    Glucose (POC) 432 (H) 65 - 100 mg/dL    Performed by Julius Garduno    GLUCOSE, POC    Collection Time: 05/01/18 12:23 PM   Result Value Ref Range    Glucose (POC) 408 (H) 65 - 100 mg/dL    Performed by Abbie Menendez         Imaging review:  CT Head  1. No evidence of acute infarct or intracranial hemorrhage. 2. Minimal periventricular white matter disease is likely secondary to chronic  small vessel ischemic changes. MRI brain  Mild atrophy and nonspecific white matter disease. No other  significant no acute findings. HOME MEDS  Prior to Admission Medications   Prescriptions Last Dose Informant Patient Reported? Taking? amLODIPine-benazepril (LOTREL) 10-40 mg per capsule Unknown at Unknown time Other Yes No   Sig: Take 1 Cap by mouth daily. aspirin 81 mg tablet Unknown at Unknown time Significant Other Yes No   Sig: Take 81 mg by mouth daily. atorvastatin (LIPITOR) 10 mg tablet Unknown at Unknown time Significant Other Yes No   Sig: Take 10 mg by mouth daily. cinnamon bark (CINNAMON) 500 mg Cap Unknown at Unknown time Significant Other Yes No   Sig: Take 2,000 mg by mouth daily. glipiZIDE (GLUCOTROL) 5 mg tablet Unknown at Unknown time Significant Other Yes No   Sig: Take 5 mg by mouth two (2) times a day. imipramine (TOFRANIL) 10 mg tablet Unknown at Unknown time Significant Other Yes No   Sig: Take 10 mg by mouth three (3) times daily. indapamide (LOZOL) 2.5 mg tablet Unknown at Unknown time Other Yes No   Sig: Take 2.5 mg by mouth daily.    insulin aspart protamine/insulin aspart (NOVOLOG MIX 70-30) 100 unit/mL (70-30) injection Unknown at Unknown time Other Yes No   Si Units by SubCUTAneous route Before breakfast and dinner. isosorbide dinitrate (ISORDIL) 30 mg tablet Unknown at Unknown time Other Yes No   Sig: Take 30 mg by mouth daily. megestrol (MEGACE) 20 mg tablet Unknown at Unknown time Significant Other Yes No   Sig: Take 20 mg by mouth two (2) times a day. metFORMIN (GLUMETZA ER) 500 mg TG24 24 hour tablet Unknown at Unknown time Other Yes No   Sig: Take 1,200 mg by mouth two (2) times daily (with meals). metoprolol succinate (TOPROL-XL) 100 mg tablet Unknown at Unknown time Other Yes No   Sig: Take 100 mg by mouth daily. omega-3 fatty acids-vitamin e (FISH OIL) 1,000 mg Cap Unknown at Unknown time Significant Other Yes No   Sig: Take 1 Cap by mouth daily. pseudoephedrine/acetaminophen (ALLEREST NO DROWSINESS PO) Unknown at Unknown time Significant Other Yes No   Sig: Take 1 Tab by mouth daily as needed (allergies).       Facility-Administered Medications: None       CURRENT MEDS  Current Facility-Administered Medications   Medication Dose Route Frequency    insulin lispro (HUMALOG) injection 9 Units  9 Units SubCUTAneous ONCE    QUEtiapine (SEROquel) tablet 50 mg  50 mg Oral QHS    insulin glargine (LANTUS) injection 65 Units  65 Units SubCUTAneous DAILY    glipiZIDE (GLUCOTROL) tablet 5 mg  5 mg Oral ACB&D    insulin lispro (HUMALOG) injection   SubCUTAneous AC&HS    insulin lispro (HUMALOG) injection 11 Units  11 Units SubCUTAneous TIDAC    amLODIPine (NORVASC) tablet 10 mg  10 mg Oral DAILY    aspirin chewable tablet 81 mg  81 mg Oral DAILY    atorvastatin (LIPITOR) tablet 10 mg  10 mg Oral DAILY    indapamide (LOZOL) tablet 2.5 mg  2.5 mg Oral DAILY    isosorbide dinitrate (ISORDIL) tablet 30 mg  30 mg Oral DAILY    metoprolol succinate (TOPROL-XL) XL tablet 100 mg  100 mg Oral DAILY    sodium chloride (NS) flush 5-10 mL  5-10 mL IntraVENous Q8H    heparin (porcine) injection 5,000 Units  5,000 Units SubCUTAneous Q8H    imipramine (TOFRANIL) tablet 10 mg  (Patient Supplied)  10 mg Oral TID       IMPRESSION:  Corin Horowitz is a 69 y.o. male who presents with increased confusion and agitation and was found to have hyperglycemai. Now that has resolved. Pt continues to have confusion. His mmse is 13/30. I do suspect underlying dementia which was worsened by hyperglycemia and HERMILO. RECOMMENDATIONS:  1. MRI brain - Normal  2. EEG - Normal  3. Neuropsychological testing as outpatient with Dr. Nela Perez or Dr. Tameka Daniel at :  [facility], [address] [place] [address]. Phone: [phone] x [phone]   Fax [phone]    F/U as outpatient after neuropsych testing.         Jessica Kaminski MD  Neurologist

## 2018-05-01 NOTE — PROGRESS NOTES
0700: Bedside shift change report given to Miguelito Payan RN (oncoming nurse) by Ender Babb RN (offgoing nurse). Report included the following information SBAR, Kardex, ED Summary, Procedure Summary, Intake/Output, MAR, Recent Results and Cardiac Rhythm NSR.

## 2018-05-02 LAB
ANION GAP SERPL CALC-SCNC: 8 MMOL/L (ref 5–15)
BASOPHILS # BLD: 0 K/UL (ref 0–0.1)
BASOPHILS NFR BLD: 0 % (ref 0–1)
BUN SERPL-MCNC: 30 MG/DL (ref 6–20)
BUN/CREAT SERPL: 17 (ref 12–20)
CALCIUM SERPL-MCNC: 9.4 MG/DL (ref 8.5–10.1)
CHLORIDE SERPL-SCNC: 103 MMOL/L (ref 97–108)
CO2 SERPL-SCNC: 26 MMOL/L (ref 21–32)
CREAT SERPL-MCNC: 1.77 MG/DL (ref 0.7–1.3)
DIFFERENTIAL METHOD BLD: ABNORMAL
EOSINOPHIL # BLD: 0.1 K/UL (ref 0–0.4)
EOSINOPHIL NFR BLD: 2 % (ref 0–7)
ERYTHROCYTE [DISTWIDTH] IN BLOOD BY AUTOMATED COUNT: 13.3 % (ref 11.5–14.5)
GLUCOSE BLD STRIP.AUTO-MCNC: 129 MG/DL (ref 65–100)
GLUCOSE BLD STRIP.AUTO-MCNC: 151 MG/DL (ref 65–100)
GLUCOSE BLD STRIP.AUTO-MCNC: 198 MG/DL (ref 65–100)
GLUCOSE BLD STRIP.AUTO-MCNC: 263 MG/DL (ref 65–100)
GLUCOSE SERPL-MCNC: 156 MG/DL (ref 65–100)
HCT VFR BLD AUTO: 38.7 % (ref 36.6–50.3)
HGB BLD-MCNC: 12.9 G/DL (ref 12.1–17)
IMM GRANULOCYTES # BLD: 0 K/UL (ref 0–0.04)
IMM GRANULOCYTES NFR BLD AUTO: 0 % (ref 0–0.5)
LYMPHOCYTES # BLD: 0.8 K/UL (ref 0.8–3.5)
LYMPHOCYTES NFR BLD: 15 % (ref 12–49)
MAGNESIUM SERPL-MCNC: 2.1 MG/DL (ref 1.6–2.4)
MCH RBC QN AUTO: 29.7 PG (ref 26–34)
MCHC RBC AUTO-ENTMCNC: 33.3 G/DL (ref 30–36.5)
MCV RBC AUTO: 89.2 FL (ref 80–99)
MONOCYTES # BLD: 0.3 K/UL (ref 0–1)
MONOCYTES NFR BLD: 6 % (ref 5–13)
NEUTS SEG # BLD: 4.1 K/UL (ref 1.8–8)
NEUTS SEG NFR BLD: 76 % (ref 32–75)
NRBC # BLD: 0 K/UL (ref 0–0.01)
NRBC BLD-RTO: 0 PER 100 WBC
PHOSPHATE SERPL-MCNC: 4 MG/DL (ref 2.6–4.7)
PLATELET # BLD AUTO: 192 K/UL (ref 150–400)
PMV BLD AUTO: 9.4 FL (ref 8.9–12.9)
POTASSIUM SERPL-SCNC: 4.2 MMOL/L (ref 3.5–5.1)
RBC # BLD AUTO: 4.34 M/UL (ref 4.1–5.7)
RPR SER QL: NONREACTIVE
SERVICE CMNT-IMP: ABNORMAL
SODIUM SERPL-SCNC: 137 MMOL/L (ref 136–145)
WBC # BLD AUTO: 5.4 K/UL (ref 4.1–11.1)

## 2018-05-02 PROCEDURE — 84100 ASSAY OF PHOSPHORUS: CPT | Performed by: INTERNAL MEDICINE

## 2018-05-02 PROCEDURE — 36415 COLL VENOUS BLD VENIPUNCTURE: CPT | Performed by: INTERNAL MEDICINE

## 2018-05-02 PROCEDURE — 80048 BASIC METABOLIC PNL TOTAL CA: CPT | Performed by: INTERNAL MEDICINE

## 2018-05-02 PROCEDURE — 85025 COMPLETE CBC W/AUTO DIFF WBC: CPT | Performed by: INTERNAL MEDICINE

## 2018-05-02 PROCEDURE — 74011250637 HC RX REV CODE- 250/637: Performed by: INTERNAL MEDICINE

## 2018-05-02 PROCEDURE — 74011636637 HC RX REV CODE- 636/637: Performed by: HOSPITALIST

## 2018-05-02 PROCEDURE — 65660000000 HC RM CCU STEPDOWN

## 2018-05-02 PROCEDURE — 82962 GLUCOSE BLOOD TEST: CPT

## 2018-05-02 PROCEDURE — 74011250636 HC RX REV CODE- 250/636: Performed by: INTERNAL MEDICINE

## 2018-05-02 PROCEDURE — 74011250636 HC RX REV CODE- 250/636: Performed by: HOSPITALIST

## 2018-05-02 PROCEDURE — 83735 ASSAY OF MAGNESIUM: CPT | Performed by: INTERNAL MEDICINE

## 2018-05-02 PROCEDURE — 74011636637 HC RX REV CODE- 636/637: Performed by: INTERNAL MEDICINE

## 2018-05-02 RX ORDER — LORAZEPAM 2 MG/ML
1 INJECTION INTRAMUSCULAR
Status: DISCONTINUED | OUTPATIENT
Start: 2018-05-02 | End: 2018-05-07 | Stop reason: HOSPADM

## 2018-05-02 RX ORDER — SODIUM CHLORIDE 450 MG/100ML
50 INJECTION, SOLUTION INTRAVENOUS CONTINUOUS
Status: DISCONTINUED | OUTPATIENT
Start: 2018-05-02 | End: 2018-05-05

## 2018-05-02 RX ADMIN — HEPARIN SODIUM 5000 UNITS: 5000 INJECTION, SOLUTION INTRAVENOUS; SUBCUTANEOUS at 06:36

## 2018-05-02 RX ADMIN — ASPIRIN 81 MG CHEWABLE TABLET 81 MG: 81 TABLET CHEWABLE at 09:09

## 2018-05-02 RX ADMIN — INSULIN LISPRO 5 UNITS: 100 INJECTION, SOLUTION INTRAVENOUS; SUBCUTANEOUS at 12:42

## 2018-05-02 RX ADMIN — HEPARIN SODIUM 5000 UNITS: 5000 INJECTION, SOLUTION INTRAVENOUS; SUBCUTANEOUS at 15:38

## 2018-05-02 RX ADMIN — GLIPIZIDE 5 MG: 5 TABLET ORAL at 17:54

## 2018-05-02 RX ADMIN — INDAPAMIDE 2.5 MG: 2.5 TABLET, FILM COATED ORAL at 09:08

## 2018-05-02 RX ADMIN — HEPARIN SODIUM 5000 UNITS: 5000 INJECTION, SOLUTION INTRAVENOUS; SUBCUTANEOUS at 21:08

## 2018-05-02 RX ADMIN — INSULIN LISPRO 11 UNITS: 100 INJECTION, SOLUTION INTRAVENOUS; SUBCUTANEOUS at 09:09

## 2018-05-02 RX ADMIN — METOPROLOL SUCCINATE 100 MG: 50 TABLET, EXTENDED RELEASE ORAL at 09:09

## 2018-05-02 RX ADMIN — INSULIN LISPRO 2 UNITS: 100 INJECTION, SOLUTION INTRAVENOUS; SUBCUTANEOUS at 09:09

## 2018-05-02 RX ADMIN — SODIUM CHLORIDE AND POTASSIUM CHLORIDE: 9; 1.49 INJECTION, SOLUTION INTRAVENOUS at 03:42

## 2018-05-02 RX ADMIN — Medication 10 ML: at 06:36

## 2018-05-02 RX ADMIN — Medication 10 ML: at 16:24

## 2018-05-02 RX ADMIN — GLIPIZIDE 5 MG: 5 TABLET ORAL at 09:09

## 2018-05-02 RX ADMIN — INSULIN GLARGINE 75 UNITS: 100 INJECTION, SOLUTION SUBCUTANEOUS at 22:02

## 2018-05-02 RX ADMIN — SODIUM CHLORIDE AND POTASSIUM CHLORIDE: 9; 1.49 INJECTION, SOLUTION INTRAVENOUS at 17:54

## 2018-05-02 RX ADMIN — IMIPRAMINE HYDROCHLORIDE 10 MG: 10 TABLET ORAL at 09:10

## 2018-05-02 RX ADMIN — AMLODIPINE BESYLATE 10 MG: 5 TABLET ORAL at 09:09

## 2018-05-02 RX ADMIN — INSULIN LISPRO 11 UNITS: 100 INJECTION, SOLUTION INTRAVENOUS; SUBCUTANEOUS at 12:43

## 2018-05-02 RX ADMIN — ISOSORBIDE DINITRATE 30 MG: 20 TABLET ORAL at 09:08

## 2018-05-02 RX ADMIN — Medication 10 ML: at 15:38

## 2018-05-02 RX ADMIN — INSULIN LISPRO 11 UNITS: 100 INJECTION, SOLUTION INTRAVENOUS; SUBCUTANEOUS at 17:54

## 2018-05-02 RX ADMIN — ATORVASTATIN CALCIUM 10 MG: 40 TABLET, FILM COATED ORAL at 09:09

## 2018-05-02 RX ADMIN — QUETIAPINE FUMARATE 50 MG: 25 TABLET ORAL at 21:07

## 2018-05-02 RX ADMIN — IMIPRAMINE HYDROCHLORIDE 10 MG: 10 TABLET ORAL at 21:08

## 2018-05-02 RX ADMIN — IMIPRAMINE HYDROCHLORIDE 10 MG: 10 TABLET ORAL at 17:54

## 2018-05-02 NOTE — PROGRESS NOTES
0710:Bedside shift change report given to Mike Lambsusan Fink 1137 (oncoming nurse) by Indu Salazar (offgoing nurse). Report included the following information SBAR, Kardex, ED Summary, Procedure Summary, Intake/Output, MAR, Recent Results and Cardiac Rhythm NSR.     1030: Diabetes education in room with patient and family. 1620: PICC team at bedside. Patient has had three IV infiltrate and multiple RN's attempt to gain IV access without success. PICC team placed IV, but stated patient may need another line. 1750: Patient IV infiltrated. Will page and updated MD.     1800: Dr. Marion Stone paged to update on unable to gain IV access    1820: Dr. Marion Stone paged again to update on un successful IV attempts    1845: Spoke with Dr. Marion Stone, will leave patient over night without IV access and obtain a midline tomorrow. Will encourage patient to take PO water and change Ativan to IM PRN. 1900: Bedside shift change report given to Christiane Peña RN (oncoming nurse) by Madison Quinones RN (offgoing nurse). Report included the following information SBAR, Kardex, ED Summary, Procedure Summary, Intake/Output, MAR, Recent Results and Cardiac Rhythm NSR.

## 2018-05-02 NOTE — PROGRESS NOTES
1940: Bedside shift change report given to Flo Monson RN (oncoming nurse) by Vanita Woods (offgoing nurse). Report included the following information SBAR, Kardex, ED Summary, Procedure Summary, Intake/Output, MAR, Recent Results and Cardiac Rhythm NSR.

## 2018-05-02 NOTE — PROGRESS NOTES
[address] [facility] [address]  [address] [address]  [phone]  Fax: [phone]    May 2, 2018    To Whom may it concern. This letter is to certify that  Chela Siegel   was admitted 4/27/18 and remains a patient here at [facility]. 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 .  If you have any questions, please do not hesitate to contact me.        ____________________  [facility].  Brett Pabon MD  Staff Physician

## 2018-05-02 NOTE — DIABETES MGMT
DTC Consult Note    Recommendations/ Comments: Pt's BG > 400 mg/dL yesterday and now 263 mg/dL today at lunch. Pt required > 25 units correction yesterday. Please consider:   1. Increasing mealtime insulin dose to Humalog 15 units ac tid   2. Also may consider discontinuing Glipizide at this time as it is likely not providing any additional benefit   3. Please add no concentrated sweet restrictions to diet   4. Please document PO intake to better assess DM management needs    Current hospital DM medication: glipizide 5 mg BID, Lantus 75 units at bedtime, lispro TID ac 11 units, lispro correction -normal sensitivity     Consult received for:  [x]             Assessment of home management                []      Medication Recommendations                []             Meter/monitoring     []             Insulin instruction     []             New diagnosis     []             Outpatient education     []             Insulin pump patient     []             Insulin infusion     []             DKA/HHS    Chart reviewed and initial evaluation complete on [facility]. Pt's wife, daughter, and other family were present during visit. Patient is a 69 y.o. male with known DM on metformin 1200 mg BID, glipizide 5 mg BID, Novolog 70/30 56 units before breakfast and dinner. Pt's wife shared that pt was not taking any DM mediations since February as he \"just stopped taking them\". Pt injects insulin in abdomen or arm, rotates sites per pt's wife. Pt's wife reports that pt was checking 2x/day in February but had discontinued checking BG around this time. - reported BG in 300s at this time. Pt's wife shared that pt still has working meter, but sometimes receives error message on glucometer when checking. Pt eats 3 meals daily, snacks on amor-snaps and drinks lemon-lime soda ~24 oz almost daily.     Assessed and instructed patient on the following:   ·  blood sugar goals, hypoglycemia prevention and treatment , insulin adjustments - discussed possible need for separate basal insulin and meal-time insulin and to discuss this with PCP, illness management - importance in following up with PCP if BG above target, nutrition - discussed balanced meals, avoid sugary beverages and site rotation - reviewed where to inject, rotating sites, discarding any  insulin. Discussed looking in user manual of meter for error code, also discussed how to get control solution for meter from pharmacy as pt's daughter inquired about this. Discussed differences in 70/30 vs basal vs meal-time insulin vs glipizide vs metformin. Encouraged the following:   · dietary modifications: discontinuing sugary beverages, aiming to eat 4-5 hours apart from meals, avoiding excessive snacking, regular blood sugar monitoring: checking 2-3x/day before meals, importance in checking BG and calling PCP if BG above target    Provided patient with the following: [x]             Survival skills education materials               [x]             Insulin education materials               []             CHO counting education materials               []             Outpatient DTC contact number               []             Glucometer     Demonstrated use of demon insulin pen with wife given pt's current status and questionability in managing care alone at home. Patient's wife was able to give return demonstration of    []       Glucometer    [x]       saline injection      with [x]     without []     Some  assistance needed. [x]       Nurse to have patient's family self inject prior to discharge. Discussed with patient and/or family need for follow up appointment for diabetes management after discharge.       A1c:   Lab Results   Component Value Date/Time    Hemoglobin A1c 12.7 (H) 2018 01:17 PM       Recent Glucose Results:   Lab Results   Component Value Date/Time     (H) 2018 03:43 AM    GLUCPOC 263 (H) 2018 11:45 AM GLUCPOC 151 (H) 05/02/2018 07:39 AM    GLUCPOC 228 (H) 05/01/2018 10:12 PM        Lab Results   Component Value Date/Time    Creatinine 1.77 (H) 05/02/2018 03:43 AM     Estimated Creatinine Clearance: 37.9 mL/min (based on Cr of 1.77). Active Orders   Diet    DIET DIABETIC CONSISTENT CARB Regular; AHA-LOW-CHOL FAT        PO intake: No data found. Will continue to follow as needed. Thank you.     Michelle Maher, [address]     [phone]

## 2018-05-02 NOTE — PROGRESS NOTES
Bedside shift change report given to Joe Soto (oncoming nurse) by Vale Chapa (offgoing nurse).  Report included the following information SBAR, Kardex, Intake/Output, MAR, Recent Results and Cardiac Rhythm ST.

## 2018-05-02 NOTE — PROGRESS NOTES
Hospitalist Progress Note    NAME:  Anna Almodovar  :  1940  MRN:  [id number]  PCP:  Scarlett Zamarripa MD  Date of Service:  2018    Assessment & Plan:     Hyperosmolar Non ketotic Hyperglycemia in Type 2 DM uncontrolled without coma POA  HERMILO/dehydration POA  DM type 2: better controlled  Psueudohyponatremia POA, resolved  - Due to medication noncompliance for over 6 weeks per wife. - Diabetic management with accuchecks with ISS coverage and basal coverage  - Increased lantus to 75 units and change to QHS: BS control slightly better  - Monitor labs  - DTC consulted     HERMILO:  worsening  Hypokalemia  - monitor labs  - Restart IVFs change to 1/2 NS  - Encourage PO fluids  - replace lytes as needed     Acute encephalopathy POA possible undiagnosed dementia  - No change  - Unlikely CVA as there is no neurological deficit other than confusion  - CT head neg, ammonia neg, no evidence of infection  - RPR negative  - UA neg  - CT head neg  -  MRI brain: Mild atrophy & nonspecific white matter disease. No acute findings. - Start seroquel, ativan prn, melatonin qhs prn  - Continue tele-sitter  - Neurology consulted  - Wife requested letter of incompetence at this time to handle his affairs and pay bills. Written see previous progress note.     HTN  CAD s/p CABG  Hypercholesterolemia  - Cont amlodipine, indapamide, Metoprolol  - Continue ASA, statin, isordil      H/o Prostate Ca s/p resection, s/p radiation therapy  - Cont Imipramine as at home  - Pt is on Lupron shot monthyly with urologist (Dr Felicity Hill) as per the wife  - OP follow up as before.     Edema right hand and forearm from infiltrated IVF  - Elevate right arm  - Change IV site  - if note improved order RUE venous doppler US    Overweight:  Body mass index is 28.76 kg/(m^2)     Code Status: Full  Surrogate Decision Maker: wife Trinity Orf # [phone]   DVT Prophylaxis: Sq heparin  GI Prophylaxis: not indicated   Baseline: Pt is independent at home with ADLs, lives with wife  . Reason for visit:   Recheck AMS, DM, HERMILO    Interval history / Subjective:   5/02  No changes but no complaints. IVF infiltrated right arm.    5/01  Still confused but denies complaints. BS elevated. 4/30  Seen and examined. He is in NAD but very confused. MRI done with no acute changes. Review of Systems:     Symptom Y/N Comments   Symptom Y/N Comments   Fever/Chills n     Chest Pain n      Poor Appetite       Edema        Cough       Abdominal Pain n      Sputum       Joint Pain        SOB/ANGUIANO n     Pruritis/Rash        Nausea/vomit  n     Tolerating PT/OT        Diarrhea      Tolerating Diet        Constipation       Other           Could NOT obtain due to:       Vital Signs:   Last 24hrs VS reviewed since prior progress note. Most recent are:  Visit Vitals    /71 (BP 1 Location: Left arm, BP Patient Position: At rest)    Pulse (!) 109    Temp 97.7 °F (36.5 °C)    Resp 16    Ht 5' 8\" (1.727 m)    Wt 85.8 kg (189 lb 2.5 oz)    SpO2 99%    BMI 28.76 kg/m2         Intake/Output Summary (Last 24 hours) at 05/02/18 1204  Last data filed at 05/02/18 1002   Gross per 24 hour   Intake          1388.33 ml   Output              175 ml   Net          1213.33 ml        Physical Examination:   General: Sleeping but wakes up, cooperative, NAD, confused   EENT:          EOMI. Anicteric sclerae. MMM  Resp:  CTA bilaterally with normal effort and good BS  CV:                 S1-S2, RRR, No edema  GI:                 SNTBS+, No HSM  Neurologic: Oriented only to self, normal speech  Psych:         Not anxious nor agitated  Skin:        No rashes. No jaundice  Additional pertinent findings: swelling right arm.     Data Review:   Review and/or order of clinical lab test    Labs:     Recent Labs      05/02/18   0343  05/01/18   0320   WBC  5.4  4.4   HGB  12.9  13.2   HCT  38.7  39.4   PLT  192  204     Recent Labs      05/02/18   0343  05/01/18   1839  05/01/18   0317   NA  137 --   139   K  4.2   --   3.8   CL  103   --   103   CO2  26   --   25   BUN  30*   --   24*   CREA  1.77*   --   1.53*   GLU  156*   --   211*   CA  9.4   --   9.5   MG  2.1  2.1  2.1   PHOS  4.0   --    --      No results for input(s): SGOT, GPT, ALT, AP, TBIL, TBILI, TP, ALB, GLOB, GGT, AML, LPSE in the last 72 hours. No lab exists for component: AMYP, HLPSE  No results for input(s): INR, PTP, APTT in the last 72 hours. No lab exists for component: INREXT, INREXT   No results for input(s): FE, TIBC, PSAT, FERR in the last 72 hours. No results found for: FOL, RBCF   No results for input(s): PH, PCO2, PO2 in the last 72 hours. No results for input(s): CPK, CKNDX, TROIQ in the last 72 hours.     No lab exists for component: CPKMB  No results found for: CHOL, CHOLX, CHLST, CHOLV, HDL, LDL, LDLC, DLDLP, TGLX, TRIGL, TRIGP, CHHD, CHHDX  Lab Results   Component Value Date/Time    Glucose (POC) 263 (H) 05/02/2018 11:45 AM    Glucose (POC) 151 (H) 05/02/2018 07:39 AM    Glucose (POC) 228 (H) 05/01/2018 10:12 PM    Glucose (POC) 385 (H) 05/01/2018 04:12 PM    Glucose (POC) 408 (H) 05/01/2018 12:23 PM     Lab Results   Component Value Date/Time    Color YELLOW/STRAW 04/27/2018 01:32 PM    Appearance CLEAR 04/27/2018 01:32 PM    Specific gravity 1.029 04/27/2018 01:32 PM    pH (UA) 5.0 04/27/2018 01:32 PM    Protein TRACE (A) 04/27/2018 01:32 PM    Glucose >1000 (A) 04/27/2018 01:32 PM    Ketone NEGATIVE  04/27/2018 01:32 PM    Bilirubin NEGATIVE  04/27/2018 01:32 PM    Urobilinogen 0.2 04/27/2018 01:32 PM    Nitrites NEGATIVE  04/27/2018 01:32 PM    Leukocyte Esterase NEGATIVE  04/27/2018 01:32 PM    Epithelial cells FEW 04/27/2018 01:32 PM    Bacteria NEGATIVE  04/27/2018 01:32 PM    WBC 0-4 04/27/2018 01:32 PM    RBC 0-5 04/27/2018 01:32 PM       Medications Reviewed:     Current Facility-Administered Medications   Medication Dose Route Frequency    0.9% sodium chloride with KCl 20 mEq/L infusion   IntraVENous CONTINUOUS    insulin glargine (LANTUS) injection 75 Units  75 Units SubCUTAneous QHS    QUEtiapine (SEROquel) tablet 50 mg  50 mg Oral QHS    LORazepam (ATIVAN) injection 1 mg  1 mg IntraVENous Q6H PRN    melatonin tablet 6 mg  6 mg Oral QHS PRN    glipiZIDE (GLUCOTROL) tablet 5 mg  5 mg Oral ACB&D    insulin lispro (HUMALOG) injection   SubCUTAneous AC&HS    glucose chewable tablet 16 g  4 Tab Oral PRN    dextrose (D50W) injection syrg 12.5-25 g  12.5-25 g IntraVENous PRN    glucagon (GLUCAGEN) injection 1 mg  1 mg IntraMUSCular PRN    metoprolol (LOPRESSOR) injection 1.25 mg  1.25 mg IntraVENous Q6H PRN    insulin lispro (HUMALOG) injection 11 Units  11 Units SubCUTAneous TIDAC    amLODIPine (NORVASC) tablet 10 mg  10 mg Oral DAILY    aspirin chewable tablet 81 mg  81 mg Oral DAILY    atorvastatin (LIPITOR) tablet 10 mg  10 mg Oral DAILY    indapamide (LOZOL) tablet 2.5 mg  2.5 mg Oral DAILY    isosorbide dinitrate (ISORDIL) tablet 30 mg  30 mg Oral DAILY    metoprolol succinate (TOPROL-XL) XL tablet 100 mg  100 mg Oral DAILY    sodium chloride (NS) flush 5-10 mL  5-10 mL IntraVENous Q8H    sodium chloride (NS) flush 5-10 mL  5-10 mL IntraVENous PRN    heparin (porcine) injection 5,000 Units  5,000 Units SubCUTAneous Q8H    imipramine (TOFRANIL) tablet 10 mg  (Patient Supplied)  10 mg Oral TID     ______________________________________________________________________  EXPECTED LENGTH OF STAY: 3d 19h  ACTUAL LENGTH OF STAY:          5                 Melodie Arellano MD

## 2018-05-02 NOTE — PROGRESS NOTES
Problem: Falls - Risk of  Goal: *Absence of Falls  Document Karen Fall Risk and appropriate interventions in the flowsheet.    Outcome: Progressing Towards Goal  Fall Risk Interventions:  Mobility Interventions: Bed/chair exit alarm, Patient to call before getting OOB, Strengthening exercises (ROM-active/passive), Utilize walker, cane, or other assitive device    Mentation Interventions: Bed/chair exit alarm, Adequate sleep, hydration, pain control, Door open when patient unattended, Reorient patient, More frequent rounding    Medication Interventions: Bed/chair exit alarm, Patient to call before getting OOB, Teach patient to arise slowly    Elimination Interventions: Call light in reach, Toileting schedule/hourly rounds

## 2018-05-03 LAB
ANION GAP SERPL CALC-SCNC: 7 MMOL/L (ref 5–15)
BASOPHILS # BLD: 0 K/UL (ref 0–0.1)
BASOPHILS NFR BLD: 1 % (ref 0–1)
BUN SERPL-MCNC: 23 MG/DL (ref 6–20)
BUN/CREAT SERPL: 16 (ref 12–20)
CALCIUM SERPL-MCNC: 9.4 MG/DL (ref 8.5–10.1)
CHLORIDE SERPL-SCNC: 103 MMOL/L (ref 97–108)
CO2 SERPL-SCNC: 27 MMOL/L (ref 21–32)
CREAT SERPL-MCNC: 1.48 MG/DL (ref 0.7–1.3)
DIFFERENTIAL METHOD BLD: NORMAL
EOSINOPHIL # BLD: 0.2 K/UL (ref 0–0.4)
EOSINOPHIL NFR BLD: 3 % (ref 0–7)
ERYTHROCYTE [DISTWIDTH] IN BLOOD BY AUTOMATED COUNT: 13.3 % (ref 11.5–14.5)
GLUCOSE BLD STRIP.AUTO-MCNC: 103 MG/DL (ref 65–100)
GLUCOSE BLD STRIP.AUTO-MCNC: 235 MG/DL (ref 65–100)
GLUCOSE BLD STRIP.AUTO-MCNC: 267 MG/DL (ref 65–100)
GLUCOSE BLD STRIP.AUTO-MCNC: 287 MG/DL (ref 65–100)
GLUCOSE SERPL-MCNC: 167 MG/DL (ref 65–100)
HCT VFR BLD AUTO: 39.7 % (ref 36.6–50.3)
HGB BLD-MCNC: 13.1 G/DL (ref 12.1–17)
IMM GRANULOCYTES # BLD: 0 K/UL (ref 0–0.04)
IMM GRANULOCYTES NFR BLD AUTO: 0 % (ref 0–0.5)
LYMPHOCYTES # BLD: 1.2 K/UL (ref 0.8–3.5)
LYMPHOCYTES NFR BLD: 26 % (ref 12–49)
MAGNESIUM SERPL-MCNC: 2 MG/DL (ref 1.6–2.4)
MCH RBC QN AUTO: 29.1 PG (ref 26–34)
MCHC RBC AUTO-ENTMCNC: 33 G/DL (ref 30–36.5)
MCV RBC AUTO: 88.2 FL (ref 80–99)
MONOCYTES # BLD: 0.4 K/UL (ref 0–1)
MONOCYTES NFR BLD: 8 % (ref 5–13)
NEUTS SEG # BLD: 2.9 K/UL (ref 1.8–8)
NEUTS SEG NFR BLD: 63 % (ref 32–75)
NRBC # BLD: 0 K/UL (ref 0–0.01)
NRBC BLD-RTO: 0 PER 100 WBC
PLATELET # BLD AUTO: 174 K/UL (ref 150–400)
PMV BLD AUTO: 9.6 FL (ref 8.9–12.9)
POTASSIUM SERPL-SCNC: 3.8 MMOL/L (ref 3.5–5.1)
RBC # BLD AUTO: 4.5 M/UL (ref 4.1–5.7)
SERVICE CMNT-IMP: ABNORMAL
SODIUM SERPL-SCNC: 137 MMOL/L (ref 136–145)
WBC # BLD AUTO: 4.7 K/UL (ref 4.1–11.1)

## 2018-05-03 PROCEDURE — 80048 BASIC METABOLIC PNL TOTAL CA: CPT | Performed by: INTERNAL MEDICINE

## 2018-05-03 PROCEDURE — 74011250637 HC RX REV CODE- 250/637: Performed by: INTERNAL MEDICINE

## 2018-05-03 PROCEDURE — 85025 COMPLETE CBC W/AUTO DIFF WBC: CPT | Performed by: INTERNAL MEDICINE

## 2018-05-03 PROCEDURE — 74011250636 HC RX REV CODE- 250/636: Performed by: INTERNAL MEDICINE

## 2018-05-03 PROCEDURE — 36415 COLL VENOUS BLD VENIPUNCTURE: CPT | Performed by: INTERNAL MEDICINE

## 2018-05-03 PROCEDURE — 65660000000 HC RM CCU STEPDOWN

## 2018-05-03 PROCEDURE — 74011636637 HC RX REV CODE- 636/637: Performed by: HOSPITALIST

## 2018-05-03 PROCEDURE — 74011636637 HC RX REV CODE- 636/637: Performed by: INTERNAL MEDICINE

## 2018-05-03 PROCEDURE — 74011000258 HC RX REV CODE- 258: Performed by: HOSPITALIST

## 2018-05-03 PROCEDURE — 83735 ASSAY OF MAGNESIUM: CPT | Performed by: INTERNAL MEDICINE

## 2018-05-03 PROCEDURE — 82962 GLUCOSE BLOOD TEST: CPT

## 2018-05-03 RX ORDER — GLIPIZIDE 5 MG/1
10 TABLET ORAL
Status: DISCONTINUED | OUTPATIENT
Start: 2018-05-03 | End: 2018-05-04

## 2018-05-03 RX ADMIN — HEPARIN SODIUM 5000 UNITS: 5000 INJECTION, SOLUTION INTRAVENOUS; SUBCUTANEOUS at 23:22

## 2018-05-03 RX ADMIN — IMIPRAMINE HYDROCHLORIDE 10 MG: 10 TABLET ORAL at 08:35

## 2018-05-03 RX ADMIN — LORAZEPAM 1 MG: 2 INJECTION INTRAMUSCULAR; INTRAVENOUS at 21:41

## 2018-05-03 RX ADMIN — INSULIN LISPRO 3 UNITS: 100 INJECTION, SOLUTION INTRAVENOUS; SUBCUTANEOUS at 23:22

## 2018-05-03 RX ADMIN — GLIPIZIDE 10 MG: 5 TABLET ORAL at 17:49

## 2018-05-03 RX ADMIN — INSULIN GLARGINE 75 UNITS: 100 INJECTION, SOLUTION SUBCUTANEOUS at 23:22

## 2018-05-03 RX ADMIN — SODIUM CHLORIDE 50 ML/HR: 450 INJECTION, SOLUTION INTRAVENOUS at 12:37

## 2018-05-03 RX ADMIN — ASPIRIN 81 MG CHEWABLE TABLET 81 MG: 81 TABLET CHEWABLE at 08:36

## 2018-05-03 RX ADMIN — METOPROLOL SUCCINATE 100 MG: 50 TABLET, EXTENDED RELEASE ORAL at 08:36

## 2018-05-03 RX ADMIN — HEPARIN SODIUM 5000 UNITS: 5000 INJECTION, SOLUTION INTRAVENOUS; SUBCUTANEOUS at 14:18

## 2018-05-03 RX ADMIN — INSULIN LISPRO 5 UNITS: 100 INJECTION, SOLUTION INTRAVENOUS; SUBCUTANEOUS at 17:50

## 2018-05-03 RX ADMIN — HEPARIN SODIUM 5000 UNITS: 5000 INJECTION, SOLUTION INTRAVENOUS; SUBCUTANEOUS at 06:43

## 2018-05-03 RX ADMIN — GLIPIZIDE 5 MG: 5 TABLET ORAL at 08:37

## 2018-05-03 RX ADMIN — Medication 10 ML: at 23:23

## 2018-05-03 RX ADMIN — AMLODIPINE BESYLATE 10 MG: 5 TABLET ORAL at 08:36

## 2018-05-03 RX ADMIN — INSULIN LISPRO 11 UNITS: 100 INJECTION, SOLUTION INTRAVENOUS; SUBCUTANEOUS at 11:42

## 2018-05-03 RX ADMIN — QUETIAPINE FUMARATE 50 MG: 25 TABLET ORAL at 21:41

## 2018-05-03 RX ADMIN — INSULIN LISPRO 3 UNITS: 100 INJECTION, SOLUTION INTRAVENOUS; SUBCUTANEOUS at 11:41

## 2018-05-03 RX ADMIN — Medication 10 ML: at 14:19

## 2018-05-03 RX ADMIN — IMIPRAMINE HYDROCHLORIDE 10 MG: 10 TABLET ORAL at 17:49

## 2018-05-03 RX ADMIN — ATORVASTATIN CALCIUM 10 MG: 40 TABLET, FILM COATED ORAL at 08:36

## 2018-05-03 RX ADMIN — ISOSORBIDE DINITRATE 30 MG: 20 TABLET ORAL at 11:41

## 2018-05-03 RX ADMIN — INSULIN LISPRO 11 UNITS: 100 INJECTION, SOLUTION INTRAVENOUS; SUBCUTANEOUS at 08:36

## 2018-05-03 RX ADMIN — LORAZEPAM 1 MG: 2 INJECTION INTRAMUSCULAR; INTRAVENOUS at 16:22

## 2018-05-03 RX ADMIN — Medication 10 ML: at 10:22

## 2018-05-03 RX ADMIN — INDAPAMIDE 2.5 MG: 2.5 TABLET, FILM COATED ORAL at 08:37

## 2018-05-03 NOTE — PROGRESS NOTES
0700: Bedside shift change report given to NOAH Taylor (oncoming nurse) by [facility], RN (offgoing nurse). Report included the following information SBAR, Kardex, ED Summary, Procedure Summary, Intake/Output, MAR, Recent Results and Cardiac Rhythm NSR.     0744: Called PICC team and left a message about Midline insertion. 1030: PICC able to gain IV access    1630: Patient becoming agitated with family, Ativan given. 1845: Bedside shift change report given to NOAH Busby (oncoming nurse) by NOAH Taylor (offgoing nurse). Report included the following information SBAR, Kardex, ED Summary, Procedure Summary, Intake/Output, MAR, Recent Results and Cardiac Rhythm NSR.

## 2018-05-03 NOTE — PROGRESS NOTES
Spiritual Care Assessment/Progress Note  [facility]      NAME: Alce Flores      MRN: [id number]  AGE: 69 y.o. SEX: male  Moravian Affiliation: Anabaptism   Language: English     5/3/2018     Total Time (in minutes): 10     Spiritual Assessment begun in [facility] PROGRESSIVE CARE through conversation with:         [x]Patient        [] Family    [] Friend(s)        Reason for Consult: Initial/Spiritual assessment, patient floor     Spiritual beliefs: (Please include comment if needed)     [] Identifies with a eris tradition:     [] Supported by a eris community:      [] Claims no spiritual orientation:      [] Seeking spiritual identity:           [x] Adheres to an individual form of spirituality:      [] Not able to assess:                     Identified resources for coping:      [x] Prayer                               [] Music                  [] Guided Imagery     [] Family/friends                 [] Pet visits     [] Devotional reading                         [] Unknown     [] Other                                            Interventions offered during this visit: (See comments for more details)    Patient Interventions: Initial/Spiritual assessment, patient floor           Plan of Care:     [] Support spiritual and/or cultural needs    [] Support AMD and/or advance care planning process      [] Support grieving process   [] Coordinate Rites and/or Rituals    [] Coordination with community clergy   [x] No spiritual needs identified at this time   [] Detailed Plan of Care below (See Comments)  [] Make referral to Music Therapy  [] Make referral to Pet Therapy     [] Make referral to Addiction services  [] Make referral to [facility]  [] Make referral to Spiritual Care Partner  [] No future visits requested        [] Follow up visits as needed     Comments:  Initial visit on PCU for spiritual assessment. No visitors present.   Patient declined visit stating he was waiting for his mother to pick him up as he was going home this afternoon. He was receptive to assurance of prayer. Anabel Lovelace, RN shared that patient is not being discharged. He apparently has had some forgetfulness. Chaplains available as needed.   KAITLIN Butts, [facility], [address] [place] [address] Road Paging Service  [phone]

## 2018-05-03 NOTE — DIABETES MGMT
DTC Progress Note    Recommendations/ Comments: Pt's  - 263 mg/dL yesterday, 103 - 235 mg/dL today. Noted Meal-time insulin discontinued, glipizide increased. Please consider:   1. Please add no concentrated sweet restrictions to diet   2. Please document PO intake to better assess DM management needs    Current hospital DM medication: glipizide 5 mg BID, Lantus 75 units at bedtime, lispro TID ac 11 units, lispro correction -normal sensitivity     Chart reviewed and initial evaluation complete on [facility]. Pt's wife, daughter, and other family were present during visit. Patient is a 69 y.o. male with known DM on metformin 1200 mg BID, glipizide 5 mg BID, Novolog 70/30 56 units before breakfast and dinner. Pt's wife shared that pt was not taking any DM mediations since February as he \"just stopped taking them\". A1c:   Lab Results   Component Value Date/Time    Hemoglobin A1c 12.7 (H) 04/27/2018 01:17 PM       Recent Glucose Results:   Lab Results   Component Value Date/Time     (H) 05/03/2018 03:27 AM    GLUCPOC 235 (H) 05/03/2018 11:18 AM    GLUCPOC 103 (H) 05/03/2018 07:32 AM    GLUCPOC 198 (H) 05/02/2018 09:51 PM        Lab Results   Component Value Date/Time    Creatinine 1.48 (H) 05/03/2018 03:27 AM     Estimated Creatinine Clearance: 45.3 mL/min (based on Cr of 1.48). Active Orders   Diet    DIET DIABETIC CONSISTENT CARB Regular; AHA-LOW-CHOL FAT        PO intake:   Patient Vitals for the past 72 hrs:   % Diet Eaten   05/03/18 0836 100 %   05/02/18 1754 100 %   05/02/18 1242 100 %   05/02/18 0909 100 %       Will continue to follow as needed. Thank you.     Cayla Catalan, [phone]    [phone]

## 2018-05-03 NOTE — PROGRESS NOTES
PCU SHIFT NURSING NOTE      Bedside and Verbal shift change report given to Kira Alfonso (oncoming nurse) by Guillermina Alfonso (offgoing nurse). Report included the following information SBAR, Kardex, MAR, Recent Results and Med Rec Status. Shift Summary:   0325 Labs collected            Admission Date 4/27/2018   Admission Diagnosis Uncontrolled type 2 DM with hyperosmolar nonketotic hyperglycemia (HCC)  Hyponatremia  HERMILO (acute kidney injury) ([facility] [address]  Acute encephalopathy   Consults IP CONSULT TO NEUROLOGY        Consults   []PT   []OT   []Speech   []Case Management      [] Palliative      Cardiac Monitoring Order   [x]Yes   []No     IV drips   []Yes    Drip:                            Dose:  Drip:                            Dose:  Drip:                            Dose:   [x]No     GI Prophylaxis   []Yes   []No         DVT Prophylaxis   SCDs:             Jas stockings:         [] Medication   []Contraindicated   []None      Activity Level Activity Level: Up with Assistance     Activity Assistance: Partial (one person)   Purposeful Rounding every 1-2 hour? [x]Yes   Kumar Score  Total Score: 4   Bed Alarm (If score 3 or >)   []Yes   [] Refused (See signed refusal form in chart)   Finn Score  Finn Score: 18   Finn Score (if score 14 or less)   []PMT consult   []Wound Care consult      []Specialty bed   [x] Nutrition consult          Needs prior to discharge:   Home O2 required:    []Yes   [x]No    If yes, how much O2 required?     Other:    Last Bowel Movement: Last Bowel Movement Date: 04/26/18      Influenza Vaccine Received Flu Vaccine for Current Season (usually Sept-March): Yes        Pneumonia Vaccine           Diet Active Orders   Diet    DIET DIABETIC CONSISTENT CARB Regular; AHA-LOW-CHOL FAT      LDAs                                 Urinary Catheter [REMOVED] Condom Catheter 04/28/18-Indications for Use: Strict I/Os, every 1-2 hours    Intake & Output   Date 05/02/18 0700 - 05/03/18 0659 05/03/18 0700 - 05/04/18 [address] 6322-3084 24 Hour Total 8248-8674 0224-9657 24 Hour Total   I  N  T  A  K  E   P. O. 540  540         P. O. 540  540       I.V.  (mL/kg/hr) 1388.3  (1.3)  1388. 3         Volume (0.9% sodium chloride with KCl 20 mEq/L infusion) 1388. 3  1388. 3       Shift Total  (mL/kg) 1928.3  (22.5)  1928.3  (22.5)      O  U  T  P  U  T   Urine  (mL/kg/hr) 175  (0.2)  175         Urine Voided 175  175         Urine Occurrence(s) 3 x  3 x       Shift Total  (mL/kg) 175  (2)  175  (2)      NET 1753.3  1753.3      Weight (kg) 85.8 85.8 85.8 85.8 85.8 85.8         Readmission Risk Assessment Tool Score Low Risk            12       Total Score        3 Patient Length of Stay (>5 days = 3)    5 Pt. Coverage (Medicare=5 , Medicaid, or Self-Pay=4)    4 Charlson Comorbidity Score (Age + Comorbid Conditions)        Criteria that do not apply:    Has Seen PCP in Last 6 Months (Yes=3, No=0)    . Living with Significant Other. Assisted Living. LTAC. SNF.  or   Rehab    IP Visits Last 12 Months (1-3=4, 4=9, >4=11)       Expected Length of Stay 3d 19h   Actual Length of Stay 6

## 2018-05-03 NOTE — PROGRESS NOTES
Hospitalist Progress Note    NAME:  Jr Marc  :  1940  MRN:  [id number]  PCP:  Gretchen Paula MD  Date of Service:  5/3/2018    Assessment & Plan:     Hyperosmolar Non ketotic Hyperglycemia in Type 2 DM uncontrolled without coma POA  HERMILO/dehydration POA  DM type 2: better controlled  Psueudohyponatremia POA, resolved  - Due to medication noncompliance for over 6 weeks per wife. - Diabetic management with accuchecks with ISS coverage and basal coverage  - Increased lantus to 75 units and change to QHS: BS control slightly better  - Monitor labs  - DTC consulted A1c is 12.7  ( but no meds for 6 weeks )      HERMILO:  worsening  Hypokalemia  - monitor labs  - Restart IVFs change to 1/2 NS  - Encourage PO fluids  - replace lytes as needed     Acute encephalopathy POA  Baseline previously  undiagnosed dementia ( noted Forgetfulness x 1 year with progressive Slow mental function per wife and stopped meds and cannot be influenced by wife so she could assist with DM  Mgt.  - No change  - Unlikely CVA as there is no neurological deficit other than confusion  - CT head neg, ammonia neg, no evidence of infection  - RPR negative  - UA neg  - CT head neg  -  MRI brain: Mild atrophy & nonspecific white matter disease. No acute findings. - Start seroquel, ativan prn, melatonin qhs prn  - Continue tele-sitter  - Neurology consulted  - Wife requested letter of incompetence at this time to handle his affairs and pay bills. Written see previous progress note. Will ORDER a formal Psych eval  For Competency  Spoke to wife at length today who states that patient will NOT allow her or daughter to assist with Insulin and SHE does not believe that he will comply to what ever Insulin regimen we give not because he does not want to but he unable to mentally \" because they have been trying to help him at home the last few months but he wont let them.  She is also very concerned about his driving, discussed.     HTN  CAD s/p CABG  Hypercholesterolemia  - Cont amlodipine, indapamide, Metoprolol  - Continue ASA, statin, isordil      H/o Prostate Ca s/p resection, s/p radiation therapy  - Cont Imipramine as at home  - Pt is on Lupron shot monthyly with urologist (Dr Mirella Quintero) as per the wife  - OP follow up as before. Edema right hand and forearm from infiltrated IVF  - Elevate right arm  - Change IV site  - if note improved order RUE venous doppler US    Overweight:  Body mass index is 28.76 kg/(m^2)     Code Status: Full  Surrogate Decision Maker: wife Victorina Zavala # [phone]   DVT Prophylaxis: Sq heparin  GI Prophylaxis: not indicated   Baseline: Pt is independent at home with ADLs, lives with wife  . Reason for visit:   Recheck AMS, DM, HERMILO    Interval history / Subjective:   5/03 I wanted to re enlist in the [address] of the things the Doctors were doing  Clearly confused  5/02  No changes but no complaints. IVF infiltrated right arm.    5/01  Still confused but denies complaints. BS elevated. 4/30  Seen and examined. He is in NAD but very confused. MRI done with no acute changes. Review of Systems:     Symptom Y/N Comments   Symptom Y/N Comments   Fever/Chills n     Chest Pain n      Poor Appetite       Edema        Cough       Abdominal Pain n      Sputum       Joint Pain        SOB/ANGUIANO n     Pruritis/Rash        Nausea/vomit  n     Tolerating PT/OT        Diarrhea      Tolerating Diet        Constipation       Other           Could NOT obtain due to:       Vital Signs:   Last 24hrs VS reviewed since prior progress note.  Most recent are:  Visit Vitals    /77    Pulse 79    Temp 97.8 °F (36.6 °C)    Resp 16    Ht 5' 8\" (1.727 m)    Wt 86.1 kg (189 lb 13.1 oz)    SpO2 100%    BMI 28.86 kg/m2         Intake/Output Summary (Last 24 hours) at 05/03/18 1404  Last data filed at 05/03/18 0836   Gross per 24 hour   Intake              440 ml   Output                0 ml   Net 440 ml        Physical Examination:   General: Sleeping but wakes up, cooperative, NAD, confused   EENT:          EOMI. Anicteric sclerae. MMM  Resp:  CTA bilaterally with normal effort and good BS  CV:                 S1-S2, RRR, No edema  GI:                 SNTBS+, No HSM  Neurologic: Oriented only to self, normal speech  Psych:         Not anxious nor agitated  Skin:        No rashes. No jaundice  Additional pertinent findings: swelling right arm. Data Review:   Review and/or order of clinical lab test    Labs:     Recent Labs      05/03/18 0327 05/02/18 0343   WBC  4.7  5.4   HGB  13.1  12.9   HCT  39.7  38.7   PLT  174  192     Recent Labs      05/03/18 0327 05/02/18 0343 05/01/18   1839  05/01/18 0317   NA  137  137   --   139   K  3.8  4.2   --   3.8   CL  103  103   --   103   CO2  27  26   --   25   BUN  23*  30*   --   24*   CREA  1.48*  1.77*   --   1.53*   GLU  167*  156*   --   211*   CA  9.4  9.4   --   9.5   MG  2.0  2.1  2.1  2.1   PHOS   --   4.0   --    --      No results for input(s): SGOT, GPT, ALT, AP, TBIL, TBILI, TP, ALB, GLOB, GGT, AML, LPSE in the last 72 hours. No lab exists for component: AMYP, HLPSE  No results for input(s): INR, PTP, APTT in the last 72 hours. No lab exists for component: INREXT, INREXT   No results for input(s): FE, TIBC, PSAT, FERR in the last 72 hours. No results found for: FOL, RBCF   No results for input(s): PH, PCO2, PO2 in the last 72 hours. No results for input(s): CPK, CKNDX, TROIQ in the last 72 hours.     No lab exists for component: CPKMB  No results found for: CHOL, CHOLX, CHLST, CHOLV, HDL, LDL, LDLC, DLDLP, TGLX, TRIGL, TRIGP, CHHD, CHHDX  Lab Results   Component Value Date/Time    Glucose (POC) 235 (H) 05/03/2018 11:18 AM    Glucose (POC) 103 (H) 05/03/2018 07:32 AM    Glucose (POC) 198 (H) 05/02/2018 09:51 PM    Glucose (POC) 129 (H) 05/02/2018 05:03 PM    Glucose (POC) 263 (H) 05/02/2018 11:45 AM     Lab Results   Component Value Date/Time    Color YELLOW/STRAW 04/27/2018 01:32 PM    Appearance CLEAR 04/27/2018 01:32 PM    Specific gravity 1.029 04/27/2018 01:32 PM    pH (UA) 5.0 04/27/2018 01:32 PM    Protein TRACE (A) 04/27/2018 01:32 PM    Glucose >1000 (A) 04/27/2018 01:32 PM    Ketone NEGATIVE  04/27/2018 01:32 PM    Bilirubin NEGATIVE  04/27/2018 01:32 PM    Urobilinogen 0.2 04/27/2018 01:32 PM    Nitrites NEGATIVE  04/27/2018 01:32 PM    Leukocyte Esterase NEGATIVE  04/27/2018 01:32 PM    Epithelial cells FEW 04/27/2018 01:32 PM    Bacteria NEGATIVE  04/27/2018 01:32 PM    WBC 0-4 04/27/2018 01:32 PM    RBC 0-5 04/27/2018 01:32 PM       Medications Reviewed:     Current Facility-Administered Medications   Medication Dose Route Frequency    LORazepam (ATIVAN) injection 1 mg  1 mg IntraMUSCular Q6H PRN    0.45% sodium chloride infusion  50 mL/hr IntraVENous CONTINUOUS    insulin glargine (LANTUS) injection 75 Units  75 Units SubCUTAneous QHS    QUEtiapine (SEROquel) tablet 50 mg  50 mg Oral QHS    LORazepam (ATIVAN) injection 1 mg  1 mg IntraVENous Q6H PRN    melatonin tablet 6 mg  6 mg Oral QHS PRN    glipiZIDE (GLUCOTROL) tablet 5 mg  5 mg Oral ACB&D    insulin lispro (HUMALOG) injection   SubCUTAneous AC&HS    glucose chewable tablet 16 g  4 Tab Oral PRN    dextrose (D50W) injection syrg 12.5-25 g  12.5-25 g IntraVENous PRN    glucagon (GLUCAGEN) injection 1 mg  1 mg IntraMUSCular PRN    metoprolol (LOPRESSOR) injection 1.25 mg  1.25 mg IntraVENous Q6H PRN    insulin lispro (HUMALOG) injection 11 Units  11 Units SubCUTAneous TIDAC    amLODIPine (NORVASC) tablet 10 mg  10 mg Oral DAILY    aspirin chewable tablet 81 mg  81 mg Oral DAILY    atorvastatin (LIPITOR) tablet 10 mg  10 mg Oral DAILY    indapamide (LOZOL) tablet 2.5 mg  2.5 mg Oral DAILY    isosorbide dinitrate (ISORDIL) tablet 30 mg  30 mg Oral DAILY    metoprolol succinate (TOPROL-XL) XL tablet 100 mg  100 mg Oral DAILY    sodium chloride (NS) flush 5-10 mL  5-10 mL IntraVENous Q8H    sodium chloride (NS) flush 5-10 mL  5-10 mL IntraVENous PRN    heparin (porcine) injection 5,000 Units  5,000 Units SubCUTAneous Q8H    imipramine (TOFRANIL) tablet 10 mg  (Patient Supplied)  10 mg Oral TID     ______________________________________________________________________  EXPECTED LENGTH OF STAY: 3d 19h  ACTUAL LENGTH OF STAY:          6                 Leonie Cisneros MD

## 2018-05-04 LAB
ANION GAP SERPL CALC-SCNC: 6 MMOL/L (ref 5–15)
BUN SERPL-MCNC: 25 MG/DL (ref 6–20)
BUN/CREAT SERPL: 16 (ref 12–20)
CALCIUM SERPL-MCNC: 9.2 MG/DL (ref 8.5–10.1)
CHLORIDE SERPL-SCNC: 102 MMOL/L (ref 97–108)
CO2 SERPL-SCNC: 27 MMOL/L (ref 21–32)
CREAT SERPL-MCNC: 1.52 MG/DL (ref 0.7–1.3)
GLUCOSE BLD STRIP.AUTO-MCNC: 138 MG/DL (ref 65–100)
GLUCOSE BLD STRIP.AUTO-MCNC: 240 MG/DL (ref 65–100)
GLUCOSE BLD STRIP.AUTO-MCNC: 242 MG/DL (ref 65–100)
GLUCOSE BLD STRIP.AUTO-MCNC: 248 MG/DL (ref 65–100)
GLUCOSE SERPL-MCNC: 212 MG/DL (ref 65–100)
MAGNESIUM SERPL-MCNC: 2.1 MG/DL (ref 1.6–2.4)
POTASSIUM SERPL-SCNC: 3.8 MMOL/L (ref 3.5–5.1)
SERVICE CMNT-IMP: ABNORMAL
SODIUM SERPL-SCNC: 135 MMOL/L (ref 136–145)

## 2018-05-04 PROCEDURE — 74011000258 HC RX REV CODE- 258: Performed by: HOSPITALIST

## 2018-05-04 PROCEDURE — 65660000000 HC RM CCU STEPDOWN

## 2018-05-04 PROCEDURE — 82962 GLUCOSE BLOOD TEST: CPT

## 2018-05-04 PROCEDURE — 74011636637 HC RX REV CODE- 636/637: Performed by: INTERNAL MEDICINE

## 2018-05-04 PROCEDURE — 80048 BASIC METABOLIC PNL TOTAL CA: CPT | Performed by: INTERNAL MEDICINE

## 2018-05-04 PROCEDURE — 74011250637 HC RX REV CODE- 250/637: Performed by: INTERNAL MEDICINE

## 2018-05-04 PROCEDURE — 36415 COLL VENOUS BLD VENIPUNCTURE: CPT | Performed by: INTERNAL MEDICINE

## 2018-05-04 PROCEDURE — 74011250636 HC RX REV CODE- 250/636: Performed by: INTERNAL MEDICINE

## 2018-05-04 PROCEDURE — 83735 ASSAY OF MAGNESIUM: CPT | Performed by: INTERNAL MEDICINE

## 2018-05-04 RX ORDER — INSULIN GLARGINE 100 [IU]/ML
50 INJECTION, SOLUTION SUBCUTANEOUS
Status: DISCONTINUED | OUTPATIENT
Start: 2018-05-04 | End: 2018-05-04

## 2018-05-04 RX ORDER — GLIPIZIDE 5 MG/1
20 TABLET ORAL
Status: DISCONTINUED | OUTPATIENT
Start: 2018-05-05 | End: 2018-05-07 | Stop reason: HOSPADM

## 2018-05-04 RX ADMIN — INSULIN LISPRO 3 UNITS: 100 INJECTION, SOLUTION INTRAVENOUS; SUBCUTANEOUS at 11:47

## 2018-05-04 RX ADMIN — QUETIAPINE FUMARATE 50 MG: 25 TABLET ORAL at 21:20

## 2018-05-04 RX ADMIN — GLIPIZIDE 10 MG: 5 TABLET ORAL at 16:26

## 2018-05-04 RX ADMIN — Medication 10 ML: at 07:41

## 2018-05-04 RX ADMIN — INSULIN LISPRO 2 UNITS: 100 INJECTION, SOLUTION INTRAVENOUS; SUBCUTANEOUS at 21:19

## 2018-05-04 RX ADMIN — INDAPAMIDE 2.5 MG: 2.5 TABLET, FILM COATED ORAL at 09:21

## 2018-05-04 RX ADMIN — HEPARIN SODIUM 5000 UNITS: 5000 INJECTION, SOLUTION INTRAVENOUS; SUBCUTANEOUS at 21:20

## 2018-05-04 RX ADMIN — SODIUM CHLORIDE 50 ML/HR: 450 INJECTION, SOLUTION INTRAVENOUS at 21:24

## 2018-05-04 RX ADMIN — METOPROLOL SUCCINATE 100 MG: 50 TABLET, EXTENDED RELEASE ORAL at 09:21

## 2018-05-04 RX ADMIN — HEPARIN SODIUM 5000 UNITS: 5000 INJECTION, SOLUTION INTRAVENOUS; SUBCUTANEOUS at 13:28

## 2018-05-04 RX ADMIN — ISOSORBIDE DINITRATE 30 MG: 20 TABLET ORAL at 09:21

## 2018-05-04 RX ADMIN — HEPARIN SODIUM 5000 UNITS: 5000 INJECTION, SOLUTION INTRAVENOUS; SUBCUTANEOUS at 07:41

## 2018-05-04 RX ADMIN — ATORVASTATIN CALCIUM 10 MG: 40 TABLET, FILM COATED ORAL at 09:21

## 2018-05-04 RX ADMIN — INSULIN LISPRO 3 UNITS: 100 INJECTION, SOLUTION INTRAVENOUS; SUBCUTANEOUS at 16:25

## 2018-05-04 RX ADMIN — GLIPIZIDE 10 MG: 5 TABLET ORAL at 09:21

## 2018-05-04 RX ADMIN — ASPIRIN 81 MG CHEWABLE TABLET 81 MG: 81 TABLET CHEWABLE at 09:21

## 2018-05-04 RX ADMIN — Medication 10 ML: at 21:20

## 2018-05-04 RX ADMIN — IMIPRAMINE HYDROCHLORIDE 10 MG: 10 TABLET ORAL at 09:25

## 2018-05-04 RX ADMIN — IMIPRAMINE HYDROCHLORIDE 10 MG: 10 TABLET ORAL at 21:22

## 2018-05-04 RX ADMIN — AMLODIPINE BESYLATE 10 MG: 5 TABLET ORAL at 09:21

## 2018-05-04 RX ADMIN — IMIPRAMINE HYDROCHLORIDE 10 MG: 10 TABLET ORAL at 17:31

## 2018-05-04 NOTE — DIABETES MGMT
DTC Progress Note    Recommendations/ Comments: Spoke with Dr. Shelia Leon regarding pt BG control. Pt not willing to allow family members to assist with insulin injections. Pt does not have mental capacity to administer injections on his own once home. Plan to maximize oral antihyperglycemic meds to aid in glucose control. May consider discontinuing Lantus and increasing Glipizide to 20mg bid and adding Tradjenta 5mg daily. Also may consider adding Actos 15mg daily if pt does not have history of heart failure. This will aid in determining pt glycemic needs during hospital admission. Current hospital DM medication: glipizide 10 mg BID, Lantus 50 units at bedtime, lispro correction -normal sensitivity     Chart reviewed and initial evaluation complete on [facility] for hyperglycemia. Patient is a 69 y.o. male with known DM on metformin 1200 mg BID, glipizide 5 mg BID, Novolog 70/30 56 units before breakfast and dinner. Pt's wife shared that pt was not taking any DM mediations since February as he \"just stopped taking them\". A1c:   Lab Results   Component Value Date/Time    Hemoglobin A1c 12.7 (H) 04/27/2018 01:17 PM       Recent Glucose Results:   Lab Results   Component Value Date/Time     (H) 05/04/2018 04:36 AM    GLUCPOC 248 (H) 05/04/2018 11:34 AM    GLUCPOC 138 (H) 05/04/2018 07:21 AM    GLUCPOC 267 (H) 05/03/2018 10:52 PM        Lab Results   Component Value Date/Time    Creatinine 1.52 (H) 05/04/2018 04:36 AM     Estimated Creatinine Clearance: 44.2 mL/min (based on Cr of 1.52). Active Orders   Diet    DIET DIABETIC CONSISTENT CARB Regular; AHA-LOW-CHOL FAT        PO intake:   Patient Vitals for the past 72 hrs:   % Diet Eaten   05/03/18 0836 100 %   05/02/18 1754 100 %   05/02/18 1242 100 %   05/02/18 0909 100 %       Will continue to follow as needed. Thank you.     Macie Litten, [address], [address]  Office: [phone]

## 2018-05-04 NOTE — CONSULTS
PSYCHIATRIC CONSULTATION                         IDENTIFICATION:    Patient Name  Dex Acevedo   Date of Birth 1940   [facility] [id number]   Medical Record Number  [id number]      Age  69 y.o. PCP Shun Adrian MD   Admit date:  4/27/2018    Room Number  2251/01  @ [facility]   Date of Service  5/4/2018            HISTORY         REASON FOR HOSPITALIZATION/CONSULTATION:  A psychiatric consultation was requested by Charleen Bernstein MD to evaluate or provide advice/opinion related to evaluating for capacity   CC: \"no complains \"    HISTORY OF PRESENT ILLNESS:    The patient, Dex Acevedo, is a 69 y.o. BLACK OR  male with no known past psychiatric history, who was admitted to the medical floor for the treatment of Uncontrolled type 2 DM with hyperosmolar nonketotic hyperglycemia ([facility] [address] He stated he does not get along with his doctor and he stopped to take his medications , he stated he wants to be back home so he can be in control , he has difficulty with memory and difficulty to maintain his attention , he is not feeling that stopping medication will cause any problems and deos not want to get any help from his wife regarding his medications he denied self harm behavior and denied hearing voices but he has difficulty with his cognition and memory     Pt seen today for evaluation. ALLERGIES:  No Known Allergies   MEDICATIONS PRIOR TO ADMISSION:  Prescriptions Prior to Admission   Medication Sig    megestrol (MEGACE) 20 mg tablet Take 20 mg by mouth two (2) times a day.  indapamide (LOZOL) 2.5 mg tablet Take 2.5 mg by mouth daily.  metFORMIN (GLUMETZA ER) 500 mg TG24 24 hour tablet Take 1,200 mg by mouth two (2) times daily (with meals).  metoprolol succinate (TOPROL-XL) 100 mg tablet Take 100 mg by mouth daily.  amLODIPine-benazepril (LOTREL) 10-40 mg per capsule Take 1 Cap by mouth daily.     imipramine (TOFRANIL) 10 mg tablet Take 10 mg by mouth three (3) times daily.  isosorbide dinitrate (ISORDIL) 30 mg tablet Take 30 mg by mouth daily.  pseudoephedrine/acetaminophen (ALLEREST NO DROWSINESS PO) Take 1 Tab by mouth daily as needed (allergies).  insulin aspart protamine/insulin aspart (NOVOLOG MIX 70-30) 100 unit/mL (70-30) injection 56 Units by SubCUTAneous route Before breakfast and dinner.  aspirin 81 mg tablet Take 81 mg by mouth daily.  atorvastatin (LIPITOR) 10 mg tablet Take 10 mg by mouth daily.  glipiZIDE (GLUCOTROL) 5 mg tablet Take 5 mg by mouth two (2) times a day.  omega-3 fatty acids-vitamin e (FISH OIL) 1,000 mg Cap Take 1 Cap by mouth daily.  cinnamon bark (CINNAMON) 500 mg Cap Take 2,000 mg by mouth daily. PAST MEDICAL HISTORY:  Past Medical History:   Diagnosis Date    CAD (coronary artery disease)     Cancer ([facility] [address]     prostate    Diabetes ([facility] [address]     GERD (gastroesophageal reflux disease)     Hypertension     Other ill-defined conditions(799.89)     elevated cholesterol     Past Surgical History:   Procedure Laterality Date    CARDIAC SURG PROCEDURE UNLIST      cabg x5 vessels,cardiologist  and david at [facility]    COLONOSCOPY N/A 8/31/2016    COLONOSCOPY performed by Molly Randle MD at [address] [place] [address]; HI RISK IND  8/31/2016         HX PROSTATECTOMY      TX COLONOSCOPY FLX DX W/COLLJ SPEC WHEN PFRMD  4/13/2011           SOCIAL HISTORY:    Social History     Social History    Marital status:      Spouse name: N/A    Number of children: N/A    Years of education: N/A     Occupational History    Not on file.      Social History Main Topics    Smoking status: Former Smoker     Packs/day: 0.50     Years: 20.00     Quit date: 4/12/1982    Smokeless tobacco: Never Used    Alcohol use No    Drug use: No    Sexual activity: Not on file     Other Topics Concern    Not on file     Social History Narrative      FAMILY HISTORY: History reviewed. No pertinent family history. History reviewed. No pertinent family history. REVIEW of SYSTEMS:   History obtained from chart review and the patient  Pertinent items are noted in the History of Present Illness. All other Systems reviewed and are considered negative. MENTAL STATUS EXAM & VITALS       MENTAL STATUS EXAM (MSE):    MSE FINDINGS ARE WITHIN NORMAL LIMITS (WNL) UNLESS OTHERWISE STATED BELOW. Orientation not oriented to situation   Vital Signs (BP,Pulse, Temp) See below (reviewed 5/4/2018); vital signs are WNL if not listed below.    Gait and Station Stable, no ataxia   Abnormal Muscular Movements/Tone/Behavior No EPS, no Tardive Dyskinesia, no abnormal muscular movements; wnl tone   Relations cooperative   General Appearance:  age appropriate   Language No aphasia or dysarthria   Speech:  hypoverbal   Thought Processes Tangential    Thought Associations tangential   Thought Content internally preoccupied    Suicidal Ideations no plan  and no intention   Homicidal Ideations no plan  and no intention   Mood:  anxious   Affect:  anxious   Memory recent  impaired   Memory remote:  impaired   Concentration/Attention:  impaired   Fund of Knowledge average   Insight:  limited   Reliability poor   Judgment:  limited            VITALS:     Patient Vitals for the past 24 hrs:   Temp Pulse Resp BP SpO2   05/04/18 1527 98.2 °F (36.8 °C) 96 18 116/80 98 %   05/04/18 1141 97.8 °F (36.6 °C) (!) 102 18 101/68 96 %   05/04/18 0801 97.5 °F (36.4 °C) 76 18 137/74 98 %   05/04/18 0313 97.8 °F (36.6 °C) 83 16 131/80 99 %   05/04/18 0028 98 °F (36.7 °C) 75 14 129/65 98 %   05/03/18 2014 97.6 °F (36.4 °C) (!) 106 16 (!) 163/92 100 %              DATA     LABORATORY DATA:  Labs Reviewed   METABOLIC PANEL, COMPREHENSIVE - Abnormal; Notable for the following:        Result Value    Sodium 126 (*)     Chloride 90 (*)     Glucose 655 (*)     BUN 38 (*)     Creatinine 2.27 (*)     GFR est AA 34 (*)     GFR est non-AA 28 (*)     AST (SGOT) 11 (*)     Protein, total 8.6 (*)     Globulin 4.3 (*)     A-G Ratio 1.0 (*)     All other components within normal limits   URINALYSIS W/ REFLEX CULTURE - Abnormal; Notable for the following:     Protein TRACE (*)     Glucose >1000 (*)     Blood TRACE (*)     All other components within normal limits   HEMOGLOBIN A1C WITH EAG - Abnormal; Notable for the following:     Hemoglobin A1c 12.7 (*)     All other components within normal limits   METABOLIC PANEL, BASIC - Abnormal; Notable for the following:     Potassium 3.4 (*)     Glucose 311 (*)     BUN 30 (*)     Creatinine 1.62 (*)     GFR est AA 50 (*)     GFR est non-AA 42 (*)     Calcium 8.3 (*)     All other components within normal limits   METABOLIC PANEL, BASIC - Abnormal; Notable for the following:     Potassium 3.2 (*)     Glucose 171 (*)     BUN 25 (*)     Creatinine 1.31 (*)     GFR est non-AA 53 (*)     All other components within normal limits   METABOLIC PANEL, BASIC - Abnormal; Notable for the following:     Sodium 134 (*)     Glucose 272 (*)     BUN 22 (*)     Creatinine 1.37 (*)     GFR est non-AA 51 (*)     All other components within normal limits   METABOLIC PANEL, BASIC - Abnormal; Notable for the following:     Potassium 3.3 (*)     Glucose 192 (*)     GFR est non-AA 54 (*)     All other components within normal limits   CBC WITH AUTOMATED DIFF - Abnormal; Notable for the following:     IMMATURE GRANULOCYTES 1 (*)     All other components within normal limits   METABOLIC PANEL, BASIC - Abnormal; Notable for the following:     Glucose 211 (*)     BUN 24 (*)     Creatinine 1.53 (*)     GFR est AA 54 (*)     GFR est non-AA 44 (*)     All other components within normal limits   CBC WITH AUTOMATED DIFF - Abnormal; Notable for the following:     NEUTROPHILS 76 (*)     All other components within normal limits   METABOLIC PANEL, BASIC - Abnormal; Notable for the following:     Glucose 156 (*)     BUN 30 (*) Creatinine 1.77 (*)     GFR est AA 46 (*)     GFR est non-AA 38 (*)     All other components within normal limits   METABOLIC PANEL, BASIC - Abnormal; Notable for the following:     Glucose 167 (*)     BUN 23 (*)     Creatinine 1.48 (*)     GFR est AA 56 (*)     GFR est non-AA 46 (*)     All other components within normal limits   METABOLIC PANEL, BASIC - Abnormal; Notable for the following:     Sodium 135 (*)     Glucose 212 (*)     BUN 25 (*)     Creatinine 1.52 (*)     GFR est AA 54 (*)     GFR est non-AA 45 (*)     All other components within normal limits   GLUCOSE, POC - Abnormal; Notable for the following:     Glucose (POC) >600 (*)     All other components within normal limits   GLUCOSE, POC - Abnormal; Notable for the following:     Glucose (POC) 517 (*)     All other components within normal limits   GLUCOSE, POC - Abnormal; Notable for the following:     Glucose (POC) 474 (*)     All other components within normal limits   GLUCOSE, POC - Abnormal; Notable for the following:     Glucose (POC) 356 (*)     All other components within normal limits   GLUCOSE, POC - Abnormal; Notable for the following:     Glucose (POC) 383 (*)     All other components within normal limits   GLUCOSE, POC - Abnormal; Notable for the following:     Glucose (POC) 309 (*)     All other components within normal limits   GLUCOSE, POC - Abnormal; Notable for the following:     Glucose (POC) 274 (*)     All other components within normal limits   GLUCOSE, POC - Abnormal; Notable for the following:     Glucose (POC) 221 (*)     All other components within normal limits   GLUCOSE, POC - Abnormal; Notable for the following:     Glucose (POC) 208 (*)     All other components within normal limits   GLUCOSE, POC - Abnormal; Notable for the following:     Glucose (POC) 170 (*)     All other components within normal limits   GLUCOSE, POC - Abnormal; Notable for the following:     Glucose (POC) 186 (*)     All other components within normal limits   GLUCOSE, POC - Abnormal; Notable for the following:     Glucose (POC) 174 (*)     All other components within normal limits   GLUCOSE, POC - Abnormal; Notable for the following:     Glucose (POC) 251 (*)     All other components within normal limits   GLUCOSE, POC - Abnormal; Notable for the following:     Glucose (POC) 272 (*)     All other components within normal limits   GLUCOSE, POC - Abnormal; Notable for the following:     Glucose (POC) 315 (*)     All other components within normal limits   GLUCOSE, POC - Abnormal; Notable for the following:     Glucose (POC) 279 (*)     All other components within normal limits   GLUCOSE, POC - Abnormal; Notable for the following:     Glucose (POC) 250 (*)     All other components within normal limits   GLUCOSE, POC - Abnormal; Notable for the following:     Glucose (POC) 485 (*)     All other components within normal limits   GLUCOSE, POC - Abnormal; Notable for the following:     Glucose (POC) 455 (*)     All other components within normal limits   GLUCOSE, POC - Abnormal; Notable for the following:     Glucose (POC) 360 (*)     All other components within normal limits   GLUCOSE, POC - Abnormal; Notable for the following:     Glucose (POC) 365 (*)     All other components within normal limits   GLUCOSE, POC - Abnormal; Notable for the following:     Glucose (POC) 376 (*)     All other components within normal limits   GLUCOSE, POC - Abnormal; Notable for the following:     Glucose (POC) 322 (*)     All other components within normal limits   GLUCOSE, POC - Abnormal; Notable for the following:     Glucose (POC) 352 (*)     All other components within normal limits   GLUCOSE, POC - Abnormal; Notable for the following:     Glucose (POC) 215 (*)     All other components within normal limits   GLUCOSE, POC - Abnormal; Notable for the following:     Glucose (POC) 334 (*)     All other components within normal limits   GLUCOSE, POC - Abnormal; Notable for the following:     Glucose (POC) 293 (*)     All other components within normal limits   GLUCOSE, POC - Abnormal; Notable for the following:     Glucose (POC) 366 (*)     All other components within normal limits   GLUCOSE, POC - Abnormal; Notable for the following:     Glucose (POC) 155 (*)     All other components within normal limits   GLUCOSE, POC - Abnormal; Notable for the following:     Glucose (POC) >600 (*)     All other components within normal limits   GLUCOSE, POC - Abnormal; Notable for the following:     Glucose (POC) 286 (*)     All other components within normal limits   GLUCOSE, POC - Abnormal; Notable for the following:     Glucose (POC) 310 (*)     All other components within normal limits   GLUCOSE, POC - Abnormal; Notable for the following:     Glucose (POC) 327 (*)     All other components within normal limits   GLUCOSE, POC - Abnormal; Notable for the following:     Glucose (POC) 224 (*)     All other components within normal limits   GLUCOSE, POC - Abnormal; Notable for the following:     Glucose (POC) 182 (*)     All other components within normal limits   GLUCOSE, POC - Abnormal; Notable for the following:     Glucose (POC) 432 (*)     All other components within normal limits   GLUCOSE, POC - Abnormal; Notable for the following:     Glucose (POC) 408 (*)     All other components within normal limits   GLUCOSE, POC - Abnormal; Notable for the following:     Glucose (POC) 385 (*)     All other components within normal limits   GLUCOSE, POC - Abnormal; Notable for the following:     Glucose (POC) 228 (*)     All other components within normal limits   GLUCOSE, POC - Abnormal; Notable for the following:     Glucose (POC) 151 (*)     All other components within normal limits   GLUCOSE, POC - Abnormal; Notable for the following:     Glucose (POC) 263 (*)     All other components within normal limits   GLUCOSE, POC - Abnormal; Notable for the following:     Glucose (POC) 129 (*)     All other components within normal limits   GLUCOSE, POC - Abnormal; Notable for the following:     Glucose (POC) 198 (*)     All other components within normal limits   GLUCOSE, POC - Abnormal; Notable for the following:     Glucose (POC) 103 (*)     All other components within normal limits   GLUCOSE, POC - Abnormal; Notable for the following:     Glucose (POC) 235 (*)     All other components within normal limits   GLUCOSE, POC - Abnormal; Notable for the following:     Glucose (POC) 287 (*)     All other components within normal limits   GLUCOSE, POC - Abnormal; Notable for the following:     Glucose (POC) 267 (*)     All other components within normal limits   GLUCOSE, POC - Abnormal; Notable for the following:     Glucose (POC) 138 (*)     All other components within normal limits   GLUCOSE, POC - Abnormal; Notable for the following:     Glucose (POC) 248 (*)     All other components within normal limits   GLUCOSE, POC - Abnormal; Notable for the following:     Glucose (POC) 242 (*)     All other components within normal limits   CBC WITH AUTOMATED DIFF   PHOSPHORUS   GLUCOSTABILIZER   GLUCOSTABILIZER   GLUCOSTABILIZER   GLUCOSTABILIZER   MAGNESIUM   GLUCOSTABILIZER   GLUCOSTABILIZER   GLUCOSTABILIZER   MAGNESIUM   GLUCOSTABILIZER   GLUCOSTABILIZER   GLUCOSTABILIZER   GLUCOSTABILIZER   GLUCOSTABILIZER   GLUCOSTABILIZER   MAGNESIUM   GLUCOSTABILIZER   GLUCOSTABILIZER   GLUCOSTABILIZER   GLUCOSTABILIZER   GLUCOSTABILIZER   GLUCOSTABILIZER   GLUCOSTABILIZER   AMMONIA   MAGNESIUM   VITAMIN B12   TSH 3RD GENERATION   MAGNESIUM   RPR   CBC WITH AUTOMATED DIFF   MAGNESIUM   RPR   MAGNESIUM   PHOSPHORUS   MAGNESIUM   CBC WITH AUTOMATED DIFF   MAGNESIUM   MAGNESIUM   MAGNESIUM   MAGNESIUM     Admission on 04/27/2018   No results displayed because visit has over 200 results. RADIOLOGY REPORTS:  No results found for this or any previous visit. Mri Brain Wo Cont    Result Date: 4/30/2018  Clinical indication: Change in mental status. Prostate carcinoma, Technical factors: Diffusion imaging, sagittal T1-weighted, axial T1-weighted T2-weighted FLAIR gradient echo coronal T2-weighted. No prior. Diffusion imaging does not show acute ischemic changes her. There is no extra-axial fluid collection, hemorrhage or shift. Ventricles are normal in size and midline. Major flow voids at the base of the brain are present. Mild nonspecific white matter disease. There is no masses or mass effect. impression: Mild atrophy and nonspecific white matter disease. No other significant no acute findings. Ct Head Wo Cont    Result Date: 4/27/2018  Indication:  Confusion, delirium Comparison: CT 2/27/2016 Findings: 5 mm axial images were obtained from the skull base through the vertex. CT dose reduction was achieved through the use of a standardized protocol tailored for this examination and automatic exposure control for dose modulation. The ventricles and cortical sulci are prominent, compatible with age related volume loss. There is no evidence of intracranial hemorrhage, mass, mass effect, or acute infarct. There is periventricular white matter disease. No extra-axial fluid collections are seen. There is chronic partial opacification of the left maxillary sinus. The orbital structures are unremarkable. No osseous abnormalities are seen. Impression: 1. No evidence of acute infarct or intracranial hemorrhage. 2. Minimal periventricular white matter disease is likely secondary to chronic small vessel ischemic changes.               MEDICATIONS       ALL MEDICATIONS  Current Facility-Administered Medications   Medication Dose Route Frequency    glipiZIDE (GLUCOTROL) tablet 10 mg  10 mg Oral ACB&D    LORazepam (ATIVAN) injection 1 mg  1 mg IntraMUSCular Q6H PRN    0.45% sodium chloride infusion  50 mL/hr IntraVENous CONTINUOUS    QUEtiapine (SEROquel) tablet 50 mg  50 mg Oral QHS    LORazepam (ATIVAN) injection 1 mg  1 mg IntraVENous Q6H PRN    melatonin tablet 6 mg  6 mg Oral QHS PRN    insulin lispro (HUMALOG) injection   SubCUTAneous AC&HS    glucose chewable tablet 16 g  4 Tab Oral PRN    dextrose (D50W) injection syrg 12.5-25 g  12.5-25 g IntraVENous PRN    glucagon (GLUCAGEN) injection 1 mg  1 mg IntraMUSCular PRN    metoprolol (LOPRESSOR) injection 1.25 mg  1.25 mg IntraVENous Q6H PRN    amLODIPine (NORVASC) tablet 10 mg  10 mg Oral DAILY    aspirin chewable tablet 81 mg  81 mg Oral DAILY    atorvastatin (LIPITOR) tablet 10 mg  10 mg Oral DAILY    indapamide (LOZOL) tablet 2.5 mg  2.5 mg Oral DAILY    isosorbide dinitrate (ISORDIL) tablet 30 mg  30 mg Oral DAILY    metoprolol succinate (TOPROL-XL) XL tablet 100 mg  100 mg Oral DAILY    sodium chloride (NS) flush 5-10 mL  5-10 mL IntraVENous Q8H    sodium chloride (NS) flush 5-10 mL  5-10 mL IntraVENous PRN    heparin (porcine) injection 5,000 Units  5,000 Units SubCUTAneous Q8H    imipramine (TOFRANIL) tablet 10 mg  (Patient Supplied)  10 mg Oral TID      SCHEDULED MEDICATIONS  Current Facility-Administered Medications   Medication Dose Route Frequency    glipiZIDE (GLUCOTROL) tablet 10 mg  10 mg Oral ACB&D    0.45% sodium chloride infusion  50 mL/hr IntraVENous CONTINUOUS    QUEtiapine (SEROquel) tablet 50 mg  50 mg Oral QHS    insulin lispro (HUMALOG) injection   SubCUTAneous AC&HS    amLODIPine (NORVASC) tablet 10 mg  10 mg Oral DAILY    aspirin chewable tablet 81 mg  81 mg Oral DAILY    atorvastatin (LIPITOR) tablet 10 mg  10 mg Oral DAILY    indapamide (LOZOL) tablet 2.5 mg  2.5 mg Oral DAILY    isosorbide dinitrate (ISORDIL) tablet 30 mg  30 mg Oral DAILY    metoprolol succinate (TOPROL-XL) XL tablet 100 mg  100 mg Oral DAILY    sodium chloride (NS) flush 5-10 mL  5-10 mL IntraVENous Q8H    heparin (porcine) injection 5,000 Units  5,000 Units SubCUTAneous Q8H    imipramine (TOFRANIL) tablet 10 mg  (Patient Supplied)  10 mg Oral TID                ASSESSMENT & PLAN        The patient Dex Acevedo is a 69 y.o.  male who presents at this time for treatment of the following diagnoses:  Patient Active Hospital Problem List:         Assessment: Patient does not know the risk and benefit of taking or stopping medication and has no capacity for medical decision and family needs to be involved , he can not drive         Disposition:No need for inpatient psychiatry , may need home health aid or rehab   Thank you very much for the opportunity to participate in the care of your patient. I will continue to follow up with patient as deemed appropriate. I have reviewed admission (and previous/old) labs and medical tests in the EHR and or transferring hospital documents. I will continue to order blood tests/labs and diagnostic tests as deemed appropriate and review results as they become available (see orders for details). I have reviewed old psychiatric and medical records available in the EHR. I Will order additional psychiatric records from other institutions to further elucidate the nature of patient's psychopathology and review once available. I will gather additional collateral information from friends, family and o/p treatment team to further elucidate the nature of patient's psychopathology and baselline level of psychiatric functioning.                      SIGNED:    Ronaldo Russo MD  5/4/2018

## 2018-05-04 NOTE — PROGRESS NOTES
Initial Nutrition Assessment:    INTERVENTIONS/RECOMMENDATIONS:   · Meals/Snacks: General/healthful diet: Continue cardiac/consistent carbohydrate diet    ASSESSMENT:   Patient medically noted for acute encephalopathy, HERMILO, DM, and HTN. Patient having lunch at time of visit. He reports his meals have been good and his appetite has been good. Eating 100% of meals per flowsheets. Elevated BG noted; receiving CCD. DTC following. Awaiting Psych consult for competency. Continue current diet. Encourage intake of meals. Diet Order: Consistent carb, Cardiac  % Eaten:  Patient Vitals for the past 72 hrs:   % Diet Eaten   05/03/18 0836 100 %   05/02/18 1754 100 %   05/02/18 1242 100 %   05/02/18 0909 100 %     Pertinent Medications: [x]Reviewed []Other: Norvasc, Atorvastatin, Glipizide, Lantus, Humalog, Seroquel  Pertinent Labs: [x]Reviewed []Other: [phone]  Food Allergies: [x]None []Other   Last BM: 4/28   []Active     []Hyperactive  []Hypoactive       [] Absent BS  Skin:    [x] Intact   [] Incision  [] Breakdown  [] Other:    Anthropometrics:   Height: 5' 8\" (172.7 cm) Weight: 86.1 kg (189 lb 13.1 oz)   IBW (%IBW):   ( ) UBW (%UBW):   (  %)   Last Weight Metrics:  Weight Loss Metrics 5/3/2018 8/31/2016 2/27/2016 1/29/2014 4/13/2011   Today's Wt 189 lb 13.1 oz 188 lb 2 oz 185 lb 186 lb 6 oz 178 lb 4 oz   BMI 28.86 kg/m2 29.46 kg/m2 28.97 kg/m2 29.18 kg/m2 27.91 kg/m2       BMI: Body mass index is 28.86 kg/(m^2). This BMI is indicative of:   []Underweight    []Normal    [x]Overweight    [] Obesity   [] Extreme Obesity (BMI>40)     Estimated Nutrition Needs (Based on):   2034 Kcals/day (BMR (1565) x 1. 3AF) , 69 g (-86g (0.8-1.0 g/kg bw)) Protein  Carbohydrate:  At Least 130 g/day  Fluids: 2050 mL/day (1ml/kcal)    Pt expected to meet estimated nutrient needs: [x]Yes []No    NUTRITION DIAGNOSES:   Problem:  Altered nutrition-related lab values      Etiology: related to DM     Signs/Symptoms: as evidenced by BG 287      NUTRITION INTERVENTIONS:  Meals/Snacks: General/healthful diet                  GOAL:   PO intake >70% of meals and BG trend <180 next 5-7 days    LEARNING NEEDS (Diet, Food/Nutrient-Drug Interaction):    [x] None Identified   [] Identified and Education Provided/Documented   [] Identified and Pt declined/was not appropriate     Cultureal, Pentecostalism, OR Ethnic Dietary Needs:    [x] None Identified   [] Identified and Addressed     [x] Interdisciplinary Care Plan Reviewed/Documented    [x] Discharge Planning:  Heart healthy/consistent carbohydrate diet      MONITORING /EVALUATION:   Food/Nutrient Intake Outcomes:  Total energy intake  Physical Signs/Symptoms Outcomes: Weight/weight change, Glucose profile    NUTRITION RISK:    [] High              [] Moderate           [x]  Low  []  Minimal/Uncompromised    PT SEEN FOR:    []  MD Consult: []Calorie Count      []Diabetic Diet Education        []Diet Education     []Electrolyte Management     []General Nutrition Management and Supplements     []Management of Tube Feeding     []TPN Recommendations    []  RN Referral:  []MST score >=2     []Enteral/Parenteral Nutrition PTA     []Pregnant: Gestational DM or Multigestation     []Pressure Ulcer/Wound Care needs        []  Low BMI  [x]  JOSEF Jones  Pager [phone]                 Weekend Pager [phone]

## 2018-05-05 LAB
GLUCOSE BLD STRIP.AUTO-MCNC: 111 MG/DL (ref 65–100)
GLUCOSE BLD STRIP.AUTO-MCNC: 134 MG/DL (ref 65–100)
GLUCOSE BLD STRIP.AUTO-MCNC: 291 MG/DL (ref 65–100)
GLUCOSE BLD STRIP.AUTO-MCNC: 337 MG/DL (ref 65–100)
MAGNESIUM SERPL-MCNC: 2.2 MG/DL (ref 1.6–2.4)
SERVICE CMNT-IMP: ABNORMAL

## 2018-05-05 PROCEDURE — 65660000000 HC RM CCU STEPDOWN

## 2018-05-05 PROCEDURE — 74011636637 HC RX REV CODE- 636/637: Performed by: INTERNAL MEDICINE

## 2018-05-05 PROCEDURE — 74011250637 HC RX REV CODE- 250/637: Performed by: INTERNAL MEDICINE

## 2018-05-05 PROCEDURE — 36415 COLL VENOUS BLD VENIPUNCTURE: CPT | Performed by: INTERNAL MEDICINE

## 2018-05-05 PROCEDURE — 74011250636 HC RX REV CODE- 250/636: Performed by: INTERNAL MEDICINE

## 2018-05-05 PROCEDURE — 82962 GLUCOSE BLOOD TEST: CPT

## 2018-05-05 PROCEDURE — 83735 ASSAY OF MAGNESIUM: CPT | Performed by: INTERNAL MEDICINE

## 2018-05-05 RX ORDER — QUETIAPINE FUMARATE 25 MG/1
12.5 TABLET, FILM COATED ORAL
Status: DISCONTINUED | OUTPATIENT
Start: 2018-05-05 | End: 2018-05-06

## 2018-05-05 RX ADMIN — ISOSORBIDE DINITRATE 30 MG: 20 TABLET ORAL at 08:38

## 2018-05-05 RX ADMIN — ASPIRIN 81 MG CHEWABLE TABLET 81 MG: 81 TABLET CHEWABLE at 08:39

## 2018-05-05 RX ADMIN — LORAZEPAM 1 MG: 2 INJECTION INTRAMUSCULAR; INTRAVENOUS at 10:47

## 2018-05-05 RX ADMIN — Medication 10 ML: at 21:41

## 2018-05-05 RX ADMIN — METOPROLOL SUCCINATE 100 MG: 50 TABLET, EXTENDED RELEASE ORAL at 08:38

## 2018-05-05 RX ADMIN — LINAGLIPTIN 5 MG: 5 TABLET, FILM COATED ORAL at 08:39

## 2018-05-05 RX ADMIN — GLIPIZIDE 20 MG: 5 TABLET ORAL at 08:38

## 2018-05-05 RX ADMIN — LORAZEPAM 1 MG: 2 INJECTION INTRAMUSCULAR; INTRAVENOUS at 18:37

## 2018-05-05 RX ADMIN — HEPARIN SODIUM 5000 UNITS: 5000 INJECTION, SOLUTION INTRAVENOUS; SUBCUTANEOUS at 21:40

## 2018-05-05 RX ADMIN — INSULIN LISPRO 5 UNITS: 100 INJECTION, SOLUTION INTRAVENOUS; SUBCUTANEOUS at 12:38

## 2018-05-05 RX ADMIN — INSULIN LISPRO 7 UNITS: 100 INJECTION, SOLUTION INTRAVENOUS; SUBCUTANEOUS at 17:41

## 2018-05-05 RX ADMIN — AMLODIPINE BESYLATE 10 MG: 5 TABLET ORAL at 08:38

## 2018-05-05 RX ADMIN — Medication 10 ML: at 12:38

## 2018-05-05 RX ADMIN — IMIPRAMINE HYDROCHLORIDE 10 MG: 10 TABLET ORAL at 17:41

## 2018-05-05 RX ADMIN — LORAZEPAM 1 MG: 2 INJECTION INTRAMUSCULAR; INTRAVENOUS at 22:50

## 2018-05-05 RX ADMIN — IMIPRAMINE HYDROCHLORIDE 10 MG: 10 TABLET ORAL at 08:40

## 2018-05-05 RX ADMIN — ATORVASTATIN CALCIUM 10 MG: 40 TABLET, FILM COATED ORAL at 08:38

## 2018-05-05 RX ADMIN — INDAPAMIDE 2.5 MG: 2.5 TABLET, FILM COATED ORAL at 08:38

## 2018-05-05 RX ADMIN — GLIPIZIDE 20 MG: 5 TABLET ORAL at 17:41

## 2018-05-05 RX ADMIN — IMIPRAMINE HYDROCHLORIDE 10 MG: 10 TABLET ORAL at 21:46

## 2018-05-05 RX ADMIN — HEPARIN SODIUM 5000 UNITS: 5000 INJECTION, SOLUTION INTRAVENOUS; SUBCUTANEOUS at 06:26

## 2018-05-05 RX ADMIN — QUETIAPINE FUMARATE 50 MG: 25 TABLET ORAL at 21:40

## 2018-05-05 NOTE — PROGRESS NOTES
Hospitalist Progress Note    NAME:  Rossi Reed  :  1940  MRN:  [id number]  PCP:  Faith Mcadams MD  Date of Service:  2018    Assessment & Plan:     Hyperosmolar Non ketotic Hyperglycemia in Type 2 DM uncontrolled without coma POA  HERMILO/dehydration POA  DM type 2: better controlled  Psueudohyponatremia POA, resolved  - Due to medication noncompliance for over 6 weeks per wife. - Diabetic management with accuchecks with ISS coverage and basal coverage  - Increased lantus to 75 units and change to QHS: BS control slightly better  - Monitor labs  - DTC consulted A1c is 12.7  ( but no meds for 6 weeks )   Discussed with DTC. stop all insulin and change to Glucotrol and Tradjenta and monitor BS prior to discharge  BS only in 200 range inspite of OFF Lantus. Stop  SS tonight and watch BS without insulin and all ORAL     HERMILO:  worsening  Hypokalemia  - monitor labs  - Restart IVFs change to 1/2 NS  - Encourage PO fluids  - replace lytes as needed     Acute encephalopathy POA  Baseline previously  undiagnosed dementia ( noted Forgetfulness x 1 year with progressive Slow mental function per wife and stopped meds and cannot be influenced by wife so she could assist with DM  Mgt.  - No change  - Unlikely CVA as there is no neurological deficit other than confusion  - CT head neg, ammonia neg, no evidence of infection  - RPR negative  - UA neg  - CT head neg  -  MRI brain: Mild atrophy & nonspecific white matter disease. No acute findings. - Start seroquel, ativan prn, melatonin qhs prn  - Continue tele-sitter  - Neurology consulted  - Wife requested letter of incompetence at this time to handle his affairs and pay bills. Written see previous progress note. Will ORDER a formal Psych eval  For Competency. Patient is not competent.  No driving  Spoke to wife at length today who states that patient will NOT allow her or daughter to assist with Insulin and SHE does not believe that he will comply to what ever Insulin regimen we give not because he does not want to but he unable to mentally \" because they have been trying to help him at home the last few months but he wont let them. She is also very concerned about his driving, discussed. Left message with wife today      HTN  CAD s/p CABG  Hypercholesterolemia  - Cont amlodipine, indapamide, Metoprolol  - Continue ASA, statin, isordil      H/o Prostate Ca s/p resection, s/p radiation therapy  - Cont Imipramine as at home  - Pt is on Lupron shot monthyly with urologist (Dr Constance Deluca) as per the wife  - OP follow up as before. Edema right hand and forearm from infiltrated IVF  - Elevate right arm  - Change IV site  - if note improved order RUE venous doppler US    Overweight:  Body mass index is 28.76 kg/(m^2)     Code Status: Full  Surrogate Decision Maker: wife Cheryle Punt # [phone]   DVT Prophylaxis: Sq heparin  GI Prophylaxis: not indicated   Baseline: Pt is independent at home with ADLs, lives with wife  . Reason for visit:   Recheck AMS, DM, HERMILO    Interval history / Subjective:   5/4   I had a standing off with my \" what is the name of the person that passes on my prescriptions to the Pharmacy\"  ( meaning his PCP  5/03 I wanted to re enlist in the [address] of the things the Doctors were doing  Clearly confused  5/02  No changes but no complaints. IVF infiltrated right arm.    5/01  Still confused but denies complaints. BS elevated. 4/30  Seen and examined. He is in NAD but very confused. MRI done with no acute changes.      Review of Systems:     Symptom Y/N Comments   Symptom Y/N Comments   Fever/Chills n     Chest Pain n      Poor Appetite       Edema        Cough       Abdominal Pain n      Sputum       Joint Pain        SOB/ANGUIANO n     Pruritis/Rash        Nausea/vomit  n     Tolerating PT/OT        Diarrhea      Tolerating Diet        Constipation       Other           Could NOT obtain due to:       Vital Signs:   Last 24hrs VS reviewed since prior progress note. Most recent are:  Visit Vitals    /83    Pulse 76    Temp 98.1 °F (36.7 °C)    Resp 16    Ht 5' 8\" (1.727 m)    Wt 86.1 kg (189 lb 13.1 oz)    SpO2 100%    BMI 28.86 kg/m2         Intake/Output Summary (Last 24 hours) at 05/05/18 1437  Last data filed at 05/05/18 1351   Gross per 24 hour   Intake          2461.67 ml   Output              535 ml   Net          1926.67 ml        Physical Examination:   General: Sleeping but wakes up, cooperative, NAD, confused   EENT:          EOMI. Anicteric sclerae. MMM  Resp:  CTA bilaterally with normal effort and good BS  CV:                 S1-S2, RRR, No edema  GI:                 SNTBS+, No HSM  Neurologic: Oriented only to self, normal speech  Psych:         Not anxious nor agitated  Skin:        No rashes. No jaundice  Additional pertinent findings: swelling right arm. Data Review:   Review and/or order of clinical lab test    Labs:     Recent Labs      05/03/18   0327   WBC  4.7   HGB  13.1   HCT  39.7   PLT  174     Recent Labs      05/05/18   0312  05/04/18   0436  05/03/18   0327   NA   --   135*  137   K   --   3.8  3.8   CL   --   102  103   CO2   --   27  27   BUN   --   25*  23*   CREA   --   1.52*  1.48*   GLU   --   212*  167*   CA   --   9.2  9.4   MG  2.2  2.1  2.0     No results for input(s): SGOT, GPT, ALT, AP, TBIL, TBILI, TP, ALB, GLOB, GGT, AML, LPSE in the last 72 hours. No lab exists for component: AMYP, HLPSE  No results for input(s): INR, PTP, APTT in the last 72 hours. No lab exists for component: INREXT, INREXT   No results for input(s): FE, TIBC, PSAT, FERR in the last 72 hours. No results found for: FOL, RBCF   No results for input(s): PH, PCO2, PO2 in the last 72 hours. No results for input(s): CPK, CKNDX, TROIQ in the last 72 hours.     No lab exists for component: CPKMB  No results found for: CHOL, CHOLX, CHLST, CHOLV, HDL, LDL, LDLC, DLDLP, TGLX, TRIGL, TRIGP, CHHD, CHHDX  Lab Results Component Value Date/Time    Glucose (POC) 291 (H) 05/05/2018 11:17 AM    Glucose (POC) 134 (H) 05/05/2018 07:19 AM    Glucose (POC) 240 (H) 05/04/2018 09:05 PM    Glucose (POC) 242 (H) 05/04/2018 04:18 PM    Glucose (POC) 248 (H) 05/04/2018 11:34 AM     Lab Results   Component Value Date/Time    Color YELLOW/STRAW 04/27/2018 01:32 PM    Appearance CLEAR 04/27/2018 01:32 PM    Specific gravity 1.029 04/27/2018 01:32 PM    pH (UA) 5.0 04/27/2018 01:32 PM    Protein TRACE (A) 04/27/2018 01:32 PM    Glucose >1000 (A) 04/27/2018 01:32 PM    Ketone NEGATIVE  04/27/2018 01:32 PM    Bilirubin NEGATIVE  04/27/2018 01:32 PM    Urobilinogen 0.2 04/27/2018 01:32 PM    Nitrites NEGATIVE  04/27/2018 01:32 PM    Leukocyte Esterase NEGATIVE  04/27/2018 01:32 PM    Epithelial cells FEW 04/27/2018 01:32 PM    Bacteria NEGATIVE  04/27/2018 01:32 PM    WBC 0-4 04/27/2018 01:32 PM    RBC 0-5 04/27/2018 01:32 PM       Medications Reviewed:     Current Facility-Administered Medications   Medication Dose Route Frequency    glipiZIDE (GLUCOTROL) tablet 20 mg  20 mg Oral ACB&D    linagliptin (TRADJENTA) tablet 5 mg  5 mg Oral ACB    LORazepam (ATIVAN) injection 1 mg  1 mg IntraMUSCular Q6H PRN    QUEtiapine (SEROquel) tablet 50 mg  50 mg Oral QHS    LORazepam (ATIVAN) injection 1 mg  1 mg IntraVENous Q6H PRN    melatonin tablet 6 mg  6 mg Oral QHS PRN    insulin lispro (HUMALOG) injection   SubCUTAneous AC&HS    glucose chewable tablet 16 g  4 Tab Oral PRN    dextrose (D50W) injection syrg 12.5-25 g  12.5-25 g IntraVENous PRN    glucagon (GLUCAGEN) injection 1 mg  1 mg IntraMUSCular PRN    metoprolol (LOPRESSOR) injection 1.25 mg  1.25 mg IntraVENous Q6H PRN    amLODIPine (NORVASC) tablet 10 mg  10 mg Oral DAILY    aspirin chewable tablet 81 mg  81 mg Oral DAILY    atorvastatin (LIPITOR) tablet 10 mg  10 mg Oral DAILY    indapamide (LOZOL) tablet 2.5 mg  2.5 mg Oral DAILY    isosorbide dinitrate (ISORDIL) tablet 30 mg  30 mg Oral DAILY    metoprolol succinate (TOPROL-XL) XL tablet 100 mg  100 mg Oral DAILY    sodium chloride (NS) flush 5-10 mL  5-10 mL IntraVENous Q8H    sodium chloride (NS) flush 5-10 mL  5-10 mL IntraVENous PRN    heparin (porcine) injection 5,000 Units  5,000 Units SubCUTAneous Q8H    imipramine (TOFRANIL) tablet 10 mg  (Patient Supplied)  10 mg Oral TID     ______________________________________________________________________  EXPECTED LENGTH OF STAY: 3d 19h  ACTUAL LENGTH OF STAY:          8                 Carmella Mclaughlin MD

## 2018-05-05 NOTE — PROGRESS NOTES
PCU SHIFT NURSING NOTE      Bedside and Verbal shift change report given to Natan Walsh RN (oncoming nurse) by Cristhian Blount RN (offgoing nurse). Report included the following information SBAR, Kardex, Intake/Output, MAR, Recent Results and Cardiac Rhythm SR. Shift Summary:   8254 - Pt very agitated and yelling at this nurse to let him go home; pt argumentative, requesting to speak to wife about going home. Called pt's wife from his room; pt's wife attempted to redirect pt's behavior. Spoke with pt's wife; she requested administration of medication to calm pt as she was unable to redirect his behavior. Administered prn Lorazepam as pt's behavior required intervention. 1149 - Pt resting quietly in bed; responsive to voice and more cooperative. 1837 - Pt very argumentative and yelling at this nurse \"I want the count back! What did you do with the count? You didn't lose it, did you? \" Unable to redirect pt's behavior as pt attempting to get out of bed, yelling about the \"count. \" Attempted to call pt's wife without success. Administered prn IV Lorazepam for pt's increased agitation. Will continue to monitor. 46 - Pt's daughter called to state that pt was only allowed to be visited tonight by Jessica Marcano (pt's brothers).      Admission Date 4/27/2018   Admission Diagnosis Uncontrolled type 2 DM with hyperosmolar nonketotic hyperglycemia (HCC)  Hyponatremia  HERMILO (acute kidney injury) ([facility] [address]  Acute encephalopathy   Consults IP CONSULT TO NEUROLOGY  IP CONSULT TO PSYCHIATRY        Consults   []PT   []OT   []Speech   []Case Management      [] Palliative      Cardiac Monitoring Order   []Yes   []No     IV drips   []Yes    Drip:                            Dose:  Drip:                            Dose:  Drip:                            Dose:   []No     GI Prophylaxis   []Yes   []No         DVT Prophylaxis   SCDs:             Jas stockings:         [] Medication   []Contraindicated   []None      Activity Level Activity Level: Up with Assistance     Activity Assistance: Partial (one person)   Purposeful Rounding every 1-2 hour? []Yes   Kumar Score  Total Score: 4   Bed Alarm (If score 3 or >)   []Yes   [] Refused (See signed refusal form in chart)   Finn Score  Finn Score: 19   Finn Score (if score 14 or less)   []PMT consult   []Wound Care consult      []Specialty bed   [] Nutrition consult          Needs prior to discharge:   Home O2 required:    []Yes   []No    If yes, how much O2 required? Other:    Last Bowel Movement: Last Bowel Movement Date: 05/04/18      Influenza Vaccine Received Flu Vaccine for Current Season (usually Sept-March): Yes        Pneumonia Vaccine           Diet Active Orders   Diet    DIET DIABETIC CONSISTENT CARB Regular; AHA-LOW-CHOL FAT      LDAs               Peripheral IV 05/03/18 Left Arm (Active)   Site Assessment Clean, dry, & intact 5/5/2018  3:09 AM   Phlebitis Assessment 0 5/5/2018  3:09 AM   Infiltration Assessment 0 5/5/2018  3:09 AM   Dressing Status Clean, dry, & intact 5/5/2018  3:09 AM   Dressing Type Tape;Transparent 5/5/2018  3:09 AM   Hub Color/Line Status Blue; Infusing 5/5/2018  3:09 AM   Action Taken Other (comment) 5/3/2018 10:23 AM   Alcohol Cap Used Yes 5/4/2018  3:13 AM                      Urinary Catheter [REMOVED] Condom Catheter 04/28/18-Indications for Use: Strict I/Os, every 1-2 hours    Intake & Output   Date 05/04/18 0700 - 05/05/18 0659 05/05/18 0700 - 05/06/18 0659   Shift 0700-1859 1900-0659 24 Hour Total 0700-1859 1900-0659 24 Hour Total   I  N  T  A  K  E   Shift Total  (mL/kg)         O  U  T  P  U  T   Urine  (mL/kg/hr)  250  (0.2) 250  (0.1) 285  285      Urine Voided  250 250 285  285      Urine Occurrence(s) 2 x 2 x 4 x       Stool            Stool Occurrence(s) 1 x  1 x       Shift Total  (mL/kg)  250  (2.9) 250  (2.9) 285  (3.3)  285  (3.3)   NET  -250 -250 -285  -285   Weight (kg) 86.1 86.1 86.1 86.1 86.1 86.1         Readmission Risk Assessment Tool Score Low Risk            12       Total Score        3 Patient Length of Stay (>5 days = 3)    5 Pt. Coverage (Medicare=5 , Medicaid, or Self-Pay=4)    4 Charlson Comorbidity Score (Age + Comorbid Conditions)        Criteria that do not apply:    Has Seen PCP in Last 6 Months (Yes=3, No=0)    . Living with Significant Other. Assisted Living. LTAC. SNF.  or   Rehab    IP Visits Last 12 Months (1-3=4, 4=9, >4=11)       Expected Length of Stay 3d 19h   Actual Length of Stay 8

## 2018-05-05 NOTE — PROGRESS NOTES
PCU SHIFT NURSING NOTE      Bedside verbal shift change report given to Steve Sosa (oncoming nurse) by Nas Bashir (offgoing nurse). Report included the following information SBAR, Kardex, Intake/Output, Recent Results and Cardiac Rhythm NSR-ST.    1900: Family present. Video monitoring system in place. Admission Date 4/27/2018   Admission Diagnosis Uncontrolled type 2 DM with hyperosmolar nonketotic hyperglycemia (HCC)  Hyponatremia  HERMILO (acute kidney injury) ([facility] [address]  Acute encephalopathy   Consults IP CONSULT TO NEUROLOGY  IP CONSULT TO PSYCHIATRY        Consults   []PT   []OT   []Speech   []Case Management      [] Palliative      Cardiac Monitoring Order   []Yes   []No     IV drips   []Yes    Drip:                            Dose:  Drip:                            Dose:  Drip:                            Dose:   []No     GI Prophylaxis   []Yes   []No         DVT Prophylaxis   SCDs:             Jas stockings:         [] Medication   []Contraindicated   []None      Activity Level Activity Level: Up with Assistance     Activity Assistance: Partial (one person)   Purposeful Rounding every 1-2 hour? []Yes   Kumar Score  Total Score: 4   Bed Alarm (If score 3 or >)   []Yes   [] Refused (See signed refusal form in chart)   Finn Score  Finn Score: 19   Finn Score (if score 14 or less)   []PMT consult   []Wound Care consult      []Specialty bed   [] Nutrition consult          Needs prior to discharge:   Home O2 required:    []Yes   []No    If yes, how much O2 required?     Other:    Last Bowel Movement: Last Bowel Movement Date: 05/04/18      Influenza Vaccine Received Flu Vaccine for Current Season (usually Sept-March): Yes        Pneumonia Vaccine           Diet Active Orders   Diet    DIET DIABETIC CONSISTENT CARB Regular; AHA-LOW-CHOL FAT      LDAs               Peripheral IV 05/03/18 Left Arm (Active)   Site Assessment Clean, dry, & intact 5/4/2018  7:57 PM   Phlebitis Assessment 0 5/4/2018  7:57 PM   Infiltration Assessment 0 5/4/2018  7:57 PM   Dressing Status Clean, dry, & intact 5/4/2018  7:57 PM   Dressing Type Tape;Transparent 5/4/2018  7:57 PM   Hub Color/Line Status Blue; Infusing 5/4/2018  7:57 PM   Action Taken Other (comment) 5/3/2018 10:23 AM   Alcohol Cap Used Yes 5/4/2018  3:13 AM                      Urinary Catheter [REMOVED] Condom Catheter 04/28/18-Indications for Use: Strict I/Os, every 1-2 hours    Intake & Output   Date 05/03/18 1900 - 05/04/18 0659 05/04/18 0700 - 05/05/18 0659   Shift 4644-9949 24 Hour Total 5562-3625 3676-6273 24 Hour Total   I  N  T  A  K  E   P.O.  240         P. O.  240       Shift Total  (mL/kg)  240  (2.8)      O  U  T  P  U  T   Urine  (mL/kg/hr)           Urine Occurrence(s) 2 x 2 x 2 x  2 x    Stool           Stool Occurrence(s)   1 x  1 x    Shift Total  (mL/kg)        NET  240      Weight (kg) 86.1 86.1 86.1 86.1 86.1         Readmission Risk Assessment Tool Score Low Risk            12       Total Score        3 Patient Length of Stay (>5 days = 3)    5 Pt. Coverage (Medicare=5 , Medicaid, or Self-Pay=4)    4 Charlson Comorbidity Score (Age + Comorbid Conditions)        Criteria that do not apply:    Has Seen PCP in Last 6 Months (Yes=3, No=0)    . Living with Significant Other. Assisted Living. LTAC. SNF.  or   Rehab    IP Visits Last 12 Months (1-3=4, 4=9, >4=11)       Expected Length of Stay 3d 19h   Actual Length of Stay 7

## 2018-05-06 LAB
ANION GAP SERPL CALC-SCNC: 7 MMOL/L (ref 5–15)
BUN SERPL-MCNC: 21 MG/DL (ref 6–20)
BUN/CREAT SERPL: 14 (ref 12–20)
CALCIUM SERPL-MCNC: 9.7 MG/DL (ref 8.5–10.1)
CHLORIDE SERPL-SCNC: 102 MMOL/L (ref 97–108)
CO2 SERPL-SCNC: 27 MMOL/L (ref 21–32)
CREAT SERPL-MCNC: 1.48 MG/DL (ref 0.7–1.3)
GLUCOSE BLD STRIP.AUTO-MCNC: 176 MG/DL (ref 65–100)
GLUCOSE BLD STRIP.AUTO-MCNC: 283 MG/DL (ref 65–100)
GLUCOSE BLD STRIP.AUTO-MCNC: 312 MG/DL (ref 65–100)
GLUCOSE BLD STRIP.AUTO-MCNC: 359 MG/DL (ref 65–100)
GLUCOSE SERPL-MCNC: 168 MG/DL (ref 65–100)
MAGNESIUM SERPL-MCNC: 2.2 MG/DL (ref 1.6–2.4)
POTASSIUM SERPL-SCNC: 4.1 MMOL/L (ref 3.5–5.1)
SERVICE CMNT-IMP: ABNORMAL
SODIUM SERPL-SCNC: 136 MMOL/L (ref 136–145)

## 2018-05-06 PROCEDURE — 74011250636 HC RX REV CODE- 250/636: Performed by: INTERNAL MEDICINE

## 2018-05-06 PROCEDURE — 74011250637 HC RX REV CODE- 250/637: Performed by: INTERNAL MEDICINE

## 2018-05-06 PROCEDURE — 83735 ASSAY OF MAGNESIUM: CPT | Performed by: INTERNAL MEDICINE

## 2018-05-06 PROCEDURE — 36415 COLL VENOUS BLD VENIPUNCTURE: CPT | Performed by: INTERNAL MEDICINE

## 2018-05-06 PROCEDURE — 82962 GLUCOSE BLOOD TEST: CPT

## 2018-05-06 PROCEDURE — 65660000000 HC RM CCU STEPDOWN

## 2018-05-06 PROCEDURE — 80048 BASIC METABOLIC PNL TOTAL CA: CPT | Performed by: INTERNAL MEDICINE

## 2018-05-06 RX ORDER — METFORMIN HYDROCHLORIDE 850 MG/1
850 TABLET ORAL
Status: DISCONTINUED | OUTPATIENT
Start: 2018-05-07 | End: 2018-05-07 | Stop reason: HOSPADM

## 2018-05-06 RX ORDER — LANOLIN ALCOHOL/MO/W.PET/CERES
6 CREAM (GRAM) TOPICAL
Status: DISCONTINUED | OUTPATIENT
Start: 2018-05-06 | End: 2018-05-07 | Stop reason: HOSPADM

## 2018-05-06 RX ORDER — QUETIAPINE FUMARATE 25 MG/1
25 TABLET, FILM COATED ORAL
Status: DISCONTINUED | OUTPATIENT
Start: 2018-05-06 | End: 2018-05-07 | Stop reason: HOSPADM

## 2018-05-06 RX ORDER — METFORMIN HYDROCHLORIDE 500 MG/1
500 TABLET ORAL
Status: DISCONTINUED | OUTPATIENT
Start: 2018-05-06 | End: 2018-05-06

## 2018-05-06 RX ADMIN — ASPIRIN 81 MG CHEWABLE TABLET 81 MG: 81 TABLET CHEWABLE at 08:26

## 2018-05-06 RX ADMIN — HEPARIN SODIUM 5000 UNITS: 5000 INJECTION, SOLUTION INTRAVENOUS; SUBCUTANEOUS at 06:29

## 2018-05-06 RX ADMIN — Medication 10 ML: at 06:00

## 2018-05-06 RX ADMIN — IMIPRAMINE HYDROCHLORIDE 10 MG: 10 TABLET ORAL at 16:58

## 2018-05-06 RX ADMIN — MELATONIN 3 MG ORAL TABLET 6 MG: 3 TABLET ORAL at 21:40

## 2018-05-06 RX ADMIN — METFORMIN HYDROCHLORIDE 500 MG: 500 TABLET, FILM COATED ORAL at 16:59

## 2018-05-06 RX ADMIN — METFORMIN HYDROCHLORIDE 500 MG: 500 TABLET, FILM COATED ORAL at 10:27

## 2018-05-06 RX ADMIN — GLIPIZIDE 20 MG: 5 TABLET ORAL at 08:26

## 2018-05-06 RX ADMIN — HEPARIN SODIUM 5000 UNITS: 5000 INJECTION, SOLUTION INTRAVENOUS; SUBCUTANEOUS at 21:40

## 2018-05-06 RX ADMIN — QUETIAPINE FUMARATE 50 MG: 25 TABLET ORAL at 21:40

## 2018-05-06 RX ADMIN — IMIPRAMINE HYDROCHLORIDE 10 MG: 10 TABLET ORAL at 08:27

## 2018-05-06 RX ADMIN — LORAZEPAM 1 MG: 2 INJECTION INTRAMUSCULAR; INTRAVENOUS at 08:26

## 2018-05-06 RX ADMIN — GLIPIZIDE 20 MG: 5 TABLET ORAL at 16:59

## 2018-05-06 RX ADMIN — LINAGLIPTIN 5 MG: 5 TABLET, FILM COATED ORAL at 08:26

## 2018-05-06 RX ADMIN — HEPARIN SODIUM 5000 UNITS: 5000 INJECTION, SOLUTION INTRAVENOUS; SUBCUTANEOUS at 14:51

## 2018-05-06 RX ADMIN — Medication 10 ML: at 21:40

## 2018-05-06 RX ADMIN — ISOSORBIDE DINITRATE 30 MG: 20 TABLET ORAL at 08:26

## 2018-05-06 RX ADMIN — AMLODIPINE BESYLATE 10 MG: 5 TABLET ORAL at 08:26

## 2018-05-06 RX ADMIN — ATORVASTATIN CALCIUM 10 MG: 40 TABLET, FILM COATED ORAL at 08:26

## 2018-05-06 RX ADMIN — QUETIAPINE FUMARATE 25 MG: 25 TABLET ORAL at 10:27

## 2018-05-06 RX ADMIN — METOPROLOL SUCCINATE 100 MG: 50 TABLET, EXTENDED RELEASE ORAL at 08:26

## 2018-05-06 RX ADMIN — INDAPAMIDE 2.5 MG: 2.5 TABLET, FILM COATED ORAL at 08:26

## 2018-05-06 RX ADMIN — IMIPRAMINE HYDROCHLORIDE 10 MG: 10 TABLET ORAL at 21:40

## 2018-05-06 NOTE — PROGRESS NOTES
Hospitalist Progress Note    NAME:  Grady Herbert  :  1940  MRN:  [id number]  PCP:  Lisa Gilmore MD  Date of Service:  2018    Assessment & Plan:     Hyperosmolar Non ketotic Hyperglycemia in Type 2 DM uncontrolled without coma POA  HERMILO/dehydration POA  DM type 2: better controlled  Psueudohyponatremia POA, resolved  - Due to medication noncompliance for over 6 weeks per wife. - Diabetic management with accuchecks with ISS coverage and basal coverage  - Increased lantus to 75 units and change to QHS: BS control slightly better  - Monitor labs  - DTC consulted A1c is 12.7  ( but no meds for 6 weeks )   Discussed with DTC. stop all insulin and change to Glucotrol and Tradjenta and monitor BS prior to discharge  BS only in 200 range inspite of OFF Lantus. Stop  SS tonight and watch BS without insulin and all ORAL metformin  GOAL IS TO DISCHARGE ON ALL ORAL Antidiabetic Meds  Due to difficulty in monitoring him on Insulin as patient does not allow assistance and monitoring with Insulin      HERMILO:  worsening  Hypokalemia  - monitor labs  - Restart IVFs change to 1/2 NS  - Encourage PO fluids  - replace lytes as needed     Acute encephalopathy POA  Baseline Dementia , with intermittent Agitation  - Unlikely CVA as there is no neurological deficit other than confusion  - CT head neg, ammonia neg, no evidence of infection  - RPR negative  - UA neg  - CT head neg  -  MRI brain: Mild atrophy & nonspecific white matter disease. No acute findings. - Start seroquel, ativan prn, melatonin qhs prn  - Continue tele-sitter  - Neurology consulted  - Wife requested letter of incompetence at this time to handle his affairs and pay bills. Written see previous progress note. Will ORDER a formal Psych eval  For Competency. Patient is not competent. No driving  Met with wife and daughter yesterday.   They are safety proofing their house today in anticipation of Home in am  Seroquel q HS is helping , will also start a dose in am     HTN  CAD s/p CABG  Hypercholesterolemia  - Cont amlodipine, indapamide, Metoprolol  - Continue ASA, statin, isordil      H/o Prostate Ca s/p resection, s/p radiation therapy  - Cont Imipramine as at home  - Pt is on Lupron shot monthyly with urologist (Dr Prosper Strauss) as per the wife  - OP follow up as before. Edema right hand and forearm from infiltrated IVF  - Elevate right arm  - Change IV site  - if note improved order RUE venous doppler US    Overweight:  Body mass index is 28.76 kg/(m^2)     Code Status: Full  Surrogate Decision Maker: wife Catarino Agustin # [phone]   DVT Prophylaxis: Sq heparin  GI Prophylaxis: not indicated   Baseline: Pt is independent at home with ADLs, lives with wife  . Reason for visit:   Recheck AMS, DM, HERMILO    Interval history / Subjective:   5/4   I had a standing off with my \" what is the name of the person that passes on my prescriptions to the Pharmacy\"  ( meaning his PCP  5/03 I wanted to re enlist in the [address] of the things the Doctors were doing  Clearly confused  5/02  No changes but no complaints. IVF infiltrated right arm.    5/01  Still confused but denies complaints. BS elevated. 4/30  Seen and examined. He is in NAD but very confused. MRI done with no acute changes. Review of Systems:     Symptom Y/N Comments   Symptom Y/N Comments   Fever/Chills n     Chest Pain n      Poor Appetite       Edema        Cough       Abdominal Pain n      Sputum       Joint Pain        SOB/ANGUIANO n     Pruritis/Rash        Nausea/vomit  n     Tolerating PT/OT        Diarrhea      Tolerating Diet        Constipation       Other           Could NOT obtain due to:       Vital Signs:   Last 24hrs VS reviewed since prior progress note.  Most recent are:  Visit Vitals    /64    Pulse 85    Temp 98.2 °F (36.8 °C)    Resp 20    Ht 5' 8\" (1.727 m)    Wt 86.1 kg (189 lb 13.1 oz)    SpO2 100%    BMI 28.86 kg/m2         Intake/Output Summary (Last 24 hours) at 05/06/18 1107  Last data filed at 05/05/18 1351   Gross per 24 hour   Intake           159.17 ml   Output                0 ml   Net           159.17 ml        Physical Examination:   General: Sleeping but wakes up, cooperative, NAD, confused   EENT:          EOMI. Anicteric sclerae. MMM  Resp:  CTA bilaterally with normal effort and good BS  CV:                 S1-S2, RRR, No edema  GI:                 SNTBS+, No HSM  Neurologic: Oriented only to self, normal speech  Psych:         Not anxious nor agitated  Skin:        No rashes. No jaundice  Additional pertinent findings: swelling right arm. Data Review:   Review and/or order of clinical lab test    Labs:     No results for input(s): WBC, HGB, HCT, PLT, HGBEXT, HCTEXT, PLTEXT, HGBEXT, HCTEXT, PLTEXT in the last 72 hours. Recent Labs      05/06/18   0400  05/05/18   0312  05/04/18   0436   NA  136   --   135*   K  4.1   --   3.8   CL  102   --   102   CO2  27   --   27   BUN  21*   --   25*   CREA  1.48*   --   1.52*   GLU  168*   --   212*   CA  9.7   --   9.2   MG  2.2  2.2  2.1     No results for input(s): SGOT, GPT, ALT, AP, TBIL, TBILI, TP, ALB, GLOB, GGT, AML, LPSE in the last 72 hours. No lab exists for component: AMYP, HLPSE  No results for input(s): INR, PTP, APTT in the last 72 hours. No lab exists for component: INREXT, INREXT   No results for input(s): FE, TIBC, PSAT, FERR in the last 72 hours. No results found for: FOL, RBCF   No results for input(s): PH, PCO2, PO2 in the last 72 hours. No results for input(s): CPK, CKNDX, TROIQ in the last 72 hours.     No lab exists for component: CPKMB  No results found for: CHOL, CHOLX, CHLST, CHOLV, HDL, LDL, LDLC, DLDLP, TGLX, TRIGL, TRIGP, CHHD, CHHDX  Lab Results   Component Value Date/Time    Glucose (POC) 312 (H) 05/06/2018 11:05 AM    Glucose (POC) 176 (H) 05/06/2018 07:31 AM    Glucose (POC) 111 (H) 05/05/2018 09:46 PM    Glucose (POC) 337 (H) 05/05/2018 04:20 PM    Glucose (POC) 291 (H) 05/05/2018 11:17 AM     Lab Results   Component Value Date/Time    Color YELLOW/STRAW 04/27/2018 01:32 PM    Appearance CLEAR 04/27/2018 01:32 PM    Specific gravity 1.029 04/27/2018 01:32 PM    pH (UA) 5.0 04/27/2018 01:32 PM    Protein TRACE (A) 04/27/2018 01:32 PM    Glucose >1000 (A) 04/27/2018 01:32 PM    Ketone NEGATIVE  04/27/2018 01:32 PM    Bilirubin NEGATIVE  04/27/2018 01:32 PM    Urobilinogen 0.2 04/27/2018 01:32 PM    Nitrites NEGATIVE  04/27/2018 01:32 PM    Leukocyte Esterase NEGATIVE  04/27/2018 01:32 PM    Epithelial cells FEW 04/27/2018 01:32 PM    Bacteria NEGATIVE  04/27/2018 01:32 PM    WBC 0-4 04/27/2018 01:32 PM    RBC 0-5 04/27/2018 01:32 PM       Medications Reviewed:     Current Facility-Administered Medications   Medication Dose Route Frequency    metFORMIN (GLUCOPHAGE) tablet 500 mg  500 mg Oral TID WITH MEALS    QUEtiapine (SEROquel) tablet 25 mg  25 mg Oral 7am    melatonin tablet 6 mg  6 mg Oral QHS    glipiZIDE (GLUCOTROL) tablet 20 mg  20 mg Oral ACB&D    linagliptin (TRADJENTA) tablet 5 mg  5 mg Oral ACB    LORazepam (ATIVAN) injection 1 mg  1 mg IntraMUSCular Q6H PRN    QUEtiapine (SEROquel) tablet 50 mg  50 mg Oral QHS    LORazepam (ATIVAN) injection 1 mg  1 mg IntraVENous Q6H PRN    glucose chewable tablet 16 g  4 Tab Oral PRN    dextrose (D50W) injection syrg 12.5-25 g  12.5-25 g IntraVENous PRN    glucagon (GLUCAGEN) injection 1 mg  1 mg IntraMUSCular PRN    metoprolol (LOPRESSOR) injection 1.25 mg  1.25 mg IntraVENous Q6H PRN    amLODIPine (NORVASC) tablet 10 mg  10 mg Oral DAILY    aspirin chewable tablet 81 mg  81 mg Oral DAILY    atorvastatin (LIPITOR) tablet 10 mg  10 mg Oral DAILY    indapamide (LOZOL) tablet 2.5 mg  2.5 mg Oral DAILY    isosorbide dinitrate (ISORDIL) tablet 30 mg  30 mg Oral DAILY    metoprolol succinate (TOPROL-XL) XL tablet 100 mg  100 mg Oral DAILY    sodium chloride (NS) flush 5-10 mL  5-10 mL IntraVENous Q8H    sodium chloride (NS) flush 5-10 mL  5-10 mL IntraVENous PRN    heparin (porcine) injection 5,000 Units  5,000 Units SubCUTAneous Q8H    imipramine (TOFRANIL) tablet 10 mg  (Patient Supplied)  10 mg Oral TID     ______________________________________________________________________  EXPECTED LENGTH OF STAY: 3d 19h  ACTUAL LENGTH OF STAY:          Miguel Sheridan MD

## 2018-05-06 NOTE — PROGRESS NOTES
Dr. Luis Gan was made aware of pt's blood sugar of 359 she said \" it's ok because I stopped all the insulin \".

## 2018-05-06 NOTE — PROGRESS NOTES
* No surgery found *  * No surgery found *  Bedside and Verbal shift change report given to Sasha KENT  (oncoming nurse) by  Monica Fierro R.N (offgoing nurse). Report included the following information SBAR, Kardex, MAR, Med Rec Status and Cardiac Rhythm nsr. Zone Phone:   [phone]      Significant changes during shift:  none        Patient Information    Kale Negron  69 y.o.  4/27/2018 12:53 PM by Reji Mendoza MD. Kale Negron was admitted from Home    Problem List    Patient Active Problem List    Diagnosis Date Noted    Hyponatremia 04/27/2018    HERMILO (acute kidney injury) ([facility] [address] 04/27/2018    Acute encephalopathy 04/27/2018    Uncontrolled type 2 DM with hyperosmolar nonketotic hyperglycemia ([facility] [address] 04/27/2018    Other and combined forms of senile cataract 01/16/2014     Past Medical History:   Diagnosis Date    CAD (coronary artery disease)     Cancer ([facility] [address]     prostate    Diabetes ([facility] [address]     GERD (gastroesophageal reflux disease)     Hypertension     Other ill-defined conditions(799.89)     elevated cholesterol         Core Measures:    CVA: No No  CHF:No No  PNA:No No    Post Op Surgical (If Applicable):     Number times ambulated in hallway past shift:  no  Number of times OOB to chair past shift:   no  NG Tube: No  Incentive Spirometer: No  Drains: No   Volume    Dressing Present:  No  Flatus:  Not applicable    Activity Status:    OOB to Chair Yes  Ambulated this shift No   Bed Rest No    Supplemental O2: (If Applicable)    NC No  NRB No  Venti-mask No  On  Liters/min      LINES AND DRAINS:    Central Line? No     PICC LINE? No     Urinary Catheter? No     DVT prophylaxis:    DVT prophylaxis Med- Yes  DVT prophylaxis SCD or MICHAEL- No     Wounds: (If Applicable)    Wounds- No    Location     Patient Safety:    Falls Score Total Score: 4  Safety Level_______  Bed Alarm On? Yes  Sitter?  No    Plan for upcoming shift: monitor for safety        Discharge Plan: Yes ,possible tomorrow    Active Consults:  IP CONSULT TO NEUROLOGY  IP CONSULT TO PSYCHIATRY

## 2018-05-06 NOTE — PROGRESS NOTES
PCU SHIFT NURSING NOTE      Bedside verbal shift change report given to Susana Wynne (oncoming nurse) by Johana Oconnor (offgoing nurse). Report included the following information SBAR, Kardex, Intake/Output, Recent Results and Cardiac Rhythm NSR.      2120: Notified Dr. Cici Smallwood of duplicate order of Seroquel. MD ordered to hold 12.5 mg Seroquel bedtime order. 2250: Patient continues to be restless and anxious. Patient continues to get out of bed, setting off bed alarms and video monitoring alarms. Tele-sitter calls nurse several times for patient attempting to get out of bed. Attempts of therapeutic approach to calm patient unsuccessful. Ativan given. 2355: Patient pulls out IV line. Unable to place access at this time due to patients restlessness. 0155: Patient attempting to pull at IV. Redirection given. IV wrapped to protect access. Admission Date 4/27/2018   Admission Diagnosis Uncontrolled type 2 DM with hyperosmolar nonketotic hyperglycemia (HCC)  Hyponatremia  HERMILO (acute kidney injury) ([facility] [address]  Acute encephalopathy   Consults IP CONSULT TO NEUROLOGY  IP CONSULT TO PSYCHIATRY        Consults   []PT   []OT   []Speech   []Case Management      [] Palliative      Cardiac Monitoring Order   []Yes   []No     IV drips   []Yes    Drip:                            Dose:  Drip:                            Dose:  Drip:                            Dose:   []No     GI Prophylaxis   []Yes   []No         DVT Prophylaxis   SCDs:             Jas stockings:         [] Medication   []Contraindicated   []None      Activity Level Activity Level: Up with Assistance     Activity Assistance: Partial (one person)   Purposeful Rounding every 1-2 hour?    []Yes   Kumar Score  Total Score: 4   Bed Alarm (If score 3 or >)   []Yes   [] Refused (See signed refusal form in chart)   Finn Score  Finn Score: 19   Finn Score (if score 14 or less)   []PMT consult   []Wound Care consult      []Specialty bed   [] Nutrition consult Needs prior to discharge:   Home O2 required:    []Yes   []No    If yes, how much O2 required? Other:    Last Bowel Movement: Last Bowel Movement Date: 05/04/18      Influenza Vaccine Received Flu Vaccine for Current Season (usually Sept-March): Yes        Pneumonia Vaccine           Diet Active Orders   Diet    DIET DIABETIC CONSISTENT CARB Regular; AHA-LOW-CHOL FAT      LDAs               Peripheral IV 05/03/18 Left Arm (Active)   Site Assessment Clean, dry, & intact 5/5/2018  7:58 PM   Phlebitis Assessment 0 5/5/2018  7:58 PM   Infiltration Assessment 0 5/5/2018  7:58 PM   Dressing Status Clean, dry, & intact 5/5/2018  7:58 PM   Dressing Type Tape;Transparent 5/5/2018  7:58 PM   Hub Color/Line Status Blue;Flushed 5/5/2018  7:58 PM   Action Taken Other (comment) 5/3/2018 10:23 AM   Alcohol Cap Used Yes 5/4/2018  3:13 AM                      Urinary Catheter [REMOVED] Condom Catheter 04/28/18-Indications for Use: Strict I/Os, every 1-2 hours    Intake & Output   Date 05/04/18 1900 - 05/05/18 0659 05/05/18 0700 - 05/06/18 0659   Shift 0983-1359 24 Hour Total 7052-9258 7749-5171 24 Hour Total   I  N  T  A  K  E   I.V.  (mL/kg/hr) 2118.3 2118.3 343.3  (0.3)  343. 3      Volume (0.45% sodium chloride infusion) 2118.3 2118.3 343.3  343. 3    Shift Total  (mL/kg) 2118.3  (24.6) 2118.3  (24.6) 343.3  (4)  343.3  (4)   O  U  T  P  U  T   Urine  (mL/kg/hr) 250 250 285  (0.3)  285      Urine Voided 250 250 285  285      Urine Occurrence(s) 2 x 4 x  1 x 1 x    Stool           Stool Occurrence(s)  1 x       Shift Total  (mL/kg) 250  (2.9) 250  (2.9) 285  (3.3)  285  (3.3)   NET 1868.3 1868.3 58.3  58.3   Weight (kg) 86.1 86.1 86.1 86.1 86.1         Readmission Risk Assessment Tool Score Low Risk            12       Total Score        3 Patient Length of Stay (>5 days = 3)    5 Pt.  Coverage (Medicare=5 , Medicaid, or Self-Pay=4)    4 Charlson Comorbidity Score (Age + Comorbid Conditions)        Criteria that do not apply:    Has Seen PCP in Last 6 Months (Yes=3, No=0)    . Living with Significant Other. Assisted Living. LTAC. SNF.  or   Rehab    IP Visits Last 12 Months (1-3=4, 4=9, >4=11)       Expected Length of Stay 3d 19h   Actual Length of Stay 8

## 2018-05-06 NOTE — PROGRESS NOTES
PCU SHIFT NURSING NOTE      Bedside and Verbal shift change report given to Johnathan Cm RN (oncoming nurse) by Yuri Smith RN (offgoing nurse). Report included the following information SBAR, Kardex, Intake/Output, MAR, Recent Results and Cardiac Rhythm SR. Shift Summary:   8698- Telesitter called this nurse to room; pt had already pulled PIV out and was bleeding. Pt very argumentative and agitated. With the assistance of nursing tech, placed another IV access and administered IV lorazepam as pt unable to be redirected. 1138 - Attempted to call report; nurse to call back once reassignment of room has been made. 1156 - TRANSFER - OUT REPORT:    Verbal report given to Nicho Cai RN(name) on Peyton Salvage  being transferred to Neuro(unit) for routine progression of care       Report consisted of patients Situation, Background, Assessment and   Recommendations(SBAR). Information from the following report(s) SBAR, Kardex, Intake/Output, MAR, Recent Results and Cardiac Rhythm SR was reviewed with the receiving nurse. Lines:   Peripheral IV 05/06/18 Right Antecubital (Active)   Site Assessment Clean, dry, & intact 5/6/2018  9:52 AM   Phlebitis Assessment 0 5/6/2018  9:52 AM   Infiltration Assessment 0 5/6/2018  9:52 AM   Dressing Status New 5/6/2018  9:52 AM   Dressing Type Tape;Transparent 5/6/2018  9:52 AM   Hub Color/Line Status Blue;Flushed;Capped 5/6/2018  9:52 AM        Opportunity for questions and clarification was provided. Patient transported with:   [facility]      8205 - Pt's daughter directed to pt's new room and informed of room change; daughter to inform pt's wife as she is currently at [facility].          Admission Date 4/27/2018   Admission Diagnosis Uncontrolled type 2 DM with hyperosmolar nonketotic hyperglycemia (HCC)  Hyponatremia  HERMILO (acute kidney injury) ([facility] [address]  Acute encephalopathy   Consults IP CONSULT TO NEUROLOGY  IP CONSULT TO PSYCHIATRY        Consults   []PT   []OT   []Speech   []Case Management      [] Palliative      Cardiac Monitoring Order   [x]Yes   []No     IV drips   []Yes    Drip:                            Dose:  Drip:                            Dose:  Drip:                            Dose:   [x]No     GI Prophylaxis   []Yes   []No         DVT Prophylaxis   SCDs:             Jas stockings:         [] Medication   []Contraindicated   []None      Activity Level Activity Level: Up with Assistance     Activity Assistance: Partial (one person)   Purposeful Rounding every 1-2 hour? [x]Yes   Kumar Score  Total Score: 4   Bed Alarm (If score 3 or >)   [x]Yes   [] Refused (See signed refusal form in chart)   Finn Score  Finn Score: 19   Finn Score (if score 14 or less)   []PMT consult   []Wound Care consult      []Specialty bed   [] Nutrition consult          Needs prior to discharge:   Home O2 required:    []Yes   [x]No    If yes, how much O2 required?     Other:    Last Bowel Movement: Last Bowel Movement Date: 05/04/18      Influenza Vaccine Received Flu Vaccine for Current Season (usually Sept-March): Yes        Pneumonia Vaccine           Diet Active Orders   Diet    DIET DIABETIC CONSISTENT CARB Regular; AHA-LOW-CHOL FAT      LDAs               Peripheral IV 05/06/18 Right Antecubital (Active)   Site Assessment Clean, dry, & intact 5/6/2018  3:54 AM   Phlebitis Assessment 0 5/6/2018  3:54 AM   Infiltration Assessment 0 5/6/2018  3:54 AM   Dressing Status Clean, dry, & intact 5/6/2018  3:54 AM   Dressing Type Tape;Transparent 5/6/2018  3:54 AM   Hub Color/Line Status Pink;Flushed 5/6/2018  3:54 AM                      Urinary Catheter [REMOVED] Condom Catheter 04/28/18-Indications for Use: Strict I/Os, every 1-2 hours    Intake & Output   Date 05/05/18 0700 - 05/06/18 0659 05/06/18 0700 - 05/07/18 0659   Shift 0700-1859 1900-0659 24 Hour Total 0700-1859 1900-0659 24 Hour Total   I  N  T  A  K  E   I.V.  (mL/kg/hr) 343.3  (0.3)  343.3  (0.2)         Volume (0.45% sodium chloride infusion) 343.3  343. 3       Shift Total  (mL/kg) 343.3  (4)  343.3  (4)      O  U  T  P  U  T   Urine  (mL/kg/hr) 285  (0.3)  285  (0.1)         Urine Voided 285  285         Urine Occurrence(s)  1 x 1 x       Shift Total  (mL/kg) 285  (3.3)  285  (3.3)      NET 58.3  58.3      Weight (kg) 86.1 86.1 86.1 86.1 86.1 86.1         Readmission Risk Assessment Tool Score Low Risk            12       Total Score        3 Patient Length of Stay (>5 days = 3)    5 Pt. Coverage (Medicare=5 , Medicaid, or Self-Pay=4)    4 Charlson Comorbidity Score (Age + Comorbid Conditions)        Criteria that do not apply:    Has Seen PCP in Last 6 Months (Yes=3, No=0)    . Living with Significant Other. Assisted Living. LTAC. SNF.  or   Rehab    IP Visits Last 12 Months (1-3=4, 4=9, >4=11)       Expected Length of Stay 3d 19h   Actual Length of Stay 9

## 2018-05-06 NOTE — PROGRESS NOTES
Responded to request from staff to visit with patient and provide supportive presence. At time of visit patient was expressing frustration and having difficulty remembering why he was in the hospital. Provided empathetic listening and supportive presence as patient vented feelings and expressed hopes to leave soon. Patient was easily engaged in conversation about his career history and provided extensive life review focusing on his [employer]. Conversation was not always fluid and contained a great deal of repetition but patient's mood and demeanor lifted considerably. Concluded visit when patient's son and daughter-in-law arrived. Advised patient and family of  availability as needed or desired. Chaplain Hetal Mesa M.Div.    Paging Service [phone])

## 2018-05-06 NOTE — PROGRESS NOTES
Pt transitioned from PCU to Neuro on 5/6/18. CM noted consult dated 5/6/18 at 11 AM from Dr. Yuli Narvaez for [facility] to Home on Monday with Dakota Low RN for medication check. \"      CM spoke to wife Mayo Martinez: [phone] cell and explained that MD is recommending [place] RN for medication check. CM offered [place] choice list and noted [facility] [place] [facility]. Pt lives in [address]. CM will send referral to [facility] [place] via CC. CM will send referral to [facility] Ro[facility] and Arm[facility] Low to see who can accept the referral and give the Spouse the choice of whom to use. CM will need to call and discuss with wife tomorrow who is the preferred provider. Anticipated DC is 5/7/18. Family can transport home in car. CM will continue to monitor discharge plan.       Hema Tripathi, Weekend CM   Ext [phone]

## 2018-05-07 VITALS
WEIGHT: 189.82 LBS | HEIGHT: 68 IN | TEMPERATURE: 98.8 F | OXYGEN SATURATION: 98 % | RESPIRATION RATE: 20 BRPM | BODY MASS INDEX: 28.77 KG/M2 | SYSTOLIC BLOOD PRESSURE: 115 MMHG | HEART RATE: 106 BPM | DIASTOLIC BLOOD PRESSURE: 72 MMHG

## 2018-05-07 LAB
ANION GAP SERPL CALC-SCNC: 8 MMOL/L (ref 5–15)
BUN SERPL-MCNC: 31 MG/DL (ref 6–20)
BUN/CREAT SERPL: 17 (ref 12–20)
CALCIUM SERPL-MCNC: 9.9 MG/DL (ref 8.5–10.1)
CHLORIDE SERPL-SCNC: 100 MMOL/L (ref 97–108)
CO2 SERPL-SCNC: 27 MMOL/L (ref 21–32)
CREAT SERPL-MCNC: 1.85 MG/DL (ref 0.7–1.3)
GLUCOSE BLD STRIP.AUTO-MCNC: 241 MG/DL (ref 65–100)
GLUCOSE SERPL-MCNC: 248 MG/DL (ref 65–100)
MAGNESIUM SERPL-MCNC: 2.3 MG/DL (ref 1.6–2.4)
POTASSIUM SERPL-SCNC: 4.8 MMOL/L (ref 3.5–5.1)
SERVICE CMNT-IMP: ABNORMAL
SODIUM SERPL-SCNC: 135 MMOL/L (ref 136–145)

## 2018-05-07 PROCEDURE — 74011250637 HC RX REV CODE- 250/637: Performed by: INTERNAL MEDICINE

## 2018-05-07 PROCEDURE — 82962 GLUCOSE BLOOD TEST: CPT

## 2018-05-07 PROCEDURE — 80048 BASIC METABOLIC PNL TOTAL CA: CPT | Performed by: INTERNAL MEDICINE

## 2018-05-07 PROCEDURE — 74011250636 HC RX REV CODE- 250/636: Performed by: INTERNAL MEDICINE

## 2018-05-07 PROCEDURE — 36415 COLL VENOUS BLD VENIPUNCTURE: CPT | Performed by: INTERNAL MEDICINE

## 2018-05-07 PROCEDURE — 83735 ASSAY OF MAGNESIUM: CPT | Performed by: INTERNAL MEDICINE

## 2018-05-07 RX ORDER — AMLODIPINE BESYLATE 10 MG/1
10 TABLET ORAL DAILY
Qty: 60 TAB | Refills: 1 | Status: ON HOLD | OUTPATIENT
Start: 2018-05-08 | End: 2019-07-09

## 2018-05-07 RX ORDER — QUETIAPINE FUMARATE 50 MG/1
50 TABLET, FILM COATED ORAL
Qty: 60 TAB | Refills: 1 | Status: ON HOLD | OUTPATIENT
Start: 2018-05-07 | End: 2019-07-09

## 2018-05-07 RX ORDER — LANOLIN ALCOHOL/MO/W.PET/CERES
6 CREAM (GRAM) TOPICAL
Qty: 60 TAB | Refills: 1 | Status: ON HOLD | OUTPATIENT
Start: 2018-05-07 | End: 2019-07-09

## 2018-05-07 RX ORDER — GLIPIZIDE 10 MG/1
20 TABLET ORAL
Qty: 60 TAB | Refills: 1 | Status: SHIPPED | OUTPATIENT
Start: 2018-05-07 | End: 2018-09-07

## 2018-05-07 RX ADMIN — INDAPAMIDE 2.5 MG: 2.5 TABLET, FILM COATED ORAL at 08:09

## 2018-05-07 RX ADMIN — AMLODIPINE BESYLATE 10 MG: 5 TABLET ORAL at 08:09

## 2018-05-07 RX ADMIN — METFORMIN HYDROCHLORIDE 850 MG: 850 TABLET, FILM COATED ORAL at 08:01

## 2018-05-07 RX ADMIN — LINAGLIPTIN 5 MG: 5 TABLET, FILM COATED ORAL at 08:01

## 2018-05-07 RX ADMIN — GLIPIZIDE 20 MG: 5 TABLET ORAL at 08:01

## 2018-05-07 RX ADMIN — HEPARIN SODIUM 5000 UNITS: 5000 INJECTION, SOLUTION INTRAVENOUS; SUBCUTANEOUS at 07:26

## 2018-05-07 RX ADMIN — ISOSORBIDE DINITRATE 30 MG: 20 TABLET ORAL at 08:09

## 2018-05-07 RX ADMIN — Medication 10 ML: at 07:26

## 2018-05-07 RX ADMIN — ATORVASTATIN CALCIUM 10 MG: 40 TABLET, FILM COATED ORAL at 08:09

## 2018-05-07 RX ADMIN — QUETIAPINE FUMARATE 25 MG: 25 TABLET ORAL at 08:01

## 2018-05-07 RX ADMIN — METOPROLOL SUCCINATE 100 MG: 50 TABLET, EXTENDED RELEASE ORAL at 08:09

## 2018-05-07 RX ADMIN — ASPIRIN 81 MG CHEWABLE TABLET 81 MG: 81 TABLET CHEWABLE at 08:09

## 2018-05-07 RX ADMIN — IMIPRAMINE HYDROCHLORIDE 10 MG: 10 TABLET ORAL at 11:29

## 2018-05-07 NOTE — DISCHARGE INSTRUCTIONS
HOSPITALIST DISCHARGE INSTRUCTIONS    NAME: Roselyn Membreno   :  1940   MRN:  [id number]     Date/Time:  2018 10:26 AM    ADMIT DATE: 2018   DISCHARGE DATE: 2018         · It is important that you take the medication exactly as they are prescribed. · Keep your medication in the bottles provided by the pharmacist and keep a list of the medication names, dosages, and times to be taken in your wallet. · Do not take other medications without consulting your doctor. What to do at Home    Recommended diet:  Diabetic Diet   NO CONCENTRATED SWEETS. PUSH  WATER / FLUIDS   NO SUGAR  DRINKS  TO STAY HYDRATED      Recommended activity: Activity as tolerated      If you have questions regarding the hospital related prescriptions or hospital related issues please call [facility] at [phone]. You can always direct your questions to your primary care doctor if you are unable to reach your hospital physician; your PCP works as an extension of your hospital doctor just like your hospital doctor is an extension of your PCP for your time at the hospital [facility])    If you experience any of the following symptoms then please call your primary care physician or return to the emergency room if you cannot get hold of your doctor:    Fever, chills, nausea, vomiting, or persistent diarrhea  Worsening weakness or new problems with your speech or balance  Dark stools or visible blood in your stools  New Leg swelling or shortness of breath as these could be signs of a clot    Additional Instructions:    HIS SUGARS will NOT be well controlled on ORAL MEDICATIONS but it is still Better than  NO MEDICATIONS/ INSULIN  You may have to tolerate Blood sugars in the 200 to low 300 range  AS his DEMENTIA PROGRESSES he may get more  Willing for you to monitor his Blood sugar at which time Dr. Leonides Ward can reassess  And Reintroduce Insuli.   ENCOURAGE  Increase WATER intake  As he could be loosing fluids more via Urine and cause higher risk for dehydration      Bring these papers with you to your follow up appointments. The papers will help your doctors be sure to continue the care plan from the hospital.              Information obtained by :  I understand that if any problems occur once I am at home I am to contact my physician. I understand and acknowledge receipt of the instructions indicated above.                                                                                                                                            Physician's or R.N.'s Signature                                                                  Date/Time                                                                                                                                              Patient or Representative Signature

## 2018-05-07 NOTE — PROGRESS NOTES
Patient transferred to neuro over the weekend. Handoff given to [facility] to follow for dcp.

## 2018-05-07 NOTE — DISCHARGE SUMMARY
Hospitalist Discharge Summary     Patient ID:  Chela Siegel  [id number]  69 y.o.  1940    PCP on record: Moe Hawk MD    Admit date: 4/27/2018  Discharge date and time: 5/7/2018      DISCHARGE DIAGNOSIS:    Hyperosmolar Non ketotic Hyperglycemia in Type 2 DM uncontrolled without coma POA  HERMILO/dehydration POA  DM type 2: poorly controlled due to Non-compliance ( Stopped takin Insulin) Due to Dementia  Psueudohyponatremia POA, resolved  HERMILO:    Hypokalemia  Acute encephalopathy POA  Baseline Dementia , with intermittent Agitation  HTN  CAD s/p CABG  Hypercholesterolemia  H/o Prostate Ca s/p resection, s/p radiation therapy  Edema right hand and forearm from infiltrated IVF  Overweight:  Body mass index is 28.76 kg/(m^2)    Code Status: Full  Surrogate Decision Maker: wife Elijah Mckenzie # [phone]         CONSULTATIONS:  IP CONSULT TO NEUROLOGY  IP CONSULT TO PSYCHIATRY    Excerpted HPI from H&P of Livan Esquivel MD:  Chela Siegel is a 69 y.o.  male who presents with above complains from home ambulatory. Pt was brought in by family with CC of worsening Confusion  X days/weeks as per wife. H/o High sugar on the glucometer- unable to get a reading today as per wife  H/o pt not taking his insulin for weeks as per pt's wife- pt denies it but is encephalopathic  H/o UTI 1-2m ago- took kelfex & cipro for it in feb as per PCP  Pt had missed his appointment with PCP in march due to ?storm as per pt/family & were in the process of getting another appointment but had not made it yet.     Pt was found to have FS in 600's in ER with HERMILO with Cr 2.2 & low na at 126 in ER on workup with neg CT head, UA.     We were asked to admit for work up and evaluation of the above problems. ______________________________________________________________________  DISCHARGE SUMMARY/HOSPITAL COURSE:  for full details see H&P, daily progress notes, labs, consult notes.      Hyperosmolar Non ketotic Hyperglycemia in Type 2 DM uncontrolled without coma POA  HERMILO/dehydration POA  DM type 2: better controlled  Psueudohyponatremia POA, resolved  - Due to medication noncompliance for over 6 weeks per wife. - Diabetic management with accuchecks with ISS coverage and basal coverage  - Increased lantus to 75 units and change to QHS: BS control slightly better  - Monitor labs  - DTC consulted A1c is 12.7  ( but no meds for 6 weeks )   Discussed with DTC. stop all insulin and change to Glucotrol and Tradjenta and monitor BS prior to discharge  BS only in 200 range inspite of OFF Lantus. Stop  SS tonight and watch BS without insulin and all ORAL metformin  GOAL IS TO DISCHARGE ON ALL ORAL Antidiabetic Meds  Due to difficulty in monitoring him on Insulin as patient does not allow assistance and monitoring with Insulin       HERMILO:  worsening  Hypokalemia  - monitor labs  - Restart IVFs change to 1/2 NS  - Encourage PO fluids  - replace lytes as needed      Acute encephalopathy POA  Baseline Dementia , with intermittent Agitation  - Unlikely CVA as there is no neurological deficit other than confusion  - CT head neg, ammonia neg, no evidence of infection  - RPR negative  - UA neg  - CT head neg  - 4/20 MRI brain: Mild atrophy & nonspecific white matter disease. No acute findings. - Start seroquel, ativan prn, melatonin qhs prn  - Continue tele-sitter  - Neurology consulted  - Wife requested letter of incompetence at this time to handle his affairs and pay bills. Written see previous progress note. Will ORDER a formal Psych eval  For Competency. Patient is not competent. No driving  Met with wife and daughter yesterday.   They are safety proofing their house today in anticipation of Home in am  Seroquel q HS is helping , will also start a dose in am      HTN  CAD s/p CABG  Hypercholesterolemia  - Cont amlodipine, indapamide, Metoprolol  - Continue ASA, statin, isordil      H/o Prostate Ca s/p resection, s/p radiation therapy  - Cont Imipramine as at home  - Pt is on Lupron shot monthyly with urologist (Dr Mirella Quintero) as per the wife  - OP follow up as before.     Edema right hand and forearm from infiltrated IVF  - Elevate right arm  - Change IV site  - if note improved order RUE venous doppler US     Overweight:  Body mass index is 28.76 kg/(m^2)      Code Status: Full  Surrogate Decision Maker: wife Victorina Zavala # [phone]   DVT Prophylaxis: Sq heparin  GI Prophylaxis: not indicated   Baseline: Pt is independent at home with ADLs, lives with wife        _______________________________________________________________________  Patient seen and examined by me on discharge day. Pertinent Findings:  Gen:    Not in distress  Chest: Clear lungs  CVS:   Regular rhythm. No edema  Abd:  Soft, not distended, not tender  Neuro:  Alert, Ox3  _______________________________________________________________________  DISCHARGE MEDICATIONS:   Current Discharge Medication List      START taking these medications    Details   amLODIPine (NORVASC) 10 mg tablet Take 1 Tab by mouth daily. Indications: hypertension  Qty: 60 Tab, Refills: 1    Associated Diagnoses: Essential hypertension      linagliptin (TRADJENTA) 5 mg tablet Take 1 Tab by mouth Daily (before breakfast). Indications: type 2 diabetes mellitus  Qty: 30 Tab, Refills: 1      melatonin 3 mg tablet Take 2 Tabs by mouth nightly. Qty: 60 Tab, Refills: 1      QUEtiapine (SEROQUEL) 50 mg tablet Take 1 Tab by mouth nightly. Take Half a pill in the morning and a FULL Pill at Bedtime  Qty: 60 Tab, Refills: 1         CONTINUE these medications which have CHANGED    Details   glipiZIDE (GLUCOTROL) 10 mg tablet Take 2 Tabs by mouth Before breakfast and dinner. Qty: 60 Tab, Refills: 1         CONTINUE these medications which have NOT CHANGED    Details   megestrol (MEGACE) 20 mg tablet Take 20 mg by mouth two (2) times a day.       metFORMIN (GLUMETZA ER) 500 mg TG24 24 hour tablet Take 1,200 mg by mouth two (2) times daily (with meals). metoprolol succinate (TOPROL-XL) 100 mg tablet Take 100 mg by mouth daily. imipramine (TOFRANIL) 10 mg tablet Take 10 mg by mouth three (3) times daily. isosorbide dinitrate (ISORDIL) 30 mg tablet Take 30 mg by mouth daily. pseudoephedrine/acetaminophen (ALLEREST NO DROWSINESS PO) Take 1 Tab by mouth daily as needed (allergies). aspirin 81 mg tablet Take 81 mg by mouth daily. atorvastatin (LIPITOR) 10 mg tablet Take 10 mg by mouth daily. omega-3 fatty acids-vitamin e (FISH OIL) 1,000 mg Cap Take 1 Cap by mouth daily. cinnamon bark (CINNAMON) 500 mg Cap Take 2,000 mg by mouth daily. STOP taking these medications       indapamide (LOZOL) 2.5 mg tablet Comments:   Reason for Stopping:         amLODIPine-benazepril (LOTREL) 10-40 mg per capsule Comments:   Reason for Stopping:         insulin aspart protamine/insulin aspart (NOVOLOG MIX 70-30) 100 unit/mL (70-30) injection Comments:   Reason for Stopping:               My Recommended Diet, Activity, Wound Care, and follow-up labs are listed in the patient's Discharge Insturctions which I have personally completed and reviewed.     ______________________________________________________________________    Risk of deterioration: Low to moderate    Condition at Discharge:  Stable  ______________________________________________________________________    Disposition  Home with family and home health services  ______________________________________________________________________    Care Plan discussed with:   Patient, Family, RN, Care Manager    ______________________________________________________________________    Code Status: Full Code  ______________________________________________________________________      Follow up with:   PCP : Jj Jean MD  Follow-up Information     Follow up With Details Micki Hernandez MD Go on 5/14/2018 Please follow up on May 14, 2018 at 1:15 [address] [phone]              Total time in minutes spent coordinating this discharge (includes going over instructions, follow-up, prescriptions, and preparing report for sign off to her PCP) :  > 30 minutes    Signed:  Og Oneal MD

## 2018-05-07 NOTE — PROGRESS NOTES
CM met with pt's wife and provided her with the 2nd  Medicare letter, she understood and signed it. Copy left with pt's wife. Pt's wife and family present and will transport pt home by car. [facility] set up with [employer] and this CM sent information to them via [facility]. F/u appt made and documented on the discharge paperwork. Care Management Interventions  PCP Verified by CM: Yes  Mode of Transport at Discharge:  Other (see comment) (pt's family will transport by car)  Hospital Transport Time of Discharge: 1130  Transition of Care Consult (CM Consult): [address]: No  Reason Outside [place]: Patient already serviced by other home care/hospice agency ([facility]  Discharge Durable Medical Equipment:  (Patient has glaucometer at home. )  Current Support Network: Lives with Spouse  Confirm Follow Up Transport: Family  Plan discussed with Pt/Family/Caregiver: Yes  Freedom of Choice Offered: Yes  Discharge Location  Discharge Placement: Home with home health    Janae Stratton, [address]

## 2018-05-07 NOTE — PROGRESS NOTES
* No surgery found *  * No surgery found *  Bedside and Verbal shift change report given to Abby Ya R.N  (oncoming nurse) by  Rosemary Santos R.N (offgoing nurse). Report included the following information SBAR, Kardex, MAR, Med Rec Status and Cardiac Rhythm nsr. Zone Phone:   [phone]      Significant changes during shift:  none        Patient Information    Chela Siegel  69 y.o.  4/27/2018 12:53 PM by Livan Esquivel MD. Chela Siegel was admitted from Home    Problem List    Patient Active Problem List    Diagnosis Date Noted    Hyponatremia 04/27/2018    HERMILO [facility]acute kidney injury) [facility] [facility] 04/27/2018    Acute encephalopathy 04/27/2018    Uncontrolled type 2 DM with hyperosmolar nonketotic hyperglycemia ([facility] [address] 04/27/2018    Other and combined forms of senile cataract 01/16/2014     Past Medical History:   Diagnosis Date    CAD (coronary artery disease)     Cancer ([facility] [address] [facility]     prostate    Diabetes ([facility] [facility]     GERD (gastroesophageal reflux disease)     Hypertension     Other ill-defined conditions(799.89)     elevated cholesterol         Core Measures:    CVA: No No  CHF:No No  PNA:No No    Post Op Surgical (If Applicable):     Number times ambulated in hallway past shift:  no  Number of times OOB to chair past shift:   no  NG Tube: No  Incentive Spirometer: No  Drains: No   Volume    Dressing Present:  No  Flatus:  Not applicable    Activity Status:    OOB to Chair Yes  Ambulated this shift No   Bed Rest No    Supplemental O2: (If Applicable)    NC No  NRB No  Venti-mask No  On  Liters/min      LINES AND DRAINS:    Central Line? No     PICC LINE? No     Urinary Catheter? No     DVT prophylaxis:    DVT prophylaxis Med- Yes  DVT prophylaxis SCD or MICHAEL- No     Wounds: (If Applicable)    Wounds- No    Location     Patient Safety:    Falls Score Total Score: 4  Safety Level_______  Bed Alarm On? Yes  Sitter?  No    Plan for upcoming shift: monitor for safety        Discharge Plan: Yes ,possible tomorrow    Active Consults:  IP CONSULT TO NEUROLOGY  IP CONSULT TO PSYCHIATRY

## 2018-05-07 NOTE — PROGRESS NOTES
CM called pt's wife and informed her that [facility] and [facility] accepted pt for [facility]. Pt's wife chose [facility] and this CM informed them via [employer]. Informed pt's wife that pt is on the list for discharge this morning. Pt's wife will transport pt home by car.      Jeffry Burks, [phone]

## 2018-09-07 ENCOUNTER — OFFICE VISIT (OUTPATIENT)
Dept: NEUROLOGY | Age: 78
End: 2018-09-07

## 2018-09-07 VITALS
WEIGHT: 195 LBS | BODY MASS INDEX: 29.55 KG/M2 | DIASTOLIC BLOOD PRESSURE: 72 MMHG | OXYGEN SATURATION: 98 % | SYSTOLIC BLOOD PRESSURE: 130 MMHG | HEIGHT: 68 IN | HEART RATE: 93 BPM

## 2018-09-07 DIAGNOSIS — F03.90 DEMENTIA WITHOUT BEHAVIORAL DISTURBANCE, UNSPECIFIED DEMENTIA TYPE: Primary | ICD-10-CM

## 2018-09-07 RX ORDER — DONEPEZIL HYDROCHLORIDE 10 MG/1
10 TABLET, FILM COATED ORAL
COMMUNITY
End: 2018-09-07 | Stop reason: DRUGHIGH

## 2018-09-07 NOTE — PROGRESS NOTES
Neurology follow-up note Chief Complaint Patient presents with  
[facility] Follow Up  
  memory loss SUBJECTIVE Sixto Aranda is a 69 y.o. male who presented to the neurology office for management of memory problem. I had last seen the patient in the hospital in April 2018 and he was having significant memory problem at that time. His Mini-Mental Status Examination at that time was 13 out of 30 and it remains the same despite his blood sugars being better. He is also on Aricept 10 mg daily and the wife has not noticed any improvement. Current Outpatient Prescriptions Medication Sig  
 memantine-donepezil (NAMZARIC) 28-10 mg CSpX Take 1 Cap by mouth daily.  amLODIPine (NORVASC) 10 mg tablet Take 1 Tab by mouth daily. Indications: hypertension  linagliptin (TRADJENTA) 5 mg tablet Take 1 Tab by mouth Daily (before breakfast). Indications: type 2 diabetes mellitus  melatonin 3 mg tablet Take 2 Tabs by mouth nightly.  megestrol (MEGACE) 20 mg tablet Take 20 mg by mouth two (2) times a day.  metoprolol succinate (TOPROL-XL) 100 mg tablet Take 100 mg by mouth daily.  imipramine (TOFRANIL) 10 mg tablet Take 10 mg by mouth three (3) times daily.  isosorbide dinitrate (ISORDIL) 30 mg tablet Take 30 mg by mouth daily.  aspirin 81 mg tablet Take 81 mg by mouth daily.  atorvastatin (LIPITOR) 10 mg tablet Take 10 mg by mouth daily.  omega-3 fatty acids-vitamin e (FISH OIL) 1,000 mg Cap Take 1 Cap by mouth daily.  QUEtiapine (SEROQUEL) 50 mg tablet Take 1 Tab by mouth nightly. Take Half a pill in the morning and a FULL Pill at Bedtime (Patient taking differently: Take 50 mg by mouth two (2) times a day. Take Half a pill in the morning and a FULL Pill at Bedtime) No current facility-administered medications for this visit. Past Medical History:  
Diagnosis Date  CAD (coronary artery disease)  Cancer [facility])   
 prostate  Diabetes ([facility] [address]  GERD (gastroesophageal reflux disease)  Hypertension  Other ill-defined conditions(799.89)   
 elevated cholesterol Past Surgical History:  
Procedure Laterality Date  CARDIAC SURG PROCEDURE UNLIST    
 cabg x5 vessels,cardiologist  and david at [facility] doctors  COLONOSCOPY N/A 8/31/2016 COLONOSCOPY performed by Angeles Hinton MD at [facility] ENDOSCOPY  COLORECTAL SCRN; HI RISK IND  8/31/2016  HX PROSTATECTOMY  IA COLONOSCOPY FLX DX W/COLLJ SPEC WHEN PFRMD  4/13/2011 No family history on file. Social History Substance Use Topics  Smoking status: Former Smoker Packs/day: 0.50 Years: 20.00 Quit date: 4/12/1982  Smokeless tobacco: Never Used  Alcohol use No  
 
 
REVIEW OF SYSTEMS:  
A ten system review of constitutional, cardiovascular, respiratory, musculoskeletal, endocrine, skin, SHEENT, genitourinary, psychiatric and neurologic systems was obtained and is unremarkable except memory loss EXAMINATION:  
Visit Vitals  /72  Pulse 93  
 Ht 5' 8\" (1.727 m)  Wt 195 lb (88.5 kg)  SpO2 98%  BMI 29.65 kg/m2 General:  
General appearance: Pt is in no acute distress Distal pulses are preserved Neurological Examination:  
Mental Status: AAO x2. Speech is fluent. Follows commands, has abnormal fund of knowledge, attention, short term recall, comprehension and insight. Cranial Nerves: Visual fields are full. PERRL, Extraocular movements are full. Facial sensation intact. Facial movement intact. Hearing intact to conversation. Palate elevates symmetrically. Shoulder shrug symmetric. Tongue midline. Motor: Strength is 5/5 in all 4 ext. Sensation: Normal to light touch Coordination/Cerebellar: Intact to finger-nose-finger Skin: No significant bruising or lacerations. Laboratory review:  
Results for orders placed or performed during the hospital encounter of 04/27/18 CBC WITH AUTOMATED DIFF Result Value Ref Range WBC 4.5 4.1 - 11.1 K/uL  
 RBC 4.67 4.10 - 5.70 M/uL  
 HGB 14.1 12.1 - 17.0 g/dL HCT 40.0 36.6 - 50.3 % MCV 85.7 80.0 - 99.0 FL  
 MCH 30.2 26.0 - 34.0 PG  
 MCHC 35.3 30.0 - 36.5 g/dL  
 RDW 12.7 11.5 - 14.5 % PLATELET 113 574 - 587 K/uL MPV 9.8 8.9 - 12.9 FL  
 NRBC 0.0 0  WBC ABSOLUTE NRBC 0.00 0.00 - 0.01 K/uL NEUTROPHILS 69 32 - 75 % LYMPHOCYTES 22 12 - 49 % MONOCYTES 6 5 - 13 % EOSINOPHILS 2 0 - 7 % BASOPHILS 1 0 - 1 % IMMATURE GRANULOCYTES 0 0.0 - 0.5 % ABS. NEUTROPHILS 3.1 1.8 - 8.0 K/UL  
 ABS. LYMPHOCYTES 1.0 0.8 - 3.5 K/UL  
 ABS. MONOCYTES 0.3 0.0 - 1.0 K/UL  
 ABS. EOSINOPHILS 0.1 0.0 - 0.4 K/UL  
 ABS. BASOPHILS 0.0 0.0 - 0.1 K/UL  
 ABS. IMM. GRANS. 0.0 0.00 - 0.04 K/UL  
 DF AUTOMATED METABOLIC PANEL, COMPREHENSIVE Result Value Ref Range Sodium 126 (L) 136 - 145 mmol/L Potassium 4.3 3.5 - 5.1 mmol/L Chloride 90 (L) 97 - 108 mmol/L  
 CO2 27 21 - 32 mmol/L Anion gap 9 5 - 15 mmol/L Glucose 655 (HH) 65 - 100 mg/dL BUN 38 (H) 6 - 20 MG/DL Creatinine 2.27 (H) 0.70 - 1.30 MG/DL  
 BUN/Creatinine ratio 17 12 - 20 GFR est AA 34 (L) >60 ml/min/1.73m2 GFR est non-AA 28 (L) >60 ml/min/1.73m2 Calcium 9.8 8.5 - 10.1 MG/DL Bilirubin, total 0.7 0.2 - 1.0 MG/DL  
 ALT (SGPT) 31 12 - 78 U/L  
 AST (SGOT) 11 (L) 15 - 37 U/L Alk. phosphatase 92 45 - 117 U/L Protein, total 8.6 (H) 6.4 - 8.2 g/dL Albumin 4.3 3.5 - 5.0 g/dL Globulin 4.3 (H) 2.0 - 4.0 g/dL A-G Ratio 1.0 (L) 1.1 - 2.2 URINALYSIS W/ REFLEX CULTURE Result Value Ref Range Color YELLOW/STRAW Appearance CLEAR CLEAR Specific gravity 1.029 1.003 - 1.030    
 pH (UA) 5.0 5.0 - 8.0 Protein TRACE (A) NEG mg/dL Glucose >1000 (A) NEG mg/dL Ketone NEGATIVE  NEG mg/dL Bilirubin NEGATIVE  NEG Blood TRACE (A) NEG Urobilinogen 0.2 0.2 - 1.0 EU/dL  Nitrites NEGATIVE  NEG    
 Leukocyte Esterase NEGATIVE  NEG    
 WBC 0-4 0 - 4 /hpf  
 RBC 0-5 0 - 5 /hpf Epithelial cells FEW FEW /lpf Bacteria NEGATIVE  NEG /hpf  
 UA:UC IF INDICATED CULTURE NOT INDICATED BY UA RESULT CNI Hyaline cast 0-2 0 - 5 /lpf HEMOGLOBIN A1C WITH EAG Result Value Ref Range Hemoglobin A1c 12.7 (H) 4.2 - 6.3 % Est. average glucose 318 mg/dL PHOSPHORUS Result Value Ref Range Phosphorus 4.2 2.6 - 4.7 MG/DL  
GLUCOSTABILIZER Result Value Ref Range Glucose 474 mg/dL Insulin order 8.3 units/hour Insulin adminstered 8.3 units/hour Multiplier 0.020 Low target 200 mg/dL High target 300 mg/dL D50 order 0.0 ml  
 D50 administered 0.00 ml Minutes until next BG 60 min Order initials jea Administered initials jea GLSCOM Comments Cherry Zamarripa Result Value Ref Range Glucose 356 mg/dL Insulin order 5.9 units/hour Insulin adminstered 5.9 units/hour Multiplier 0.020 Low target 200 mg/dL High target 300 mg/dL D50 order 0.0 ml  
 D50 administered 0.00 ml Minutes until next BG 60 min Order initials jea Administered initials jea GLSCOM Comments Cherry Zamarripa Result Value Ref Range Carbohydrate eaten 45 g Insulin bolus ordered 3.0 units/hour Insulin bolus administered 3.0 units/hour Carb ratio 15 Minutes until next BG 60 min Order initials jea Administered initials jea GLSCOM Comments Cherry Zamarripa Result Value Ref Range Glucose 383 mg/dL Insulin order 5.9 units/hour Insulin adminstered 5.9 units/hour Multiplier 0.020 Low target 200 mg/dL High target 300 mg/dL D50 order 0.0 ml  
 D50 administered 0.00 ml Minutes until next BG 60 min Order initials RKJ Administered initials BE   
 GLSCOM Comments METABOLIC PANEL, BASIC Result Value Ref Range Sodium 136 136 - 145 mmol/L Potassium 3.4 (L) 3.5 - 5.1 mmol/L  Chloride 103 97 - 108 mmol/L  
 CO2 24 21 - 32 mmol/L Anion gap 9 5 - 15 mmol/L Glucose 311 (H) 65 - 100 mg/dL BUN 30 (H) 6 - 20 MG/DL Creatinine 1.62 (H) 0.70 - 1.30 MG/DL  
 BUN/Creatinine ratio 19 12 - 20 GFR est AA 50 (L) >60 ml/min/1.73m2 GFR est non-AA 42 (L) >60 ml/min/1.73m2 Calcium 8.3 (L) 8.5 - 10.1 MG/DL MAGNESIUM Result Value Ref Range Magnesium 2.2 1.6 - 2.4 mg/dL Lory Elvie Result Value Ref Range Glucose 309 mg/dL Insulin order 7.5 units/hour Insulin adminstered 7.5 units/hour Multiplier 0.030 Low target 200 mg/dL High target 300 mg/dL D50 order 0.0 ml  
 D50 administered 0.00 ml Minutes until next BG 60 min Order initials sbh Administered initials sbh GLSCOM Comments Lory Elvie Result Value Ref Range Glucose 274 mg/dL Insulin order 6.4 units/hour Insulin adminstered 6.4 units/hour Multiplier 0.030 Low target 200 mg/dL High target 300 mg/dL D50 order 0.0 ml  
 D50 administered 0.00 ml Minutes until next BG 60 min Order initials sbh Administered initials sbh GLSCOM Comments Lory Elvie Result Value Ref Range Glucose 221 mg/dL Insulin order 4.8 units/hour Insulin adminstered 4.8 units/hour Multiplier 0.030 Low target 200 mg/dL High target 300 mg/dL D50 order 0.0 ml  
 D50 administered 0.00 ml Minutes until next BG 60 min Order initials sbh Administered initials sbh GLSCOM Comments METABOLIC PANEL, BASIC Result Value Ref Range Sodium 137 136 - 145 mmol/L Potassium 3.2 (L) 3.5 - 5.1 mmol/L Chloride 106 97 - 108 mmol/L  
 CO2 24 21 - 32 mmol/L Anion gap 7 5 - 15 mmol/L Glucose 171 (H) 65 - 100 mg/dL BUN 25 (H) 6 - 20 MG/DL Creatinine 1.31 (H) 0.70 - 1.30 MG/DL  
 BUN/Creatinine ratio 19 12 - 20 GFR est AA >60 >60 ml/min/1.73m2 GFR est non-AA 53 (L) >60 ml/min/1.73m2 Calcium 8.6 8.5 - 10.1 MG/DL MAGNESIUM Result Value Ref Range Magnesium 2.1 1.6 - 2.4 mg/dL Gerardine Martin Result Value Ref Range Glucose 208 mg/dL Insulin order 4.4 units/hour Insulin adminstered 4.4 units/hour Multiplier 0.030 Low target 200 mg/dL High target 300 mg/dL D50 order 0.0 ml  
 D50 administered 0.00 ml Minutes until next BG 60 min Order initials sb Administered initials [facility] GLSCOM Comments Rafine Martin Result Value Ref Range Glucose 170 mg/dL Insulin order 2.2 units/hour Insulin adminstered 2.2 units/hour Multiplier 0.020 Low target 200 mg/dL High target 300 mg/dL D50 order 0.0 ml  
 D50 administered 0.00 ml Minutes until next BG 60 min Order initials    
 Administered initials    
 GLSCOM Comments Rafine Martin Result Value Ref Range Glucose 186 mg/dL Insulin order 1.3 units/hour Insulin adminstered 1.3 units/hour Multiplier 0.010 Low target 200 mg/dL High target 300 mg/dL D50 order 0.0 ml  
 D50 administered 0.00 ml Minutes until next BG 60 min Order initials    
 Administered initials    
 GLSCOM Comments Rafine Martin Result Value Ref Range Glucose 174 mg/dL Insulin order 0.0 units/hour Insulin adminstered 0.0 units/hour Multiplier 0.000 Low target 200 mg/dL High target 300 mg/dL D50 order 0.0 ml  
 D50 administered 0.00 ml Minutes until next BG 60 min Order initials    
 Administered initials    
 GLSCOM Comments Rafine Martin Result Value Ref Range Glucose 251 mg/dL Insulin order 0.1 units/hour Insulin adminstered 0.1 units/hour Multiplier 0.000 Low target 200 mg/dL High target 300 mg/dL D50 order 0.0 ml  
 D50 administered 0.00 ml Minutes until next BG 60 min Order initials    
 Administered initials    
 GLSCOM Comments Rafine Martin Result Value Ref Range Glucose 272 mg/dL Insulin order 0.1 units/hour Insulin adminstered 0.1 units/hour Multiplier 0.000 Low target 200 mg/dL High target 300 mg/dL D50 order 0.0 ml  
 D50 administered 0.00 ml Minutes until next BG 60 min Order initials    
 Administered initials sh   
 GLSCOM Comments METABOLIC PANEL, BASIC Result Value Ref Range Sodium 134 (L) 136 - 145 mmol/L Potassium 3.8 3.5 - 5.1 mmol/L Chloride 102 97 - 108 mmol/L  
 CO2 23 21 - 32 mmol/L Anion gap 9 5 - 15 mmol/L Glucose 272 (H) 65 - 100 mg/dL BUN 22 (H) 6 - 20 MG/DL Creatinine 1.37 (H) 0.70 - 1.30 MG/DL  
 BUN/Creatinine ratio 16 12 - 20 GFR est AA >60 >60 ml/min/1.73m2 GFR est non-AA 51 (L) >60 ml/min/1.73m2 Calcium 8.8 8.5 - 10.1 MG/DL MAGNESIUM Result Value Ref Range Magnesium 1.9 1.6 - 2.4 mg/dL Rolinda Musca Result Value Ref Range Glucose 315 mg/dL Insulin order 2.6 units/hour Insulin adminstered 2.6 units/hour Multiplier 0.010 Low target 200 mg/dL High target 300 mg/dL D50 order 0.0 ml  
 D50 administered 0.00 ml Minutes until next BG 60 min Order initials    
 Administered initials    
 GLSCOM Comments Rolinda Musca Result Value Ref Range Glucose 279 mg/dL Insulin order 2.2 units/hour Insulin adminstered 2.2 units/hour Multiplier 0.010 Low target 200 mg/dL High target 300 mg/dL D50 order 0.0 ml  
 D50 administered 0.00 ml Minutes until next BG 60 min Order initials  Administered initials    
 GLSCOM Comments Rolinda Musca Result Value Ref Range Glucose 250 mg/dL Insulin order 1.9 units/hour Insulin adminstered 1.9 units/hour Multiplier 0.010 Low target 200 mg/dL High target 300 mg/dL D50 order 0.0 ml  
 D50 administered 0.00 ml Minutes until next BG 60 min Order initials LF Administered initials    
 GLSCOM Comments Rolinda Musca Result Value Ref Range Glucose DELETED mg/dL Insulin order DELETED units/hour Insulin adminstered DELETED units/hour Multiplier DELETED Low target DELETED mg/dL High target DELETED mg/dL D50 order DELETED ml  
 D50 administered DELETED ml  
 Minutes until next BG DELETED min Order initials DELETED Administered initials DELETED   
 GLSCOM Comments DELETED   
GLUCOSTABILIZER Result Value Ref Range Glucose 455 mg/dL Insulin order 7.9 units/hour Insulin adminstered 7.9 units/hour Multiplier 0.020 Low target 200 mg/dL High target 300 mg/dL D50 order 0.0 ml  
 D50 administered 0.00 ml Minutes until next BG 60 min Order initials LF Administered initials LF   
 GLSCOM Comments Rechecked BG   
GLUCOSTABILIZER Result Value Ref Range Glucose 365 mg/dL Insulin order 9.2 units/hour Insulin adminstered 9.2 units/hour Multiplier 0.030 Low target 200 mg/dL High target 300 mg/dL D50 order 0.0 ml  
 D50 administered 0.00 ml Minutes until next BG 60 min Order initials LF Administered initials LF   
 GLSCOM Comments Jayant Fossa Result Value Ref Range Glucose 376 mg/dL Insulin order 12.6 units/hour Insulin adminstered 12.6 units/hour Multiplier 0.040 Low target 200 mg/dL High target 300 mg/dL D50 order 0.0 ml  
 D50 administered 0.00 ml Minutes until next BG 60 min Order initials LF Administered initials LF   
 GLSCOM Comments AMMONIA Result Value Ref Range Ammonia 16 <53 UMOL/L  
METABOLIC PANEL, BASIC Result Value Ref Range Sodium 137 136 - 145 mmol/L Potassium 3.3 (L) 3.5 - 5.1 mmol/L Chloride 103 97 - 108 mmol/L  
 CO2 25 21 - 32 mmol/L Anion gap 9 5 - 15 mmol/L Glucose 192 (H) 65 - 100 mg/dL BUN 20 6 - 20 MG/DL Creatinine 1.30 0.70 - 1.30 MG/DL  
 BUN/Creatinine ratio 15 12 - 20 GFR est AA >60 >60 ml/min/1.73m2 GFR est non-AA 54 (L) >60 ml/min/1.73m2 Calcium 9.3 8.5 - 10.1 MG/DL  
CBC WITH AUTOMATED DIFF Result Value Ref Range WBC 4.3 4.1 - 11.1 K/uL  
 RBC 4.62 4.10 - 5.70 M/uL  
 HGB 13.7 12.1 - 17.0 g/dL HCT 39.8 36.6 - 50.3 % MCV 86.1 80.0 - 99.0 FL  
 MCH 29.7 26.0 - 34.0 PG  
 MCHC 34.4 30.0 - 36.5 g/dL  
 RDW 13.1 11.5 - 14.5 % PLATELET 893 579 - 971 K/uL MPV 9.9 8.9 - 12.9 FL  
 NRBC 0.0 0  WBC ABSOLUTE NRBC 0.00 0.00 - 0.01 K/uL NEUTROPHILS 57 32 - 75 % LYMPHOCYTES 32 12 - 49 % MONOCYTES 8 5 - 13 % EOSINOPHILS 2 0 - 7 % BASOPHILS 1 0 - 1 % IMMATURE GRANULOCYTES 1 (H) 0.0 - 0.5 % ABS. NEUTROPHILS 2.5 1.8 - 8.0 K/UL  
 ABS. LYMPHOCYTES 1.4 0.8 - 3.5 K/UL  
 ABS. MONOCYTES 0.3 0.0 - 1.0 K/UL  
 ABS. EOSINOPHILS 0.1 0.0 - 0.4 K/UL  
 ABS. BASOPHILS 0.0 0.0 - 0.1 K/UL  
 ABS. IMM. GRANS. 0.0 0.00 - 0.04 K/UL  
 DF AUTOMATED MAGNESIUM Result Value Ref Range Magnesium 2.0 1.6 - 2.4 mg/dL VITAMIN B12 Result Value Ref Range Vitamin B12 909 193 - 986 pg/mL TSH 3RD GENERATION Result Value Ref Range TSH 3.13 0.36 - 3.74 uIU/mL MAGNESIUM Result Value Ref Range Magnesium 2.1 1.6 - 2.4 mg/dL RPR Result Value Ref Range RPR NONREACTIVE NR    
CBC WITH AUTOMATED DIFF Result Value Ref Range WBC 4.4 4.1 - 11.1 K/uL  
 RBC 4.50 4. 10 - 5.70 M/uL  
 HGB 13.2 12.1 - 17.0 g/dL HCT 39.4 36.6 - 50.3 % MCV 87.6 80.0 - 99.0 FL  
 MCH 29.3 26.0 - 34.0 PG  
 MCHC 33.5 30.0 - 36.5 g/dL  
 RDW 13.3 11.5 - 14.5 % PLATELET 921 141 - 369 K/uL MPV 10.0 8.9 - 12.9 FL  
 NRBC 0.0 0  WBC ABSOLUTE NRBC 0.00 0.00 - 0.01 K/uL NEUTROPHILS 59 32 - 75 % LYMPHOCYTES 30 12 - 49 % MONOCYTES 8 5 - 13 % EOSINOPHILS 3 0 - 7 % BASOPHILS 1 0 - 1 % IMMATURE GRANULOCYTES 0 0.0 - 0.5 % ABS. NEUTROPHILS 2.6 1.8 - 8.0 K/UL  
 ABS. LYMPHOCYTES 1.3 0.8 - 3.5 K/UL  
 ABS. MONOCYTES 0.3 0.0 - 1.0 K/UL  
 ABS. EOSINOPHILS 0.1 0.0 - 0.4 K/UL  
 ABS. BASOPHILS 0.0 0.0 - 0.1 K/UL  
 ABS. IMM. GRANS. 0.0 0.00 - 0.04 K/UL  
 DF AUTOMATED METABOLIC PANEL, BASIC  
 Result Value Ref Range Sodium 139 136 - 145 mmol/L Potassium 3.8 3.5 - 5.1 mmol/L Chloride 103 97 - 108 mmol/L  
 CO2 25 21 - 32 mmol/L Anion gap 11 5 - 15 mmol/L Glucose 211 (H) 65 - 100 mg/dL BUN 24 (H) 6 - 20 MG/DL Creatinine 1.53 (H) 0.70 - 1.30 MG/DL  
 BUN/Creatinine ratio 16 12 - 20 GFR est AA 54 (L) >60 ml/min/1.73m2 GFR est non-AA 44 (L) >60 ml/min/1.73m2 Calcium 9.5 8.5 - 10.1 MG/DL MAGNESIUM Result Value Ref Range Magnesium 2.1 1.6 - 2.4 mg/dL RPR Result Value Ref Range RPR NONREACTIVE NR    
MAGNESIUM Result Value Ref Range Magnesium 2.1 1.6 - 2.4 mg/dL CBC WITH AUTOMATED DIFF Result Value Ref Range WBC 5.4 4.1 - 11.1 K/uL  
 RBC 4.34 4.10 - 5.70 M/uL  
 HGB 12.9 12.1 - 17.0 g/dL HCT 38.7 36.6 - 50.3 % MCV 89.2 80.0 - 99.0 FL  
 MCH 29.7 26.0 - 34.0 PG  
 MCHC 33.3 30.0 - 36.5 g/dL  
 RDW 13.3 11.5 - 14.5 % PLATELET 788 756 - 119 K/uL MPV 9.4 8.9 - 12.9 FL  
 NRBC 0.0 0  WBC ABSOLUTE NRBC 0.00 0.00 - 0.01 K/uL NEUTROPHILS 76 (H) 32 - 75 % LYMPHOCYTES 15 12 - 49 % MONOCYTES 6 5 - 13 % EOSINOPHILS 2 0 - 7 % BASOPHILS 0 0 - 1 % IMMATURE GRANULOCYTES 0 0.0 - 0.5 % ABS. NEUTROPHILS 4.1 1.8 - 8.0 K/UL  
 ABS. LYMPHOCYTES 0.8 0.8 - 3.5 K/UL  
 ABS. MONOCYTES 0.3 0.0 - 1.0 K/UL  
 ABS. EOSINOPHILS 0.1 0.0 - 0.4 K/UL  
 ABS. BASOPHILS 0.0 0.0 - 0.1 K/UL  
 ABS. IMM. GRANS. 0.0 0.00 - 0.04 K/UL  
 DF AUTOMATED METABOLIC PANEL, BASIC Result Value Ref Range Sodium 137 136 - 145 mmol/L Potassium 4.2 3.5 - 5.1 mmol/L Chloride 103 97 - 108 mmol/L  
 CO2 26 21 - 32 mmol/L Anion gap 8 5 - 15 mmol/L Glucose 156 (H) 65 - 100 mg/dL BUN 30 (H) 6 - 20 MG/DL Creatinine 1.77 (H) 0.70 - 1.30 MG/DL  
 BUN/Creatinine ratio 17 12 - 20 GFR est AA 46 (L) >60 ml/min/1.73m2 GFR est non-AA 38 (L) >60 ml/min/1.73m2 Calcium 9.4 8.5 - 10.1 MG/DL  
PHOSPHORUS Result Value Ref Range Phosphorus 4.0 2.6 - 4.7 MG/DL MAGNESIUM Result Value Ref Range Magnesium 2.0 1.6 - 2.4 mg/dL METABOLIC PANEL, BASIC Result Value Ref Range Sodium 137 136 - 145 mmol/L Potassium 3.8 3.5 - 5.1 mmol/L Chloride 103 97 - 108 mmol/L  
 CO2 27 21 - 32 mmol/L Anion gap 7 5 - 15 mmol/L Glucose 167 (H) 65 - 100 mg/dL BUN 23 (H) 6 - 20 MG/DL Creatinine 1.48 (H) 0.70 - 1.30 MG/DL  
 BUN/Creatinine ratio 16 12 - 20 GFR est AA 56 (L) >60 ml/min/1.73m2 GFR est non-AA 46 (L) >60 ml/min/1.73m2 Calcium 9.4 8.5 - 10.1 MG/DL  
CBC WITH AUTOMATED DIFF Result Value Ref Range WBC 4.7 4.1 - 11.1 K/uL  
 RBC 4.50 4. 10 - 5.70 M/uL  
 HGB 13.1 12.1 - 17.0 g/dL HCT 39.7 36.6 - 50.3 % MCV 88.2 80.0 - 99.0 FL  
 MCH 29.1 26.0 - 34.0 PG  
 MCHC 33.0 30.0 - 36.5 g/dL  
 RDW 13.3 11.5 - 14.5 % PLATELET 921 318 - 256 K/uL MPV 9.6 8.9 - 12.9 FL  
 NRBC 0.0 0  WBC ABSOLUTE NRBC 0.00 0.00 - 0.01 K/uL NEUTROPHILS 63 32 - 75 % LYMPHOCYTES 26 12 - 49 % MONOCYTES 8 5 - 13 % EOSINOPHILS 3 0 - 7 % BASOPHILS 1 0 - 1 % IMMATURE GRANULOCYTES 0 0.0 - 0.5 % ABS. NEUTROPHILS 2.9 1.8 - 8.0 K/UL  
 ABS. LYMPHOCYTES 1.2 0.8 - 3.5 K/UL  
 ABS. MONOCYTES 0.4 0.0 - 1.0 K/UL  
 ABS. EOSINOPHILS 0.2 0.0 - 0.4 K/UL  
 ABS. BASOPHILS 0.0 0.0 - 0.1 K/UL  
 ABS. IMM. GRANS. 0.0 0.00 - 0.04 K/UL  
 DF AUTOMATED MAGNESIUM Result Value Ref Range Magnesium 2.1 1.6 - 2.4 mg/dL METABOLIC PANEL, BASIC Result Value Ref Range Sodium 135 (L) 136 - 145 mmol/L Potassium 3.8 3.5 - 5.1 mmol/L Chloride 102 97 - 108 mmol/L  
 CO2 27 21 - 32 mmol/L Anion gap 6 5 - 15 mmol/L Glucose 212 (H) 65 - 100 mg/dL BUN 25 (H) 6 - 20 MG/DL Creatinine 1.52 (H) 0.70 - 1.30 MG/DL  
 BUN/Creatinine ratio 16 12 - 20 GFR est AA 54 (L) >60 ml/min/1.73m2 GFR est non-AA 45 (L) >60 ml/min/1.73m2 Calcium 9.2 8.5 - 10.1 MG/DL MAGNESIUM Result Value Ref Range Magnesium 2.2 1.6 - 2.4 mg/dL METABOLIC PANEL, BASIC Result Value Ref Range Sodium 136 136 - 145 mmol/L Potassium 4.1 3.5 - 5.1 mmol/L Chloride 102 97 - 108 mmol/L  
 CO2 27 21 - 32 mmol/L Anion gap 7 5 - 15 mmol/L Glucose 168 (H) 65 - 100 mg/dL BUN 21 (H) 6 - 20 MG/DL Creatinine 1.48 (H) 0.70 - 1.30 MG/DL  
 BUN/Creatinine ratio 14 12 - 20 GFR est AA 56 (L) >60 ml/min/1.73m2 GFR est non-AA 46 (L) >60 ml/min/1.73m2 Calcium 9.7 8.5 - 10.1 MG/DL MAGNESIUM Result Value Ref Range Magnesium 2.2 1.6 - 2.4 mg/dL METABOLIC PANEL, BASIC Result Value Ref Range Sodium 135 (L) 136 - 145 mmol/L Potassium 4.8 3.5 - 5.1 mmol/L Chloride 100 97 - 108 mmol/L  
 CO2 27 21 - 32 mmol/L Anion gap 8 5 - 15 mmol/L Glucose 248 (H) 65 - 100 mg/dL BUN 31 (H) 6 - 20 MG/DL Creatinine 1.85 (H) 0.70 - 1.30 MG/DL  
 BUN/Creatinine ratio 17 12 - 20 GFR est AA 43 (L) >60 ml/min/1.73m2 GFR est non-AA 36 (L) >60 ml/min/1.73m2 Calcium 9.9 8.5 - 10.1 MG/DL MAGNESIUM Result Value Ref Range Magnesium 2.3 1.6 - 2.4 mg/dL GLUCOSE, POC Result Value Ref Range Glucose (POC) >600 (HH) 65 - 100 mg/dL Performed by Zulma Killer GLUCOSE, POC Result Value Ref Range Glucose (POC) 517 (H) 65 - 100 mg/dL Performed by [employer] GLUCOSE, POC Result Value Ref Range Glucose (POC) 474 (H) 65 - 100 mg/dL Performed by [employer] GLUCOSE, POC Result Value Ref Range Glucose (POC) 356 (H) 65 - 100 mg/dL Performed by Dean Cintron \"[place]\" GLUCOSE, POC Result Value Ref Range Glucose (POC) 383 (H) 65 - 100 mg/dL Performed by [employer] GLUCOSE, POC Result Value Ref Range Glucose (POC) 309 (H) 65 - 100 mg/dL Performed by Wally Oconnell GLUCOSE, POC Result Value Ref Range Glucose (POC) 274 (H) 65 - 100 mg/dL Performed by Sandee Soto GLUCOSE, POC Result Value Ref Range Glucose (POC) 221 (H) 65 - 100 mg/dL Performed by [employer] GLUCOSE, POC Result Value Ref Range Glucose (POC) 208 (H) 65 - 100 mg/dL Performed by [employer] GLUCOSE, POC Result Value Ref Range Glucose (POC) 170 (H) 65 - 100 mg/dL Performed by Mariam Makenzie GLUCOSE, POC Result Value Ref Range Glucose (POC) 186 (H) 65 - 100 mg/dL Performed by [employer] GLUCOSE, POC Result Value Ref Range Glucose (POC) 174 (H) 65 - 100 mg/dL Performed by [employer] [place] GLUCOSE, POC Result Value Ref Range Glucose (POC) 251 (H) 65 - 100 mg/dL Performed by [employer] GLUCOSE, POC Result Value Ref Range Glucose (POC) 272 (H) 65 - 100 mg/dL Performed by [employer] GLUCOSE, POC Result Value Ref Range Glucose (POC) 315 (H) 65 - 100 mg/dL Performed by St[facility] GLUCOSE, POC Result Value Ref Range Glucose (POC) 279 (H) 65 - 100 mg/dL Performed by Ayaan Pap GLUCOSE, POC Result Value Ref Range Glucose (POC) 250 (H) 65 - 100 mg/dL Performed by Ayaan Pap GLUCOSE, POC Result Value Ref Range Glucose (POC) 485 (H) 65 - 100 mg/dL Performed by [employer] GLUCOSE, POC Result Value Ref Range Glucose (POC) 455 (H) 65 - 100 mg/dL Performed by [employer] GLUCOSE, POC Result Value Ref Range Glucose (POC) 360 (H) 65 - 100 mg/dL Performed by Ayaan Pap GLUCOSE, POC Result Value Ref Range Glucose (POC) 365 (H) 65 - 100 mg/dL Performed by [employer] GLUCOSE, POC Result Value Ref Range Glucose (POC) 376 (H) 65 - 100 mg/dL Performed by [employer] GLUCOSE, POC Result Value Ref Range Glucose (POC) 322 (H) 65 - 100 mg/dL Performed by [employer] GLUCOSE, POC Result Value Ref Range Glucose (POC) 352 (H) 65 - 100 mg/dL Performed by [employer] GLUCOSE, POC Result Value Ref Range Glucose (POC) 215 (H) 65 - 100 mg/dL Performed by James Kicks GLUCOSE, POC Result Value Ref Range Glucose (POC) 334 (H) 65 - 100 mg/dL Performed by James [place] GLUCOSE, POC Result Value Ref Range Glucose (POC) 293 (H) 65 - 100 mg/dL Performed by Blaine Blanca GLUCOSE, POC Result Value Ref Range Glucose (POC) 366 (H) 65 - 100 mg/dL Performed by Kamar Sarabia GLUCOSE, POC Result Value Ref Range Glucose (POC) 155 (H) 65 - 100 mg/dL Performed by Naseem Osler GLUCOSE, POC Result Value Ref Range Glucose (POC) >600 (HH) 65 - 100 mg/dL Performed by Jaleel Kaur GLUCOSE, POC Result Value Ref Range Glucose (POC) 286 (H) 65 - 100 mg/dL Performed by Benito Osler GLUCOSE, POC Result Value Ref Range Glucose (POC) 310 (H) 65 - 100 mg/dL Performed by TENA JOSIE(CON) GLUCOSE, POC Result Value Ref Range Glucose (POC) 327 (H) 65 - 100 mg/dL Performed by TENA JOSIE(CON) GLUCOSE, POC Result Value Ref Range Glucose (POC) 224 (H) 65 - 100 mg/dL Performed by Delia Persaud, POC Result Value Ref Range Glucose (POC) 182 (H) 65 - 100 mg/dL Performed by Naseem Rustler GLUCOSE, POC Result Value Ref Range Glucose (POC) 432 (H) 65 - 100 mg/dL Performed by Asia Connolly GLUCOSE, POC Result Value Ref Range Glucose (POC) 408 (H) 65 - 100 mg/dL Performed by Blaire Garcia GLUCOSE, POC Result Value Ref Range Glucose (POC) 385 (H) 65 - 100 mg/dL Performed by Naseem Osler GLUCOSE, POC Result Value Ref Range Glucose (POC) 228 (H) 65 - 100 mg/dL Performed by Bryson Burton GLUCOSE, POC Result Value Ref Range Glucose (POC) 151 (H) 65 - 100 mg/dL Performed by Kaushik Medrano GLUCOSE, POC Result Value Ref Range Glucose (POC) 263 (H) 65 - 100 mg/dL Performed by James Yaptacarolines GLUCOSE, POC Result Value Ref Range Glucose (POC) 129 (H) 65 - 100 mg/dL Performed by [employer] GLUCOSE, POC Result Value Ref Range Glucose (POC) 198 (H) 65 - 100 mg/dL Performed by Ana Paula Jarvis GLUCOSE, POC Result Value Ref Range Glucose (POC) 103 (H) 65 - 100 mg/dL Performed by [employer] GLUCOSE, POC Result Value Ref Range Glucose (POC) 235 (H) 65 - 100 mg/dL Performed by [employer] GLUCOSE, POC Result Value Ref Range Glucose (POC) 287 (H) 65 - 100 mg/dL Performed by Luis Ornelas GLUCOSE, POC Result Value Ref Range Glucose (POC) 267 (H) 65 - 100 mg/dL Performed by Rome Dias GLUCOSE, POC Result Value Ref Range Glucose (POC) 138 (H) 65 - 100 mg/dL Performed by the Shelf GLUCOSE, POC Result Value Ref Range Glucose (POC) 248 (H) 65 - 100 mg/dL Performed by the Shel[employer] GLUCOSE, POC Result Value Ref Range Glucose (POC) 242 (H) 65 - 100 mg/dL Performed by Serafin Jhaveri (PCT) GLUCOSE, POC Result Value Ref Range Glucose (POC) 240 (H) 65 - 100 mg/dL Performed by [employer] GLUCOSE, POC Result Value Ref Range Glucose (POC) 134 (H) 65 - 100 mg/dL Performed by [employer] GLUCOSE, POC Result Value Ref Range Glucose (POC) 291 (H) 65 - 100 mg/dL Performed by [employer] GLUCOSE, POC Result Value Ref Range Glucose (POC) 337 (H) 65 - 100 mg/dL Performed by [employer] GLUCOSE, POC Result Value Ref Range Glucose (POC) 111 (H) 65 - 100 mg/dL Performed by [employer] GLUCOSE, POC Result Value Ref Range Glucose (POC) 176 (H) 65 - 100 mg/dL Performed by [employer] GLUCOSE, POC Result Value Ref Range Glucose (POC) 312 (H) 65 - 100 mg/dL Performed by [employer] GLUCOSE, POC Result Value Ref Range Glucose (POC) 359 (H) 65 - 100 mg/dL Performed by Rock Riedel (PCT) GLUCOSE, POC Result Value Ref Range Glucose (POC) 283 (H) 65 - 100 mg/dL Performed by Chris Ragland (PCT) GLUCOSE, POC Result Value Ref Range Glucose (POC) 241 (H) 65 - 100 mg/dL Performed by Chris Ragland (PCT) Imaging review: MRI brain Normal 
 
Documentation review: 
None Assessment/Plan:  
Mario Alberto Hill is a 69 y.o. male who presented to the neurology office for management of memory problem. Mini-Mental Status Examination is 13 out of 30. He does have significant dementia. MRI of the brain has been normal and there is no other explanation for his memory loss. The patient is taking Aricept 10 mg daily. I do plan to discontinue that and I will be starting the patient on Namzaric.  4 weeks sample was provided to the patient and a prescription has also been sent to the pharmacy. Follow-up in 2-3 months PHQ over the last two weeks 9/7/2018 Little interest or pleasure in doing things Not at all Feeling down, depressed, irritable, or hopeless Not at all Total Score PHQ 2 0 Primary care to address possible depression if PHQ-9 score is more than 9. ICD-10-CM ICD-9-CM 1. Dementia without behavioral disturbance, unspecified dementia type F03.90 294.20 memantine-donepezil (NAMZARIC) 28-10 mg CSpX Ne Tellez MD 
Neurologist 
 
CC: Navjot Pichardo MD 
Fax: [phone] This note was created using voice recognition software. Despite editing, there may be syntax errors.

## 2018-09-07 NOTE — MR AVS SNAPSHOT
[address] [facility], 
[address], [address] [facility] 
[phone] Patient: Kinga Maloney MRN: [id number] SY Visit Information Date & Time Provider Department Dept. Phone Encounter #  
 2018 10:40 AM Hoyle Krabbe, MD [facility] [phone] [id number] Your Appointments 2018 11:00 AM  
Follow Up with Hoyle Krabbe, MD  
Neurology [facility] at [facility] [address] [facility]) Appt Note: follow up headaches [address] [place] [address], 
[address] [place] [address], [address] [phone]  
[address] [facility], [address] [place] [address], [address] [place] [address] St [address] [phone] Upcoming Health Maintenance Date Due  
 LIPID PANEL Q1 1940 FOOT EXAM Q1 12/15/1950 MICROALBUMIN Q1 12/15/1951 EYE EXAM RETINAL OR DILATED Q1 12/15/1951 DTaP/Tdap/Td series (1 - Tdap) 12/15/1962 ZOSTER VACCINE AGE 60> 10/15/2001 GLAUCOMA SCREENING Q2Y 12/15/2006 Pneumococcal 65+ Low/Medium Risk (1 of 2 - PCV13) 12/15/2006 MEDICARE YEARLY EXAM 3/14/2018 Influenza Age 5 to Adult 2018 HEMOGLOBIN A1C Q6M 10/27/2018 Allergies as of 2018  Review Complete On: 5/3/2018 By: Callum Rios RN No Known Allergies Current Immunizations  Reviewed on 2018 No immunizations on file. Not reviewed this visit You Were Diagnosed With   
  
 Codes Comments Dementia without behavioral disturbance, unspecified dementia type    -  Primary ICD-10-CM: F03.90 ICD-9-CM: 294.20 Vitals BP Pulse Height(growth percentile) Weight(growth percentile) SpO2 BMI  
 130/72 93 5' 8\" (1.727 m) 195 lb (88.5 kg) 98% 29.65 kg/m2 Smoking Status Former Smoker Vitals History BMI and BSA Data Body Mass Index Body Surface Area  
 29.65 kg/m 2 2.06 m 2 Preferred Pharmacy Pharmacy Name Phone [id number] [place] [address] [phone] Your Updated Medication List  
  
   
This list is accurate as of 9/7/18 11:41 AM.  Always use your most recent med list. amLODIPine 10 mg tablet Commonly known as:  Saeed Del Take 1 Tab by mouth daily. Indications: hypertension  
  
 aspirin 81 mg tablet Take 81 mg by mouth daily. FISH OIL 1,000 mg Cap Generic drug:  omega-3 fatty acids-vitamin e Take 1 Cap by mouth daily. imipramine 10 mg tablet Commonly known as:  TOFRANIL Take 10 mg by mouth three (3) times daily. isosorbide dinitrate 30 mg tablet Commonly known as:  ISORDIL Take 30 mg by mouth daily. linagliptin 5 mg tablet Commonly known as:  April True Take 1 Tab by mouth Daily (before breakfast). Indications: type 2 diabetes mellitus LIPITOR 10 mg tablet Generic drug:  atorvastatin Take 10 mg by mouth daily. megestrol 20 mg tablet Commonly known as:  MEGACE Take 20 mg by mouth two (2) times a day. melatonin 3 mg tablet Take 2 Tabs by mouth nightly. memantine-donepezil 28-10 mg Cspx Commonly known as:  [facility] Take 1 Cap by mouth daily. metoprolol succinate 100 mg tablet Commonly known as:  TOPROL-XL Take 100 mg by mouth daily. QUEtiapine 50 mg tablet Commonly known as:  SEROquel Take 1 Tab by mouth nightly. Take Half a pill in the morning and a FULL Pill at Bedtime Prescriptions Sent to Pharmacy Refills  
 memantine-donepezil (NAMZARIC) 28-10 mg CSpX 2 Sig: Take 1 Cap by mouth daily. Class: Normal  
 Pharmacy: [facility]/pharmacy #[id number] [place], [address] Ph #: [phone] Route: Oral  
  
Patient Instructions 1. Discontinue Aricept 2. Namzaric samples 3. Follow-up in 3 months Office Policies · Phone calls/patient messages: Please allow up to 24 hours for someone in the office to contact you about your call or message. Be mindful your provider may be out of the office or your message may require further review. We encourage you to use [employer] for your messages as this is a faster, more efficient way to communicate with our office · Medication Refills: 
Prescription medications require up to 48 business hours to process. We encourage you to use [employer] for your refills. For controlled medications: Please allow up to 72 business hours to process. Certain medications may require you to  a written prescription at our office. NO narcotic/controlled medications will be prescribed after 4pm Monday through Friday or on weekends · Form/Paperwork Completion: 
Please note there is a $25 fee for all paperwork completed by our providers. We ask that you allow 7-14 business days. Pre-payment is due prior to picking up/faxing the completed form. You may also download your forms to [employer] to have your doctor print off. Introducing [facility]! [employer] introduces [employer] patient portal. Now you can access parts of your medical record, email your doctor's office, and request medication refills online. 1. In your internet browser, go to [email or web address] 2. Click on the First Time User? Click Here link in the Sign In box. You will see the New Member Sign Up page. 3. Enter your [employer] Access Code exactly as it appears below. You will not need to use this code after youve completed the sign-up process. If you do not sign up before the expiration date, you must request a new code. · [employer] Access Code: [email or web address] Expires: 12/6/2018 10:44 AM 
 
4. Enter the last four digits of your Social Security Number (xxxx) and Date of Birth (mm/dd/yyyy) as indicated and click Submit. You will be taken to the next sign-up page. 5. Create a [employer] ID.  This will be your [employer] login ID and cannot be changed, so think of one that is secure and easy to remember. 6. Create a [employer] password. You can change your password at any time. 7. Enter your Password Reset Question and Answer. This can be used at a later time if you forget your password. 8. Enter your e-mail address. You will receive e-mail notification when new information is available in [address] 9. Click Sign Up. You can now view and download portions of your medical record. 10. Click the Download Summary menu link to download a portable copy of your medical information. If you have questions, please visit the Frequently Asked Questions section of the [employer] website. Remember, [employer] is NOT to be used for urgent needs. For medical emergencies, dial 911. Now available from your iPhone and Android! Please provide this summary of care documentation to your next provider. Your primary care clinician is listed as Tino Ross. If you have any questions after today's visit, please call [phone].

## 2018-09-07 NOTE — LETTER
9/7/2018 12:05 PM 
 
Patient:    Kinga Maloney YOB: 1940 Date of Visit:    9/7/2018 Dear Yovanny Amador MD 
 
Thank you for referring Mr. Tomy Tejeda to me for evaluation/treatment. Below are the relevant portions of my assessment and plan of care. Neurology follow-up note Chief Complaint Patient presents with  
[facility] Follow Up  
  memory loss SUBJECTIVE Kinga Maloney is a 69 y.o. male who presented to the neurology office for management of memory problem. I had last seen the patient in the hospital in April 2018 and he was having significant memory problem at that time. His Mini-Mental Status Examination at that time was 13 out of 30 and it remains the same despite his blood sugars being better. He is also on Aricept 10 mg daily and the wife has not noticed any improvement. Current Outpatient Prescriptions Medication Sig  
 memantine-donepezil (NAMZARIC) 28-10 mg CSpX Take 1 Cap by mouth daily.  amLODIPine (NORVASC) 10 mg tablet Take 1 Tab by mouth daily. Indications: hypertension  linagliptin (TRADJENTA) 5 mg tablet Take 1 Tab by mouth Daily (before breakfast). Indications: type 2 diabetes mellitus  melatonin 3 mg tablet Take 2 Tabs by mouth nightly.  megestrol (MEGACE) 20 mg tablet Take 20 mg by mouth two (2) times a day.  metoprolol succinate (TOPROL-XL) 100 mg tablet Take 100 mg by mouth daily.  imipramine (TOFRANIL) 10 mg tablet Take 10 mg by mouth three (3) times daily.  isosorbide dinitrate (ISORDIL) 30 mg tablet Take 30 mg by mouth daily.  aspirin 81 mg tablet Take 81 mg by mouth daily.  atorvastatin (LIPITOR) 10 mg tablet Take 10 mg by mouth daily.  omega-3 fatty acids-vitamin e (FISH OIL) 1,000 mg Cap Take 1 Cap by mouth daily.  QUEtiapine (SEROQUEL) 50 mg tablet Take 1 Tab by mouth nightly.  Take Half a pill in the morning and a FULL Pill at Bedtime (Patient taking differently: Take 50 mg by mouth two (2) times a day. Take Half a pill in the morning and a FULL Pill at Bedtime) No current facility-administered medications for this visit. Past Medical History:  
Diagnosis Date  CAD (coronary artery disease)  Cancer [facility])   
 prostate  Diabetes ([facility] [address]  GERD (gastroesophageal reflux disease)  Hypertension  Other ill-defined conditions(799.89)   
 elevated cholesterol Past Surgical History:  
Procedure Laterality Date  CARDIAC SURG PROCEDURE UNLIST    
 cabg x5 vessels,cardiologist  and david at [facility] doctors  COLONOSCOPY N/A 8/31/2016 COLONOSCOPY performed by Neptali Crandall MD at [facility] ENDOSCOPY  COLORECTAL SCRN; HI RISK IND  8/31/2016  HX PROSTATECTOMY  NE COLONOSCOPY FLX DX W/COLLJ SPEC WHEN PFRMD  4/13/2011 No family history on file. Social History Substance Use Topics  Smoking status: Former Smoker Packs/day: 0.50 Years: 20.00 Quit date: 4/12/1982  Smokeless tobacco: Never Used  Alcohol use No  
 
 
REVIEW OF SYSTEMS:  
A ten system review of constitutional, cardiovascular, respiratory, musculoskeletal, endocrine, skin, SHEENT, genitourinary, psychiatric and neurologic systems was obtained and is unremarkable except memory loss EXAMINATION:  
Visit Vitals  /72  Pulse 93  
 Ht 5' 8\" (1.727 m)  Wt 195 lb (88.5 kg)  SpO2 98%  BMI 29.65 kg/m2 General:  
General appearance: Pt is in no acute distress Distal pulses are preserved Neurological Examination:  
Mental Status: AAO x2. Speech is fluent. Follows commands, has abnormal fund of knowledge, attention, short term recall, comprehension and insight. Cranial Nerves: Visual fields are full. PERRL, Extraocular movements are full. Facial sensation intact. Facial movement intact. Hearing intact to conversation. Palate elevates symmetrically. Shoulder shrug symmetric. Tongue midline. Motor: Strength is 5/5 in all 4 ext. Sensation: Normal to light touch Coordination/Cerebellar: Intact to finger-nose-finger Skin: No significant bruising or lacerations. Laboratory review:  
Results for orders placed or performed during the hospital encounter of 04/27/18 CBC WITH AUTOMATED DIFF Result Value Ref Range WBC 4.5 4.1 - 11.1 K/uL  
 RBC 4.67 4.10 - 5.70 M/uL  
 HGB 14.1 12.1 - 17.0 g/dL HCT 40.0 36.6 - 50.3 % MCV 85.7 80.0 - 99.0 FL  
 MCH 30.2 26.0 - 34.0 PG  
 MCHC 35.3 30.0 - 36.5 g/dL  
 RDW 12.7 11.5 - 14.5 % PLATELET 267 060 - 011 K/uL MPV 9.8 8.9 - 12.9 FL  
 NRBC 0.0 0  WBC ABSOLUTE NRBC 0.00 0.00 - 0.01 K/uL NEUTROPHILS 69 32 - 75 % LYMPHOCYTES 22 12 - 49 % MONOCYTES 6 5 - 13 % EOSINOPHILS 2 0 - 7 % BASOPHILS 1 0 - 1 % IMMATURE GRANULOCYTES 0 0.0 - 0.5 % ABS. NEUTROPHILS 3.1 1.8 - 8.0 K/UL  
 ABS. LYMPHOCYTES 1.0 0.8 - 3.5 K/UL  
 ABS. MONOCYTES 0.3 0.0 - 1.0 K/UL  
 ABS. EOSINOPHILS 0.1 0.0 - 0.4 K/UL  
 ABS. BASOPHILS 0.0 0.0 - 0.1 K/UL  
 ABS. IMM. GRANS. 0.0 0.00 - 0.04 K/UL  
 DF AUTOMATED METABOLIC PANEL, COMPREHENSIVE Result Value Ref Range Sodium 126 (L) 136 - 145 mmol/L Potassium 4.3 3.5 - 5.1 mmol/L Chloride 90 (L) 97 - 108 mmol/L  
 CO2 27 21 - 32 mmol/L Anion gap 9 5 - 15 mmol/L Glucose 655 (HH) 65 - 100 mg/dL BUN 38 (H) 6 - 20 MG/DL Creatinine 2.27 (H) 0.70 - 1.30 MG/DL  
 BUN/Creatinine ratio 17 12 - 20 GFR est AA 34 (L) >60 ml/min/1.73m2 GFR est non-AA 28 (L) >60 ml/min/1.73m2 Calcium 9.8 8.5 - 10.1 MG/DL Bilirubin, total 0.7 0.2 - 1.0 MG/DL  
 ALT (SGPT) 31 12 - 78 U/L  
 AST (SGOT) 11 (L) 15 - 37 U/L Alk. phosphatase 92 45 - 117 U/L Protein, total 8.6 (H) 6.4 - 8.2 g/dL Albumin 4.3 3.5 - 5.0 g/dL Globulin 4.3 (H) 2.0 - 4.0 g/dL A-G Ratio 1.0 (L) 1.1 - 2.2 URINALYSIS W/ REFLEX CULTURE Result Value Ref Range Color YELLOW/STRAW Appearance CLEAR CLEAR Specific gravity 1.029 1.003 - 1.030    
 pH (UA) 5.0 5.0 - 8.0 Protein TRACE (A) NEG mg/dL Glucose >1000 (A) NEG mg/dL Ketone NEGATIVE  NEG mg/dL Bilirubin NEGATIVE  NEG Blood TRACE (A) NEG Urobilinogen 0.2 0.2 - 1.0 EU/dL Nitrites NEGATIVE  NEG Leukocyte Esterase NEGATIVE  NEG    
 WBC 0-4 0 - 4 /hpf  
 RBC 0-5 0 - 5 /hpf Epithelial cells FEW FEW /lpf Bacteria NEGATIVE  NEG /hpf  
 UA:UC IF INDICATED CULTURE NOT INDICATED BY UA RESULT CNI Hyaline cast 0-2 0 - 5 /lpf HEMOGLOBIN A1C WITH EAG Result Value Ref Range Hemoglobin A1c 12.7 (H) 4.2 - 6.3 % Est. average glucose 318 mg/dL PHOSPHORUS Result Value Ref Range Phosphorus 4.2 2.6 - 4.7 MG/DL  
GLUCOSTABILIZER Result Value Ref Range Glucose 474 mg/dL Insulin order 8.3 units/hour Insulin adminstered 8.3 units/hour Multiplier 0.020 Low target 200 mg/dL High target 300 mg/dL D50 order 0.0 ml  
 D50 administered 0.00 ml Minutes until next BG 60 min Order initials jea Administered initials jea GLSCOM Comments [facility] Cancer Result Value Ref Range Glucose 356 mg/dL Insulin order 5.9 units/hour Insulin adminstered 5.9 units/hour Multiplier 0.020 Low target 200 mg/dL High target 300 mg/dL D50 order 0.0 ml  
 D50 administered 0.00 ml Minutes until next BG 60 min Order initials jea Administered initials jea GLSCOM Comments [facility] Cancer Result Value Ref Range Carbohydrate eaten 45 g Insulin bolus ordered 3.0 units/hour Insulin bolus administered 3.0 units/hour Carb ratio 15 Minutes until next BG 60 min Order initials jea Administered initials jea GLSCOM Comments [facility] Cancer Result Value Ref Range Glucose 383 mg/dL Insulin order 5.9 units/hour Insulin adminstered 5.9 units/hour Multiplier 0.020 Low target 200 mg/dL High target 300 mg/dL D50 order 0.0 ml  
 D50 administered 0.00 ml Minutes until next BG 60 min Order initials RKJ Administered initials BE   
 GLSCOM Comments METABOLIC PANEL, BASIC Result Value Ref Range Sodium 136 136 - 145 mmol/L Potassium 3.4 (L) 3.5 - 5.1 mmol/L Chloride 103 97 - 108 mmol/L  
 CO2 24 21 - 32 mmol/L Anion gap 9 5 - 15 mmol/L Glucose 311 (H) 65 - 100 mg/dL BUN 30 (H) 6 - 20 MG/DL Creatinine 1.62 (H) 0.70 - 1.30 MG/DL  
 BUN/Creatinine ratio 19 12 - 20 GFR est AA 50 (L) >60 ml/min/1.73m2 GFR est non-AA 42 (L) >60 ml/min/1.73m2 Calcium 8.3 (L) 8.5 - 10.1 MG/DL MAGNESIUM Result Value Ref Range Magnesium 2.2 1.6 - 2.4 mg/dL [facility] Cancer Result Value Ref Range Glucose 309 mg/dL Insulin order 7.5 units/hour Insulin adminstered 7.5 units/hour Multiplier 0.030 Low target 200 mg/dL High target 300 mg/dL D50 order 0.0 ml  
 D50 administered 0.00 ml Minutes until next BG 60 min Order initials sbh Administered initials sb GLSCOM Comments [facility] Cancer Result Value Ref Range Glucose 274 mg/dL Insulin order 6.4 units/hour Insulin adminstered 6.4 units/hour Multiplier 0.030 Low target 200 mg/dL High target 300 mg/dL D50 order 0.0 ml  
 D50 administered 0.00 ml Minutes until next BG 60 min Order initials sbh Administered initials sbh GLSCOM Comments [facility] Cancer Result Value Ref Range Glucose 221 mg/dL Insulin order 4.8 units/hour Insulin adminstered 4.8 units/hour Multiplier 0.030 Low target 200 mg/dL High target 300 mg/dL D50 order 0.0 ml  
 D50 administered 0.00 ml Minutes until next BG 60 min Order initials sbh Administered initials sbh GLSCOM Comments METABOLIC PANEL, BASIC Result Value Ref Range Sodium 137 136 - 145 mmol/L Potassium 3.2 (L) 3.5 - 5.1 mmol/L  Chloride 106 97 - 108 mmol/L  
 CO2 24 21 - 32 mmol/L  
 Anion gap 7 5 - 15 mmol/L Glucose 171 (H) 65 - 100 mg/dL BUN 25 (H) 6 - 20 MG/DL Creatinine 1.31 (H) 0.70 - 1.30 MG/DL  
 BUN/Creatinine ratio 19 12 - 20 GFR est AA >60 >60 ml/min/1.73m2 GFR est non-AA 53 (L) >60 ml/min/1.73m2 Calcium 8.6 8.5 - 10.1 MG/DL MAGNESIUM Result Value Ref Range Magnesium 2.1 1.6 - 2.4 mg/dL [place] Result Value Ref Range Glucose 208 mg/dL Insulin order 4.4 units/hour Insulin adminstered 4.4 units/hour Multiplier 0.030 Low target 200 mg/dL High target 300 mg/dL D50 order 0.0 ml  
 D50 administered 0.00 ml Minutes until next BG 60 min Order initials [facility] Administered initials [facility] GLSCOM Comments [place] Result Value Ref Range Glucose 170 mg/dL Insulin order 2.2 units/hour Insulin adminstered 2.2 units/hour Multiplier 0.020 Low target 200 mg/dL High target 300 mg/dL D50 order 0.0 ml  
 D50 administered 0.00 ml Minutes until next BG 60 min Order initials    
 Administered initials    
 GLSCOM Kell Ortiz Result Value Ref Range Glucose 186 mg/dL Insulin order 1.3 units/hour Insulin adminstered 1.3 units/hour Multiplier 0.010 Low target 200 mg/dL High target 300 mg/dL D50 order 0.0 ml  
 D50 administered 0.00 ml Minutes until next BG 60 min Order initials    
 Administered initials    
 [facility] [place] Result Value Ref Range Glucose 174 mg/dL Insulin order 0.0 units/hour Insulin adminstered 0.0 units/hour Multiplier 0.000 Low target 200 mg/dL High target 300 mg/dL D50 order 0.0 ml  
 D50 administered 0.00 ml Minutes until next BG 60 min Order initials    
 Administered initials [facility] [place] Result Value Ref Range Glucose 251 mg/dL Insulin order 0.1 units/hour Insulin adminstered 0.1 units/hour Multiplier 0.000 Low target 200 mg/dL High target 300 mg/dL D50 order 0.0 ml  
 D50 administered 0.00 ml Minutes until next BG 60 min Order initials    
 Administered initials    
 GLSCOM Comments [facility] Daisy Result Value Ref Range Glucose 272 mg/dL Insulin order 0.1 units/hour Insulin adminstered 0.1 units/hour Multiplier 0.000 Low target 200 mg/dL High target 300 mg/dL D50 order 0.0 ml  
 D50 administered 0.00 ml Minutes until next BG 60 min Order initials    
 Administered initials    
 GLSCOM Comments METABOLIC PANEL, BASIC Result Value Ref Range Sodium 134 (L) 136 - 145 mmol/L Potassium 3.8 3.5 - 5.1 mmol/L Chloride 102 97 - 108 mmol/L  
 CO2 23 21 - 32 mmol/L Anion gap 9 5 - 15 mmol/L Glucose 272 (H) 65 - 100 mg/dL BUN 22 (H) 6 - 20 MG/DL Creatinine 1.37 (H) 0.70 - 1.30 MG/DL  
 BUN/Creatinine ratio 16 12 - 20 GFR est AA >60 >60 ml/min/1.73m2 GFR est non-AA 51 (L) >60 ml/min/1.73m2 Calcium 8.8 8.5 - 10.1 MG/DL MAGNESIUM Result Value Ref Range Magnesium 1.9 1.6 - 2.4 mg/dL Deronda Daisy Result Value Ref Range Glucose 315 mg/dL Insulin order 2.6 units/hour Insulin adminstered 2.6 units/hour Multiplier 0.010 Low target 200 mg/dL High target 300 mg/dL D50 order 0.0 ml  
 D50 administered 0.00 ml Minutes until next BG 60 min Order initials    
 Administered initials    
 GLSCOM Comments Deronda Daisy Result Value Ref Range Glucose 279 mg/dL Insulin order 2.2 units/hour Insulin adminstered 2.2 units/hour Multiplier 0.010 Low target 200 mg/dL High target 300 mg/dL D50 order 0.0 ml  
 D50 administered 0.00 ml Minutes until next BG 60 min Order initials LF Administered initials LF   
 GLSCOM Comments Deronda Daisy Result Value Ref Range Glucose 250 mg/dL Insulin order 1.9 units/hour Insulin adminstered 1.9 units/hour  Multiplier 0.010   
 Low target 200 mg/dL High target 300 mg/dL D50 order 0.0 ml  
 D50 administered 0.00 ml Minutes until next BG 60 min Order initials LF Administered initials LF   
 GLSCOM Comments [facility] Kev Result Value Ref Range Glucose DELETED mg/dL Insulin order DELETED units/hour Insulin adminstered DELETED units/hour Multiplier DELETED Low target DELETED mg/dL High target DELETED mg/dL D50 order DELETED ml  
 D50 administered DELETED ml  
 Minutes until next BG DELETED min Order initials DELETED Administered initials DELETED   
 GLSCOM Comments DELETED   
GLUCOSTABILIZER Result Value Ref Range Glucose 455 mg/dL Insulin order 7.9 units/hour Insulin adminstered 7.9 units/hour Multiplier 0.020 Low target 200 mg/dL High target 300 mg/dL D50 order 0.0 ml  
 D50 administered 0.00 ml Minutes until next BG 60 min Order initials LF Administered initials LF   
 GLSCOM Comments Rechecked BG   
GLUCOSTABILIZER Result Value Ref Range Glucose 365 mg/dL Insulin order 9.2 units/hour Insulin adminstered 9.2 units/hour Multiplier 0.030 Low target 200 mg/dL High target 300 mg/dL D50 order 0.0 ml  
 D50 administered 0.00 ml Minutes until next BG 60 min Order initials LF Administered initials LF   
 GLSCOM Comments Chun Kev Result Value Ref Range Glucose 376 mg/dL Insulin order 12.6 units/hour Insulin adminstered 12.6 units/hour Multiplier 0.040 Low target 200 mg/dL High target 300 mg/dL D50 order 0.0 ml  
 D50 administered 0.00 ml Minutes until next BG 60 min Order initials LF Administered initials LF   
 GLSCOM Comments AMMONIA Result Value Ref Range Ammonia 16 <41 UMOL/L  
METABOLIC PANEL, BASIC Result Value Ref Range Sodium 137 136 - 145 mmol/L Potassium 3.3 (L) 3.5 - 5.1 mmol/L  Chloride 103 97 - 108 mmol/L  
 CO2 25 21 - 32 mmol/L  
 Anion gap 9 5 - 15 mmol/L Glucose 192 (H) 65 - 100 mg/dL BUN 20 6 - 20 MG/DL Creatinine 1.30 0.70 - 1.30 MG/DL  
 BUN/Creatinine ratio 15 12 - 20 GFR est AA >60 >60 ml/min/1.73m2 GFR est non-AA 54 (L) >60 ml/min/1.73m2 Calcium 9.3 8.5 - 10.1 MG/DL  
CBC WITH AUTOMATED DIFF Result Value Ref Range WBC 4.3 4.1 - 11.1 K/uL  
 RBC 4.62 4.10 - 5.70 M/uL  
 HGB 13.7 12.1 - 17.0 g/dL HCT 39.8 36.6 - 50.3 % MCV 86.1 80.0 - 99.0 FL  
 MCH 29.7 26.0 - 34.0 PG  
 MCHC 34.4 30.0 - 36.5 g/dL  
 RDW 13.1 11.5 - 14.5 % PLATELET 791 468 - 367 K/uL MPV 9.9 8.9 - 12.9 FL  
 NRBC 0.0 0  WBC ABSOLUTE NRBC 0.00 0.00 - 0.01 K/uL NEUTROPHILS 57 32 - 75 % LYMPHOCYTES 32 12 - 49 % MONOCYTES 8 5 - 13 % EOSINOPHILS 2 0 - 7 % BASOPHILS 1 0 - 1 % IMMATURE GRANULOCYTES 1 (H) 0.0 - 0.5 % ABS. NEUTROPHILS 2.5 1.8 - 8.0 K/UL  
 ABS. LYMPHOCYTES 1.4 0.8 - 3.5 K/UL  
 ABS. MONOCYTES 0.3 0.0 - 1.0 K/UL  
 ABS. EOSINOPHILS 0.1 0.0 - 0.4 K/UL  
 ABS. BASOPHILS 0.0 0.0 - 0.1 K/UL  
 ABS. IMM. GRANS. 0.0 0.00 - 0.04 K/UL  
 DF AUTOMATED MAGNESIUM Result Value Ref Range Magnesium 2.0 1.6 - 2.4 mg/dL VITAMIN B12 Result Value Ref Range Vitamin B12 909 193 - 986 pg/mL TSH 3RD GENERATION Result Value Ref Range TSH 3.13 0.36 - 3.74 uIU/mL MAGNESIUM Result Value Ref Range Magnesium 2.1 1.6 - 2.4 mg/dL RPR Result Value Ref Range RPR NONREACTIVE NR    
CBC WITH AUTOMATED DIFF Result Value Ref Range WBC 4.4 4.1 - 11.1 K/uL  
 RBC 4.50 4. 10 - 5.70 M/uL  
 HGB 13.2 12.1 - 17.0 g/dL HCT 39.4 36.6 - 50.3 % MCV 87.6 80.0 - 99.0 FL  
 MCH 29.3 26.0 - 34.0 PG  
 MCHC 33.5 30.0 - 36.5 g/dL  
 RDW 13.3 11.5 - 14.5 % PLATELET 493 054 - 037 K/uL MPV 10.0 8.9 - 12.9 FL  
 NRBC 0.0 0  WBC ABSOLUTE NRBC 0.00 0.00 - 0.01 K/uL NEUTROPHILS 59 32 - 75 % LYMPHOCYTES 30 12 - 49 % MONOCYTES 8 5 - 13 % EOSINOPHILS 3 0 - 7 % BASOPHILS 1 0 - 1 % IMMATURE GRANULOCYTES 0 0.0 - 0.5 % ABS. NEUTROPHILS 2.6 1.8 - 8.0 K/UL  
 ABS. LYMPHOCYTES 1.3 0.8 - 3.5 K/UL  
 ABS. MONOCYTES 0.3 0.0 - 1.0 K/UL  
 ABS. EOSINOPHILS 0.1 0.0 - 0.4 K/UL  
 ABS. BASOPHILS 0.0 0.0 - 0.1 K/UL  
 ABS. IMM. GRANS. 0.0 0.00 - 0.04 K/UL  
 DF AUTOMATED METABOLIC PANEL, BASIC Result Value Ref Range Sodium 139 136 - 145 mmol/L Potassium 3.8 3.5 - 5.1 mmol/L Chloride 103 97 - 108 mmol/L  
 CO2 25 21 - 32 mmol/L Anion gap 11 5 - 15 mmol/L Glucose 211 (H) 65 - 100 mg/dL BUN 24 (H) 6 - 20 MG/DL Creatinine 1.53 (H) 0.70 - 1.30 MG/DL  
 BUN/Creatinine ratio 16 12 - 20 GFR est AA 54 (L) >60 ml/min/1.73m2 GFR est non-AA 44 (L) >60 ml/min/1.73m2 Calcium 9.5 8.5 - 10.1 MG/DL MAGNESIUM Result Value Ref Range Magnesium 2.1 1.6 - 2.4 mg/dL RPR Result Value Ref Range RPR NONREACTIVE NR    
MAGNESIUM Result Value Ref Range Magnesium 2.1 1.6 - 2.4 mg/dL CBC WITH AUTOMATED DIFF Result Value Ref Range WBC 5.4 4.1 - 11.1 K/uL  
 RBC 4.34 4.10 - 5.70 M/uL  
 HGB 12.9 12.1 - 17.0 g/dL HCT 38.7 36.6 - 50.3 % MCV 89.2 80.0 - 99.0 FL  
 MCH 29.7 26.0 - 34.0 PG  
 MCHC 33.3 30.0 - 36.5 g/dL  
 RDW 13.3 11.5 - 14.5 % PLATELET 141 017 - 241 K/uL MPV 9.4 8.9 - 12.9 FL  
 NRBC 0.0 0  WBC ABSOLUTE NRBC 0.00 0.00 - 0.01 K/uL NEUTROPHILS 76 (H) 32 - 75 % LYMPHOCYTES 15 12 - 49 % MONOCYTES 6 5 - 13 % EOSINOPHILS 2 0 - 7 % BASOPHILS 0 0 - 1 % IMMATURE GRANULOCYTES 0 0.0 - 0.5 % ABS. NEUTROPHILS 4.1 1.8 - 8.0 K/UL  
 ABS. LYMPHOCYTES 0.8 0.8 - 3.5 K/UL  
 ABS. MONOCYTES 0.3 0.0 - 1.0 K/UL  
 ABS. EOSINOPHILS 0.1 0.0 - 0.4 K/UL  
 ABS. BASOPHILS 0.0 0.0 - 0.1 K/UL  
 ABS. IMM. GRANS. 0.0 0.00 - 0.04 K/UL  
 DF AUTOMATED METABOLIC PANEL, BASIC Result Value Ref Range Sodium 137 136 - 145 mmol/L Potassium 4.2 3.5 - 5.1 mmol/L  Chloride 103 97 - 108 mmol/L  
 CO2 26 21 - 32 mmol/L  
 Anion gap 8 5 - 15 mmol/L Glucose 156 (H) 65 - 100 mg/dL BUN 30 (H) 6 - 20 MG/DL Creatinine 1.77 (H) 0.70 - 1.30 MG/DL  
 BUN/Creatinine ratio 17 12 - 20 GFR est AA 46 (L) >60 ml/min/1.73m2 GFR est non-AA 38 (L) >60 ml/min/1.73m2 Calcium 9.4 8.5 - 10.1 MG/DL  
PHOSPHORUS Result Value Ref Range Phosphorus 4.0 2.6 - 4.7 MG/DL MAGNESIUM Result Value Ref Range Magnesium 2.0 1.6 - 2.4 mg/dL METABOLIC PANEL, BASIC Result Value Ref Range Sodium 137 136 - 145 mmol/L Potassium 3.8 3.5 - 5.1 mmol/L Chloride 103 97 - 108 mmol/L  
 CO2 27 21 - 32 mmol/L Anion gap 7 5 - 15 mmol/L Glucose 167 (H) 65 - 100 mg/dL BUN 23 (H) 6 - 20 MG/DL Creatinine 1.48 (H) 0.70 - 1.30 MG/DL  
 BUN/Creatinine ratio 16 12 - 20 GFR est AA 56 (L) >60 ml/min/1.73m2 GFR est non-AA 46 (L) >60 ml/min/1.73m2 Calcium 9.4 8.5 - 10.1 MG/DL  
CBC WITH AUTOMATED DIFF Result Value Ref Range WBC 4.7 4.1 - 11.1 K/uL  
 RBC 4.50 4. 10 - 5.70 M/uL  
 HGB 13.1 12.1 - 17.0 g/dL HCT 39.7 36.6 - 50.3 % MCV 88.2 80.0 - 99.0 FL  
 MCH 29.1 26.0 - 34.0 PG  
 MCHC 33.0 30.0 - 36.5 g/dL  
 RDW 13.3 11.5 - 14.5 % PLATELET 099 280 - 070 K/uL MPV 9.6 8.9 - 12.9 FL  
 NRBC 0.0 0  WBC ABSOLUTE NRBC 0.00 0.00 - 0.01 K/uL NEUTROPHILS 63 32 - 75 % LYMPHOCYTES 26 12 - 49 % MONOCYTES 8 5 - 13 % EOSINOPHILS 3 0 - 7 % BASOPHILS 1 0 - 1 % IMMATURE GRANULOCYTES 0 0.0 - 0.5 % ABS. NEUTROPHILS 2.9 1.8 - 8.0 K/UL  
 ABS. LYMPHOCYTES 1.2 0.8 - 3.5 K/UL  
 ABS. MONOCYTES 0.4 0.0 - 1.0 K/UL  
 ABS. EOSINOPHILS 0.2 0.0 - 0.4 K/UL  
 ABS. BASOPHILS 0.0 0.0 - 0.1 K/UL  
 ABS. IMM. GRANS. 0.0 0.00 - 0.04 K/UL  
 DF AUTOMATED MAGNESIUM Result Value Ref Range Magnesium 2.1 1.6 - 2.4 mg/dL METABOLIC PANEL, BASIC Result Value Ref Range Sodium 135 (L) 136 - 145 mmol/L Potassium 3.8 3.5 - 5.1 mmol/L  Chloride 102 97 - 108 mmol/L  
 CO2 27 21 - 32 mmol/L  
 Anion gap 6 5 - 15 mmol/L Glucose 212 (H) 65 - 100 mg/dL BUN 25 (H) 6 - 20 MG/DL Creatinine 1.52 (H) 0.70 - 1.30 MG/DL  
 BUN/Creatinine ratio 16 12 - 20 GFR est AA 54 (L) >60 ml/min/1.73m2 GFR est non-AA 45 (L) >60 ml/min/1.73m2 Calcium 9.2 8.5 - 10.1 MG/DL MAGNESIUM Result Value Ref Range Magnesium 2.2 1.6 - 2.4 mg/dL METABOLIC PANEL, BASIC Result Value Ref Range Sodium 136 136 - 145 mmol/L Potassium 4.1 3.5 - 5.1 mmol/L Chloride 102 97 - 108 mmol/L  
 CO2 27 21 - 32 mmol/L Anion gap 7 5 - 15 mmol/L Glucose 168 (H) 65 - 100 mg/dL BUN 21 (H) 6 - 20 MG/DL Creatinine 1.48 (H) 0.70 - 1.30 MG/DL  
 BUN/Creatinine ratio 14 12 - 20 GFR est AA 56 (L) >60 ml/min/1.73m2 GFR est non-AA 46 (L) >60 ml/min/1.73m2 Calcium 9.7 8.5 - 10.1 MG/DL MAGNESIUM Result Value Ref Range Magnesium 2.2 1.6 - 2.4 mg/dL METABOLIC PANEL, BASIC Result Value Ref Range Sodium 135 (L) 136 - 145 mmol/L Potassium 4.8 3.5 - 5.1 mmol/L Chloride 100 97 - 108 mmol/L  
 CO2 27 21 - 32 mmol/L Anion gap 8 5 - 15 mmol/L Glucose 248 (H) 65 - 100 mg/dL BUN 31 (H) 6 - 20 MG/DL Creatinine 1.85 (H) 0.70 - 1.30 MG/DL  
 BUN/Creatinine ratio 17 12 - 20 GFR est AA 43 (L) >60 ml/min/1.73m2 GFR est non-AA 36 (L) >60 ml/min/1.73m2 Calcium 9.9 8.5 - 10.1 MG/DL MAGNESIUM Result Value Ref Range Magnesium 2.3 1.6 - 2.4 mg/dL GLUCOSE, POC Result Value Ref Range Glucose (POC) >600 (HH) 65 - 100 mg/dL Performed by Zainab Escobar GLUCOSE, POC Result Value Ref Range Glucose (POC) 517 (H) 65 - 100 mg/dL Performed by [employer] GLUCOSE, POC Result Value Ref Range Glucose (POC) 474 (H) 65 - 100 mg/dL Performed by [employer] GLUCOSE, POC Result Value Ref Range Glucose (POC) 356 (H) 65 - 100 mg/dL Performed by Ducle [place] \"[place]\" GLUCOSE, POC Result Value Ref Range Glucose (POC) 383 (H) 65 - 100 mg/dL Performed by [employer] GLUCOSE, POC Result Value Ref Range Glucose (POC) 309 (H) 65 - 100 mg/dL Performed by [employer] Can GLUCOSE, POC Result Value Ref Range Glucose (POC) 274 (H) 65 - 100 mg/dL Performed by [employer] GLUCOSE, POC Result Value Ref Range Glucose (POC) 221 (H) 65 - 100 mg/dL Performed by [employer] GLUCOSE, POC Result Value Ref Range Glucose (POC) 208 (H) 65 - 100 mg/dL Performed by [employer] GLUCOSE, POC Result Value Ref Range Glucose (POC) 170 (H) 65 - 100 mg/dL Performed by [employer] GLUCOSE, POC Result Value Ref Range Glucose (POC) 186 (H) 65 - 100 mg/dL Performed by [employer] GLUCOSE, POC Result Value Ref Range Glucose (POC) 174 (H) 65 - 100 mg/dL Performed by [employer] GLUCOSE, POC Result Value Ref Range Glucose (POC) 251 (H) 65 - 100 mg/dL Performed by [employer] GLUCOSE, POC Result Value Ref Range Glucose (POC) 272 (H) 65 - 100 mg/dL Performed by [employer] GLUCOSE, POC Result Value Ref Range Glucose (POC) 315 (H) 65 - 100 mg/dL Performed by [employer] GLUCOSE, POC Result Value Ref Range Glucose (POC) 279 (H) 65 - 100 mg/dL Performed by [employer] GLUCOSE, POC Result Value Ref Range Glucose (POC) 250 (H) 65 - 100 mg/dL Performed by Carmelia Brod GLUCOSE, POC Result Value Ref Range Glucose (POC) 485 (H) 65 - 100 mg/dL Performed by [employer] GLUCOSE, POC Result Value Ref Range Glucose (POC) 455 (H) 65 - 100 mg/dL Performed by [employer] GLUCOSE, POC Result Value Ref Range Glucose (POC) 360 (H) 65 - 100 mg/dL Performed by Carmelia Brod GLUCOSE, POC Result Value Ref Range Glucose (POC) 365 (H) 65 - 100 mg/dL Performed by [employer] GLUCOSE, POC Result Value Ref Range Glucose (POC) 376 (H) 65 - 100 mg/dL Performed by Carolynn Elias GLUCOSE, POC Result Value Ref Range Glucose (POC) 322 (H) 65 - 100 mg/dL Performed by Carolynn Hickmant GLUCOSE, POC Result Value Ref Range Glucose (POC) 352 (H) 65 - 100 mg/dL Performed by [employer] GLUCOSE, POC Result Value Ref Range Glucose (POC) 215 (H) 65 - 100 mg/dL Performed by [employer] GLUCOSE, POC Result Value Ref Range Glucose (POC) 334 (H) 65 - 100 mg/dL Performed by Jose Daniel [employer] GLUCOSE, POC Result Value Ref Range Glucose (POC) 293 (H) 65 - 100 mg/dL Performed by [employer] GLUCOSE, POC Result Value Ref Range Glucose (POC) 366 (H) 65 - 100 mg/dL Performed by Iván Yung GLUCOSE, POC Result Value Ref Range Glucose (POC) 155 (H) 65 - 100 mg/dL Performed by Andre Obregon GLUCOSE, POC Result Value Ref Range Glucose (POC) >600 (HH) 65 - 100 mg/dL Performed by Tina Bennett GLUCOSE, POC Result Value Ref Range Glucose (POC) 286 (H) 65 - 100 mg/dL Performed by Andre Obregon GLUCOSE, POC Result Value Ref Range Glucose (POC) 310 (H) 65 - 100 mg/dL Performed by TENA JOSIE(CON) GLUCOSE, POC Result Value Ref Range Glucose (POC) 327 (H) 65 - 100 mg/dL Performed by TENA JOSIE(CON) GLUCOSE, POC Result Value Ref Range Glucose (POC) 224 (H) 65 - 100 mg/dL Performed by Raquel Thrasher, POC Result Value Ref Range Glucose (POC) 182 (H) 65 - 100 mg/dL Performed by Andre Obregon GLUCOSE, POC Result Value Ref Range Glucose (POC) 432 (H) 65 - 100 mg/dL Performed by Vanessa Watkins GLUCOSE, POC Result Value Ref Range Glucose (POC) 408 (H) 65 - 100 mg/dL Performed by Carolynn Elias GLUCOSE, POC Result Value Ref Range Glucose (POC) 385 (H) 65 - 100 mg/dL Performed by Andre Obregon GLUCOSE, POC Result Value Ref Range Glucose (POC) 228 (H) 65 - 100 mg/dL Performed by [employer] GLUCOSE, POC Result Value Ref Range Glucose (POC) 151 (H) 65 - 100 mg/dL Performed by [employer] GLUCOSE, POC Result Value Ref Range Glucose (POC) 263 (H) 65 - 100 mg/dL Performed by Emile [place] GLUCOSE, POC Result Value Ref Range Glucose (POC) 129 (H) 65 - 100 mg/dL [facility] Result Value Ref Range Glucose (POC) 198 (H) 65 - 100 mg/dL Performed by Echo Chan GLUCOSE, POC Result Value Ref Range Glucose (POC) 103 (H) 65 - 100 mg/dL Performed by [facility] [place] GLUCOSE, POC Result Value Ref Range Glucose (POC) 235 (H) 65 - 100 mg/dL Performed by [facility] [place] GLUCOSE, POC Result Value Ref Range Glucose (POC) 287 (H) 65 - 100 mg/dL Performed by Luis Ornelas GLUCOSE, POC Result Value Ref Range Glucose (POC) 267 (H) 65 - 100 mg/dL Performed by [facility] GLUCOSE, POC Result Value Ref Range Glucose (POC) 138 (H) 65 - 100 mg/dL Performed by Jimmie Cosby GLUCOSE, POC Result Value Ref Range Glucose (POC) 248 (H) 65 - 100 mg/dL Performed by Jimmie Mo GLUCOSE, POC Result Value Ref Range Glucose (POC) 242 (H) 65 - 100 mg/dL Performed by Se Oliver (PCT) GLUCOSE, POC Result Value Ref Range Glucose (POC) 240 (H) 65 - 100 mg/dL Performed by Dennise Hollowayman GLUCOSE, POC Result Value Ref Range Glucose (POC) 134 (H) 65 - 100 mg/dL Performed by [facility] GLUCOSE, POC Result Value Ref Range Glucose (POC) 291 (H) 65 - 100 mg/dL Performed by [facility] GLUCOSE, POC Result Value Ref Range Glucose (POC) 337 (H) 65 - 100 mg/dL Performed by [facility] GLUCOSE, POC Result Value Ref Range Glucose (POC) 111 (H) 65 - 100 mg/dL Performed by Dennise [facility] GLUCOSE, POC Result Value Ref Range Glucose (POC) 176 (H) 65 - 100 mg/dL Performed by [facility] GLUCOSE, POC Result Value Ref Range Glucose (POC) 312 (H) 65 - 100 mg/dL Performed by Claudine Weir GLUCOSE, POC Result Value Ref Range Glucose (POC) 359 (H) 65 - 100 mg/dL Performed by Keshawn Harvey (PCT) GLUCOSE, POC Result Value Ref Range Glucose (POC) 283 (H) 65 - 100 mg/dL Performed by Albin Pradhan (PCT) GLUCOSE, POC Result Value Ref Range Glucose (POC) 241 (H) 65 - 100 mg/dL Performed by Albin Pradhan (PCT) Imaging review: MRI brain Normal 
 
Documentation review: 
None Assessment/Plan:  
Alma Rosa Avila is a 69 y.o. male who presented to the neurology office for management of memory problem. Mini-Mental Status Examination is 13 out of 30. He does have significant dementia. MRI of the brain has been normal and there is no other explanation for his memory loss. The patient is taking Aricept 10 mg daily. I do plan to discontinue that and I will be starting the patient on Namzaric.  4 weeks sample was provided to the patient and a prescription has also been sent to the pharmacy. Follow-up in 2-3 months PHQ over the last two weeks 9/7/2018 Little interest or pleasure in doing things Not at all Feeling down, depressed, irritable, or hopeless Not at all Total Score PHQ 2 0 Primary care to address possible depression if PHQ-9 score is more than 9. ICD-10-CM ICD-9-CM 1. Dementia without behavioral disturbance, unspecified dementia type F03.90 294.20 memantine-donepezil (NAMZARIC) 28-10 mg CSpX Iwona Wise MD 
Neurologist 
 
CC: Nicolle Mckeon MD 
Fax: [phone] This note was created using voice recognition software. Despite editing, there may be syntax errors. If you have questions, please do not hesitate to call me. I look forward to following Mr. Nita Caballero along with you. Sincerely, Iwona Wise MD

## 2018-09-07 NOTE — PATIENT INSTRUCTIONS
1.  Discontinue Aricept 2. Namzaric samples 3. Follow-up in 3 months Office Policies · Phone calls/patient messages: Please allow up to 24 hours for someone in the office to contact you about your call or message. Be mindful your provider may be out of the office or your message may require further review. We encourage you to use [employer] for your messages as this is a faster, more efficient way to communicate with our office · Medication Refills: 
Prescription medications require up to 48 business hours to process. We encourage you to use [employer] for your refills. For controlled medications: Please allow up to 72 business hours to process. Certain medications may require you to  a written prescription at our office. NO narcotic/controlled medications will be prescribed after 4pm Monday through Friday or on weekends · Form/Paperwork Completion: 
Please note there is a $25 fee for all paperwork completed by our providers. We ask that you allow 7-14 business days. Pre-payment is due prior to picking up/faxing the completed form. You may also download your forms to [employer] to have your doctor print off.

## 2018-11-29 ENCOUNTER — HOSPITAL ENCOUNTER (OUTPATIENT)
Dept: NUCLEAR MEDICINE | Age: 78
Discharge: HOME | End: 2018-11-29
Attending: UROLOGY
Payer: MEDICARE

## 2018-11-29 ENCOUNTER — HOSPITAL ENCOUNTER (OUTPATIENT)
Dept: MRI IMAGING | Age: 78
Discharge: HOME | End: 2018-11-29
Attending: UROLOGY
Payer: MEDICARE

## 2018-11-29 VITALS — WEIGHT: 198 LBS | BODY MASS INDEX: 30.11 KG/M2

## 2018-11-29 DIAGNOSIS — C61 PROSTATE CANCER (HCC): ICD-10-CM

## 2018-11-29 PROCEDURE — 74011250636 HC RX REV CODE- 250/636: Performed by: UROLOGY

## 2018-11-29 PROCEDURE — A9585 GADOBUTROL INJECTION: HCPCS | Performed by: UROLOGY

## 2018-11-29 PROCEDURE — 78306 BONE IMAGING WHOLE BODY: CPT

## 2018-11-29 PROCEDURE — 72197 MRI PELVIS W/O & W/DYE: CPT

## 2018-11-29 PROCEDURE — 74011000258 HC RX REV CODE- 258: Performed by: UROLOGY

## 2018-11-29 RX ADMIN — SODIUM CHLORIDE 100 ML: 900 INJECTION, SOLUTION INTRAVENOUS at 12:00

## 2018-11-29 RX ADMIN — GADOBUTROL 9 ML: 604.72 INJECTION INTRAVENOUS at 12:00

## 2018-11-30 ENCOUNTER — HOSPITAL ENCOUNTER (OUTPATIENT)
Dept: MRI IMAGING | Age: 78
Discharge: HOME | End: 2018-11-30
Attending: UROLOGY
Payer: MEDICARE

## 2018-11-30 DIAGNOSIS — C61 PROSTATE CANCER (HCC): ICD-10-CM

## 2018-11-30 PROCEDURE — A9585 GADOBUTROL INJECTION: HCPCS | Performed by: UROLOGY

## 2018-11-30 PROCEDURE — 74183 MRI ABD W/O CNTR FLWD CNTR: CPT

## 2018-11-30 PROCEDURE — 74011250636 HC RX REV CODE- 250/636: Performed by: UROLOGY

## 2018-11-30 PROCEDURE — 74011000258 HC RX REV CODE- 258: Performed by: UROLOGY

## 2018-11-30 RX ADMIN — GADOBUTROL 9.5 ML: 604.72 INJECTION INTRAVENOUS at 11:00

## 2018-11-30 RX ADMIN — SODIUM CHLORIDE 100 ML: 900 INJECTION, SOLUTION INTRAVENOUS at 11:00

## 2018-12-04 ENCOUNTER — OFFICE VISIT (OUTPATIENT)
Dept: NEUROLOGY | Age: 78
End: 2018-12-04

## 2018-12-04 VITALS
OXYGEN SATURATION: 98 % | SYSTOLIC BLOOD PRESSURE: 138 MMHG | DIASTOLIC BLOOD PRESSURE: 80 MMHG | WEIGHT: 205 LBS | BODY MASS INDEX: 31.07 KG/M2 | HEART RATE: 103 BPM | HEIGHT: 68 IN

## 2018-12-04 DIAGNOSIS — F03.90 DEMENTIA WITHOUT BEHAVIORAL DISTURBANCE, UNSPECIFIED DEMENTIA TYPE: ICD-10-CM

## 2018-12-04 NOTE — PROGRESS NOTES
Neurology follow-up note    Chief Complaint   Patient presents with    Follow-up     memory       [address] [place] [address] Rian Muniz is a 69 y.o. male who presented to the neurology office for management of memory problem. He does have dementia and during the last visit he was started on Namzaric 28/10 mg daily. There has been no decline in his memory since the last visit. Current Outpatient Medications   Medication Sig    memantine-donepezil (NAMZARIC) 28-10 mg CSpX Take 1 Cap by mouth daily.  amLODIPine (NORVASC) 10 mg tablet Take 1 Tab by mouth daily. Indications: hypertension    linagliptin (TRADJENTA) 5 mg tablet Take 1 Tab by mouth Daily (before breakfast). Indications: type 2 diabetes mellitus    melatonin 3 mg tablet Take 2 Tabs by mouth nightly.  QUEtiapine (SEROQUEL) 50 mg tablet Take 1 Tab by mouth nightly. Take Half a pill in the morning and a FULL Pill at Bedtime (Patient taking differently: Take 50 mg by mouth two (2) times a day. Take Half a pill in the morning and a FULL Pill at Bedtime)    metoprolol succinate (TOPROL-XL) 100 mg tablet Take 100 mg by mouth daily.  isosorbide dinitrate (ISORDIL) 30 mg tablet Take 30 mg by mouth daily.  aspirin 81 mg tablet Take 81 mg by mouth daily.  atorvastatin (LIPITOR) 10 mg tablet Take 10 mg by mouth daily.  omega-3 fatty acids-vitamin e (FISH OIL) 1,000 mg Cap Take 1 Cap by mouth daily.  megestrol (MEGACE) 20 mg tablet Take 20 mg by mouth two (2) times a day.  imipramine (TOFRANIL) 10 mg tablet Take 10 mg by mouth three (3) times daily. No current facility-administered medications for this visit.         Past Medical History:   Diagnosis Date    CAD (coronary artery disease)     [facility]     prostate    Diabetes [facility]     GERD (gastroesophageal reflux disease)     Hypertension     Other ill-defined conditions(799.89)     elevated cholesterol     Past Surgical History:   Procedure Laterality Date    CARDIAC SURG PROCEDURE UNLIST      cabg x5 vessels,cardiologist  and david at [facility]    COLONOSCOPY N/A 2016    COLONOSCOPY performed by Marlyn Russo MD at [facility] ENDOSCOPY   [facility] SCRN; HI RISK IND  2016         HX PROSTATECTOMY      NE COLONOSCOPY FLX DX W/COLLJ SPEC WHEN PFRMD  2011          No family history on file. Social History     Tobacco Use    Smoking status: Former Smoker     Packs/day: 0.50     Years: 20.00     Pack years: 10.00     Last attempt to quit: 1982     Years since quittin.6    Smokeless tobacco: Never Used   Substance Use Topics    Alcohol use: No    Drug use: No       REVIEW OF SYSTEMS:   A ten system review of constitutional, cardiovascular, respiratory, musculoskeletal, endocrine, skin, SHEENT, genitourinary, psychiatric and neurologic systems was obtained and is unremarkable except memory loss    EXAMINATION:   Visit Vitals  /80   Pulse (!) 103   Ht 5' 8\" (1.727 m)   Wt 205 lb (93 kg)   SpO2 98%   BMI 31.17 kg/m²        General:   General appearance: Pt is in no acute distress   Distal pulses are preserved    Neurological Examination:   Mental Status: AAO x2. Speech is fluent. Follows commands, has abnormal fund of knowledge, attention, short term recall, comprehension and insight. Cranial Nerves: Visual fields are full. PERRL, Extraocular movements are full. Facial sensation intact. Facial movement intact. Hearing intact to conversation. Palate elevates symmetrically. Shoulder shrug symmetric. Tongue midline. Motor: Strength is 5/5 in all 4 ext. Sensation: Normal to light touch    Coordination/Cerebellar: Intact to finger-nose-finger     Skin: No significant bruising or lacerations.     Mini Mental State Exam 2018   What is the Year 0   What is the Season 0   What is the Date 0   What is the Day 0   What is the Month 1   Where are we State 1   Where are we Country 1   Where are we [facility] or [facility] 1   Where are we Floor 0   Name three objects, then ask the patient to say them 2   Serial sevens Subtract 7 from 100 in increments 1   Ask for the three objects repeated above 0   Name a pencil 1   Name a watch 1   Have the patient repeat this phrase \"No ifs, ands, or buts\" 1   Three stage command: Take the paper in your right hand 1   Fold the paper in half 1   Put the paper on the floor 1   Read and obey the following: CLOSE YOUR EYES 1   Have the patient write a sentence 1   Have the patient copy a figure 1   Mini Mental Score 16   Some recent data might be hidden        Laboratory review:   Results for orders placed or performed during the hospital encounter of 04/27/18   CBC WITH AUTOMATED DIFF   Result Value Ref Range    WBC 4.5 4.1 - 11.1 K/uL    RBC 4.67 4.10 - 5.70 M/uL    HGB 14.1 12.1 - 17.0 g/dL    HCT 40.0 36.6 - 50.3 %    MCV 85.7 80.0 - 99.0 FL    MCH 30.2 26.0 - 34.0 PG    MCHC 35.3 30.0 - 36.5 g/dL    RDW 12.7 11.5 - 14.5 %    PLATELET 344 832 - 036 K/uL    MPV 9.8 8.9 - 12.9 FL    NRBC 0.0 0  WBC    ABSOLUTE NRBC 0.00 0.00 - 0.01 K/uL    NEUTROPHILS 69 32 - 75 %    LYMPHOCYTES 22 12 - 49 %    MONOCYTES 6 5 - 13 %    EOSINOPHILS 2 0 - 7 %    BASOPHILS 1 0 - 1 %    IMMATURE GRANULOCYTES 0 0.0 - 0.5 %    ABS. NEUTROPHILS 3.1 1.8 - 8.0 K/UL    ABS. LYMPHOCYTES 1.0 0.8 - 3.5 K/UL    ABS. MONOCYTES 0.3 0.0 - 1.0 K/UL    ABS. EOSINOPHILS 0.1 0.0 - 0.4 K/UL    ABS. BASOPHILS 0.0 0.0 - 0.1 K/UL    ABS. IMM.  GRANS. 0.0 0.00 - 0.04 K/UL    DF AUTOMATED     METABOLIC PANEL, COMPREHENSIVE   Result Value Ref Range    Sodium 126 (L) 136 - 145 mmol/L    Potassium 4.3 3.5 - 5.1 mmol/L    Chloride 90 (L) 97 - 108 mmol/L    CO2 27 21 - 32 mmol/L    Anion gap 9 5 - 15 mmol/L    Glucose 655 (HH) 65 - 100 mg/dL    BUN 38 (H) 6 - 20 MG/DL    Creatinine 2.27 (H) 0.70 - 1.30 MG/DL    BUN/Creatinine ratio 17 12 - 20      GFR est AA 34 (L) >60 ml/min/1.73m2    GFR est non-AA 28 (L) >60 ml/min/1.73m2    Calcium 9.8 8.5 - 10.1 MG/DL    Bilirubin, total 0.7 0.2 - 1.0 MG/DL    ALT (SGPT) 31 12 - 78 U/L    AST (SGOT) 11 (L) 15 - 37 U/L    Alk. phosphatase 92 45 - 117 U/L    Protein, total 8.6 (H) 6.4 - 8.2 g/dL    Albumin 4.3 3.5 - 5.0 g/dL    Globulin 4.3 (H) 2.0 - 4.0 g/dL    A-G Ratio 1.0 (L) 1.1 - 2.2       Imaging review:  MRI brain  Normal    Documentation review:  None    Assessment/Plan:   Mark Burkett is a 69 y.o. male who presented to the neurology office for management of dementia. Mini-Mental Status Examination is 16 out of 30. MMSE has improved since the last visit. He does have significant dementia. He is presently on Namzaric 28/10 mg daily. We will continue the same. Follow-up in 6 months    Over 25 minutes was spent with the patient of which > 50% of the visit was spent counseling on diagnosis, management, and treatment of the diagnosis. I did discuss about dementia and medications        PHQ over the last two weeks 9/7/2018   Little interest or pleasure in doing things Not at all   Feeling down, depressed, irritable, or hopeless Not at all   Total Score PHQ 2 0     Primary care to address possible depression if PHQ-9 score is more than 9. ICD-10-CM ICD-9-CM    1. Dementia without behavioral disturbance, unspecified dementia type F03.90 294.20 memantine-donepezil [facility]) 28-10 mg Raechel Schaumann, MD  Neurologist    CC: Rhett Buenrostro MD  Fax: None    This note was created using voice recognition software. Despite editing, there may be syntax errors.

## 2018-12-04 NOTE — PATIENT INSTRUCTIONS
1.  Follow-up in 6 months    Office Policies  · Phone calls/patient messages:  Please allow up to 24 hours for someone in the office to contact you about your call or message. Be mindful your provider may be out of the office or your message may require further review. We encourage you to use [employer] for your messages as this is a faster, more efficient way to communicate with our office  · Medication Refills:  Prescription medications require up to 48 business hours to process. We encourage you to use [employer] for your refills. For controlled medications: Please allow up to 72 business hours to process. Certain medications may require you to  a written prescription at our office. NO narcotic/controlled medications will be prescribed after 4pm Monday through Friday or on weekends  · Form/Paperwork Completion:  Please note there is a $25 fee for all paperwork completed by our providers. We ask that you allow 7-14 business days. Pre-payment is due prior to picking up/faxing the completed form. You may also download your forms to [employer] to have your doctor print off.

## 2019-06-05 ENCOUNTER — OFFICE VISIT (OUTPATIENT)
Dept: NEUROLOGY | Age: 79
End: 2019-06-05

## 2019-06-05 VITALS
HEIGHT: 68 IN | HEART RATE: 62 BPM | DIASTOLIC BLOOD PRESSURE: 70 MMHG | BODY MASS INDEX: 30.62 KG/M2 | WEIGHT: 202 LBS | OXYGEN SATURATION: 98 % | SYSTOLIC BLOOD PRESSURE: 158 MMHG

## 2019-06-05 DIAGNOSIS — F03.90 DEMENTIA WITHOUT BEHAVIORAL DISTURBANCE, UNSPECIFIED DEMENTIA TYPE: Primary | ICD-10-CM

## 2019-06-05 NOTE — PROGRESS NOTES
Neurology follow-up note    Chief Complaint   Patient presents with    Follow-up     memory       [address] [place] [address] Patricia Fox is a 69 y.o. male who presented to the neurology office for management of memory problem. He does have dementia and and is on Namzaric 28/10 mg daily. Mild decline since the last visit. Current Outpatient Medications   Medication Sig    memantine-donepezil (NAMZARIC) 28-10 mg CSpX Take 1 Cap by mouth daily.  amLODIPine (NORVASC) 10 mg tablet Take 1 Tab by mouth daily. Indications: hypertension    linagliptin (TRADJENTA) 5 mg tablet Take 1 Tab by mouth Daily (before breakfast). Indications: type 2 diabetes mellitus    melatonin 3 mg tablet Take 2 Tabs by mouth nightly.  QUEtiapine (SEROQUEL) 50 mg tablet Take 1 Tab by mouth nightly. Take Half a pill in the morning and a FULL Pill at Bedtime (Patient taking differently: Take 50 mg by mouth two (2) times a day. Take Half a pill in the morning and a FULL Pill at Bedtime)    metoprolol succinate (TOPROL-XL) 100 mg tablet Take 100 mg by mouth daily.  isosorbide dinitrate (ISORDIL) 30 mg tablet Take 30 mg by mouth daily.  aspirin 81 mg tablet Take 81 mg by mouth daily.  atorvastatin (LIPITOR) 10 mg tablet Take 10 mg by mouth daily.  omega-3 fatty acids-vitamin e (FISH OIL) 1,000 mg Cap Take 1 Cap by mouth daily. No current facility-administered medications for this visit.         Past Medical History:   Diagnosis Date    CAD (coronary artery disease)     [facility]     prostate    Diabetes ([facility]     GERD (gastroesophageal reflux disease)     Hypertension     Other ill-defined conditions(799.89)     elevated cholesterol     Past Surgical History:   Procedure Laterality Date    CARDIAC SURG PROCEDURE UNLIST      cabg x5 vessels,[facility]ologist  and david at [facility]    COLONOSCOPY N/A 8/31/2016    COLONOSCOPY performed by Marilyn Salazar MD at [address] [place] [address]; HI RISK IND  2016         HX PROSTATECTOMY      SD COLONOSCOPY FLX DX W/COLLJ SPEC WHEN PFRMD  2011          No family history on file. Social History     Tobacco Use    Smoking status: Former Smoker     Packs/day: 0.50     Years: 20.00     Pack years: 10.00     Last attempt to quit: 1982     Years since quittin.1    Smokeless tobacco: Never Used   Substance Use Topics    Alcohol use: No    Drug use: No       REVIEW OF SYSTEMS:   A ten system review of constitutional, cardiovascular, respiratory, musculoskeletal, endocrine, skin, SHEENT, genitourinary, psychiatric and neurologic systems was obtained and is unremarkable except memory loss    EXAMINATION:   Visit Vitals  /70   Pulse 62   Ht 5' 8\" (1.727 m)   Wt 202 lb (91.6 kg)   SpO2 98%   BMI 30.71 kg/m²        General:   General appearance: Pt is in no acute distress   Distal pulses are preserved    Neurological Examination:   Mental Status: AAO x2. Speech is fluent. Follows commands, has abnormal fund of knowledge, attention, short term recall, comprehension and insight. Cranial Nerves: Visual fields are full. PERRL, Extraocular movements are full. Facial sensation intact. Facial movement intact. Hearing intact to conversation. Palate elevates symmetrically. Shoulder shrug symmetric. Tongue midline. Motor: Strength is 5/5 in all 4 ext. Sensation: Normal to light touch    Coordination/Cerebellar: Intact to finger-nose-finger     Skin: No significant bruising or lacerations.     Mini Mental State Exam 2019   What is the Year 0   What is the Season 0   What is the Date 0   What is the Day 0   What is the Month 0   Where are we State 1   Where are we Country 1   Where are we [place] or [place] 1   Where are we Floor 0   Name three objects, then ask the patient to say them 1   Serial sevens Subtract 7 from 100 in increments 1   Ask for the three objects repeated above 0   Name a pencil 1   Name a watch 1   Have the patient repeat this phrase \"No ifs, ands, or buts\" 0   Three stage command: Take the paper in your right hand 0   Fold the paper in half 1   Put the paper on the floor 1   Read and obey the following: CLOSE YOUR EYES 1   Have the patient write a sentence 1   Have the patient copy a figure 1   Mini Mental Score 12   Some recent data might be hidden        Laboratory review:   Results for orders placed or performed during the hospital encounter of 04/27/18   CBC WITH AUTOMATED DIFF   Result Value Ref Range    WBC 4.5 4.1 - 11.1 K/uL    RBC 4.67 4.10 - 5.70 M/uL    HGB 14.1 12.1 - 17.0 g/dL    HCT 40.0 36.6 - 50.3 %    MCV 85.7 80.0 - 99.0 FL    MCH 30.2 26.0 - 34.0 PG    MCHC 35.3 30.0 - 36.5 g/dL    RDW 12.7 11.5 - 14.5 %    PLATELET 406 861 - 932 K/uL    MPV 9.8 8.9 - 12.9 FL    NRBC 0.0 0  WBC    ABSOLUTE NRBC 0.00 0.00 - 0.01 K/uL    NEUTROPHILS 69 32 - 75 %    LYMPHOCYTES 22 12 - 49 %    MONOCYTES 6 5 - 13 %    EOSINOPHILS 2 0 - 7 %    BASOPHILS 1 0 - 1 %    IMMATURE GRANULOCYTES 0 0.0 - 0.5 %    ABS. NEUTROPHILS 3.1 1.8 - 8.0 K/UL    ABS. LYMPHOCYTES 1.0 0.8 - 3.5 K/UL    ABS. MONOCYTES 0.3 0.0 - 1.0 K/UL    ABS. EOSINOPHILS 0.1 0.0 - 0.4 K/UL    ABS. BASOPHILS 0.0 0.0 - 0.1 K/UL    ABS. IMM. GRANS. 0.0 0.00 - 0.04 K/UL    DF AUTOMATED     METABOLIC PANEL, COMPREHENSIVE   Result Value Ref Range    Sodium 126 (L) 136 - 145 mmol/L    Potassium 4.3 3.5 - 5.1 mmol/L    Chloride 90 (L) 97 - 108 mmol/L    CO2 27 21 - 32 mmol/L    Anion gap 9 5 - 15 mmol/L    Glucose 655 (HH) 65 - 100 mg/dL    BUN 38 (H) 6 - 20 MG/DL    Creatinine 2.27 (H) 0.70 - 1.30 MG/DL    BUN/Creatinine ratio 17 12 - 20      GFR est AA 34 (L) >60 ml/min/1.73m2    GFR est non-AA 28 (L) >60 ml/min/1.73m2    Calcium 9.8 8.5 - 10.1 MG/DL    Bilirubin, total 0.7 0.2 - 1.0 MG/DL    ALT (SGPT) 31 12 - 78 U/L    AST (SGOT) 11 (L) 15 - 37 U/L    Alk.  phosphatase 92 45 - 117 U/L    Protein, total 8.6 (H) 6.4 - 8.2 g/dL    Albumin 4.3 3.5 - 5.0 g/dL    Globulin 4.3 (H) 2.0 - 4.0 g/dL    A-G Ratio 1.0 (L) 1.1 - 2.2       Imaging review:  MRI brain  Normal    Documentation review:  None    Assessment/Plan:   Branden Jackson is a 69 y.o. male who presented to the neurology office for management of dementia. Mini-Mental Status Examination is 12 out of 30 and there has been worsening since last visit. The patient is already on max dose of Namzaric. No behavioral changes. We will have to discontinue and observe. Family understands that there will be gradual progression over time. Follow-up in 6 months    Over 25 minutes was spent with the patient of which > 50% of the visit was spent counseling on diagnosis, management, and treatment of the diagnosis. I did discuss about dementia and medications        3 most recent PHQ Screens 9/7/2018   Little interest or pleasure in doing things Not at all   Feeling down, depressed, irritable, or hopeless Not at all   Total Score PHQ 2 0     Primary care to address possible depression if PHQ-9 score is more than 9. ICD-10-CM ICD-9-CM    1. Dementia without behavioral disturbance, unspecified dementia type F03.90 294.20 memantine-donepezil [facility]) 28-10 mg Kimberly Hilario MD  Neurologist    CC: Alisia Kelly MD  Fax: [phone]    This note was created using voice recognition software. Despite editing, there may be syntax errors.

## 2019-06-05 NOTE — LETTER
6/5/19 Patient: Napoleon Gomez YOB: 1940 Date of Visit: 6/5/2019 Bran Silva MD 
[address] [facility] [address] [address] VIA Facsimile: [phone] Dear Bran Silva MD, Thank you for referring Mr. Dmitriy Ku to [address] [place] [address] for evaluation. My notes for this consultation are attached. If you have questions, please do not hesitate to call me. I look forward to following your patient along with you. Sincerely, Harjeet Hair MD

## 2019-07-08 ENCOUNTER — APPOINTMENT (OUTPATIENT)
Dept: GENERAL RADIOLOGY | Age: 79
DRG: 689 | End: 2019-07-08
Attending: EMERGENCY MEDICINE
Payer: MEDICARE

## 2019-07-08 ENCOUNTER — HOSPITAL ENCOUNTER (INPATIENT)
Age: 79
LOS: 4 days | Discharge: HOME HEALTH | DRG: 689 | End: 2019-07-12
Attending: EMERGENCY MEDICINE | Admitting: INTERNAL MEDICINE
Payer: MEDICARE

## 2019-07-08 DIAGNOSIS — G93.40 ACUTE ENCEPHALOPATHY: Primary | ICD-10-CM

## 2019-07-08 DIAGNOSIS — R77.8 ELEVATED TROPONIN: ICD-10-CM

## 2019-07-08 DIAGNOSIS — R11.2 NAUSEA AND VOMITING, INTRACTABILITY OF VOMITING NOT SPECIFIED, UNSPECIFIED VOMITING TYPE: ICD-10-CM

## 2019-07-08 DIAGNOSIS — N30.01 ACUTE CYSTITIS WITH HEMATURIA: ICD-10-CM

## 2019-07-08 LAB
ALBUMIN SERPL-MCNC: 3.3 G/DL (ref 3.5–5)
ALBUMIN/GLOB SERPL: 0.8 {RATIO} (ref 1.1–2.2)
ALP SERPL-CCNC: 88 U/L (ref 45–117)
ALT SERPL-CCNC: 26 U/L (ref 12–78)
ANION GAP SERPL CALC-SCNC: 6 MMOL/L (ref 5–15)
APPEARANCE UR: ABNORMAL
AST SERPL-CCNC: 17 U/L (ref 15–37)
ATRIAL RATE: 107 BPM
BACTERIA URNS QL MICRO: ABNORMAL /HPF
BASOPHILS # BLD: 0.1 K/UL (ref 0–0.1)
BASOPHILS NFR BLD: 1 % (ref 0–1)
BILIRUB SERPL-MCNC: 0.6 MG/DL (ref 0.2–1)
BILIRUB UR QL: NEGATIVE
BUN SERPL-MCNC: 23 MG/DL (ref 6–20)
BUN/CREAT SERPL: 13 (ref 12–20)
CALCIUM SERPL-MCNC: 9.1 MG/DL (ref 8.5–10.1)
CALCULATED P AXIS, ECG09: 32 DEGREES
CALCULATED R AXIS, ECG10: 155 DEGREES
CALCULATED T AXIS, ECG11: -33 DEGREES
CHLORIDE SERPL-SCNC: 105 MMOL/L (ref 97–108)
CO2 SERPL-SCNC: 25 MMOL/L (ref 21–32)
COLOR UR: ABNORMAL
COMMENT, HOLDF: NORMAL
CREAT SERPL-MCNC: 1.79 MG/DL (ref 0.7–1.3)
DIAGNOSIS, 93000: NORMAL
DIFFERENTIAL METHOD BLD: ABNORMAL
EOSINOPHIL # BLD: 0 K/UL (ref 0–0.4)
EOSINOPHIL NFR BLD: 0 % (ref 0–7)
EPITH CASTS URNS QL MICRO: ABNORMAL /LPF
ERYTHROCYTE [DISTWIDTH] IN BLOOD BY AUTOMATED COUNT: 13.6 % (ref 11.5–14.5)
GLOBULIN SER CALC-MCNC: 4.3 G/DL (ref 2–4)
GLUCOSE BLD STRIP.AUTO-MCNC: 127 MG/DL (ref 65–100)
GLUCOSE BLD STRIP.AUTO-MCNC: 186 MG/DL (ref 65–100)
GLUCOSE BLD STRIP.AUTO-MCNC: 280 MG/DL (ref 65–100)
GLUCOSE SERPL-MCNC: 219 MG/DL (ref 65–100)
GLUCOSE UR STRIP.AUTO-MCNC: 100 MG/DL
HCT VFR BLD AUTO: 39.6 % (ref 36.6–50.3)
HGB BLD-MCNC: 13.3 G/DL (ref 12.1–17)
HGB UR QL STRIP: ABNORMAL
HYALINE CASTS URNS QL MICRO: ABNORMAL /LPF (ref 0–5)
IMM GRANULOCYTES # BLD AUTO: 0 K/UL (ref 0–0.04)
IMM GRANULOCYTES NFR BLD AUTO: 0 % (ref 0–0.5)
KETONES UR QL STRIP.AUTO: NEGATIVE MG/DL
LACTATE SERPL-SCNC: 1.5 MMOL/L (ref 0.4–2)
LEUKOCYTE ESTERASE UR QL STRIP.AUTO: ABNORMAL
LYMPHOCYTES # BLD: 0.5 K/UL (ref 0.8–3.5)
LYMPHOCYTES NFR BLD: 5 % (ref 12–49)
MCH RBC QN AUTO: 29.2 PG (ref 26–34)
MCHC RBC AUTO-ENTMCNC: 33.6 G/DL (ref 30–36.5)
MCV RBC AUTO: 87 FL (ref 80–99)
MONOCYTES # BLD: 0.1 K/UL (ref 0–1)
MONOCYTES NFR BLD: 1 % (ref 5–13)
NEUTS BAND NFR BLD MANUAL: 4 %
NEUTS SEG # BLD: 9.4 K/UL (ref 1.8–8)
NEUTS SEG NFR BLD: 89 % (ref 32–75)
NITRITE UR QL STRIP.AUTO: POSITIVE
NRBC # BLD: 0 K/UL (ref 0–0.01)
NRBC BLD-RTO: 0 PER 100 WBC
P-R INTERVAL, ECG05: 192 MS
PH UR STRIP: 5.5 [PH] (ref 5–8)
PLATELET # BLD AUTO: 251 K/UL (ref 150–400)
PMV BLD AUTO: 9.7 FL (ref 8.9–12.9)
POTASSIUM SERPL-SCNC: 4.2 MMOL/L (ref 3.5–5.1)
PROT SERPL-MCNC: 7.6 G/DL (ref 6.4–8.2)
PROT UR STRIP-MCNC: 30 MG/DL
Q-T INTERVAL, ECG07: 348 MS
QRS DURATION, ECG06: 94 MS
QTC CALCULATION (BEZET), ECG08: 464 MS
RBC # BLD AUTO: 4.55 M/UL (ref 4.1–5.7)
RBC #/AREA URNS HPF: ABNORMAL /HPF (ref 0–5)
RBC MORPH BLD: ABNORMAL
SAMPLES BEING HELD,HOLD: NORMAL
SERVICE CMNT-IMP: ABNORMAL
SODIUM SERPL-SCNC: 136 MMOL/L (ref 136–145)
SP GR UR REFRACTOMETRY: 1.01 (ref 1–1.03)
TROPONIN I SERPL-MCNC: 0.08 NG/ML
TROPONIN I SERPL-MCNC: 0.18 NG/ML
UA: UC IF INDICATED,UAUC: ABNORMAL
UROBILINOGEN UR QL STRIP.AUTO: 0.2 EU/DL (ref 0.2–1)
VENTRICULAR RATE, ECG03: 107 BPM
WBC # BLD AUTO: 10.1 K/UL (ref 4.1–11.1)
WBC MORPH BLD: ABNORMAL
WBC URNS QL MICRO: ABNORMAL /HPF (ref 0–4)

## 2019-07-08 PROCEDURE — 82962 GLUCOSE BLOOD TEST: CPT

## 2019-07-08 PROCEDURE — 87086 URINE CULTURE/COLONY COUNT: CPT

## 2019-07-08 PROCEDURE — 96365 THER/PROPH/DIAG IV INF INIT: CPT

## 2019-07-08 PROCEDURE — 51701 INSERT BLADDER CATHETER: CPT

## 2019-07-08 PROCEDURE — 99285 EMERGENCY DEPT VISIT HI MDM: CPT

## 2019-07-08 PROCEDURE — 74011250636 HC RX REV CODE- 250/636: Performed by: EMERGENCY MEDICINE

## 2019-07-08 PROCEDURE — 93005 ELECTROCARDIOGRAM TRACING: CPT

## 2019-07-08 PROCEDURE — 80053 COMPREHEN METABOLIC PANEL: CPT

## 2019-07-08 PROCEDURE — 83605 ASSAY OF LACTIC ACID: CPT

## 2019-07-08 PROCEDURE — 85025 COMPLETE CBC W/AUTO DIFF WBC: CPT

## 2019-07-08 PROCEDURE — 74011250636 HC RX REV CODE- 250/636: Performed by: INTERNAL MEDICINE

## 2019-07-08 PROCEDURE — 84484 ASSAY OF TROPONIN QUANT: CPT

## 2019-07-08 PROCEDURE — 87077 CULTURE AEROBIC IDENTIFY: CPT

## 2019-07-08 PROCEDURE — 65660000000 HC RM CCU STEPDOWN

## 2019-07-08 PROCEDURE — 81001 URINALYSIS AUTO W/SCOPE: CPT

## 2019-07-08 PROCEDURE — 87186 SC STD MICRODIL/AGAR DIL: CPT

## 2019-07-08 PROCEDURE — 77030005563 HC CATH URETH INT MMGH -A

## 2019-07-08 PROCEDURE — 74011250637 HC RX REV CODE- 250/637: Performed by: EMERGENCY MEDICINE

## 2019-07-08 PROCEDURE — 96375 TX/PRO/DX INJ NEW DRUG ADDON: CPT

## 2019-07-08 PROCEDURE — 87040 BLOOD CULTURE FOR BACTERIA: CPT

## 2019-07-08 PROCEDURE — 74011000258 HC RX REV CODE- 258: Performed by: EMERGENCY MEDICINE

## 2019-07-08 PROCEDURE — 74011636637 HC RX REV CODE- 636/637: Performed by: INTERNAL MEDICINE

## 2019-07-08 PROCEDURE — 71045 X-RAY EXAM CHEST 1 VIEW: CPT

## 2019-07-08 PROCEDURE — 36415 COLL VENOUS BLD VENIPUNCTURE: CPT

## 2019-07-08 RX ORDER — MAGNESIUM SULFATE 100 %
4 CRYSTALS MISCELLANEOUS AS NEEDED
Status: DISCONTINUED | OUTPATIENT
Start: 2019-07-08 | End: 2019-07-12 | Stop reason: HOSPADM

## 2019-07-08 RX ORDER — ONDANSETRON 2 MG/ML
4 INJECTION INTRAMUSCULAR; INTRAVENOUS
Status: COMPLETED | OUTPATIENT
Start: 2019-07-08 | End: 2019-07-08

## 2019-07-08 RX ORDER — ONDANSETRON 2 MG/ML
4 INJECTION INTRAMUSCULAR; INTRAVENOUS
Status: DISCONTINUED | OUTPATIENT
Start: 2019-07-08 | End: 2019-07-12 | Stop reason: HOSPADM

## 2019-07-08 RX ORDER — SODIUM CHLORIDE 9 MG/ML
75 INJECTION, SOLUTION INTRAVENOUS CONTINUOUS
Status: DISCONTINUED | OUTPATIENT
Start: 2019-07-08 | End: 2019-07-09

## 2019-07-08 RX ORDER — SODIUM CHLORIDE 0.9 % (FLUSH) 0.9 %
5-40 SYRINGE (ML) INJECTION EVERY 8 HOURS
Status: DISCONTINUED | OUTPATIENT
Start: 2019-07-08 | End: 2019-07-12 | Stop reason: HOSPADM

## 2019-07-08 RX ORDER — ACETAMINOPHEN 325 MG/1
650 TABLET ORAL
Status: DISCONTINUED | OUTPATIENT
Start: 2019-07-08 | End: 2019-07-12 | Stop reason: HOSPADM

## 2019-07-08 RX ORDER — INSULIN LISPRO 100 [IU]/ML
INJECTION, SOLUTION INTRAVENOUS; SUBCUTANEOUS
Status: DISCONTINUED | OUTPATIENT
Start: 2019-07-08 | End: 2019-07-12 | Stop reason: HOSPADM

## 2019-07-08 RX ORDER — SODIUM CHLORIDE 0.9 % (FLUSH) 0.9 %
5-40 SYRINGE (ML) INJECTION AS NEEDED
Status: DISCONTINUED | OUTPATIENT
Start: 2019-07-08 | End: 2019-07-12 | Stop reason: HOSPADM

## 2019-07-08 RX ORDER — HEPARIN SODIUM 5000 [USP'U]/ML
5000 INJECTION, SOLUTION INTRAVENOUS; SUBCUTANEOUS EVERY 8 HOURS
Status: DISCONTINUED | OUTPATIENT
Start: 2019-07-08 | End: 2019-07-12 | Stop reason: HOSPADM

## 2019-07-08 RX ORDER — ACETAMINOPHEN 500 MG
1000 TABLET ORAL ONCE
Status: COMPLETED | OUTPATIENT
Start: 2019-07-08 | End: 2019-07-08

## 2019-07-08 RX ORDER — DEXTROSE MONOHYDRATE 100 MG/ML
125-250 INJECTION, SOLUTION INTRAVENOUS AS NEEDED
Status: DISCONTINUED | OUTPATIENT
Start: 2019-07-08 | End: 2019-07-12 | Stop reason: HOSPADM

## 2019-07-08 RX ADMIN — INSULIN LISPRO 2 UNITS: 100 INJECTION, SOLUTION INTRAVENOUS; SUBCUTANEOUS at 22:23

## 2019-07-08 RX ADMIN — HEPARIN SODIUM 5000 UNITS: 5000 INJECTION INTRAVENOUS; SUBCUTANEOUS at 22:25

## 2019-07-08 RX ADMIN — Medication 10 ML: at 14:55

## 2019-07-08 RX ADMIN — ACETAMINOPHEN 1000 MG: 500 TABLET ORAL at 14:53

## 2019-07-08 RX ADMIN — SODIUM CHLORIDE 75 ML/HR: 900 INJECTION, SOLUTION INTRAVENOUS at 18:30

## 2019-07-08 RX ADMIN — CEFTRIAXONE 1 G: 1 INJECTION, POWDER, FOR SOLUTION INTRAMUSCULAR; INTRAVENOUS at 16:06

## 2019-07-08 RX ADMIN — ONDANSETRON 4 MG: 2 INJECTION INTRAMUSCULAR; INTRAVENOUS at 14:53

## 2019-07-08 RX ADMIN — Medication 10 ML: at 22:27

## 2019-07-08 NOTE — CONSULTS
Consult    NAME: Adriane Morales   :  1940   MRN:  [id number]     Date/Time:  2019 5:24 PM    Patient PCP: Onel Fiore MD  ________________________________________________________________________     Assessment:     1. Fever, equvicol troponin  2. CAD/CABG in  with cath in  with patent sequential LIMA to LAD/Diagonal, patent seq SVG to ramus and OM, patent SVG to distal Om  3. Unknown EF  4. Sinus with incomplete RBBB, NT  5. HTD  6. DM  7. Dyslipidemia  8. Dementia  9. , retired  for [employer], sedentary        Plan:     No chest pain. Equivicol troponin. NT on ECG. Medically rx CAD for now. Euvolemic  Sinus    Check Echo    1. Cont ASA  2. Cont toprol  3. Hold norvasc, restart soon  4. Cont imdur  5. Cont lipitor          [x]        High complexity decision making was performed        Subjective:   CHIEF COMPLAINT: Chills and altered mental status. HISTORY OF PRESENT ILLNESS:       Adriane Morales, 69 y.o. male with PMHx significant for CAD, dementia, diabetes and hypertension, presents    to the ED with cc of altered mental status, vomiting and chills. According to the patient's wife, the patient brings chills at 4 AM today. He went back to bed, and later she stated that he was not answering her questions normally. She indicates that he understood what she was saying but really did not respond to it. She also states that he wanted to go back to bed and she so why you want to go back to bed? He stated because I have to. He later vomited twice. The wife states that he coughed prior to vomiting, but the cough was nonproductive. She did not measure his temperature at home. She took his glucose at home and it was 148. His baseline mental status is alert and oriented x2. The patient denies headache, chest pain, abdominal discomfort or diarrhea. He denies dysuria, but states he had hematuria. No chest pain. No dyspnea.   Wife does note occasional diaphoresis. No edema, no syncope. We were asked to consult for work up and evaluation of the above problems. Past Medical History:   Diagnosis Date    CAD (coronary artery disease)     [facility] [address]     prostate    Diabetes [facility] [address]     GERD (gastroesophageal reflux disease)     Hypertension     Other ill-defined conditions(799.89)     elevated cholesterol      Past Surgical History:   Procedure Laterality Date    CARDIAC SURG PROCEDURE UNLIST      cabg x5 vessels,cardiologist  and david at [facility]    COLONOSCOPY N/A 2016    COLONOSCOPY performed by Daniel Hayes MD at [facility]; HI RISK IND  2016         HX PROSTATECTOMY      MD COLONOSCOPY FLX DX W/COLLJ SPEC WHEN PFRMD  2011          No Known Allergies   Meds:  See below  Social History     Tobacco Use    Smoking status: Former Smoker     Packs/day: 0.50     Years: 20.00     Pack years: 10.00     Last attempt to quit: 1982     Years since quittin.2    Smokeless tobacco: Never Used   Substance Use Topics    Alcohol use: No      No family history on file. REVIEW OF SYSTEMS:     []         Unable to obtain  ROS due to ---   [x]         Total of 12 systems reviewed as follows:     Total of 12 systems reviewed as follows:       POSITIVE= Bold text  Negative = normal text  General:  fever, chills, sweats, generalized weakness, weight loss/gain,      loss of appetite   Eyes:    blurred vision, eye pain, loss of vision, double vision  ENT:    rhinorrhea, pharyngitis   Respiratory:   cough, sputum production, SOB, ANGUIANO, wheezing, pleuritic pain   Cardiology:   chest pain, palpitations, orthopnea, PND, edema, syncope   Gastrointestinal:  abdominal pain , N/V, diarrhea, dysphagia, constipation, bleeding   Genitourinary:  frequency, urgency, dysuria, hematuria, incontinence   Muskuloskeletal :  arthralgia, myalgia, back pain  Hematology:  easy bruising, nose or gum bleeding, lymphadenopathy   Dermatological: rash, ulceration, pruritis, color change / jaundice  Endocrine:   hot flashes or polydipsia   Neurological:  headache, dizziness, confusion, focal weakness, paresthesia,     Speech difficulties, memory loss, gait difficulty  Psychological: Feelings of anxiety, depression, agitation    Objective:      Physical Exam:    Last 24hrs VS reviewed since prior progress note. Most recent are:    Visit Vitals  /74 (BP 1 Location: Right arm, BP Patient Position: At rest)   Pulse 88   Temp 100.4 °F (38 °C)   Resp 16   Ht 5' 7\" (1.702 m)   Wt 90.1 kg (198 lb 10.2 oz)   SpO2 98%   BMI 31.11 kg/m²     No intake or output data in the 24 hours ending 07/08/19 1724     General Appearance: Well developed, well nourished, alert & oriented x 3,    no acute distress. Ears/Nose/Mouth/Throat: Pupils equal and round, Hearing grossly normal.  Neck: Supple. JVP within normal limits. Carotids good upstrokes, with no bruit. Chest: Lungs clear to auscultation bilaterally. Cardiovascular: Regular rate and rhythm, S1S2 normal, no murmur, rubs, gallops. Abdomen: Soft, non-tender, bowel sounds are active. No organomegaly. Extremities: No edema bilaterally. Femoral pulses +2, Distal Pulses +1. Skin: Warm and dry. Neuro: CN II-XII grossly intact, Strength and sensation grossly intact. Data:      Prior to Admission medications    Medication Sig Start Date End Date Taking? Authorizing Provider   QUEtiapine (SEROQUEL) 50 mg tablet Take 1 Tab by mouth nightly. Take Half a pill in the morning and a FULL Pill at Bedtime  Patient taking differently: Take 50 mg by mouth two (2) times a day. Take Half a pill in the morning and a FULL Pill at Bedtime 5/7/18  Yes Patricia Paul MD   memantine-donepezil [facility]) 28-10 mg CSpX Take 1 Cap by mouth daily. 6/5/19   Clover Watkins MD   amLODIPine (NORVASC) 10 mg tablet Take 1 Tab by mouth daily.  Indications: hypertension 5/8/18   Zerate, Pari Yee MD   linagliptin (TRADJENTA) 5 mg tablet Take 1 Tab by mouth Daily (before breakfast). Indications: type 2 diabetes mellitus 5/8/18   Milagro Jorge MD   melatonin 3 mg tablet Take 2 Tabs by mouth nightly. 5/7/18   Milagro Jorge MD   metoprolol succinate (TOPROL-XL) 100 mg tablet Take 100 mg by mouth daily. OtherSarah MD   isosorbide dinitrate (ISORDIL) 30 mg tablet Take 30 mg by mouth daily. Other, MD Sarah   aspirin 81 mg tablet Take 81 mg by mouth daily. 4/12/11   Provider, Historical   atorvastatin (LIPITOR) 10 mg tablet Take 10 mg by mouth daily. Provider, Historical   omega-3 fatty acids-vitamin e (FISH OIL) 1,000 mg Cap Take 1 Cap by mouth daily. 4/12/11   Provider, Historical       Recent Results (from the past 24 hour(s))   EKG, 12 LEAD, INITIAL    Collection Time: 07/08/19 12:37 PM   Result Value Ref Range    Ventricular Rate 107 BPM    Atrial Rate 107 BPM    P-R Interval 192 ms    QRS Duration 94 ms    Q-T Interval 348 ms    QTC Calculation (Bezet) 464 ms    Calculated P Axis 32 degrees    Calculated R Axis 155 degrees    Calculated T Axis -33 degrees    Diagnosis       Sinus tachycardia  Possible Left atrial enlargement  Incomplete right bundle branch block  Left posterior fascicular block  Inferior infarct , age undetermined  Anteroseptal infarct , age undetermined  When compared with ECG of 27-FEB-2016 13:20,  Significant changes have occurred  Confirmed by Latasha Reyes ([id number]) on 7/8/2019 1:11:30 PM     GLUCOSE, POC    Collection Time: 07/08/19 12:43 PM   Result Value Ref Range    Glucose (POC) 186 (H) 65 - 100 mg/dL    Performed by [id number]    Collection Time: 07/08/19 12:48 PM   Result Value Ref Range    SAMPLES BEING HELD 1RED 1BLU     COMMENT        Add-on orders for these samples will be processed based on acceptable specimen integrity and analyte stability, which may vary by analyte.    CBC WITH AUTOMATED DIFF    Collection Time: 07/08/19 12:48 PM   Result Value Ref Range    WBC 10.1 4.1 - 11.1 K/uL    RBC 4.55 4.10 - 5.70 M/uL    HGB 13.3 12.1 - 17.0 g/dL    HCT 39.6 36.6 - 50.3 %    MCV 87.0 80.0 - 99.0 FL    MCH 29.2 26.0 - 34.0 PG    MCHC 33.6 30.0 - 36.5 g/dL    RDW 13.6 11.5 - 14.5 %    PLATELET 269 907 - 548 K/uL    MPV 9.7 8.9 - 12.9 FL    NRBC 0.0 0  WBC    ABSOLUTE NRBC 0.00 0.00 - 0.01 K/uL    NEUTROPHILS 89 (H) 32 - 75 %    BAND NEUTROPHILS 4 %    LYMPHOCYTES 5 (L) 12 - 49 %    MONOCYTES 1 (L) 5 - 13 %    EOSINOPHILS 0 0 - 7 %    BASOPHILS 1 0 - 1 %    IMMATURE GRANULOCYTES 0 0.0 - 0.5 %    ABS. NEUTROPHILS 9.4 (H) 1.8 - 8.0 K/UL    ABS. LYMPHOCYTES 0.5 (L) 0.8 - 3.5 K/UL    ABS. MONOCYTES 0.1 0.0 - 1.0 K/UL    ABS. EOSINOPHILS 0.0 0.0 - 0.4 K/UL    ABS. BASOPHILS 0.1 0.0 - 0.1 K/UL    ABS. IMM. GRANS. 0.0 0.00 - 0.04 K/UL    DF MANUAL      RBC COMMENTS NORMOCYTIC, NORMOCHROMIC      WBC COMMENTS VACUOLATED POLYS     METABOLIC PANEL, COMPREHENSIVE    Collection Time: 07/08/19 12:48 PM   Result Value Ref Range    Sodium 136 136 - 145 mmol/L    Potassium 4.2 3.5 - 5.1 mmol/L    Chloride 105 97 - 108 mmol/L    CO2 25 21 - 32 mmol/L    Anion gap 6 5 - 15 mmol/L    Glucose 219 (H) 65 - 100 mg/dL    BUN 23 (H) 6 - 20 MG/DL    Creatinine 1.79 (H) 0.70 - 1.30 MG/DL    BUN/Creatinine ratio 13 12 - 20      GFR est AA 45 (L) >60 ml/min/1.73m2    GFR est non-AA 37 (L) >60 ml/min/1.73m2    Calcium 9.1 8.5 - 10.1 MG/DL    Bilirubin, total 0.6 0.2 - 1.0 MG/DL    ALT (SGPT) 26 12 - 78 U/L    AST (SGOT) 17 15 - 37 U/L    Alk.  phosphatase 88 45 - 117 U/L    Protein, total 7.6 6.4 - 8.2 g/dL    Albumin 3.3 (L) 3.5 - 5.0 g/dL    Globulin 4.3 (H) 2.0 - 4.0 g/dL    A-G Ratio 0.8 (L) 1.1 - 2.2     TROPONIN I    Collection Time: 07/08/19 12:48 PM   Result Value Ref Range    Troponin-I, Qt. 0.08 (H) <0.05 ng/mL   URINALYSIS W/ REFLEX CULTURE    Collection Time: 07/08/19  3:06 PM   Result Value Ref Range    Color YELLOW/STRAW      Appearance CLOUDY (A) CLEAR      Specific gravity 1.014 1.003 - 1.030      pH (UA) 5.5 5.0 - 8.0      Protein 30 (A) NEG mg/dL    Glucose 100 (A) NEG mg/dL    Ketone NEGATIVE  NEG mg/dL    Bilirubin NEGATIVE  NEG      Blood LARGE (A) NEG      Urobilinogen 0.2 0.2 - 1.0 EU/dL    Nitrites POSITIVE (A) NEG      Leukocyte Esterase MODERATE (A) NEG      WBC  0 - 4 /hpf    RBC 10-20 0 - 5 /hpf    Epithelial cells FEW FEW /lpf    Bacteria 4+ (A) NEG /hpf    UA:UC IF INDICATED URINE CULTURE ORDERED (A) CNI      Hyaline cast 0-2 0 - 5 /lpf   TROPONIN I    Collection Time: 07/08/19  3:06 PM   Result Value Ref Range    Troponin-I, Qt. 0.18 (H) <0.05 ng/mL

## 2019-07-08 NOTE — ED PROVIDER NOTES
EMERGENCY DEPARTMENT HISTORY AND PHYSICAL EXAM      Date: 7/8/2019  Patient Name: Kayla Marin    History of Presenting Illness     Chief Complaint   Patient presents with    Altered mental status     To ED via EMS, per wife, patient is more altered than usual, has a hx of dementia, no increased weakness    Vomiting     x2 today, no hematemesis    Chills       History Provided By: Patient, Patient's Wife and Family members     HPI: Kayla Marin, 69 y.o. male with PMHx significant for CAD, dementia, diabetes and hypertension, presents    to the ED with cc of altered mental status, vomiting and chills. According to the patient's wife, the patient had chills at 4 AM today. He went back to bed, and later she stated that he was not answering her questions normally. She indicates that he understood what she was saying but really did not respond to it. She also states that he wanted to go back to bed and she asked Patel Obdulia you want to go back to bed? \"  He stated because I have to. He later vomited twice. The wife states that he coughed prior to vomiting, but the cough was nonproductive. She did not measure his temperature at home. She took his glucose at home and it was 148. His baseline mental status is alert and oriented x2. The patient denies headache, chest pain, abdominal discomfort or diarrhea. He denies dysuria, but states he had hematuria. There are no other complaints, changes, or physical findings at this time. PCP: Mell Garces MD    No current facility-administered medications on file prior to encounter. Current Outpatient Medications on File Prior to Encounter   Medication Sig Dispense Refill    QUEtiapine (SEROQUEL) 50 mg tablet Take 1 Tab by mouth nightly. Take Half a pill in the morning and a FULL Pill at Bedtime (Patient taking differently: Take 50 mg by mouth two (2) times a day.  Take Half a pill in the morning and a FULL Pill at Bedtime) 60 Tab 1    memantine-donepezil (NAMZARIC) 28-10 mg CSpX Take 1 Cap by mouth daily. 90 Cap 3    amLODIPine (NORVASC) 10 mg tablet Take 1 Tab by mouth daily. Indications: hypertension 60 Tab 1    linagliptin (TRADJENTA) 5 mg tablet Take 1 Tab by mouth Daily (before breakfast). Indications: type 2 diabetes mellitus 30 Tab 1    melatonin 3 mg tablet Take 2 Tabs by mouth nightly. 60 Tab 1    metoprolol succinate (TOPROL-XL) 100 mg tablet Take 100 mg by mouth daily.  isosorbide dinitrate (ISORDIL) 30 mg tablet Take 30 mg by mouth daily.  aspirin 81 mg tablet Take 81 mg by mouth daily.  atorvastatin (LIPITOR) 10 mg tablet Take 10 mg by mouth daily.  omega-3 fatty acids-vitamin e (FISH OIL) 1,000 mg Cap Take 1 Cap by mouth daily. Past History     Past Medical History:  Past Medical History:   Diagnosis Date    CAD (coronary artery disease)     Cancer ([facility] [address]     prostate    Diabetes ([facility] [address]     GERD (gastroesophageal reflux disease)     Hypertension     Other ill-defined conditions(799.89)     elevated cholesterol       Past Surgical History:  Past Surgical History:   Procedure Laterality Date    CARDIAC SURG PROCEDURE UNLIST      cabg x5 vessels,cardiologist  and david at [facility]    COLONOSCOPY N/A 2016    COLONOSCOPY performed by Fabiola Corona MD at [address] [place] [address]; HI RISK IND  2016         HX PROSTATECTOMY      VT COLONOSCOPY FLX DX W/COLLJ SPEC WHEN PFRMD  2011            Family History:  No family history on file.     Social History:  Social History     Tobacco Use    Smoking status: Former Smoker     Packs/day: 0.50     Years: 20.00     Pack years: 10.00     Last attempt to quit: 1982     Years since quittin.2    Smokeless tobacco: Never Used   Substance Use Topics    Alcohol use: No    Drug use: No       Allergies:  No Known Allergies      Review of Systems   Review of Systems   Constitutional: Positive for chills. HENT: Negative for congestion. Eyes: Negative. Respiratory: Positive for cough. Cardiovascular: Negative for chest pain. Gastrointestinal: Positive for nausea and vomiting. Negative for abdominal pain. Endocrine: Negative for heat intolerance. Genitourinary: Positive for hematuria. Musculoskeletal: Negative for back pain. Skin: Negative for rash. Allergic/Immunologic: Negative for immunocompromised state. Neurological: Negative for headaches. Hematological: Does not bruise/bleed easily. Psychiatric/Behavioral: Negative. All other systems reviewed and are negative. Physical Exam   Physical Exam   Constitutional: He appears well-developed and well-nourished. No distress. HENT:   Head: Normocephalic and atraumatic. Eyes: Pupils are equal, round, and reactive to light. EOM are normal.   Neck: Normal range of motion. Neck supple. Cardiovascular: Normal rate, regular rhythm, normal heart sounds and intact distal pulses. Pulmonary/Chest: Effort normal and breath sounds normal. No respiratory distress. Abdominal: Soft. Bowel sounds are normal. There is no tenderness. Musculoskeletal: Normal range of motion. He exhibits no edema or tenderness. Neurological: He is alert. Coordination normal.   Alert and oriented x2, sensory and motor intact   Skin: Skin is warm and dry. Psychiatric: He has a normal mood and affect. His behavior is normal.   Nursing note and vitals reviewed.       Diagnostic Study Results     Labs -     Recent Results (from the past 12 hour(s))   EKG, 12 LEAD, INITIAL    Collection Time: 07/08/19 12:37 PM   Result Value Ref Range    Ventricular Rate 107 BPM    Atrial Rate 107 BPM    P-R Interval 192 ms    QRS Duration 94 ms    Q-T Interval 348 ms    QTC Calculation (Bezet) 464 ms    Calculated P Axis 32 degrees    Calculated R Axis 155 degrees    Calculated T Axis -33 degrees    Diagnosis       Sinus tachycardia  Possible Left atrial enlargement  Incomplete right bundle branch block  Left posterior fascicular block  Inferior infarct , age undetermined  Anteroseptal infarct , age undetermined  When compared with ECG of 27-FEB-2016 13:20,  Significant changes have occurred  Confirmed by Singh Urbina ([id number]) on 7/8/2019 1:11:30 PM     GLUCOSE, POC    Collection Time: 07/08/19 12:43 PM   Result Value Ref Range    Glucose (POC) 186 (H) 65 - 100 mg/dL    Performed by Jas Muller    Collection Time: 07/08/19 12:48 PM   Result Value Ref Range    SAMPLES BEING HELD 1RED 1BLU     COMMENT        Add-on orders for these samples will be processed based on acceptable specimen integrity and analyte stability, which may vary by analyte. CBC WITH AUTOMATED DIFF    Collection Time: 07/08/19 12:48 PM   Result Value Ref Range    WBC 10.1 4.1 - 11.1 K/uL    RBC 4.55 4.10 - 5.70 M/uL    HGB 13.3 12.1 - 17.0 g/dL    HCT 39.6 36.6 - 50.3 %    MCV 87.0 80.0 - 99.0 FL    MCH 29.2 26.0 - 34.0 PG    MCHC 33.6 30.0 - 36.5 g/dL    RDW 13.6 11.5 - 14.5 %    PLATELET 993 818 - 494 K/uL    MPV 9.7 8.9 - 12.9 FL    NRBC 0.0 0  WBC    ABSOLUTE NRBC 0.00 0.00 - 0.01 K/uL    NEUTROPHILS 89 (H) 32 - 75 %    BAND NEUTROPHILS 4 %    LYMPHOCYTES 5 (L) 12 - 49 %    MONOCYTES 1 (L) 5 - 13 %    EOSINOPHILS 0 0 - 7 %    BASOPHILS 1 0 - 1 %    IMMATURE GRANULOCYTES 0 0.0 - 0.5 %    ABS. NEUTROPHILS 9.4 (H) 1.8 - 8.0 K/UL    ABS. LYMPHOCYTES 0.5 (L) 0.8 - 3.5 K/UL    ABS. MONOCYTES 0.1 0.0 - 1.0 K/UL    ABS. EOSINOPHILS 0.0 0.0 - 0.4 K/UL    ABS. BASOPHILS 0.1 0.0 - 0.1 K/UL    ABS. IMM.  GRANS. 0.0 0.00 - 0.04 K/UL    DF MANUAL      RBC COMMENTS NORMOCYTIC, NORMOCHROMIC      WBC COMMENTS VACUOLATED POLYS     METABOLIC PANEL, COMPREHENSIVE    Collection Time: 07/08/19 12:48 PM   Result Value Ref Range    Sodium 136 136 - 145 mmol/L    Potassium 4.2 3.5 - 5.1 mmol/L    Chloride 105 97 - 108 mmol/L    CO2 25 21 - 32 mmol/L    Anion gap 6 5 - 15 mmol/L Glucose 219 (H) 65 - 100 mg/dL    BUN 23 (H) 6 - 20 MG/DL    Creatinine 1.79 (H) 0.70 - 1.30 MG/DL    BUN/Creatinine ratio 13 12 - 20      GFR est AA 45 (L) >60 ml/min/1.73m2    GFR est non-AA 37 (L) >60 ml/min/1.73m2    Calcium 9.1 8.5 - 10.1 MG/DL    Bilirubin, total 0.6 0.2 - 1.0 MG/DL    ALT (SGPT) 26 12 - 78 U/L    AST (SGOT) 17 15 - 37 U/L    Alk. phosphatase 88 45 - 117 U/L    Protein, total 7.6 6.4 - 8.2 g/dL    Albumin 3.3 (L) 3.5 - 5.0 g/dL    Globulin 4.3 (H) 2.0 - 4.0 g/dL    A-G Ratio 0.8 (L) 1.1 - 2.2     TROPONIN I    Collection Time: 07/08/19 12:48 PM   Result Value Ref Range    Troponin-I, Qt. 0.08 (H) <0.05 ng/mL       Radiologic Studies -   XR CHEST PORT   Final Result   IMPRESSION: No Acute Disease. CT Results  (Last 48 hours)    None        CXR Results  (Last 48 hours)    None            Medical Decision Making   I am the first provider for this patient. I reviewed the vital signs, available nursing notes, past medical history, past surgical history, family history and social history. Vital Signs-Reviewed the patient's vital signs. Patient Vitals for the past 12 hrs:   Temp Pulse Resp BP SpO2   07/08/19 1237 100.4 °F (38 °C) 88 16 152/74 98 %       Pulse Oximetry Analysis - 98% on room air     Cardiac Monitor:   Rate: 88 bpm  Rhythm: Normal Sinus Rhythm        EKG interpretation: (Preliminary)  Rhythm: sinus tachycardia; and regular . Rate (approx.): 107; Axis: normal; RI interval: normal; QRS interval: normal ; ST/T wave: T wave inverted; Other findings: abnormal ekg. Records Reviewed: Nursing Notes, Old Medical Records, Previous electrocardiograms, Previous Radiology Studies and Previous Laboratory Studies    Provider Notes (Medical Decision Making):   Sepsis, UTI, gastritis, electrolyte abnormality, dehydration, CAD, encephalopathy, viral syndrome, pneumonia    ED Course:   Initial assessment performed.  The patients presenting problems have been discussed, and they are in agreement with the care plan formulated and outlined with them. I have encouraged them to ask questions as they arise throughout their visit. Consult note: The case was discussed with Dr. Lulu Morel, cardiology. He will provide a consult    Consult note: The patient is being admitted by Dr. Ad Hobbs, hospitalist      CRITICAL CARE NOTE :    2:20 AM      IMPENDING DETERIORATION -Respiratory, Cardiovascular, CNS, Metabolic and Renal    ASSOCIATED RISK FACTORS - Hypotension, Hypoxia, Dysrhythmia, Metabolic changes, Dehydration and CNS Decompensation    MANAGEMENT- Bedside Assessment and Supervision of Care    INTERPRETATION -  Xrays, ECG and Blood Pressure    INTERVENTIONS - hemodynamic mngmt and Metobolic interventions    CASE REVIEW - Hospitalist, Medical Sub-Specialist, Nursing and Family    TREATMENT RESPONSE -Unchanged     PERFORMED BY - Self        NOTES   :      I have spent 40 minutes of critical care time involved in lab review, consultations with specialist, family decision- making, bedside attention and documentation. During this entire length of time I was immediately available to the patient . Lucian Krabbe, MD                                                       Critical Care Time:   36    Disposition:  Admit    PLAN:  1. Current Discharge Medication List        2. Follow-up Information    None       Return to ED if worse     Diagnosis     Clinical Impression:   1. Acute encephalopathy    2. Acute cystitis with hematuria    3. Elevated troponin    4. Nausea and vomiting, intractability of vomiting not specified, unspecified vomiting type        Attestations:     This chart was completed by myself, Dr. Calloway Dec

## 2019-07-08 NOTE — PROGRESS NOTES
TRANSFER - IN REPORT:    Verbal report received from Bo Song (name) on Lynne Breeding  being received from ED (unit) for routine progression of care      Report consisted of patients Situation, Background, Assessment and   Recommendations(SBAR). Information from the following report(s) SBAR, Kardex, ED Summary, Intake/Output, MAR and Recent Results was reviewed with the receiving nurse.

## 2019-07-08 NOTE — H&P
Hospitalist Admission Note    NAME: Gayathri Martinez   :  1940   MRN:  [id number]     Date/Time:  2019 5:33 PM    Patient PCP: Madhuri Healy MD  _____________________________________________________________________  Given the patient's current clinical presentation, I have a high level of concern for decompensation if discharged from the emergency department. Complex decision making was performed, which includes reviewing the patient's available past medical records, laboratory results, and x-ray films. My assessment of this patient's clinical condition and my plan of care is as follows. Assessment / Plan:    Acute encephalopathy due to UTI  Underlying dementia  -exam non focal and MS seems near baseline. No fall or trauma so hold on CT  -treat UTI. Continue rocephin started by ER. Follow up on cultures  -PT OT eval and treat    Vomiting   -more likely related to UTI than to cardiac ischemia. Abd exam benign  -allow diet. Antiemetics prn. Monitor  -gentle hydration    CAD, s/p CABG 5V  HTN  Troponin elevation  EKG changes vs 2016  -continue ASA, toprol, isordil, statin  -cardiology consulted    DM  -SSI prn. Check A1c    CKD3  -follow Cr    Prostate cancer, s/p prostatectomy and radiation  -follows with Dr Anil Marcial Status:  Full  Surrogate Decision Maker:  wife    DVT Prophylaxis:  Heparin SQ  GI Prophylaxis: not indicated    Baseline:  . Ambulates independently          Subjective:   CHIEF COMPLAINT:   AMS    HISTORY OF PRESENT ILLNESS:     Wanda Winston is a 69 y.o.  male with a history of CAD, CABG, HTN, DM, CKD and prostate cancer who presents via EMS because of AMS. Patient got up to use the rest room this morning and as he was coming back to bed, wife noticed that he was shaking. She checked his BG and it was 146. Patient vomited twice and he appeared confused or altered so EMS was dispatched.   ER evaluation was remarkable for UTI, low grad temp, elevated troponin. We were asked to admit for work up and evaluation of the above problems. Past Medical History:   Diagnosis Date    CAD (coronary artery disease)     [facility] ([facility] [address] [facility]     prostate    Diabetes ([facility] [address] [facility]     GERD (gastroesophageal reflux disease)     Hypertension     Other ill-defined conditions(799.89)     elevated cholesterol        Past Surgical History:   Procedure Laterality Date    CARDIAC SURG PROCEDURE UNLIST      cabg x5 vessels,cardiologist  and david [facility]    COLONOSCOPY N/A 2016    COLONOSCOPY performed by Danna Remy MD at [address] [facility]; HI RISK IND  2016         HX PROSTATECTOMY      [place] COLONOSCOPY FLX DX W/COLLJ SPEC WHEN PFRMD  2011            Social History     Tobacco Use    Smoking status: Former Smoker     Packs/day: 0.50     Years: 20.00     Pack years: 10.00     Last attempt to quit: 1982     Years since quittin.2    Smokeless tobacco: Never Used   Substance Use Topics    Alcohol use: No        FHx no early CAD  No Known Allergies     Prior to Admission medications    Medication Sig Start Date End Date Taking? Authorizing Provider   QUEtiapine (SEROQUEL) 50 mg tablet Take 1 Tab by mouth nightly. Take Half a pill in the morning and a FULL Pill at Bedtime  Patient taking differently: Take 50 mg by mouth two (2) times a day. Take Half a pill in the morning and a FULL Pill at Bedtime 18  Yes Joby Thomas MD   memantine-donepezil [facility]) 28-10 mg CSpX Take 1 Cap by mouth daily. 19   Tanmay Baires MD   amLODIPine (NORVASC) 10 mg tablet Take 1 Tab by mouth daily. Indications: hypertension 18   Joby Thomas MD   linagliptin (TRADJENTA) 5 mg tablet Take 1 Tab by mouth Daily (before breakfast). Indications: type 2 diabetes mellitus 18   Joby Thomas MD   melatonin 3 mg tablet Take 2 Tabs by mouth nightly.  18   Katelynn Ramachandran MD JESSE   metoprolol succinate (TOPROL-XL) 100 mg tablet Take 100 mg by mouth daily. Sarah Hawk MD   isosorbide dinitrate (ISORDIL) 30 mg tablet Take 30 mg by mouth daily. Sarah Hawk MD   aspirin 81 mg tablet Take 81 mg by mouth daily. 4/12/11   Provider, Historical   atorvastatin (LIPITOR) 10 mg tablet Take 10 mg by mouth daily. Provider, Historical   omega-3 fatty acids-vitamin e (FISH OIL) 1,000 mg Cap Take 1 Cap by mouth daily. 4/12/11   Provider, Historical       REVIEW OF SYSTEMS:     I am not able to complete the review of systems because: The patient is intubated and sedated   x The patient has altered mental status due to his acute medical problems    The patient has baseline aphasia from prior stroke(s)   x The patient has baseline dementia and is not reliable historian    The patient is in acute medical distress and unable to provide information           Objective:   VITALS:    Visit Vitals  /74 (BP 1 Location: Right arm, BP Patient Position: At rest)   Pulse 88   Temp 100.4 °F (38 °C)   Resp 16   Ht 5' 7\" (1.702 m)   Wt 90.1 kg (198 lb 10.2 oz)   SpO2 98%   BMI 31.11 kg/m²       PHYSICAL EXAM:    General:    Alert, cooperative, no distress, appears stated age. HEENT: Atraumatic, anicteric sclerae, pink conjunctivae     No oral ulcers, mucosa moist, throat clear, dentition fair  Neck:  Supple, symmetrical,  thyroid: non tender  Lungs:   Clear to auscultation bilaterally. No Wheezing or Rhonchi. No rales. Chest wall:  No tenderness  No Accessory muscle use. Heart:   Regular  rhythm,  No edema  Abdomen:   Soft, non-tender. Not distended. Bowel sounds normal  Extremities: No cyanosis. No clubbing,  Skin turgor normal, Capillary refill normal, Radial dial pulse 2+  Skin:     Not pale. Not Jaundiced  No rashes   Psych:  Poor insight. Not depressed. Not anxious or agitated. Neurologic: No facial asymmetry. No aphasia or slurred speech.  Symmetrical strength, Sensation grossly intact. Alert and oriented X place and person. Recognize wife. Not aware of situation. _______________________________________________________________________  Care Plan discussed with:    Comments   Patient x    Family  x    RN     Care Manager                    Consultant:  x    _______________________________________________________________________  Expected  Disposition:   Home with Family x   HH/PT/OT/RN    SNF/LTC    MARVIN    ________________________________________________________________________  TOTAL TIME:  48  Minutes    Critical Care Provided     Minutes non procedure based      Comments     Reviewed previous records   >50% of visit spent in counseling and coordination of care  Discussion with patient and/or family and questions answered       Given the patient's current clinical presentation, I have a high level of concern for decompensation if discharged from the ED. Complex decision making was performed which includes reviewing the patient's available past medical records, laboratory results, and Xray films. I have also directly communicated my plan and discussed this case with the involved ED physician.     ____________________________________________________________________  Lang Jones MD    Procedures: see electronic medical records for all procedures/Xrays and details which were not copied into this note but were reviewed prior to creation of Plan.     LAB DATA REVIEWED:    Recent Results (from the past 24 hour(s))   EKG, 12 LEAD, INITIAL    Collection Time: 07/08/19 12:37 PM   Result Value Ref Range    Ventricular Rate 107 BPM    Atrial Rate 107 BPM    P-R Interval 192 ms    QRS Duration 94 ms    Q-T Interval 348 ms    QTC Calculation (Bezet) 464 ms    Calculated P Axis 32 degrees    Calculated R Axis 155 degrees    Calculated T Axis -33 degrees    Diagnosis       Sinus tachycardia  Possible Left atrial enlargement  Incomplete right bundle branch block  Left posterior fascicular block  Inferior infarct , age undetermined  Anteroseptal infarct , age undetermined  When compared with ECG of 27-FEB-2016 13:20,  Significant changes have occurred  Confirmed by Prem Sexton ([id number]) on 7/8/2019 1:11:30 PM     GLUCOSE, POC    Collection Time: 07/08/19 12:43 PM   Result Value Ref Range    Glucose (POC) 186 (H) 65 - 100 mg/dL    Performed by Naida Saver    Collection Time: 07/08/19 12:48 PM   Result Value Ref Range    SAMPLES BEING HELD 1RED 1BLU     COMMENT        Add-on orders for these samples will be processed based on acceptable specimen integrity and analyte stability, which may vary by analyte. CBC WITH AUTOMATED DIFF    Collection Time: 07/08/19 12:48 PM   Result Value Ref Range    WBC 10.1 4.1 - 11.1 K/uL    RBC 4.55 4.10 - 5.70 M/uL    HGB 13.3 12.1 - 17.0 g/dL    HCT 39.6 36.6 - 50.3 %    MCV 87.0 80.0 - 99.0 FL    MCH 29.2 26.0 - 34.0 PG    MCHC 33.6 30.0 - 36.5 g/dL    RDW 13.6 11.5 - 14.5 %    PLATELET 576 831 - 013 K/uL    MPV 9.7 8.9 - 12.9 FL    NRBC 0.0 0  WBC    ABSOLUTE NRBC 0.00 0.00 - 0.01 K/uL    NEUTROPHILS 89 (H) 32 - 75 %    BAND NEUTROPHILS 4 %    LYMPHOCYTES 5 (L) 12 - 49 %    MONOCYTES 1 (L) 5 - 13 %    EOSINOPHILS 0 0 - 7 %    BASOPHILS 1 0 - 1 %    IMMATURE GRANULOCYTES 0 0.0 - 0.5 %    ABS. NEUTROPHILS 9.4 (H) 1.8 - 8.0 K/UL    ABS. LYMPHOCYTES 0.5 (L) 0.8 - 3.5 K/UL    ABS. MONOCYTES 0.1 0.0 - 1.0 K/UL    ABS. EOSINOPHILS 0.0 0.0 - 0.4 K/UL    ABS. BASOPHILS 0.1 0.0 - 0.1 K/UL    ABS. IMM.  GRANS. 0.0 0.00 - 0.04 K/UL    DF MANUAL      RBC COMMENTS NORMOCYTIC, NORMOCHROMIC      WBC COMMENTS VACUOLATED POLYS     METABOLIC PANEL, COMPREHENSIVE    Collection Time: 07/08/19 12:48 PM   Result Value Ref Range    Sodium 136 136 - 145 mmol/L    Potassium 4.2 3.5 - 5.1 mmol/L    Chloride 105 97 - 108 mmol/L    CO2 25 21 - 32 mmol/L    Anion gap 6 5 - 15 mmol/L    Glucose 219 (H) 65 - 100 mg/dL    BUN 23 (H) 6 - 20 MG/DL    Creatinine 1.79 (H) 0.70 - 1.30 MG/DL    BUN/Creatinine ratio 13 12 - 20      GFR est AA 45 (L) >60 ml/min/1.73m2    GFR est non-AA 37 (L) >60 ml/min/1.73m2    Calcium 9.1 8.5 - 10.1 MG/DL    Bilirubin, total 0.6 0.2 - 1.0 MG/DL    ALT (SGPT) 26 12 - 78 U/L    AST (SGOT) 17 15 - 37 U/L    Alk.  phosphatase 88 45 - 117 U/L    Protein, total 7.6 6.4 - 8.2 g/dL    Albumin 3.3 (L) 3.5 - 5.0 g/dL    Globulin 4.3 (H) 2.0 - 4.0 g/dL    A-G Ratio 0.8 (L) 1.1 - 2.2     TROPONIN I    Collection Time: 07/08/19 12:48 PM   Result Value Ref Range    Troponin-I, Qt. 0.08 (H) <0.05 ng/mL   LACTIC ACID    Collection Time: 07/08/19  2:34 PM   Result Value Ref Range    Lactic acid 1.5 0.4 - 2.0 MMOL/L   URINALYSIS W/ REFLEX CULTURE    Collection Time: 07/08/19  3:06 PM   Result Value Ref Range    Color YELLOW/STRAW      Appearance CLOUDY (A) CLEAR      Specific gravity 1.014 1.003 - 1.030      pH (UA) 5.5 5.0 - 8.0      Protein 30 (A) NEG mg/dL    Glucose 100 (A) NEG mg/dL    Ketone NEGATIVE  NEG mg/dL    Bilirubin NEGATIVE  NEG      Blood LARGE (A) NEG      Urobilinogen 0.2 0.2 - 1.0 EU/dL    Nitrites POSITIVE (A) NEG      Leukocyte Esterase MODERATE (A) NEG      WBC  0 - 4 /hpf    RBC 10-20 0 - 5 /hpf    Epithelial cells FEW FEW /lpf    Bacteria 4+ (A) NEG /hpf    UA:UC IF INDICATED URINE CULTURE ORDERED (A) CNI      Hyaline cast 0-2 0 - 5 /lpf   TROPONIN I    Collection Time: 07/08/19  3:06 PM   Result Value Ref Range    Troponin-I, Qt. 0.18 (H) <0.05 ng/mL

## 2019-07-09 ENCOUNTER — APPOINTMENT (OUTPATIENT)
Dept: NON INVASIVE DIAGNOSTICS | Age: 79
DRG: 689 | End: 2019-07-09
Attending: INTERNAL MEDICINE
Payer: MEDICARE

## 2019-07-09 LAB
ANION GAP SERPL CALC-SCNC: 8 MMOL/L (ref 5–15)
BUN SERPL-MCNC: 26 MG/DL (ref 6–20)
BUN/CREAT SERPL: 14 (ref 12–20)
CALCIUM SERPL-MCNC: 9.5 MG/DL (ref 8.5–10.1)
CHLORIDE SERPL-SCNC: 104 MMOL/L (ref 97–108)
CHOLEST SERPL-MCNC: 145 MG/DL
CO2 SERPL-SCNC: 27 MMOL/L (ref 21–32)
CREAT SERPL-MCNC: 1.81 MG/DL (ref 0.7–1.3)
ECHO AO ROOT DIAM: 3.28 CM
ECHO AV AREA PEAK VELOCITY: 2.3 CM2
ECHO AV AREA/BSA PEAK VELOCITY: 1.1 CM2/M2
ECHO AV CUSP MM: 0 CM
ECHO AV PEAK GRADIENT: 6.6 MMHG
ECHO AV PEAK VELOCITY: 128.82 CM/S
ECHO EST RA PRESSURE: 10 MMHG
ECHO LA AREA 4C: 17.9 CM2
ECHO LA MAJOR AXIS: 3.03 CM
ECHO LA TO AORTIC ROOT RATIO: 0.92
ECHO LA VOL 4C: 49.82 ML (ref 18–58)
ECHO LA VOLUME INDEX A4C: 24.71 ML/M2 (ref 16–28)
ECHO LV INTERNAL DIMENSION DIASTOLIC: 3.56 CM (ref 4.2–5.9)
ECHO LV INTERNAL DIMENSION SYSTOLIC: 2.92 CM
ECHO LV IVSD: 1.8 CM (ref 0.6–1)
ECHO LV MASS 2D: 263.3 G (ref 88–224)
ECHO LV MASS INDEX 2D: 130.6 G/M2 (ref 49–115)
ECHO LV POSTERIOR WALL DIASTOLIC: 1.42 CM (ref 0.6–1)
ECHO LV POSTERIOR WALL SYSTOLIC: 1.12 CM
ECHO LVOT DIAM: 1.82 CM
ECHO LVOT PEAK GRADIENT: 5.3 MMHG
ECHO LVOT PEAK VELOCITY: 114.72 CM/S
ECHO LVOT SV: 63.1 ML
ECHO LVOT VTI: 24.16 CM
ECHO MV A VELOCITY: 74.19 CM/S
ECHO MV AREA PHT: 4.2 CM2
ECHO MV E DECELERATION TIME (DT): 180.1 MS
ECHO MV E VELOCITY: 52.45 CM/S
ECHO MV E/A RATIO: 0.71
ECHO MV PRESSURE HALF TIME (PHT): 52.2 MS
ECHO MV REGURGITANT PEAK GRADIENT: 7.2 MMHG
ECHO MV REGURGITANT PEAK VELOCITY: 133.76 CM/S
ECHO RA VOLUME: 27.1 ML
ECHO TV MAX VELOCITY: 242.79 CM/S
ECHO TV PEAK GRADIENT: 23.6 MMHG
EST. AVERAGE GLUCOSE BLD GHB EST-MCNC: 177 MG/DL
GLUCOSE BLD STRIP.AUTO-MCNC: 169 MG/DL (ref 65–100)
GLUCOSE BLD STRIP.AUTO-MCNC: 190 MG/DL (ref 65–100)
GLUCOSE BLD STRIP.AUTO-MCNC: 251 MG/DL (ref 65–100)
GLUCOSE BLD STRIP.AUTO-MCNC: 263 MG/DL (ref 65–100)
GLUCOSE SERPL-MCNC: 205 MG/DL (ref 65–100)
HBA1C MFR BLD: 7.8 % (ref 4.2–6.3)
HDLC SERPL-MCNC: 59 MG/DL
HDLC SERPL: 2.5 {RATIO} (ref 0–5)
LDLC SERPL CALC-MCNC: 73.6 MG/DL (ref 0–100)
LIPID PROFILE,FLP: NORMAL
MAGNESIUM SERPL-MCNC: 2.4 MG/DL (ref 1.6–2.4)
POTASSIUM SERPL-SCNC: 4.2 MMOL/L (ref 3.5–5.1)
SERVICE CMNT-IMP: ABNORMAL
SODIUM SERPL-SCNC: 139 MMOL/L (ref 136–145)
TRIGL SERPL-MCNC: 62 MG/DL (ref ?–150)
TROPONIN I SERPL-MCNC: 0.11 NG/ML
VLDLC SERPL CALC-MCNC: 12.4 MG/DL

## 2019-07-09 PROCEDURE — 74011250637 HC RX REV CODE- 250/637: Performed by: INTERNAL MEDICINE

## 2019-07-09 PROCEDURE — 36415 COLL VENOUS BLD VENIPUNCTURE: CPT

## 2019-07-09 PROCEDURE — 97162 PT EVAL MOD COMPLEX 30 MIN: CPT

## 2019-07-09 PROCEDURE — 83036 HEMOGLOBIN GLYCOSYLATED A1C: CPT

## 2019-07-09 PROCEDURE — 74011250636 HC RX REV CODE- 250/636: Performed by: INTERNAL MEDICINE

## 2019-07-09 PROCEDURE — 74011250636 HC RX REV CODE- 250/636: Performed by: EMERGENCY MEDICINE

## 2019-07-09 PROCEDURE — 83735 ASSAY OF MAGNESIUM: CPT

## 2019-07-09 PROCEDURE — 97165 OT EVAL LOW COMPLEX 30 MIN: CPT | Performed by: OCCUPATIONAL THERAPIST

## 2019-07-09 PROCEDURE — 97535 SELF CARE MNGMENT TRAINING: CPT | Performed by: OCCUPATIONAL THERAPIST

## 2019-07-09 PROCEDURE — 80048 BASIC METABOLIC PNL TOTAL CA: CPT

## 2019-07-09 PROCEDURE — 84484 ASSAY OF TROPONIN QUANT: CPT

## 2019-07-09 PROCEDURE — 93306 TTE W/DOPPLER COMPLETE: CPT

## 2019-07-09 PROCEDURE — 80061 LIPID PANEL: CPT

## 2019-07-09 PROCEDURE — 65660000000 HC RM CCU STEPDOWN

## 2019-07-09 PROCEDURE — 82962 GLUCOSE BLOOD TEST: CPT

## 2019-07-09 PROCEDURE — 74011636637 HC RX REV CODE- 636/637: Performed by: INTERNAL MEDICINE

## 2019-07-09 PROCEDURE — 74011636637 HC RX REV CODE- 636/637: Performed by: EMERGENCY MEDICINE

## 2019-07-09 PROCEDURE — 74011000258 HC RX REV CODE- 258: Performed by: INTERNAL MEDICINE

## 2019-07-09 PROCEDURE — 97116 GAIT TRAINING THERAPY: CPT

## 2019-07-09 RX ORDER — SODIUM CHLORIDE 9 MG/ML
75 INJECTION, SOLUTION INTRAVENOUS CONTINUOUS
Status: DISPENSED | OUTPATIENT
Start: 2019-07-09 | End: 2019-07-10

## 2019-07-09 RX ORDER — GUAIFENESIN 100 MG/5ML
81 LIQUID (ML) ORAL DAILY
Status: DISCONTINUED | OUTPATIENT
Start: 2019-07-09 | End: 2019-07-12 | Stop reason: HOSPADM

## 2019-07-09 RX ORDER — MELATONIN
1000 DAILY
COMMUNITY

## 2019-07-09 RX ORDER — ISOSORBIDE MONONITRATE 30 MG/1
30 TABLET, EXTENDED RELEASE ORAL DAILY
Status: DISCONTINUED | OUTPATIENT
Start: 2019-07-10 | End: 2019-07-12 | Stop reason: HOSPADM

## 2019-07-09 RX ORDER — INSULIN ASPART 100 [IU]/ML
53 INJECTION, SUSPENSION SUBCUTANEOUS
Status: ON HOLD | COMMUNITY
End: 2019-07-12 | Stop reason: SDUPTHER

## 2019-07-09 RX ORDER — AMLODIPINE BESYLATE 5 MG/1
10 TABLET ORAL DAILY
Status: DISCONTINUED | OUTPATIENT
Start: 2019-07-09 | End: 2019-07-12 | Stop reason: HOSPADM

## 2019-07-09 RX ORDER — INSULIN ASPART 100 [IU]/ML
56 INJECTION, SUSPENSION SUBCUTANEOUS
Status: ON HOLD | COMMUNITY
End: 2019-07-12 | Stop reason: SDUPTHER

## 2019-07-09 RX ORDER — ISOSORBIDE MONONITRATE 30 MG/1
30 TABLET, EXTENDED RELEASE ORAL DAILY
COMMUNITY

## 2019-07-09 RX ORDER — QUETIAPINE FUMARATE 25 MG/1
50 TABLET, FILM COATED ORAL
Status: DISCONTINUED | OUTPATIENT
Start: 2019-07-09 | End: 2019-07-12 | Stop reason: HOSPADM

## 2019-07-09 RX ORDER — AMLODIPINE BESYLATE 10 MG/1
10 TABLET ORAL
COMMUNITY

## 2019-07-09 RX ORDER — MEMANTINE HYDROCHLORIDE 10 MG/1
10 TABLET ORAL 2 TIMES DAILY
Status: DISCONTINUED | OUTPATIENT
Start: 2019-07-09 | End: 2019-07-12 | Stop reason: HOSPADM

## 2019-07-09 RX ORDER — ATORVASTATIN CALCIUM 20 MG/1
20 TABLET, FILM COATED ORAL DAILY
COMMUNITY

## 2019-07-09 RX ORDER — DONEPEZIL HYDROCHLORIDE 5 MG/1
10 TABLET, FILM COATED ORAL DAILY
Status: DISCONTINUED | OUTPATIENT
Start: 2019-07-09 | End: 2019-07-12 | Stop reason: HOSPADM

## 2019-07-09 RX ORDER — QUETIAPINE FUMARATE 50 MG/1
50 TABLET, FILM COATED ORAL
COMMUNITY
End: 2019-12-11 | Stop reason: SDUPTHER

## 2019-07-09 RX ORDER — METOPROLOL SUCCINATE 50 MG/1
100 TABLET, EXTENDED RELEASE ORAL DAILY
Status: DISCONTINUED | OUTPATIENT
Start: 2019-07-09 | End: 2019-07-12 | Stop reason: HOSPADM

## 2019-07-09 RX ORDER — ATORVASTATIN CALCIUM 20 MG/1
20 TABLET, FILM COATED ORAL DAILY
Status: DISCONTINUED | OUTPATIENT
Start: 2019-07-09 | End: 2019-07-12 | Stop reason: HOSPADM

## 2019-07-09 RX ADMIN — Medication 10 ML: at 14:30

## 2019-07-09 RX ADMIN — DONEPEZIL HYDROCHLORIDE 10 MG: 5 TABLET, FILM COATED ORAL at 09:19

## 2019-07-09 RX ADMIN — HUMAN INSULIN 20 UNITS: 100 INJECTION, SUSPENSION SUBCUTANEOUS at 17:28

## 2019-07-09 RX ADMIN — ATORVASTATIN CALCIUM 20 MG: 20 TABLET, FILM COATED ORAL at 09:19

## 2019-07-09 RX ADMIN — ACETAMINOPHEN 650 MG: 325 TABLET ORAL at 20:03

## 2019-07-09 RX ADMIN — SODIUM CHLORIDE 75 ML/HR: 900 INJECTION, SOLUTION INTRAVENOUS at 17:14

## 2019-07-09 RX ADMIN — Medication 10 ML: at 22:16

## 2019-07-09 RX ADMIN — HEPARIN SODIUM 5000 UNITS: 5000 INJECTION INTRAVENOUS; SUBCUTANEOUS at 22:15

## 2019-07-09 RX ADMIN — HEPARIN SODIUM 5000 UNITS: 5000 INJECTION INTRAVENOUS; SUBCUTANEOUS at 14:30

## 2019-07-09 RX ADMIN — METOPROLOL SUCCINATE 100 MG: 50 TABLET, EXTENDED RELEASE ORAL at 09:19

## 2019-07-09 RX ADMIN — QUETIAPINE FUMARATE 50 MG: 25 TABLET ORAL at 22:15

## 2019-07-09 RX ADMIN — Medication 10 ML: at 03:20

## 2019-07-09 RX ADMIN — QUETIAPINE FUMARATE 50 MG: 25 TABLET ORAL at 00:59

## 2019-07-09 RX ADMIN — INSULIN LISPRO 3 UNITS: 100 INJECTION, SOLUTION INTRAVENOUS; SUBCUTANEOUS at 11:48

## 2019-07-09 RX ADMIN — ASPIRIN 81 MG 81 MG: 81 TABLET ORAL at 09:19

## 2019-07-09 RX ADMIN — AMLODIPINE BESYLATE 10 MG: 5 TABLET ORAL at 10:20

## 2019-07-09 RX ADMIN — CEFTRIAXONE 1 G: 1 INJECTION, POWDER, FOR SOLUTION INTRAMUSCULAR; INTRAVENOUS at 17:15

## 2019-07-09 RX ADMIN — INSULIN LISPRO 2 UNITS: 100 INJECTION, SOLUTION INTRAVENOUS; SUBCUTANEOUS at 22:14

## 2019-07-09 RX ADMIN — HEPARIN SODIUM 5000 UNITS: 5000 INJECTION INTRAVENOUS; SUBCUTANEOUS at 05:35

## 2019-07-09 RX ADMIN — MEMANTINE HYDROCHLORIDE 10 MG: 10 TABLET ORAL at 17:16

## 2019-07-09 RX ADMIN — MEMANTINE HYDROCHLORIDE 10 MG: 10 TABLET ORAL at 09:19

## 2019-07-09 RX ADMIN — ISOSORBIDE DINITRATE 30 MG: 10 TABLET ORAL at 09:19

## 2019-07-09 NOTE — PROGRESS NOTES
Reason for Admission:  Per wife bought patient to ed because he was more altered than usual. He has history of dementia and was also having vomiting and chills at home. He lives in one story home with his wife and was independent prior to coming to the hospital. He uses a cane sometimes. He is independent with adl's. His wife drives him to his appointments. RRAT Score:    9                 Plan for utilizing home health:   He has had home health in the past however has not been to inpatient rehab in the past.                        Current Advanced Directive/Advance Care Plan:  Not on file. Transition of Care Plan:   Patient and wife plans for him to come home when medically stable. He will follow up with his medical care team when discharged. His wife will transport him home when discharged. Care Management Interventions  PCP Verified by CM:  Yes  Transition of Care Consult (CM Consult): Discharge Planning  Discharge Durable Medical Equipment: (Patient has cane at home. )  Physical Therapy Consult: Yes  Occupational Therapy Consult: Yes  Current Support Network: Lives with Spouse  Confirm Follow Up Transport: Family  Plan discussed with Pt/Family/Caregiver: Yes  Discharge Location  Discharge Placement: Home

## 2019-07-09 NOTE — CDMP QUERY
Patient admitted with \"UTI\", noted to have \"Encephalopathy due to UTI\". If possible, could this diagnosis be further specified in progress notes and d/c summary if you are evaluating and/or treating any of the following: ? Metabolic Encephalopathy ? Advanced Dementia 
? Other Explanation of clinical findings ? Clinically Undetermined (no explanation for clinical findings) The medical record reflects the following: 
 
   Risk Factors: Hx of Dementia. Clint Sidhu Dx: UTI Clinical Indicators: AMS; Confused; Disoriented to situation; Oriented to self. ..noted: pt more altered than usual 
   Treatment: Treat UTI; F/u on cultures; Increased safety precautions: High-fall risk; HOB elevated; Fall prevention device; Side rails x 3 Thank you, Becky Johnson [facility] 
[phone]

## 2019-07-09 NOTE — PROGRESS NOTES
Occupational Therapy EVALUATION/discharge  Patient: Adriane Morales (41 y.o. male)  Date: 7/9/2019  Primary Diagnosis: Acute encephalopathy [G93.40]       Precautions:   Bed Alarm    ASSESSMENT:   Based on the objective data described below, the patient presents with pleasant confusion and was only oriented to himself. He also presented with memory loss. Pt however is performing mobility and ADLS at a supervision level overall. Pt will need constant supervision at discharge due to mental deficits. I do not recommend that pt manage his own medications or drive at this time. Further skilled acute occupational therapy is not indicated at this time as pt only needs supervision for mobility and ADLS.   Discharge Recommendations: home with family diana  Further Equipment Recommendations for Discharge: none      SUBJECTIVE:   Patient stated I .    OBJECTIVE DATA SUMMARY:   HISTORY:   Past Medical History:   Diagnosis Date    CAD (coronary artery disease)     [facility] ([facility] [address]     prostate    Diabetes ([facility] [address]     GERD (gastroesophageal reflux disease)     Hypertension     Other ill-defined conditions(799.89)     elevated cholesterol     Past Surgical History:   Procedure Laterality Date    CARDIAC SURG PROCEDURE UNLIST      cabg x5 vessels,cardiologist  and david at [facility]    COLONOSCOPY N/A 8/31/2016    COLONOSCOPY performed by Colleen Cleary., MD at [address]; HI RISK IND  8/31/2016         HX PROSTATECTOMY      [place] COLONOSCOPY FLX DX W/COLLJ SPEC WHEN PFRMD  4/13/2011            Prior Level of Function/Environment/Context: per pt he was performing all ADLS on his own =, ambulating with SPC in the community and driving, Pt is confused and accuracy of report is unknown   Expanded or extensive additional review of patient history:     Home Situation  Home Environment: Private residence  # Steps to Enter: (has step but unsure how many)  One/Two Story Residence: Two story(basement)  Living Alone: No  Current DME Used/Available at Home: Cane, straight  Tub or Shower Type: Tub/Shower combination    Hand dominance: Right    EXAMINATION OF PERFORMANCE DEFICITS:  Cognitive/Behavioral Status:  Neurologic State: Confused  Orientation Level: Disoriented to situation;Oriented to person  Cognition: Decreased attention/concentration;Decreased command following;Memory loss  Perception: Appears intact  Perseveration: Perseverates during conversation  Safety/Judgement: Fall prevention;Decreased awareness of need for safety;Decreased awareness of need for assistance;Decreased awareness of environment;Decreased insight into deficits      Vision/Perceptual:                           Acuity: Within Defined Limits         Range of Motion:    AROM: Within functional limits  PROM: Within functional limits                      Strength:    Strength: Within functional limits                Coordination:  Coordination: Within functional limits  Fine Motor Skills-Upper: Left Intact; Right Intact    Gross Motor Skills-Upper: Left Intact; Right Intact    Tone & Sensation:    Tone: Normal                         Balance:  Sitting: Intact; Without support  Standing: Intact; Without support    Functional Mobility and Transfers for ADLs:  Bed Mobility:  Rolling: Independent  Supine to Sit: Supervision; Additional time  Scooting: Independent    Transfers:  Sit to Stand: Independent  Stand to Sit: Independent  Bed to Chair: Supervision  Bathroom Mobility: (supervision)  Toilet Transfer : Supervision    ADL Assessment:  Feeding: Independent    Oral Facial Hygiene/Grooming: Supervision    Bathing: Supervision    Upper Body Dressing: Supervision    Lower Body Dressing: Supervision    Toileting: Supervision                ADL Intervention and task modifications:      Focus on standing balance and LB dressing today  Cognitive Retraining  Safety/Judgement: Fall prevention;Decreased awareness of need for safety;Decreased awareness of need for assistance;Decreased awareness of environment;Decreased insight into deficits      Functional Measure:  Barthel Index:    Bathin  Bladder: 5  Bowels: 5  Groomin  Dressing: 10  Feeding: 10  Mobility: 15  Stairs: 10  Toilet Use: 10  Transfer (Bed to Chair and Back): 15  Total: 85/100        Percentage of impairment   0%   1-19%   20-39%   40-59%   60-79%   80-99%   100%   Barthel Score 0-100 100 99-80 79-60 59-40 20-39 1-19   0     The Barthel ADL Index: Guidelines  1. The index should be used as a record of what a patient does, not as a record of what a patient could do. 2. The main aim is to establish degree of independence from any help, physical or verbal, however minor and for whatever reason. 3. The need for supervision renders the patient not independent. 4. A patient's performance should be established using the best available evidence. Asking the patient, friends/relatives and nurses are the usual sources, but direct observation and common sense are also important. However direct testing is not needed. 5. Usually the patient's performance over the preceding 24-48 hours is important, but occasionally longer periods will be relevant. 6. Middle categories imply that the patient supplies over 50 per cent of the effort. 7. Use of aids to be independent is allowed. Yakov Shipman., Barthel, D.W. ([id number]). Functional evaluation: the Barthel Index. [facility] (14)2. GLORY Rosales, Jayshree Alejandro., Christine LoweAd[facility], [address] [place] [address] (). Measuring the change indisability after inpatient rehabilitation; comparison of the responsiveness of the Barthel Index and Functional [place] Measure. Journal of Neurology, Neurosurgery, and Psychiatry, 66(4), 068-102. Leslie Castellano, N.J.A, Laron Goncalves  W.J.M, & Cassandra Underwood, M.A. (2004.) Assessment of post-stroke quality of life in cost-effectiveness studies: The usefulness of the Barthel Index and the EuroQoL-5D. Quality of Life Research, 15, 157-03         Occupational Therapy Evaluation Charge Determination   History Examination Decision-Making   LOW Complexity : Brief history review  MEDIUM Complexity : 3-5 performance deficits relating to physical, cognitive , or psychosocial skils that result in activity limitations and / or participation restrictions LOW Complexity : No comorbidities that affect functional and no verbal or physical assistance needed to complete eval tasks       Based on the above components, the patient evaluation is determined to be of the following complexity level: LOW   Pain:            0/10        Activity Tolerance:     Please refer to the flowsheet for vital signs taken during this treatment. After treatment:   [x]  Patient left in no apparent distress sitting up in chair  []  Patient left in no apparent distress in bed  [x]  Call bell left within reach  [x]  Nursing notified  []  Caregiver present  []  Bed alarm activated    COMMUNICATION/EDUCATION:   Communication/Collaboration:  []      Home safety education was provided and the patient/caregiver indicated understanding. []      Patient/family have participated as able and agree with findings and recommendations. [x]      Patient is unable to participate in plan of care at this time.   Findings and recommendations were discussed with: Physical Therapist, Registered Nurse and patient    CESIA Vázquez/LOLY  Time Calculation: 23 mins

## 2019-07-09 NOTE — ROUTINE PROCESS
* No surgery found * 
* No surgery found * Bedside and Verbal shift change report given to [address] [place] [address] (oncoming nurse) by Batsheva Zavala RN (offgoing nurse). Report included the following information SBAR, Kardex, MAR and Recent Results. Zone Phone:   [phone] Significant changes during shift:   
1) admission Patient Information Denilson Kumar 69 y.o. 
7/8/2019 12:30 PM by Nohemi Raza MD. Denilson Kumar was admitted from Home 
 
Problem List 
 
Patient Active Problem List  
 Diagnosis Date Noted  Hyponatremia 04/27/2018  HERMILO (acute kidney injury) ([facility] [address] 04/27/2018  Acute encephalopathy 04/27/2018  Uncontrolled type 2 DM with hyperosmolar nonketotic hyperglycemia ([facility] [address] 04/27/2018  Other and combined forms of senile cataract 01/16/2014 Past Medical History:  
Diagnosis Date  CAD (coronary artery disease)  Cancer [facility])   
 prostate  Diabetes ([facility] [address]  GERD (gastroesophageal reflux disease)  Hypertension  Other ill-defined conditions(799.89)   
 elevated cholesterol Core Measures: CVA: No Not applicable CHF:No Not applicable PNA:No Not applicable Activity Status: OOB to Chair No 
Ambulated this shift Yes Bed Rest No 
 
Supplemental O2: (If Applicable) NC No 
NRB No 
Venti-mask No 
On room air Liters/min LINES AND DRAINS: 
 
PIV 
 
DVT prophylaxis: DVT prophylaxis Med- Yes DVT prophylaxis SCD or MICHAEL- No  
 
Wounds: (If Applicable) Wounds- No 
 
Location none Patient Safety: 
 
Falls Score Total Score: 4 Safety Level_______ Bed Alarm On? Yes Sitter? No 
 
Plan for upcoming shift: abx Discharge Plan: No just admitted Active Consults: 
IP CONSULT TO CARDIOLOGY 
IP CONSULT TO HOSPITALIST

## 2019-07-09 NOTE — PROGRESS NOTES
Bedside and Verbal shift change report given to Esthela ZAMORA (oncoming nurse) by NOAH Lo (offgoing nurse).  Report included the following information SBAR, Kardex, MAR and Recent Results.     Zone KVNXB: 9199        Significant changes during shift:    None        Patient Information     Earnest Delay  69 y.o.  7/8/2019 12:30 PM by Vear Schwab, MD. Otilio Lopez admitted from Home     Problem List             Patient Active Problem List     Diagnosis Date Noted    Hyponatremia 04/27/2018    HERMILO [facility]acute kidney injury) ([facility] [address] 04/27/2018    Acute encephalopathy 04/27/2018    Uncontrolled type 2 DM with hyperosmolar nonketotic hyperglycemia ([facility] [address] 04/27/2018    Other and combined forms of senile cataract 01/16/2014              Past Medical History:   Diagnosis Date    CAD (coronary artery disease)      Cancer ([facility] [address]       prostate    Diabetes ([facility] [address]      GERD (gastroesophageal reflux disease)      Hypertension      Other ill-defined conditions(799.89)       elevated cholesterol            Core Measures:     CVA: No Not applicable  CHF:No Not applicable  PNA:No Not applicable        Activity Status:     OOB to Chair Yes  Ambulated this shift Yes   Bed Rest No     Supplemental L0: (ZD Applicable)     NC No  NRB No  Venti-mask No  On room air Liters/min        LINES AND DRAINS:     PIV     DVT prophylaxis:     DVT prophylaxis Med- Yes  DVT prophylaxis SCD or MICHAEL- No      Wounds: (If Applicable)     Wounds- No     Location none     Patient Safety:     Falls Score Total Score: 4  Safety Level_______  Bed Alarm On? Yes  Sitter? No     Plan for upcoming shift: abx, fluids           Discharge Plan: Home with family when medically stable   Active Consults:  IP CONSULT TO CARDIOLOGY  IP CONSULT TO HOSPITALIST

## 2019-07-09 NOTE — PROGRESS NOTES
Hospitalist Progress Note    NAME: Earnest Whiteside   :  1940   MRN:  [id number]       Assessment / Plan:      Acute encephalopathy due to UTI, POA  Underlying dementia  -exam non focal and MS seems near baseline. No fall or trauma so hold on CT  -treat UTI.   - Continue rocephin started by ER. Follow up on cultures  -PT OT eval and treat- no recs  -Blood cultures negative so far  -Urine with gram-negative rods  -Continue ceftriaxone and narrow based on cultures to oral antibiotics if blood cultures negative  -Mentation near baseline, suspect metabolic encephalopathy present on admission  -No head CT was done on admission but now close to baseline so we will hold off on further imaging unless patient worsens    Vomiting   -more likely related to UTI than to cardiac ischemia. Abd exam benign  -allow diet. Antiemetics prn. Monitor  -gentle hydration  -seems resolved, tolerating diet     CAD, s/p CABG 5V  HTN  Troponin elevation  EKG changes vs 2016  -continue ASA, toprol, isordil, statin   -Appreciate cardiology evaluation  -Likely demand ischemia secondary to acute infection  -Continue blood pressure control  -Echocardiogram pending    DM 2, uncontrolled with hyperglycemia  -SSI prn.  7.8 A1c  -takes 70/30 at home  -uses nph now and adjust as diet improves     CKD3  -follow Cr  -gentle hydration    Prostate cancer, s/p prostatectomy and radiation  -follows with Dr Helena Guzmán  -resume home med     Code Status:  Full  Surrogate Decision Maker:  wife     DVT Prophylaxis:  Heparin SQ  GI Prophylaxis: not indicated     Baseline:  . Ambulates independently       30.0 - 39.9 Obese / Body mass index is 31.11 kg/m². Recommended Disposition: Home w/Family and  PT, OT, RN     Subjective:     Chief Complaint / Reason for Physician Visit  ams    Patient is pleasantly confused says he feels fine. His wife is at the bedside and feels that his mentation is nearly back to his baseline.   He has some underlying dementia. He is able to walk independently at home and she takes care of him 24/7. The patient denies any shortness of breath or chest pain. Denies any abdominal pain denies any urinary symptoms. Patient was evaluated at 3 PM      Discussed with RN events overnight. Review of Systems:  Symptom Y/N Comments  Symptom Y/N Comments   Fever/Chills    Chest Pain     Poor Appetite    Edema     Cough    Abdominal Pain     Sputum    Joint Pain     SOB/ANGUIANO    Pruritis/Rash     Nausea/vomit    Tolerating PT/OT     Diarrhea    Tolerating Diet     Constipation    Other       Could NOT obtain due to: confused     Objective:     VITALS:   Last 24hrs VS reviewed since prior progress note. Most recent are:  Patient Vitals for the past 24 hrs:   Temp Pulse Resp BP SpO2   07/09/19 1133 98.4 °F (36.9 °C) 83 18 101/51 99 %   07/09/19 0749 99.3 °F (37.4 °C) 75 18 170/76 98 %   07/09/19 0317 99.3 °F (37.4 °C) 65 18 140/73 98 %   07/08/19 2247 97.6 °F (36.4 °C) 60 18 172/53 100 %   07/08/19 1949 98.4 °F (36.9 °C) 63 18 122/64 97 %   07/08/19 1830  61 18 125/72 100 %   07/08/19 1800  62 18 115/60 100 %   07/08/19 1730  64 18 120/60 99 %   07/08/19 1700  65 18 116/63 100 %   07/08/19 1630  69 18 109/62 99 %   07/08/19 1600  72 18 103/59 100 %       Intake/Output Summary (Last 24 hours) at 7/9/2019 1540  Last data filed at 7/9/2019 0545  Gross per 24 hour   Intake 1100 ml   Output    Net 1100 ml        PHYSICAL EXAM:    Patient is awake and alert he is oriented to self had trouble with orientation and date. He is on room air no distress nontoxic-appearing. Conjunctiva pink mucous membranes moist.  Cardiovascular regular rate no obvious murmurs rubs or gallops. Lungs are clear no wheezes rhonchi or crackles. Abdomen bowel sounds present soft nontender no suprapubic tenderness or CVA tenderness. Extremities no clubbing cyanosis or edema.   Neuro exam is nonfocal following commands moving all extremities    Reviewed most current lab test results and cultures  YES  Reviewed most current radiology test results   YES  Review and summation of old records today    NO  Reviewed patient's current orders and MAR    YES  PMH/SH reviewed - no change compared to H&P  ________________________________________________________________________  Care Plan discussed with:    Comments   Patient y    Family  y wife   RN y    Care Manager     Consultant                        Multidiciplinary team rounds were held today with , nursing, pharmacist and clinical coordinator. Patient's plan of care was discussed; medications were reviewed and discharge planning was addressed. ________________________________________________________________________  Total NON critical care TIME:    Minutes    Total CRITICAL CARE TIME Spent:   Minutes non procedure based      Comments   >50% of visit spent in counseling and coordination of care     ________________________________________________________________________  Catarino Ormond, MD     Procedures: see electronic medical records for all procedures/Xrays and details which were not copied into this note but were reviewed prior to creation of Plan. LABS:  I reviewed today's most current labs and imaging studies.   Pertinent labs include:  Recent Labs     07/08/19  1248   WBC 10.1   HGB 13.3   HCT 39.6        Recent Labs     07/09/19  0046 07/08/19  1248    136   K 4.2 4.2    105   CO2 27 25   * 219*   BUN 26* 23*   CREA 1.81* 1.79*   CA 9.5 9.1   MG 2.4  --    ALB  --  3.3*   TBILI  --  0.6   SGOT  --  17   ALT  --  26       Signed: Catarino Ormond, MD

## 2019-07-09 NOTE — PROGRESS NOTES
Progress Note      7/9/2019 8:21 AM  NAME: Francisco Hernandez   MRN:  [id number]   Admit Diagnosis: Acute encephalopathy [G93.40]                Assessment:       1. Fever, equvicol troponin  2. CAD/CABG in 2006 with cath in 2017 with patent sequential LIMA to LAD/Diagonal, patent seq SVG to ramus and OM, patent SVG to distal Om  3. Unknown EF  4. Sinus with incomplete RBBB, NT  5. HTD  6. DM  7. Dyslipidemia  8. Dementia  9. , retired  for [employer], sedentary  [employer]:        No chest pain. Equivicol troponin. NT on ECG. Medically rx CAD for now. Euvolemic  Sinus     Check Echo     1. Cont ASA  2. Cont toprol  3. Resume norvasc  4. Cont imdur  5. Cont lipitor             [x]        High complexity decision making was performed             Subjective:     Francisco Hernandez denies chest pain, dyspnea, mild confusion. Discussed with RN events overnight. Review of Systems:    Symptom Y/N Comments  Symptom Y/N Comments   Fever/Chills N   Chest Pain N    Poor Appetite N   Edema N    Cough N   Abdominal Pain N    Sputum N   Joint Pain N    SOB/ANGUIANO N   Pruritis/Rash N    Nausea/vomit N   Tolerating PT/OT Y    Diarrhea N   Tolerating Diet Y    Constipation N   Other       Could NOT obtain due to:      Objective:      Physical Exam:    Last 24hrs VS reviewed since prior progress note. Most recent are:    Visit Vitals  /76   Pulse 75   Temp 99.3 °F (37.4 °C)   Resp 18   Ht 5' 7\" (1.702 m)   Wt 90.1 kg (198 lb 10.2 oz)   SpO2 98%   BMI 31.11 kg/m²       Intake/Output Summary (Last 24 hours) at 7/9/2019 7732  Last data filed at 7/9/2019 0545  Gross per 24 hour   Intake 1100 ml   Output    Net 1100 ml        General Appearance: Well developed, well nourished, alert & oriented to person only,    no acute distress. Ears/Nose/Mouth/Throat: Hearing grossly normal.  Neck: Supple. Chest: Lungs clear to auscultation bilaterally.   Cardiovascular: Regular rate and rhythm, S1S2 normal, no murmur. Abdomen: Soft, non-tender, bowel sounds are active. Extremities: No edema bilaterally. Skin: Warm and dry. PMH/SH reviewed - no change compared to H&P    Data Review    Telemetry: normal sinus rhythm     Lab Data Personally Reviewed:    Recent Labs     07/08/19  1248   WBC 10.1   HGB 13.3   HCT 39.6        No results for input(s): INR, PTP, APTT in the last 72 hours. No lab exists for component: INREXT   Recent Labs     07/09/19  0046 07/08/19  1248    136   K 4.2 4.2    105   CO2 27 25   BUN 26* 23*   CREA 1.81* 1.79*   * 219*   CA 9.5 9.1   MG 2.4  --      Recent Labs     07/09/19  0046 07/08/19  1506 07/08/19  1248   TROIQ 0.11* 0.18* 0.08*     Lab Results   Component Value Date/Time    Cholesterol, total 145 07/09/2019 12:46 AM    HDL Cholesterol 59 07/09/2019 12:46 AM    LDL, calculated 73.6 07/09/2019 12:46 AM    Triglyceride 62 07/09/2019 12:46 AM    CHOL/HDL Ratio 2.5 07/09/2019 12:46 AM       Recent Labs     07/08/19  1248   SGOT 17   AP 88   TP 7.6   ALB 3.3*   GLOB 4.3*     No results for input(s): PH, PCO2, PO2 in the last 72 hours.     Medications Personally Reviewed:    Current Facility-Administered Medications   Medication Dose Route Frequency    aspirin chewable tablet 81 mg  81 mg Oral DAILY    atorvastatin (LIPITOR) tablet 20 mg  20 mg Oral DAILY    isosorbide dinitrate (ISORDIL) tablet 30 mg  30 mg Oral DAILY    metoprolol succinate (TOPROL-XL) XL tablet 100 mg  100 mg Oral DAILY    QUEtiapine (SEROquel) tablet 50 mg  50 mg Oral QHS    memantine (NAMENDA) tablet 10 mg  10 mg Oral BID    And    donepezil (ARICEPT) tablet 10 mg  10 mg Oral DAILY    amLODIPine (NORVASC) tablet 10 mg  10 mg Oral DAILY    sodium chloride (NS) flush 5-40 mL  5-40 mL IntraVENous Q8H    sodium chloride (NS) flush 5-40 mL  5-40 mL IntraVENous PRN    acetaminophen (TYLENOL) tablet 650 mg  650 mg Oral Q4H PRN    ondansetron (ZOFRAN) injection 4 mg  4 mg IntraVENous Q4H PRN    heparin (porcine) injection 5,000 Units  5,000 Units SubCUTAneous Q8H    insulin lispro (HUMALOG) injection   SubCUTAneous AC&HS    glucose chewable tablet 16 g  4 Tab Oral PRN    glucagon (GLUCAGEN) injection 1 mg  1 mg IntraMUSCular PRN    dextrose 10% infusion 125-250 mL  125-250 mL IntraVENous PRN    cefTRIAXone (ROCEPHIN) 1 g in 0.9% sodium chloride (MBP/ADV) 50 mL  1 g IntraVENous Q24H         Diego Gillis MD

## 2019-07-09 NOTE — PROGRESS NOTES
Pharmacy Medication Reconciliation     The patient was interviewed regarding current PTA medication list, use and drug allergies;  wife present in room and obtained permission from patient to discuss drug regimen with visitor(s) present. The patient was questioned regarding use of any other inhalers, topical products, over the counter medications, herbal medications, vitamin products or ophthalmic/nasal/otic medication use. Allergy Update: Patient has no known allergies. Recommendations/Findings: The following amendments were made to the patient's active medication list on file at [facility]:   1) Additions: Dakota Rizzo (wife aware that we do not stock), Vitamin D3, Novolog    2) Deletions: Tradjenta, Melatonin, Fish oil    3) Changes: Isosorbide, atorvastatin       -Clarified PTA med list with wife. PTA medication list was corrected to the following:     Prior to Admission Medications   Prescriptions Last Dose Informant Patient Reported? Taking? QUEtiapine (SEROQUEL) 50 mg tablet  Significant Other Yes Yes   Sig: Take 50 mg by mouth nightly. amLODIPine (NORVASC) 10 mg tablet  Significant Other Yes Yes   Sig: Take 10 mg by mouth nightly. apalutamide (ERLEADA) 60 mg tablet  Significant Other Yes Yes   Sig: Take 120 mg by mouth two (2) times a day. aspirin 81 mg tablet  Significant Other Yes No   Sig: Take 81 mg by mouth daily. atorvastatin (LIPITOR) 20 mg tablet  Significant Other Yes Yes   Sig: Take 20 mg by mouth daily. cholecalciferol (VITAMIN D3) 1,000 unit tablet  Significant Other Yes Yes   Sig: Take 1,000 Units by mouth daily. insulin aspart protamine/insulin aspart (NOVOLOG MIX 70-30 U-100 INSULN) 100 unit/mL (70-30) injection  Significant Other Yes Yes   Si Units by SubCUTAneous route Daily (before breakfast).    insulin aspart protamine/insulin aspart (NOVOLOG MIX 70-30 U-100 INSULN) 100 unit/mL (70-30) injection  Significant Other Yes Yes   Si Units by SubCUTAneous route Daily (before dinner). isosorbide mononitrate ER (IMDUR) 30 mg tablet  Significant Other Yes Yes   Sig: Take 30 mg by mouth daily. memantine-donepezil (NAMZARIC) 28-10 mg CSpX  Significant Other Yes Yes   Sig: Take 1 Cap by mouth nightly. metoprolol succinate (TOPROL-XL) 100 mg tablet  Significant Other Yes No   Sig: Take 100 mg by mouth daily.       Facility-Administered Medications: None          Thank you,  Monte Skiff, PHARMD

## 2019-07-09 NOTE — INTERDISCIPLINARY ROUNDS
Bedside interdisciplinary rounds were held today to discuss patient plan of care and outcomes. The following members were present: Physician, Nurse, Clinical Care Leader, Pharmacy, Physical Therapy, and Case Management. Plan: 
 
Echo pending Carotids pending PT/OT- no needs

## 2019-07-09 NOTE — PROGRESS NOTES
physical Therapy EVALUATION/DISCHARGE  Patient: Montse Bailey (89 y.o. male)  Date: 7/9/2019  Primary Diagnosis: Acute encephalopathy [G93.40]       Precautions:   Bed Alarm  ASSESSMENT :  Based on the objective data described below, the patient presents with good strength, good functional mobility, confusion, and steady gait following admission for acute encephalopathy. PTA patient lives with his wife. He is independent with all aspects of functional mobility and ADLS. He reports he walks with a SPC around the block. Currently, patient received resting in bed, agreeable and cleared for therapy. Patient is pleasantly confused, oriented to self only. Patient is overall supervision-independent with all functional mobility. He ambulated 200 feet in the hallway independently and negotiated 12 stairs with SBA with RHR. Patient left sitting in the chair with the bed alarm on at the conclusion of PT evaluation. Recommending return home with supervision from wife until confusion improves. Further skilled acute physical therapy is not indicated at this time. PLAN :  Discharge Recommendations: home with supervision, no PT needs  Further Equipment Recommendations for Discharge: none     SUBJECTIVE:   Patient stated I don't really know where I am or why I am here.  Am I in [place]?    OBJECTIVE DATA SUMMARY:   HISTORY:    Past Medical History:   Diagnosis Date    CAD (coronary artery disease)     Cancer ([facility] [address]     prostate    Diabetes ([facility] [address]     GERD (gastroesophageal reflux disease)     Hypertension     Other ill-defined conditions(799.89)     elevated cholesterol     Past Surgical History:   Procedure Laterality Date    CARDIAC SURG PROCEDURE UNLIST      cabg x5 vessels,cardiologist  and [facility]id [facility]    COLONOSCOPY N/A 8/31/2016    COLONOSCOPY [facility] Sherry Stephen [facility] ENDOSCOPY    COLORECTAL SCRN; HI RISK IND  8/31/2016         HX PROSTATECTOMY      [place] COLONOSCOPY FLX DX W/COLLJ SPEC WHEN PFRMD  4/13/2011          Prior Level of Function/Home Situation: patient lives with his wife. He is independent with all aspects of functional mobility and ADLS. He reports he walks with a SPC around the block. Personal factors and/or comorbidities impacting plan of care: confusion, early dementia? Home Situation  Home Environment: Private residence  # Steps to Enter: (has step but unsure how many)  One/Two Story Residence: Two story(basement)  Living Alone: No  Current DME Used/Available at Home: Cane, straight  Tub or Shower Type: Tub/Shower combination    EXAMINATION/PRESENTATION/DECISION MAKING:   Critical Behavior:  Neurologic State: Confused  Orientation Level: Disoriented to situation, Oriented to person  Cognition: Decreased attention/concentration, Decreased command following     Hearing:     Skin:    Edema:   Range Of Motion:  AROM: Within functional limits           PROM: Within functional limits           Strength:    Strength: Within functional limits                    Tone & Sensation:   Tone: Normal                              Coordination:  Coordination: Within functional limits  Vision:      Functional Mobility:  Bed Mobility:  Rolling: Independent  Supine to Sit: Supervision; Additional time     Scooting: Independent  Transfers:  Sit to Stand: Independent  Stand to Sit: Independent        Bed to Chair: Supervision              Balance:   Sitting: Intact; Without support  Standing: Intact; Without support  Ambulation/Gait Training:  Distance (ft): 200 Feet (ft)  Assistive Device: Gait belt  Ambulation - Level of Assistance: Supervision; Independent     Gait Description (WDL): Exceptions to WDL  Gait Abnormalities: Decreased step clearance        Base of Support: Widened     Speed/Melvina: Pace decreased (<100 feet/min)                            Stairs:  Number of Stairs Trained: 12  Stairs - Level of Assistance: Stand-by assistance   Rail Use: Right Therapeutic Exercises:       Functional Measure:  Barthel Index:    Bathin  Bladder: 5  Bowels: 5  Groomin  Dressing: 10  Feeding: 10  Mobility: 15  Stairs: 10  Toilet Use: 10  Transfer (Bed to Chair and Back): 15  Total: 85/100       Percentage of impairment   0%   1-19%   20-39%   40-59%   60-79%   80-99%   100%   Barthel Score 0-100 100 99-80 79-60 59-40 20-39 1-19   0     The Barthel ADL Index: Guidelines  1. The index should be used as a record of what a patient does, not as a record of what a patient could do. 2. The main aim is to establish degree of independence from any help, physical or verbal, however minor and for whatever reason. 3. The need for supervision renders the patient not independent. 4. A patient's performance should be established using the best available evidence. Asking the patient, friends/relatives and nurses are the usual sources, but direct observation and common sense are also important. However direct testing is not needed. 5. Usually the patient's performance over the preceding 24-48 hours is important, but occasionally longer periods will be relevant. 6. Middle categories imply that the patient supplies over 50 per cent of the effort. 7. Use of aids to be independent is allowed. Michelle Ayon., Barthel, D.W. (2902). Functional evaluation: the Barthel Index. [address] [facility] (14)2. [facility] DORA TranF, Mane Portillo., Ronda Hill., [place], [address] [place] [address] (). Measuring the change indisability after inpatient rehabilitation; comparison of the responsiveness of the Barthel Index and Functional [place] Measure. Journal of Neurology, Neurosurgery, and Psychiatry, 66(4), 456-621. Mita Peterson, N.J.A, Laron Goncalves,  SHWETHA.M, & HUNG DdosonA. (2004.) Assessment of post-stroke quality of life in cost-effectiveness studies: The usefulness of the Barthel Index and the EuroQoL-5D.  Quality of Life Research, 15, 782-38            Physical Therapy Evaluation Charge Determination   History Examination Presentation Decision-Making   MEDIUM  Complexity : 1-2 comorbidities / personal factors will impact the outcome/ POC  MEDIUM Complexity : 3 Standardized tests and measures addressing body structure, function, activity limitation and / or participation in recreation  MEDIUM Complexity : Evolving with changing characteristics  Other outcome measures Barthel   MEDIUM      Based on the above components, the patient evaluation is determined to be of the following complexity level: MEDIUM    Pain:           Activity Tolerance:   Good, confused. Please refer to the flowsheet for vital signs taken during this treatment. After treatment:   [x]   Patient left in no apparent distress sitting up in chair  []   Patient left in no apparent distress in bed  [x]   Call bell left within reach  [x]   Nursing notified  []   Caregiver present  [x]   Bed alarm activated    COMMUNICATION/EDUCATION:   Communication/Collaboration:  [x]   Fall prevention education was provided and the patient/caregiver indicated understanding. [x]   Patient/family have participated as able and agree with findings and recommendations. []   Patient is unable to participate in plan of care at this time.   Findings and recommendations were discussed with: Occupational Therapist, Registered Nurse and     Thank you for this referral.  Kody Eduardo PT, DPT   Time Calculation: 18 mins

## 2019-07-09 NOTE — ROUTINE PROCESS
Bedside and Verbal shift change report given to [facility] (oncoming nurse) by Chris Simmons RN (offgoing nurse). Report included the following information SBAR, Kardex, MAR and Recent Results. 
  
Zone Phone:   [phone] 
  
  
Significant changes during shift:   
None 
  
  
Patient Information 
  
Abdoul Vasquez 69 y.o. 
7/8/2019 12:30 PM by Keshawn Bravo MD. Abdoul Vasquez was admitted from Home 
  
Problem List 
  
    
Patient Active Problem List  
  Diagnosis Date Noted  Hyponatremia 04/27/2018  HERMILO (acute kidney injury) ([facility] [address] 04/27/2018  Acute encephalopathy 04/27/2018  Uncontrolled type 2 DM with hyperosmolar nonketotic hyperglycemia ([facility] [address] 04/27/2018  Other and combined forms of senile cataract 01/16/2014  
  
    
Past Medical History:  
Diagnosis Date  CAD (coronary artery disease)    
 Cancer [facility])    
  prostate  Diabetes ([facility] [address]    
 GERD (gastroesophageal reflux disease)    
 Hypertension    
 Other ill-defined conditions(799.89)    
  elevated cholesterol  
  
  
  
Core Measures: 
  
CVA: No Not applicable CHF:No Not applicable PNA:No Not applicable 
  
  
Activity Status: 
  
OOB to Chair Yes Ambulated this shift Yes Bed Rest No 
  
Supplemental O2: (If Applicable) 
  
NC No 
NRB No 
Venti-mask No 
On room air Liters/min 
  
  
LINES AND DRAINS: 
  
PIV 
  
DVT prophylaxis: 
  
DVT prophylaxis Med- Yes DVT prophylaxis SCD or MICHAEL- No  
  
Wounds: (If Applicable) 
  
Wounds- No 
  
Location none 
  
Patient Safety: 
  
Falls Score Total Score: 4 Safety Level_______ Bed Alarm On? Yes Sitter? No 
  
Plan for upcoming shift: abx   
  
  
Discharge Plan: No just admitted 
  
Active Consults: 
IP CONSULT TO CARDIOLOGY 
IP CONSULT TO HOSPITALIST

## 2019-07-10 LAB
ANION GAP SERPL CALC-SCNC: 7 MMOL/L (ref 5–15)
BACTERIA SPEC CULT: ABNORMAL
BASOPHILS # BLD: 0 K/UL (ref 0–0.1)
BASOPHILS NFR BLD: 0 % (ref 0–1)
BUN SERPL-MCNC: 24 MG/DL (ref 6–20)
BUN/CREAT SERPL: 16 (ref 12–20)
CALCIUM SERPL-MCNC: 8.8 MG/DL (ref 8.5–10.1)
CC UR VC: ABNORMAL
CHLORIDE SERPL-SCNC: 105 MMOL/L (ref 97–108)
CO2 SERPL-SCNC: 23 MMOL/L (ref 21–32)
CREAT SERPL-MCNC: 1.53 MG/DL (ref 0.7–1.3)
DIFFERENTIAL METHOD BLD: ABNORMAL
EOSINOPHIL # BLD: 0.1 K/UL (ref 0–0.4)
EOSINOPHIL NFR BLD: 3 % (ref 0–7)
ERYTHROCYTE [DISTWIDTH] IN BLOOD BY AUTOMATED COUNT: 13.5 % (ref 11.5–14.5)
GLUCOSE BLD STRIP.AUTO-MCNC: 143 MG/DL (ref 65–100)
GLUCOSE BLD STRIP.AUTO-MCNC: 173 MG/DL (ref 65–100)
GLUCOSE BLD STRIP.AUTO-MCNC: 325 MG/DL (ref 65–100)
GLUCOSE BLD STRIP.AUTO-MCNC: 96 MG/DL (ref 65–100)
GLUCOSE SERPL-MCNC: 141 MG/DL (ref 65–100)
HCT VFR BLD AUTO: 42.6 % (ref 36.6–50.3)
HGB BLD-MCNC: 13.7 G/DL (ref 12.1–17)
IMM GRANULOCYTES # BLD AUTO: 0 K/UL (ref 0–0.04)
IMM GRANULOCYTES NFR BLD AUTO: 1 % (ref 0–0.5)
LYMPHOCYTES # BLD: 0.7 K/UL (ref 0.8–3.5)
LYMPHOCYTES NFR BLD: 13 % (ref 12–49)
MAGNESIUM SERPL-MCNC: 2.2 MG/DL (ref 1.6–2.4)
MCH RBC QN AUTO: 28.1 PG (ref 26–34)
MCHC RBC AUTO-ENTMCNC: 32.2 G/DL (ref 30–36.5)
MCV RBC AUTO: 87.5 FL (ref 80–99)
MONOCYTES # BLD: 0.5 K/UL (ref 0–1)
MONOCYTES NFR BLD: 9 % (ref 5–13)
NEUTS SEG # BLD: 4.2 K/UL (ref 1.8–8)
NEUTS SEG NFR BLD: 75 % (ref 32–75)
NRBC # BLD: 0 K/UL (ref 0–0.01)
NRBC BLD-RTO: 0 PER 100 WBC
PHOSPHATE SERPL-MCNC: 3 MG/DL (ref 2.6–4.7)
PLATELET # BLD AUTO: 230 K/UL (ref 150–400)
PMV BLD AUTO: 9.4 FL (ref 8.9–12.9)
POTASSIUM SERPL-SCNC: 4.4 MMOL/L (ref 3.5–5.1)
RBC # BLD AUTO: 4.87 M/UL (ref 4.1–5.7)
SERVICE CMNT-IMP: ABNORMAL
SERVICE CMNT-IMP: NORMAL
SODIUM SERPL-SCNC: 135 MMOL/L (ref 136–145)
WBC # BLD AUTO: 5.6 K/UL (ref 4.1–11.1)

## 2019-07-10 PROCEDURE — 74011636637 HC RX REV CODE- 636/637: Performed by: INTERNAL MEDICINE

## 2019-07-10 PROCEDURE — 83735 ASSAY OF MAGNESIUM: CPT

## 2019-07-10 PROCEDURE — 74011250636 HC RX REV CODE- 250/636: Performed by: EMERGENCY MEDICINE

## 2019-07-10 PROCEDURE — 74011636637 HC RX REV CODE- 636/637: Performed by: EMERGENCY MEDICINE

## 2019-07-10 PROCEDURE — 94760 N-INVAS EAR/PLS OXIMETRY 1: CPT

## 2019-07-10 PROCEDURE — 82962 GLUCOSE BLOOD TEST: CPT

## 2019-07-10 PROCEDURE — 84100 ASSAY OF PHOSPHORUS: CPT

## 2019-07-10 PROCEDURE — 74011250636 HC RX REV CODE- 250/636: Performed by: INTERNAL MEDICINE

## 2019-07-10 PROCEDURE — 74011250637 HC RX REV CODE- 250/637: Performed by: INTERNAL MEDICINE

## 2019-07-10 PROCEDURE — 85025 COMPLETE CBC W/AUTO DIFF WBC: CPT

## 2019-07-10 PROCEDURE — 80048 BASIC METABOLIC PNL TOTAL CA: CPT

## 2019-07-10 PROCEDURE — 36415 COLL VENOUS BLD VENIPUNCTURE: CPT

## 2019-07-10 PROCEDURE — 65660000000 HC RM CCU STEPDOWN

## 2019-07-10 PROCEDURE — 74011250637 HC RX REV CODE- 250/637: Performed by: EMERGENCY MEDICINE

## 2019-07-10 PROCEDURE — 74011000258 HC RX REV CODE- 258: Performed by: INTERNAL MEDICINE

## 2019-07-10 RX ORDER — INSULIN LISPRO 100 [IU]/ML
5 INJECTION, SOLUTION INTRAVENOUS; SUBCUTANEOUS
Status: DISCONTINUED | OUTPATIENT
Start: 2019-07-10 | End: 2019-07-12 | Stop reason: HOSPADM

## 2019-07-10 RX ADMIN — HEPARIN SODIUM 5000 UNITS: 5000 INJECTION INTRAVENOUS; SUBCUTANEOUS at 21:31

## 2019-07-10 RX ADMIN — METOPROLOL SUCCINATE 100 MG: 50 TABLET, EXTENDED RELEASE ORAL at 09:08

## 2019-07-10 RX ADMIN — SODIUM CHLORIDE 75 ML/HR: 900 INJECTION, SOLUTION INTRAVENOUS at 07:35

## 2019-07-10 RX ADMIN — HUMAN INSULIN 20 UNITS: 100 INJECTION, SUSPENSION SUBCUTANEOUS at 09:08

## 2019-07-10 RX ADMIN — Medication 10 ML: at 21:32

## 2019-07-10 RX ADMIN — INSULIN LISPRO 5 UNITS: 100 INJECTION, SOLUTION INTRAVENOUS; SUBCUTANEOUS at 13:51

## 2019-07-10 RX ADMIN — AMLODIPINE BESYLATE 10 MG: 5 TABLET ORAL at 09:08

## 2019-07-10 RX ADMIN — ATORVASTATIN CALCIUM 20 MG: 20 TABLET, FILM COATED ORAL at 09:08

## 2019-07-10 RX ADMIN — ISOSORBIDE MONONITRATE 30 MG: 30 TABLET, EXTENDED RELEASE ORAL at 09:08

## 2019-07-10 RX ADMIN — QUETIAPINE FUMARATE 50 MG: 25 TABLET ORAL at 21:31

## 2019-07-10 RX ADMIN — MEMANTINE HYDROCHLORIDE 10 MG: 10 TABLET ORAL at 17:31

## 2019-07-10 RX ADMIN — INSULIN LISPRO 4 UNITS: 100 INJECTION, SOLUTION INTRAVENOUS; SUBCUTANEOUS at 11:47

## 2019-07-10 RX ADMIN — Medication 10 ML: at 13:52

## 2019-07-10 RX ADMIN — MEMANTINE HYDROCHLORIDE 10 MG: 10 TABLET ORAL at 09:08

## 2019-07-10 RX ADMIN — CEFTRIAXONE 1 G: 1 INJECTION, POWDER, FOR SOLUTION INTRAMUSCULAR; INTRAVENOUS at 17:58

## 2019-07-10 RX ADMIN — HUMAN INSULIN 20 UNITS: 100 INJECTION, SUSPENSION SUBCUTANEOUS at 17:31

## 2019-07-10 RX ADMIN — ACETAMINOPHEN 650 MG: 325 TABLET ORAL at 17:57

## 2019-07-10 RX ADMIN — HEPARIN SODIUM 5000 UNITS: 5000 INJECTION INTRAVENOUS; SUBCUTANEOUS at 06:48

## 2019-07-10 RX ADMIN — DONEPEZIL HYDROCHLORIDE 10 MG: 5 TABLET, FILM COATED ORAL at 09:08

## 2019-07-10 RX ADMIN — ASPIRIN 81 MG 81 MG: 81 TABLET ORAL at 09:08

## 2019-07-10 RX ADMIN — INSULIN LISPRO 5 UNITS: 100 INJECTION, SOLUTION INTRAVENOUS; SUBCUTANEOUS at 17:31

## 2019-07-10 RX ADMIN — HEPARIN SODIUM 5000 UNITS: 5000 INJECTION INTRAVENOUS; SUBCUTANEOUS at 13:51

## 2019-07-10 NOTE — PROGRESS NOTES
Progress Note      7/10/2019 8:21 AM  NAME: David Thompson   MRN:  [id number]   Admit Diagnosis: Acute encephalopathy [G93.40]                Assessment:       1. Fever, gram negative UTI, equvicol troponin  2. CAD/CABG in 2006 with cath in 2017 with patent sequential LIMA to LAD/Diagonal, patent seq SVG to ramus and OM, patent SVG to distal Om  3. Echo 7/2019 EF 60%, trace MR  4. Sinus with incomplete RBBB, NT  5. HTD  6. DM  7. Dyslipidemia  8. Dementia  9. , retired  for [employer], sedentary  [employer] [address]:        No chest pain. Equivicol troponin. NT on ECG. Medically rx CAD for now. Euvolemic  Sinus     Echo with normal EF, trace MR     1. Cont ASA  2. Cont toprol  3. Cont norvasc  4. Cont imdur  5. Cont lipitor     Stable from cardiac perspective. Please call if we can be of further assistance.          [x]        High complexity decision making was performed             Subjective:     David Thompson denies chest pain, dyspnea, mild confusion. Discussed with RN events overnight. Review of Systems:    Symptom Y/N Comments  Symptom Y/N Comments   Fever/Chills N   Chest Pain N    Poor Appetite N   Edema N    Cough N   Abdominal Pain N    Sputum N   Joint Pain N    SOB/ANGUIANO N   Pruritis/Rash N    Nausea/vomit N   Tolerating PT/OT Y    Diarrhea N   Tolerating Diet Y    Constipation N   Other       Could NOT obtain due to:      Objective:      Physical Exam:    Last 24hrs VS reviewed since prior progress note. Most recent are:    Visit Vitals  /69   Pulse 74   Temp 99.3 °F (37.4 °C)   Resp 18   Ht 5' 7\" (1.702 m)   Wt 90.1 kg (198 lb 10.2 oz)   SpO2 100%   BMI 31.11 kg/m²     No intake or output data in the 24 hours ending 07/10/19 1127     General Appearance: Well developed, well nourished, alert & oriented to person only,    no acute distress. Ears/Nose/Mouth/Throat: Hearing grossly normal.  Neck: Supple.   Chest: Lungs clear to auscultation bilaterally. Cardiovascular: Regular rate and rhythm, S1S2 normal, no murmur. Abdomen: Soft, non-tender, bowel sounds are active. Extremities: No edema bilaterally. Skin: Warm and dry. PMH/SH reviewed - no change compared to H&P    Data Review    Telemetry: normal sinus rhythm     Lab Data Personally Reviewed:    Recent Labs     07/10/19  0434 07/08/19  1248   WBC 5.6 10.1   HGB 13.7 13.3   HCT 42.6 39.6    251     No results for input(s): INR, PTP, APTT in the last 72 hours. No lab exists for component: Maury Agudelo   Recent Labs     07/10/19  0434 07/09/19  0046 07/08/19  1248   * 139 136   K 4.4 4.2 4.2    104 105   CO2 23 27 25   BUN 24* 26* 23*   CREA 1.53* 1.81* 1.79*   * 205* 219*   CA 8.8 9.5 9.1   MG 2.2 2.4  --      Recent Labs     07/09/19  0046 07/08/19  1506 07/08/19  1248   TROIQ 0.11* 0.18* 0.08*     Lab Results   Component Value Date/Time    Cholesterol, total 145 07/09/2019 12:46 AM    HDL Cholesterol 59 07/09/2019 12:46 AM    LDL, calculated 73.6 07/09/2019 12:46 AM    Triglyceride 62 07/09/2019 12:46 AM    CHOL/HDL Ratio 2.5 07/09/2019 12:46 AM       Recent Labs     07/08/19  1248   SGOT 17   AP 88   TP 7.6   ALB 3.3*   GLOB 4.3*     No results for input(s): PH, PCO2, PO2 in the last 72 hours.     Medications Personally Reviewed:    Current Facility-Administered Medications   Medication Dose Route Frequency    aspirin chewable tablet 81 mg  81 mg Oral DAILY    atorvastatin (LIPITOR) tablet 20 mg  20 mg Oral DAILY    metoprolol succinate (TOPROL-XL) XL tablet 100 mg  100 mg Oral DAILY    QUEtiapine (SEROquel) tablet 50 mg  50 mg Oral QHS    memantine (NAMENDA) tablet 10 mg  10 mg Oral BID    And    donepezil (ARICEPT) tablet 10 mg  10 mg Oral DAILY    amLODIPine (NORVASC) tablet 10 mg  10 mg Oral DAILY    0.9% sodium chloride infusion  75 mL/hr IntraVENous CONTINUOUS    [START ON 7/11/2019] apalutamide (ERLEADA) chemo tablet 240 mg (Patient Supplied) 240 mg Oral DAILY    isosorbide mononitrate ER (IMDUR) tablet 30 mg  30 mg Oral DAILY    insulin NPH (NOVOLIN N, HUMULIN N) injection 20 Units  20 Units SubCUTAneous ACB&D    sodium chloride (NS) flush 5-40 mL  5-40 mL IntraVENous Q8H    sodium chloride (NS) flush 5-40 mL  5-40 mL IntraVENous PRN    acetaminophen (TYLENOL) tablet 650 mg  650 mg Oral Q4H PRN    ondansetron (ZOFRAN) injection 4 mg  4 mg IntraVENous Q4H PRN    heparin (porcine) injection 5,000 Units  5,000 Units SubCUTAneous Q8H    insulin lispro (HUMALOG) injection   SubCUTAneous AC&HS    glucose chewable tablet 16 g  4 Tab Oral PRN    glucagon (GLUCAGEN) injection 1 mg  1 mg IntraMUSCular PRN    dextrose 10% infusion 125-250 mL  125-250 mL IntraVENous PRN    cefTRIAXone (ROCEPHIN) 1 g in 0.9% sodium chloride (MBP/ADV) 50 mL  1 g IntraVENous Q24H         Brian Motley MD

## 2019-07-10 NOTE — PROGRESS NOTES
Problem: Falls - Risk of  Goal: *Absence of Falls  Description  Document Tena Edmond Fall Risk and appropriate interventions in the flowsheet. Outcome: Progressing Towards Goal     Problem: Patient Education: Go to Patient Education Activity  Goal: Patient/Family Education  Outcome: Progressing Towards Goal     Problem: Diabetes Self-Management  Goal: *Disease process and treatment process  Description  Define diabetes and identify own type of diabetes; list 3 options for treating diabetes. Outcome: Progressing Towards Goal  Goal: *Incorporating nutritional management into lifestyle  Description  Describe effect of type, amount and timing of food on blood glucose; list 3 methods for planning meals. Outcome: Progressing Towards Goal  Goal: *Incorporating physical activity into lifestyle  Description  State effect of exercise on blood glucose levels. Outcome: Progressing Towards Goal  Goal: *Developing strategies to promote health/change behavior  Description  Define the ABC's of diabetes; identify appropriate screenings, schedule and personal plan for screenings. Outcome: Progressing Towards Goal  Goal: *Using medications safely  Description  State effect of diabetes medications on diabetes; name diabetes medication taking, action and side effects. Outcome: Progressing Towards Goal  Goal: *Monitoring blood glucose, interpreting and using results  Description  Identify recommended blood glucose targets  and personal targets. Outcome: Progressing Towards Goal  Goal: *Prevention, detection, treatment of acute complications  Description  List symptoms of hyper- and hypoglycemia; describe how to treat low blood sugar and actions for lowering  high blood glucose level.   Outcome: Progressing Towards Goal  Goal: *Prevention, detection and treatment of chronic complications  Description  Define the natural course of diabetes and describe the relationship of blood glucose levels to long term complications of diabetes.   Outcome: Progressing Towards Goal  Goal: *Developing strategies to address psychosocial issues  Description  Describe feelings about living with diabetes; identify support needed and support network  Outcome: Progressing Towards Goal  Goal: *Insulin pump training  Outcome: Progressing Towards Goal  Goal: *Sick day guidelines  Outcome: Progressing Towards Goal  Goal: *Patient Specific Goal (EDIT GOAL, INSERT TEXT)  Outcome: Progressing Towards Goal     Problem: Patient Education: Go to Patient Education Activity  Goal: Patient/Family Education  Outcome: Progressing Towards Goal

## 2019-07-10 NOTE — PROGRESS NOTES
Hospitalist Progress Note    NAME: Amanda Haque   :  1940   MRN:  [id number]       Assessment / Plan:      Acute metabolic encephalopathy due to UTI, POA  Underlying dementia  -exam non focal and MS seems near baseline. No fall or trauma so hold on CT  -treat UTI.   - Continue rocephin started by ER. Follow up on cultures  -PT OT eval and treat- no recs  -Blood cultures negative so far  -Urine with gram-negative rods  -Continue ceftriaxone and narrow based on cultures to oral antibiotics if blood cultures negative  -Mentation now baseline, suspect metabolic encephalopathy present on admission  -No head CT was done on admission but now close to baseline so we will hold off on further imaging unless patient worsens  -fever overnight and urine cx still-p, follow on IV abx until no fever for 24hrs    Vomiting   -more likely related to UTI than to cardiac ischemia. Abd exam benign  -allow diet. Antiemetics prn. Monitor  -gentle hydration  -resolved, tolerating diet     CAD, s/p CABG 5V  HTN  Troponin elevation  EKG changes vs 2016  -continue ASA, toprol, isordil, statin   -Appreciate cardiology evaluation  -Likely demand ischemia secondary to acute infection  -Continue blood pressure control  -Echocardiogram EF 60-65%    DM 2, uncontrolled with hyperglycemia  -SSI prn.  7.8 A1c  -takes 70/30 at home  -cont nph now and adjust as diet improves  -add humalog with meals     CKD3  -follow Cr, near baseline  -gentle hydration    Prostate cancer, s/p prostatectomy and radiation  -follows with Dr Virgil Quiroz  -resumed home med     Code Status:  Full  Surrogate Decision Maker:  wife     DVT Prophylaxis:  Heparin SQ  GI Prophylaxis: not indicated     Baseline:  . Ambulates independently       30.0 - 39.9 Obese / Body mass index is 31.11 kg/m².     Recommended Disposition: Home w/Family and HH PT, OT, RN in 1-2 days     Subjective:     Chief Complaint / Reason for Physician Visit  ams     Patient is pleasantly confused says he feels fine. His wife is at the bedside and feels that his mentation is nearly back to his baseline. He has some underlying dementia. He is able to walk independently at home and she takes care of him 24/7. The patient denies any shortness of breath or chest pain. Denies any abdominal pain denies any urinary symptoms. 7/10 pt feels well. He denies sob/pain. Eating well per wife in room. Mentation baseline. Had fever overnight. Patient was evaluated at 11:45am      Discussed with RN events overnight. Review of Systems:  Symptom Y/N Comments  Symptom Y/N Comments   Fever/Chills    Chest Pain     Poor Appetite    Edema     Cough    Abdominal Pain     Sputum    Joint Pain     SOB/ANGUIANO    Pruritis/Rash     Nausea/vomit    Tolerating PT/OT     Diarrhea    Tolerating Diet     Constipation    Other       Could NOT obtain due to: confused     Objective:     VITALS:   Last 24hrs VS reviewed since prior progress note. Most recent are:  Patient Vitals for the past 24 hrs:   Temp Pulse Resp BP SpO2   07/10/19 1108 99.3 °F (37.4 °C) 74 18 128/69 100 %   07/10/19 0753 98.6 °F (37 °C) 70 18 165/84 100 %   07/10/19 0430 97.6 °F (36.4 °C) 66 18 179/80 100 %   07/09/19 2255 98 °F (36.7 °C) 66 18 140/65 100 %   07/09/19 2130 98.4 °F (36.9 °C)       07/09/19 1956 (!) 100.5 °F (38.1 °C) 74 18 150/82 100 %   07/09/19 1622 99.9 °F (37.7 °C) 73 16 123/71 99 %     No intake or output data in the 24 hours ending 07/10/19 1225     PHYSICAL EXAM:    Patient is awake and alert he is oriented to self. He is on room air no distress  Well appearing. Conjunctiva pink mucous membranes moist.  Cardiovascular regular rate no obvious murmurs rubs or gallops. Lungs are clear no wheezes rhonchi or crackles. Abdomen bowel sounds present soft nontender no suprapubic tenderness or CVA tenderness. Extremities no clubbing cyanosis or edema.   Neuro exam is nonfocal following commands moving all extremities    Reviewed most current lab test results and cultures  YES  Reviewed most current radiology test results   YES  Review and summation of old records today    NO  Reviewed patient's current orders and MAR    YES  PMH/SH reviewed - no change compared to H&P  ________________________________________________________________________  Care Plan discussed with:    Comments   Patient y    Family  y wife   RN y    Care Manager     Consultant                        Multidiciplinary team rounds were held today with , nursing, pharmacist and clinical coordinator. Patient's plan of care was discussed; medications were reviewed and discharge planning was addressed. ________________________________________________________________________  Total NON critical care TIME:    Minutes    Total CRITICAL CARE TIME Spent:   Minutes non procedure based      Comments   >50% of visit spent in counseling and coordination of care     ________________________________________________________________________  Catarino Ormond, MD     Procedures: see electronic medical records for all procedures/Xrays and details which were not copied into this note but were reviewed prior to creation of Plan. LABS:  I reviewed today's most current labs and imaging studies.   Pertinent labs include:  Recent Labs     07/10/19  0434 07/08/19  1248   WBC 5.6 10.1   HGB 13.7 13.3   HCT 42.6 39.6    251     Recent Labs     07/10/19  0434 07/09/19  0046 07/08/19  1248   * 139 136   K 4.4 4.2 4.2    104 105   CO2 23 27 25   * 205* 219*   BUN 24* 26* 23*   CREA 1.53* 1.81* 1.79*   CA 8.8 9.5 9.1   MG 2.2 2.4  --    PHOS 3.0  --   --    ALB  --   --  3.3*   TBILI  --   --  0.6   SGOT  --   --  17   ALT  --   --  26       Signed: Catarino Ormond, MD

## 2019-07-10 NOTE — ROUTINE PROCESS
Bedside and Verbal shift change report given to Esthela RN (oncoming nurse) by Blanca RN (offgoing nurse). Report included the following information SBAR, Kardex, MAR and Recent Results. 
  
Zone Phone: [phone] 
  
  
Significant changes during shift:   
None. Pt with fever during shift, tylenol given , fluids stopped  
  
Patient Information 
  
Eugenia Crow 69 y.o. 
7/8/2019 12:30 PM by Hellen Awad MD. Otilio Mc admitted from Home 
  
Problem List 
  
       
Patient Active Problem List  
  Diagnosis Date Noted  Hyponatremia 04/27/2018  HERMILO (acute kidney injury) ([facility] [address] 04/27/2018  Acute encephalopathy 04/27/2018  Uncontrolled type 2 DM with hyperosmolar nonketotic hyperglycemia ([facility] [address] 04/27/2018  Other and combined forms of senile cataract 01/16/2014  
  
       
Past Medical History:  
Diagnosis Date  CAD (coronary artery disease)    
 Cancer [facility])    
  prostate  Diabetes ([facility] [address]    
 GERD (gastroesophageal reflux disease)    
 Hypertension    
 Other ill-defined conditions(799.89)    
  elevated cholesterol  
  
  
  
Core Measures: 
  
CVA: No Not applicable CHF:No Not applicable PNA:No Not applicable 
  
  
Activity Status: 
  
OOB to RF Code Ambulated this shift Yes  
Bed Rest No 
  
Supplemental K7: (UO Applicable) 
  
NC No 
NRB No 
Venti-mask No 
On room air Liters/min 
  
  
LINES AND DRAINS: 
  
PIV 
  
DVT prophylaxis: 
  
DVT prophylaxis Med- Yes DVT prophylaxis SCD or MICHAEL- No  
  
Wounds: (If Applicable) 
  
Wounds- No 
  
Location none 
  
Patient Safety: 
  
Falls Score Total Score: 4 Safety Level_______ Bed Alarm On? Yes Sitter? No 
  
Plan for upcoming shift: abx, fluids   
  
  
Discharge Plan: Home with family when medically stable  
Active Consults: 
IP CONSULT TO CARDIOLOGY 
IP CONSULT TO HOSPITALIST

## 2019-07-10 NOTE — PROGRESS NOTES
Bedside and Verbal shift change report given to Blanca ZAMORA (oncoming nurse) by NOAH Proctor (offgoing nurse). Report included the following information SBAR, Kardex, MAR and Recent Results.     Zone [id number]: [id number]        Significant changes during shift:    None.  Pt with fever during shift, tylenol given and fever subsided        Patient Information     Abbie Axel  69 y.o.  7/8/2019 12:30 PM by Neptali Gallardo MD. Otilio Campbell admitted from Home     Problem List             Patient Active Problem List     Diagnosis Date Noted    Hyponatremia 04/27/2018    HERMILO (acute kidney injury) ([facility] [address] 04/27/2018    Acute encephalopathy 04/27/2018    Uncontrolled type 2 DM with hyperosmolar nonketotic hyperglycemia ([facility] [address] 04/27/2018    Other and combined forms of senile cataract 01/16/2014              Past Medical History:   Diagnosis Date    CAD (coronary artery disease)      Cancer ([facility] [address]       prostate    Diabetes ([facility] [address]      GERD (gastroesophageal reflux disease)      Hypertension      Other ill-defined conditions(799.89)       elevated cholesterol            Core Measures:     CVA: No Not applicable  CHF:No Not applicable  PNA:No Not applicable        Activity Status:     OOB to Chair Yes  Ambulated this shift Yes   Bed Rest No     Supplemental H7: (FK Applicable)     NC No  NRB No  Venti-mask No  On room air Liters/min        LINES AND DRAINS:     PIV     DVT prophylaxis:     DVT prophylaxis Med- Yes  DVT prophylaxis SCD or MICHAEL- No      Wounds: (If Applicable)     Wounds- No     Location none     Patient Safety:     Falls Score Total Score: 4  Safety Level_______  Bed Alarm On? Yes  Sitter? No     Plan for upcoming shift: abx, fluids           Discharge Plan: Home with family when medically stable   Active Consults:  IP CONSULT TO CARDIOLOGY  IP CONSULT TO HOSPITALIST

## 2019-07-10 NOTE — PROGRESS NOTES
BRI Plan-- Home with home health services. Spoke with patient and his wife re dcp. They are in agreement with home health services with [facility]. [facility] has accepted and [facility] signed and placed on chart. Orders faxed to [facility] with confirmation. Second medicare im letter given to patient and wife with opportunity for questions and signed copy placed on chart.

## 2019-07-11 ENCOUNTER — APPOINTMENT (OUTPATIENT)
Dept: CT IMAGING | Age: 79
DRG: 689 | End: 2019-07-11
Attending: EMERGENCY MEDICINE
Payer: MEDICARE

## 2019-07-11 LAB
ANION GAP SERPL CALC-SCNC: 7 MMOL/L (ref 5–15)
BASOPHILS # BLD: 0 K/UL (ref 0–0.1)
BASOPHILS NFR BLD: 1 % (ref 0–1)
BUN SERPL-MCNC: 17 MG/DL (ref 6–20)
BUN/CREAT SERPL: 12 (ref 12–20)
CALCIUM SERPL-MCNC: 9.3 MG/DL (ref 8.5–10.1)
CHLORIDE SERPL-SCNC: 102 MMOL/L (ref 97–108)
CO2 SERPL-SCNC: 28 MMOL/L (ref 21–32)
CREAT SERPL-MCNC: 1.41 MG/DL (ref 0.7–1.3)
DIFFERENTIAL METHOD BLD: ABNORMAL
EOSINOPHIL # BLD: 0.1 K/UL (ref 0–0.4)
EOSINOPHIL NFR BLD: 4 % (ref 0–7)
ERYTHROCYTE [DISTWIDTH] IN BLOOD BY AUTOMATED COUNT: 13.4 % (ref 11.5–14.5)
GLUCOSE BLD STRIP.AUTO-MCNC: 107 MG/DL (ref 65–100)
GLUCOSE BLD STRIP.AUTO-MCNC: 113 MG/DL (ref 65–100)
GLUCOSE BLD STRIP.AUTO-MCNC: 123 MG/DL (ref 65–100)
GLUCOSE BLD STRIP.AUTO-MCNC: 170 MG/DL (ref 65–100)
GLUCOSE BLD STRIP.AUTO-MCNC: 186 MG/DL (ref 65–100)
GLUCOSE BLD STRIP.AUTO-MCNC: 60 MG/DL (ref 65–100)
GLUCOSE SERPL-MCNC: 182 MG/DL (ref 65–100)
HCT VFR BLD AUTO: 42.2 % (ref 36.6–50.3)
HGB BLD-MCNC: 13.7 G/DL (ref 12.1–17)
IMM GRANULOCYTES # BLD AUTO: 0 K/UL (ref 0–0.04)
IMM GRANULOCYTES NFR BLD AUTO: 0 % (ref 0–0.5)
LYMPHOCYTES # BLD: 0.7 K/UL (ref 0.8–3.5)
LYMPHOCYTES NFR BLD: 27 % (ref 12–49)
MAGNESIUM SERPL-MCNC: 2.3 MG/DL (ref 1.6–2.4)
MCH RBC QN AUTO: 28.4 PG (ref 26–34)
MCHC RBC AUTO-ENTMCNC: 32.5 G/DL (ref 30–36.5)
MCV RBC AUTO: 87.6 FL (ref 80–99)
MONOCYTES # BLD: 0.3 K/UL (ref 0–1)
MONOCYTES NFR BLD: 13 % (ref 5–13)
NEUTS SEG # BLD: 1.4 K/UL (ref 1.8–8)
NEUTS SEG NFR BLD: 55 % (ref 32–75)
NRBC # BLD: 0 K/UL (ref 0–0.01)
NRBC BLD-RTO: 0 PER 100 WBC
PLATELET # BLD AUTO: 228 K/UL (ref 150–400)
PMV BLD AUTO: 9.1 FL (ref 8.9–12.9)
POTASSIUM SERPL-SCNC: 4.2 MMOL/L (ref 3.5–5.1)
RBC # BLD AUTO: 4.82 M/UL (ref 4.1–5.7)
RBC MORPH BLD: ABNORMAL
SERVICE CMNT-IMP: ABNORMAL
SODIUM SERPL-SCNC: 137 MMOL/L (ref 136–145)
WBC # BLD AUTO: 2.5 K/UL (ref 4.1–11.1)

## 2019-07-11 PROCEDURE — 94760 N-INVAS EAR/PLS OXIMETRY 1: CPT

## 2019-07-11 PROCEDURE — 65660000000 HC RM CCU STEPDOWN

## 2019-07-11 PROCEDURE — 74011250637 HC RX REV CODE- 250/637: Performed by: EMERGENCY MEDICINE

## 2019-07-11 PROCEDURE — 36415 COLL VENOUS BLD VENIPUNCTURE: CPT

## 2019-07-11 PROCEDURE — 74011000258 HC RX REV CODE- 258: Performed by: EMERGENCY MEDICINE

## 2019-07-11 PROCEDURE — 83735 ASSAY OF MAGNESIUM: CPT

## 2019-07-11 PROCEDURE — 74011250637 HC RX REV CODE- 250/637: Performed by: INTERNAL MEDICINE

## 2019-07-11 PROCEDURE — 85025 COMPLETE CBC W/AUTO DIFF WBC: CPT

## 2019-07-11 PROCEDURE — 74176 CT ABD & PELVIS W/O CONTRAST: CPT

## 2019-07-11 PROCEDURE — 74011250636 HC RX REV CODE- 250/636: Performed by: INTERNAL MEDICINE

## 2019-07-11 PROCEDURE — 74011636637 HC RX REV CODE- 636/637: Performed by: EMERGENCY MEDICINE

## 2019-07-11 PROCEDURE — 74011250636 HC RX REV CODE- 250/636: Performed by: EMERGENCY MEDICINE

## 2019-07-11 PROCEDURE — 82962 GLUCOSE BLOOD TEST: CPT

## 2019-07-11 PROCEDURE — 80048 BASIC METABOLIC PNL TOTAL CA: CPT

## 2019-07-11 RX ORDER — HYDRALAZINE HYDROCHLORIDE 25 MG/1
25 TABLET, FILM COATED ORAL
Status: DISCONTINUED | OUTPATIENT
Start: 2019-07-11 | End: 2019-07-12 | Stop reason: HOSPADM

## 2019-07-11 RX ADMIN — AMLODIPINE BESYLATE 10 MG: 5 TABLET ORAL at 09:31

## 2019-07-11 RX ADMIN — HEPARIN SODIUM 5000 UNITS: 5000 INJECTION INTRAVENOUS; SUBCUTANEOUS at 13:39

## 2019-07-11 RX ADMIN — ATORVASTATIN CALCIUM 20 MG: 20 TABLET, FILM COATED ORAL at 09:41

## 2019-07-11 RX ADMIN — ISOSORBIDE MONONITRATE 30 MG: 30 TABLET, EXTENDED RELEASE ORAL at 09:31

## 2019-07-11 RX ADMIN — INSULIN LISPRO 5 UNITS: 100 INJECTION, SOLUTION INTRAVENOUS; SUBCUTANEOUS at 12:35

## 2019-07-11 RX ADMIN — Medication 10 ML: at 13:38

## 2019-07-11 RX ADMIN — MEMANTINE HYDROCHLORIDE 10 MG: 10 TABLET ORAL at 09:30

## 2019-07-11 RX ADMIN — METOPROLOL SUCCINATE 100 MG: 50 TABLET, EXTENDED RELEASE ORAL at 09:30

## 2019-07-11 RX ADMIN — MEMANTINE HYDROCHLORIDE 10 MG: 10 TABLET ORAL at 17:46

## 2019-07-11 RX ADMIN — DONEPEZIL HYDROCHLORIDE 10 MG: 5 TABLET, FILM COATED ORAL at 09:34

## 2019-07-11 RX ADMIN — CEFTRIAXONE 1 G: 1 INJECTION, POWDER, FOR SOLUTION INTRAMUSCULAR; INTRAVENOUS at 17:44

## 2019-07-11 RX ADMIN — INSULIN LISPRO 5 UNITS: 100 INJECTION, SOLUTION INTRAVENOUS; SUBCUTANEOUS at 17:44

## 2019-07-11 RX ADMIN — HEPARIN SODIUM 5000 UNITS: 5000 INJECTION INTRAVENOUS; SUBCUTANEOUS at 06:00

## 2019-07-11 RX ADMIN — HUMAN INSULIN 20 UNITS: 100 INJECTION, SUSPENSION SUBCUTANEOUS at 09:33

## 2019-07-11 RX ADMIN — Medication 10 ML: at 21:48

## 2019-07-11 RX ADMIN — Medication 10 ML: at 04:14

## 2019-07-11 RX ADMIN — HEPARIN SODIUM 5000 UNITS: 5000 INJECTION INTRAVENOUS; SUBCUTANEOUS at 21:48

## 2019-07-11 RX ADMIN — QUETIAPINE FUMARATE 50 MG: 25 TABLET ORAL at 21:48

## 2019-07-11 RX ADMIN — ASPIRIN 81 MG 81 MG: 81 TABLET ORAL at 09:31

## 2019-07-11 RX ADMIN — INSULIN LISPRO 5 UNITS: 100 INJECTION, SOLUTION INTRAVENOUS; SUBCUTANEOUS at 09:33

## 2019-07-11 RX ADMIN — HUMAN INSULIN 20 UNITS: 100 INJECTION, SUSPENSION SUBCUTANEOUS at 17:46

## 2019-07-11 NOTE — PROGRESS NOTES
Bedside and Verbal shift change report given to Mary Lou RN (oncoming nurse) by louis RN (offgoing nurse). Report included the following information SBAR, Kardex, MAR and Recent Results.     Zone Phone: [phone]        Significant changes during shift:    None.  Pt with fever during shift, tylenol given , fluids stopped      Patient Information     Dwaine Johnson  69 y.o.  7/8/2019 12:30 PM by Bridger Bruner MD. [facility]     Problem List             Patient Active Problem List     Diagnosis Date Noted    Hyponatremia 04/27/2018    HERMILO (acute kidney injury) ([facility] [address] 04/27/2018    Acute encephalopathy 04/27/2018    Uncontrolled type 2 DM with hyperosmolar nonketotic hyperglycemia ([facility] [address] 04/27/2018    Other and combined forms of senile cataract 01/16/2014              Past Medical History:   Diagnosis Date    CAD (coronary artery disease)      Cancer ([facility] [address]       prostate    Diabetes ([facility] [address]      GERD (gastroesophageal reflux disease)      Hypertension      Other ill-defined conditions(799.89)       elevated cholesterol            Core Measures:     CVA: No Not applicable  CHF:No Not applicable  PNA:No Not applicable        Activity Status:     OOB to Chair Yes  Ambulated this shift Yes   Bed Rest No     Supplemental N4: (GZ Applicable)     NC No  NRB No  Venti-mask No  On room air Liters/min        LINES AND DRAINS:     PIV     DVT prophylaxis:     DVT prophylaxis Med- Yes  DVT prophylaxis SCD or MICHAEL- No      Wounds: (If Applicable)     Wounds- No     Location none     Patient Safety:     Falls Score Total Score: 4  Safety Level_______  Bed Alarm On? Yes  Sitter? No     Plan for upcoming shift: abx, fluids           Discharge Plan: Home with family when medically stable   Active Consults:  IP CONSULT TO CARDIOLOGY  IP CONSULT TO HOSPITALIST

## 2019-07-11 NOTE — PROGRESS NOTES
Progress Note      7/11/2019 8:21 AM  NAME: Maged Callaway   MRN:  [id number]   Admit Diagnosis: Acute encephalopathy [G93.40]                Assessment:       1. Fever, gram negative UTI, equvicol troponin  2. CAD/CABG in 2006 with cath in 2017 with patent sequential LIMA to LAD/Diagonal, patent seq SVG to ramus and OM, patent SVG to distal Om  3. Echo 7/2019 EF 60%, trace MR  4. Sinus with incomplete RBBB, NT  5. HTD  6. DM  7. Dyslipidemia  8. Dementia  9. , retired  for [employer], sedentary  [facility] 69:        No chest pain. Equivicol troponin. NT on ECG. Medically rx CAD for now. Euvolemic  Sinus     Echo with normal EF, trace MR     1. Cont ASA  2. Cont toprol  3. Cont norvasc  4. Cont imdur  5. Hydralazine PRN  6. Cont lipitor     Stable from cardiac perspective. Please call if we can be of further assistance.          [x]        High complexity decision making was performed             Subjective:     Maged Callaway denies chest pain, dyspnea, mild confusion. Discussed with RN events overnight. Review of Systems:    Symptom Y/N Comments  Symptom Y/N Comments   Fever/Chills N   Chest Pain N    Poor Appetite N   Edema N    Cough N   Abdominal Pain N    Sputum N   Joint Pain N    SOB/ANGUIANO N   Pruritis/Rash N    Nausea/vomit N   Tolerating PT/OT Y    Diarrhea N   Tolerating Diet Y    Constipation N   Other       Could NOT obtain due to:      Objective:      Physical Exam:    Last 24hrs VS reviewed since prior progress note. Most recent are:    Visit Vitals  /74   Pulse 65   Temp 98.9 °F (37.2 °C)   Resp 18   Ht 5' 7\" (1.702 m)   Wt 90.1 kg (198 lb 10.2 oz)   SpO2 98%   BMI 31.11 kg/m²     No intake or output data in the 24 hours ending 07/11/19 1115     General Appearance: Well developed, well nourished, alert & oriented to person only,    no acute distress. Ears/Nose/Mouth/Throat: Hearing grossly normal.  Neck: Supple.   Chest: Lungs clear to auscultation bilaterally. Cardiovascular: Regular rate and rhythm, S1S2 normal, no murmur. Abdomen: Soft, non-tender, bowel sounds are active. Extremities: No edema bilaterally. Skin: Warm and dry. PMH/SH reviewed - no change compared to H&P    Data Review    Telemetry: normal sinus rhythm     Lab Data Personally Reviewed:    Recent Labs     07/11/19  1020 07/10/19  0434   WBC 2.5* 5.6   HGB 13.7 13.7   HCT 42.2 42.6    230     No results for input(s): INR, PTP, APTT in the last 72 hours. No lab exists for component: Kristie Given   Recent Labs     07/11/19  1020 07/10/19  0434 07/09/19  0046    135* 139   K 4.2 4.4 4.2    105 104   CO2 28 23 27   BUN 17 24* 26*   CREA 1.41* 1.53* 1.81*   * 141* 205*   CA 9.3 8.8 9.5   MG 2.3 2.2 2.4     Recent Labs     07/09/19  0046 07/08/19  1506 07/08/19  1248   TROIQ 0.11* 0.18* 0.08*     Lab Results   Component Value Date/Time    Cholesterol, total 145 07/09/2019 12:46 AM    HDL Cholesterol 59 07/09/2019 12:46 AM    LDL, calculated 73.6 07/09/2019 12:46 AM    Triglyceride 62 07/09/2019 12:46 AM    CHOL/HDL Ratio 2.5 07/09/2019 12:46 AM       Recent Labs     07/08/19  1248   SGOT 17   AP 88   TP 7.6   ALB 3.3*   GLOB 4.3*     No results for input(s): PH, PCO2, PO2 in the last 72 hours.     Medications Personally Reviewed:    Current Facility-Administered Medications   Medication Dose Route Frequency    hydrALAZINE (APRESOLINE) tablet 25 mg  25 mg Oral Q8H PRN    insulin lispro (HUMALOG) injection 5 Units  5 Units SubCUTAneous TIDAC    aspirin chewable tablet 81 mg  81 mg Oral DAILY    atorvastatin (LIPITOR) tablet 20 mg  20 mg Oral DAILY    metoprolol succinate (TOPROL-XL) XL tablet 100 mg  100 mg Oral DAILY    QUEtiapine (SEROquel) tablet 50 mg  50 mg Oral QHS    memantine (NAMENDA) tablet 10 mg  10 mg Oral BID    And    donepezil (ARICEPT) tablet 10 mg  10 mg Oral DAILY    amLODIPine (NORVASC) tablet 10 mg  10 mg Oral DAILY    apalutamide (ERLEADA) chemo tablet 240 mg (Patient Supplied)  240 mg Oral DAILY    isosorbide mononitrate ER (IMDUR) tablet 30 mg  30 mg Oral DAILY    insulin NPH (NOVOLIN N, HUMULIN N) injection 20 Units  20 Units SubCUTAneous ACB&D    sodium chloride (NS) flush 5-40 mL  5-40 mL IntraVENous Q8H    sodium chloride (NS) flush 5-40 mL  5-40 mL IntraVENous PRN    acetaminophen (TYLENOL) tablet 650 mg  650 mg Oral Q4H PRN    ondansetron (ZOFRAN) injection 4 mg  4 mg IntraVENous Q4H PRN    heparin (porcine) injection 5,000 Units  5,000 Units SubCUTAneous Q8H    insulin lispro (HUMALOG) injection   SubCUTAneous AC&HS    glucose chewable tablet 16 g  4 Tab Oral PRN    glucagon (GLUCAGEN) injection 1 mg  1 mg IntraMUSCular PRN    dextrose 10% infusion 125-250 mL  125-250 mL IntraVENous PRN    cefTRIAXone (ROCEPHIN) 1 g in 0.9% sodium chloride (MBP/ADV) 50 mL  1 g IntraVENous Q24H         Sheryle Matsu, MD

## 2019-07-11 NOTE — PROGRESS NOTES
Bedside and Verbal shift change report given to Dallin RN (oncoming nurse) by Dai Officer nurse). Report included the following information SBAR, Kardex, MAR and Recent Results.     Zone Phone: [phone]        Significant changes during shift:    None.  Pt with fever during shift, tylenol given , fluids stopped      Patient Information     Dwaine Johnson  69 y.o.  7/8/2019 12:30 PM by Bridger Bruner MD. Otilio Cui Rolfe admitted from Home     Problem List             Patient Active Problem List     Diagnosis Date Noted    Hyponatremia 04/27/2018    HERMILO (acute kidney injury) ([facility] [address] 04/27/2018    Acute encephalopathy 04/27/2018    Uncontrolled type 2 DM with hyperosmolar nonketotic hyperglycemia ([facility] [address] 04/27/2018    Other and combined forms of senile cataract 01/16/2014              Past Medical History:   Diagnosis Date    CAD (coronary artery disease)      Cancer ([facility] [address]       prostate    Diabetes ([facility] [address]      GERD (gastroesophageal reflux disease)      Hypertension      Other ill-defined conditions(799.89)       elevated cholesterol            Core Measures:     CVA: No Not applicable  CHF:No Not applicable  PNA:No Not applicable        Activity Status:     OOB to Chair Yes  Ambulated this shift Yes   Bed Rest No     Supplemental P7: (VQ Applicable)     NC No  NRB No  Venti-mask No  On room air Liters/min        LINES AND DRAINS:     PIV     DVT prophylaxis:     DVT prophylaxis Med- Yes  DVT prophylaxis SCD or MICHAEL- No      Wounds: (If Applicable)     Wounds- No     Location none     Patient Safety:     Falls Score Total Score: 4  Safety Level_______  Bed Alarm On? Yes  Sitter? No     Plan for upcoming shift: abx, fluids           Discharge Plan: Home with family when medically stable   Active Consults:  IP CONSULT TO CARDIOLOGY  IP CONSULT TO HOSPITALIST

## 2019-07-11 NOTE — PROGRESS NOTES
Hospitalist Progress Note    NAME: Earnest Whiteside   :  1940   MRN:  [id number]       Assessment / Plan:      Acute metabolic encephalopathy   due to e. Coli UTI, POA  Underlying dementia  -exam non focal and MS seems near baseline. No fall or trauma so hold on CT  -treat UTI.   - Continue rocephin started by ER.    -PT OT eval and treat- no recs  -Blood cultures negative so far  -Urine with gram-negative rods, e coli -S to ceftriaxone  -Continue ceftriaxone until afeb for 24hrs  -Mentation now baseline, suspect metabolic encephalopathy present on admission  -No head CT was done on admission but now close to baseline so we will hold off on further imaging unless patient worsens  -fever again overnight, on IV abx until no fever for 24hrs  -check CT abd/p to r/o abscess/infected stone    Vomiting   -more likely related to UTI than to cardiac ischemia. Abd exam benign  -allow diet. Antiemetics prn. Monitor  -gentle hydration  -resolved, tolerating diet     CAD, s/p CABG 5V  HTN  Troponin elevation  EKG changes vs 2016  -continue ASA, toprol, isordil, statin   -Appreciate cardiology evaluation  -Likely demand ischemia secondary to acute infection  -Continue blood pressure control  -Echocardiogram EF 60-65%    DM 2, uncontrolled with hyperglycemia  -SSI prn.  7.8 A1c  -takes 70/30 at home  -cont nph now and adjust as diet improves  -add humalog with meals     CKD3  -follow Cr, near baseline  -stop IVFs, encourage pos     Prostate cancer, s/p prostatectomy and radiation  -follows with Dr Malik December  -resumed home med     Code Status:  Full  Surrogate Decision Maker:  wife     DVT Prophylaxis:  Heparin SQ  GI Prophylaxis: not indicated     Baseline:  . Ambulates independently       30.0 - 39.9 Obese / Body mass index is 31.11 kg/m².     Recommended Disposition: Home w/Family and HH PT, OT, RN in 1-2 days     Subjective:     Chief Complaint / Reason for Physician Visit  ams     Patient is pleasantly confused says he feels fine. His wife is at the bedside and feels that his mentation is nearly back to his baseline. He has some underlying dementia. He is able to walk independently at home and she takes care of him 24/7. The patient denies any shortness of breath or chest pain. Denies any abdominal pain denies any urinary symptoms. 7/10 pt feels well. He denies sob/pain. Eating well per wife in room. Mentation baseline. Had fever overnight.     7/11 pt w/o c/os. Fever again overnight. Patient was evaluated at 9:30am      Discussed with RN events overnight. Review of Systems:  Symptom Y/N Comments  Symptom Y/N Comments   Fever/Chills    Chest Pain     Poor Appetite    Edema     Cough    Abdominal Pain     Sputum    Joint Pain     SOB/ANGUIANO    Pruritis/Rash     Nausea/vomit    Tolerating PT/OT     Diarrhea    Tolerating Diet     Constipation    Other       Could NOT obtain due to: confused     Objective:     VITALS:   Last 24hrs VS reviewed since prior progress note. Most recent are:  Patient Vitals for the past 24 hrs:   Temp Pulse Resp BP SpO2   07/11/19 1525 98.3 °F (36.8 °C) 67 18 145/68 98 %   07/11/19 1110    155/80    07/11/19 1034 98.9 °F (37.2 °C) 65 18 170/74 98 %   07/11/19 0930  66  160/80    07/11/19 0807 98.5 °F (36.9 °C) 61 18 168/86 98 %   07/11/19 0413 98.4 °F (36.9 °C) 61 18 (!) 154/96 100 %   07/10/19 2251 97.9 °F (36.6 °C) (!) 58 18 141/85 100 %   07/10/19 2000 99.2 °F (37.3 °C) 70 18 132/81 99 %     No intake or output data in the 24 hours ending 07/11/19 1735     PHYSICAL EXAM:    Patient is awake and alert he is oriented to self. He is on room air no distress  Well appearing. Conjunctiva pink mucous membranes moist.  Cardiovascular regular rate no obvious murmurs rubs or gallops. Lungs are clear no wheezes rhonchi or crackles. Abdomen bowel sounds present soft nontender no suprapubic tenderness or CVA tenderness. Extremities no clubbing cyanosis or edema.   Neuro exam is nonfocal following commands moving all extremities    Reviewed most current lab test results and cultures  YES  Reviewed most current radiology test results   YES  Review and summation of old records today    NO  Reviewed patient's current orders and MAR    YES  PMH/SH reviewed - no change compared to H&P  ________________________________________________________________________  Care Plan discussed with:    Comments   Patient y    Family      RN y    Care Manager     Consultant                        Multidiciplinary team rounds were held today with , nursing, pharmacist and clinical coordinator. Patient's plan of care was discussed; medications were reviewed and discharge planning was addressed. ________________________________________________________________________  Total NON critical care TIME:    Minutes    Total CRITICAL CARE TIME Spent:   Minutes non procedure based      Comments   >50% of visit spent in counseling and coordination of care     ________________________________________________________________________  Chavo Rose MD     Procedures: see electronic medical records for all procedures/Xrays and details which were not copied into this note but were reviewed prior to creation of Plan. LABS:  I reviewed today's most current labs and imaging studies.   Pertinent labs include:  Recent Labs     07/11/19  1020 07/10/19  0434   WBC 2.5* 5.6   HGB 13.7 13.7   HCT 42.2 42.6    230     Recent Labs     07/11/19  1020 07/10/19  0434 07/09/19  0046    135* 139   K 4.2 4.4 4.2    105 104   CO2 28 23 27   * 141* 205*   BUN 17 24* 26*   CREA 1.41* 1.53* 1.81*   CA 9.3 8.8 9.5   MG 2.3 2.2 2.4   PHOS  --  3.0  --        Signed: Chavo Rose MD

## 2019-07-11 NOTE — PROGRESS NOTES
Problem: Falls - Risk of  Goal: *Absence of Falls  Description  Document Derian Iraheta Fall Risk and appropriate interventions in the flowsheet. Outcome: Progressing Towards Goal  Note:   Fall Risk Interventions:  Mobility Interventions: Bed/chair exit alarm    Mentation Interventions: Bed/chair exit alarm    Medication Interventions: Bed/chair exit alarm    Elimination Interventions: Bed/chair exit alarm              Problem: Patient Education: Go to Patient Education Activity  Goal: Patient/Family Education  Outcome: Progressing Towards Goal     Problem: Diabetes Self-Management  Goal: *Disease process and treatment process  Description  Define diabetes and identify own type of diabetes; list 3 options for treating diabetes. Outcome: Progressing Towards Goal  Goal: *Incorporating nutritional management into lifestyle  Description  Describe effect of type, amount and timing of food on blood glucose; list 3 methods for planning meals. Outcome: Progressing Towards Goal  Goal: *Incorporating physical activity into lifestyle  Description  State effect of exercise on blood glucose levels. Outcome: Progressing Towards Goal  Goal: *Developing strategies to promote health/change behavior  Description  Define the ABC's of diabetes; identify appropriate screenings, schedule and personal plan for screenings. Outcome: Progressing Towards Goal  Goal: *Using medications safely  Description  State effect of diabetes medications on diabetes; name diabetes medication taking, action and side effects. Outcome: Progressing Towards Goal  Goal: *Monitoring blood glucose, interpreting and using results  Description  Identify recommended blood glucose targets  and personal targets. Outcome: Progressing Towards Goal  Goal: *Prevention, detection, treatment of acute complications  Description  List symptoms of hyper- and hypoglycemia; describe how to treat low blood sugar and actions for lowering  high blood glucose level.   Outcome: Progressing Towards Goal  Goal: *Prevention, detection and treatment of chronic complications  Description  Define the natural course of diabetes and describe the relationship of blood glucose levels to long term complications of diabetes. Outcome: Progressing Towards Goal  Goal: *Developing strategies to address psychosocial issues  Description  Describe feelings about living with diabetes; identify support needed and support network  Outcome: Progressing Towards Goal  Goal: *Insulin pump training  Outcome: Progressing Towards Goal  Goal: *Sick day guidelines  Outcome: Progressing Towards Goal  Goal: *Patient Specific Goal (EDIT GOAL, INSERT TEXT)  Outcome: Progressing Towards Goal     Problem: Patient Education: Go to Patient Education Activity  Goal: Patient/Family Education  Outcome: Progressing Towards Goal  Patient denies pain a this time. Pain assessment on going. Falls prevention maintained. Care plan reviewed and patient expressed understanding. Call light and belongings within reach. Will continue to monitor.

## 2019-07-12 VITALS
RESPIRATION RATE: 16 BRPM | SYSTOLIC BLOOD PRESSURE: 144 MMHG | HEART RATE: 68 BPM | WEIGHT: 198.63 LBS | BODY MASS INDEX: 31.18 KG/M2 | OXYGEN SATURATION: 100 % | TEMPERATURE: 97.3 F | HEIGHT: 67 IN | DIASTOLIC BLOOD PRESSURE: 75 MMHG

## 2019-07-12 LAB
ANION GAP SERPL CALC-SCNC: 5 MMOL/L (ref 5–15)
BASOPHILS # BLD: 0 K/UL (ref 0–0.1)
BASOPHILS NFR BLD: 1 % (ref 0–1)
BUN SERPL-MCNC: 16 MG/DL (ref 6–20)
BUN/CREAT SERPL: 11 (ref 12–20)
CALCIUM SERPL-MCNC: 9.5 MG/DL (ref 8.5–10.1)
CHLORIDE SERPL-SCNC: 103 MMOL/L (ref 97–108)
CO2 SERPL-SCNC: 29 MMOL/L (ref 21–32)
CREAT SERPL-MCNC: 1.47 MG/DL (ref 0.7–1.3)
DIFFERENTIAL METHOD BLD: ABNORMAL
EOSINOPHIL # BLD: 0.1 K/UL (ref 0–0.4)
EOSINOPHIL NFR BLD: 5 % (ref 0–7)
ERYTHROCYTE [DISTWIDTH] IN BLOOD BY AUTOMATED COUNT: 13.4 % (ref 11.5–14.5)
GLUCOSE BLD STRIP.AUTO-MCNC: 207 MG/DL (ref 65–100)
GLUCOSE BLD STRIP.AUTO-MCNC: 90 MG/DL (ref 65–100)
GLUCOSE SERPL-MCNC: 82 MG/DL (ref 65–100)
HCT VFR BLD AUTO: 43.8 % (ref 36.6–50.3)
HGB BLD-MCNC: 14.3 G/DL (ref 12.1–17)
IMM GRANULOCYTES # BLD AUTO: 0 K/UL (ref 0–0.04)
IMM GRANULOCYTES NFR BLD AUTO: 0 % (ref 0–0.5)
LYMPHOCYTES # BLD: 0.9 K/UL (ref 0.8–3.5)
LYMPHOCYTES NFR BLD: 32 % (ref 12–49)
MAGNESIUM SERPL-MCNC: 2.2 MG/DL (ref 1.6–2.4)
MCH RBC QN AUTO: 28.4 PG (ref 26–34)
MCHC RBC AUTO-ENTMCNC: 32.6 G/DL (ref 30–36.5)
MCV RBC AUTO: 87.1 FL (ref 80–99)
MONOCYTES # BLD: 0.5 K/UL (ref 0–1)
MONOCYTES NFR BLD: 18 % (ref 5–13)
NEUTS SEG # BLD: 1.3 K/UL (ref 1.8–8)
NEUTS SEG NFR BLD: 44 % (ref 32–75)
NRBC # BLD: 0 K/UL (ref 0–0.01)
NRBC BLD-RTO: 0 PER 100 WBC
PLATELET # BLD AUTO: 224 K/UL (ref 150–400)
PMV BLD AUTO: 9.1 FL (ref 8.9–12.9)
POTASSIUM SERPL-SCNC: 4 MMOL/L (ref 3.5–5.1)
RBC # BLD AUTO: 5.03 M/UL (ref 4.1–5.7)
SERVICE CMNT-IMP: ABNORMAL
SERVICE CMNT-IMP: NORMAL
SODIUM SERPL-SCNC: 137 MMOL/L (ref 136–145)
WBC # BLD AUTO: 2.9 K/UL (ref 4.1–11.1)

## 2019-07-12 PROCEDURE — 74011250636 HC RX REV CODE- 250/636: Performed by: INTERNAL MEDICINE

## 2019-07-12 PROCEDURE — 94760 N-INVAS EAR/PLS OXIMETRY 1: CPT

## 2019-07-12 PROCEDURE — 85025 COMPLETE CBC W/AUTO DIFF WBC: CPT

## 2019-07-12 PROCEDURE — 74011250637 HC RX REV CODE- 250/637: Performed by: INTERNAL MEDICINE

## 2019-07-12 PROCEDURE — 36415 COLL VENOUS BLD VENIPUNCTURE: CPT

## 2019-07-12 PROCEDURE — 74011636637 HC RX REV CODE- 636/637: Performed by: EMERGENCY MEDICINE

## 2019-07-12 PROCEDURE — 80048 BASIC METABOLIC PNL TOTAL CA: CPT

## 2019-07-12 PROCEDURE — 74011250637 HC RX REV CODE- 250/637: Performed by: EMERGENCY MEDICINE

## 2019-07-12 PROCEDURE — 74011636637 HC RX REV CODE- 636/637: Performed by: INTERNAL MEDICINE

## 2019-07-12 PROCEDURE — 82962 GLUCOSE BLOOD TEST: CPT

## 2019-07-12 PROCEDURE — 83735 ASSAY OF MAGNESIUM: CPT

## 2019-07-12 RX ORDER — INSULIN ASPART 100 [IU]/ML
20 INJECTION, SUSPENSION SUBCUTANEOUS
Qty: 10 ML | Refills: 0 | Status: SHIPPED | OUTPATIENT
Start: 2019-07-12 | End: 2022-03-10

## 2019-07-12 RX ORDER — INSULIN ASPART 100 [IU]/ML
20 INJECTION, SUSPENSION SUBCUTANEOUS
Qty: 10 ML | Refills: 0 | Status: SHIPPED
Start: 2019-07-12 | End: 2022-03-10

## 2019-07-12 RX ORDER — CEFUROXIME AXETIL 250 MG/1
250 TABLET ORAL EVERY 12 HOURS
Qty: 12 TAB | Refills: 0 | Status: SHIPPED | OUTPATIENT
Start: 2019-07-12 | End: 2019-07-18

## 2019-07-12 RX ORDER — CEFUROXIME AXETIL 250 MG/1
250 TABLET ORAL EVERY 12 HOURS
Status: DISCONTINUED | OUTPATIENT
Start: 2019-07-12 | End: 2019-07-12 | Stop reason: HOSPADM

## 2019-07-12 RX ADMIN — ISOSORBIDE MONONITRATE 30 MG: 30 TABLET, EXTENDED RELEASE ORAL at 09:30

## 2019-07-12 RX ADMIN — HYDRALAZINE HYDROCHLORIDE 25 MG: 25 TABLET, FILM COATED ORAL at 00:02

## 2019-07-12 RX ADMIN — HEPARIN SODIUM 5000 UNITS: 5000 INJECTION INTRAVENOUS; SUBCUTANEOUS at 05:14

## 2019-07-12 RX ADMIN — INSULIN LISPRO 2 UNITS: 100 INJECTION, SOLUTION INTRAVENOUS; SUBCUTANEOUS at 12:13

## 2019-07-12 RX ADMIN — Medication 10 ML: at 05:15

## 2019-07-12 RX ADMIN — DONEPEZIL HYDROCHLORIDE 10 MG: 5 TABLET, FILM COATED ORAL at 09:30

## 2019-07-12 RX ADMIN — MEMANTINE HYDROCHLORIDE 10 MG: 10 TABLET ORAL at 09:30

## 2019-07-12 RX ADMIN — INSULIN LISPRO 5 UNITS: 100 INJECTION, SOLUTION INTRAVENOUS; SUBCUTANEOUS at 09:29

## 2019-07-12 RX ADMIN — AMLODIPINE BESYLATE 10 MG: 5 TABLET ORAL at 09:31

## 2019-07-12 RX ADMIN — HUMAN INSULIN 20 UNITS: 100 INJECTION, SUSPENSION SUBCUTANEOUS at 09:31

## 2019-07-12 RX ADMIN — INSULIN LISPRO 5 UNITS: 100 INJECTION, SOLUTION INTRAVENOUS; SUBCUTANEOUS at 12:12

## 2019-07-12 RX ADMIN — CEFUROXIME AXETIL 250 MG: 250 TABLET ORAL at 12:14

## 2019-07-12 RX ADMIN — ASPIRIN 81 MG 81 MG: 81 TABLET ORAL at 09:31

## 2019-07-12 RX ADMIN — METOPROLOL SUCCINATE 100 MG: 50 TABLET, EXTENDED RELEASE ORAL at 09:30

## 2019-07-12 RX ADMIN — ATORVASTATIN CALCIUM 20 MG: 20 TABLET, FILM COATED ORAL at 09:31

## 2019-07-12 NOTE — ACP (ADVANCE CARE PLANNING)
Advance Care Planning Note      NAME: Jay Jay Emerson   :  1940   MRN:  [id number]     Date/Time:  2019 12:10 PM    Active Diagnoses:  Hospital Problems  Date Reviewed: 2018          Codes Class Noted POA    Acute encephalopathy ICD-10-CM: G93.40  ICD-9-CM: 348.30  2018 Unknown            Patient has progressive dementia and was admitted with a urinary tract infection and an elevated troponin w/ met encephalopathy     These active diagnoses are of sufficient risk that focused discussion on advance care planning is indicated in order to allow the patient to thoughtfully consider personal goals of care, and if situations arise that prevent the ability to personally give input, to ensure appropriate representation of their personal desires for different levels and aggressiveness of care. Discussion:   I met with the wife and the patient at the bedside. The wife makes decisions for him,  given his underlying dementia. We talked about goals of care in the future as his dementia worsens. She believes that at some point he would want to be a no code and transition to hospice if he were to worsen and his quality of life was to decline. She would currently like him to continue to be a full code, but will discuss it further with her children. She would want pressors if needed. she does not believe he would want alternative means of feeding such as a PEG tube if it got to the point where he could not eat on his own and agrees that at that point she would want him to be made comfortable. Code status addressed and wants to be a Full Code. She would want him to have a central line and vasopressors if needed. Persons present and participating in discussion: Eb Joseph MD, wife      Time Spent:   Total time spent face-to-face in education and discussion:   20  minutes.          Jocelin Abrams MD   Hospitalist

## 2019-07-12 NOTE — PROGRESS NOTES
Problem: Falls - Risk of  Goal: *Absence of Falls  Description  Document Bruno Fells Fall Risk and appropriate interventions in the flowsheet. 7/12/2019 1321 by Tyrel Jacobsen  Outcome: Resolved/Met  7/12/2019 0843 by Tyrel Jacobsen  Outcome: Progressing Towards Goal  Note:   Fall Risk Interventions:  Mobility Interventions: Bed/chair exit alarm    Mentation Interventions: Bed/chair exit alarm    Medication Interventions: Bed/chair exit alarm    Elimination Interventions: Bed/chair exit alarm              Problem: Patient Education: Go to Patient Education Activity  Goal: Patient/Family Education  7/12/2019 1321 by Tyrel Jacobsen  Outcome: Resolved/Met  7/12/2019 0843 by Tyrel Jacobsen  Outcome: Progressing Towards Goal     Problem: Diabetes Self-Management  Goal: *Disease process and treatment process  Description  Define diabetes and identify own type of diabetes; list 3 options for treating diabetes. 7/12/2019 1321 by Tyrel Jacobsen  Outcome: Resolved/Met  7/12/2019 0843 by Tyrel Jacobsen  Outcome: Progressing Towards Goal  Goal: *Incorporating nutritional management into lifestyle  Description  Describe effect of type, amount and timing of food on blood glucose; list 3 methods for planning meals. 7/12/2019 1321 by Tyrel Jacobsen  Outcome: Resolved/Met  7/12/2019 0843 by Tyrel Jacobsen  Outcome: Progressing Towards Goal  Goal: *Incorporating physical activity into lifestyle  Description  State effect of exercise on blood glucose levels. 7/12/2019 1321 by Tyrel Jacobsen  Outcome: Resolved/Met  7/12/2019 0843 by Tyrel Jacobsen  Outcome: Progressing Towards Goal  Goal: *Developing strategies to promote health/change behavior  Description  Define the ABC's of diabetes; identify appropriate screenings, schedule and personal plan for screenings.   7/12/2019 1321 by Tyrel Jacobsen  Outcome: Resolved/Met  7/12/2019 0843 by Tyrel Jacobsen  Outcome: Progressing Towards Goal  Goal: *Using medications safely  Description  State effect of diabetes medications on diabetes; name diabetes medication taking, action and side effects. 7/12/2019 1321 by Edouard Bourgeois  Outcome: Resolved/Met  7/12/2019 0843 by Edouard Bourgeois  Outcome: Progressing Towards Goal  Goal: *Monitoring blood glucose, interpreting and using results  Description  Identify recommended blood glucose targets  and personal targets. 7/12/2019 1321 by Edouard Bourgeois  Outcome: Resolved/Met  7/12/2019 0843 by Edouard Bourgeois  Outcome: Progressing Towards Goal  Goal: *Prevention, detection, treatment of acute complications  Description  List symptoms of hyper- and hypoglycemia; describe how to treat low blood sugar and actions for lowering  high blood glucose level. 7/12/2019 1321 by Edouard Bourgeois  Outcome: Resolved/Met  7/12/2019 0843 by Edouard Bourgeois  Outcome: Progressing Towards Goal  Goal: *Prevention, detection and treatment of chronic complications  Description  Define the natural course of diabetes and describe the relationship of blood glucose levels to long term complications of diabetes.   7/12/2019 1321 by Edouard Bourgeois  Outcome: Resolved/Met  7/12/2019 0843 by Edouard Bourgeois  Outcome: Progressing Towards Goal  Goal: *Developing strategies to address psychosocial issues  Description  Describe feelings about living with diabetes; identify support needed and support network  7/12/2019 1321 by Edouard Bourgeois  Outcome: Resolved/Met  7/12/2019 0843 by Edouard Bourgeois  Outcome: Progressing Towards Goal  Goal: *Insulin pump training  7/12/2019 1321 by Edouard Bourgeois  Outcome: Resolved/Met  7/12/2019 0843 by Edouard Bourgeois  Outcome: Progressing Towards Goal  Goal: *Sick day guidelines  7/12/2019 1321 by Edouard Bourgeois  Outcome: Resolved/Met  7/12/2019 0843 by Edouard Bourgeois  Outcome: Progressing Towards Goal  Goal: *Patient Specific Goal (EDIT GOAL, INSERT TEXT)  7/12/2019 1321 by Edouard Bourgeois  Outcome: Resolved/Met  7/12/2019 0843 by Edouard Bourgeois  Outcome: Progressing Towards Goal     Problem: Patient Education: Go to Patient Education Activity  Goal: Patient/Family Education  7/12/2019 1321 by Melodie Dick  Outcome: Resolved/Met  7/12/2019 0843 by Melodie Dick  Outcome: Progressing Towards Goal     Problem: Pressure Injury - Risk of  Goal: *Prevention of pressure injury  Description  Document Finn Scale and appropriate interventions in the flowsheet. 7/12/2019 1321 by Melodie Dick  Outcome: Resolved/Met  7/12/2019 0843 by Melodie Dick  Outcome: Progressing Towards Goal     Problem: Patient Education: Go to Patient Education Activity  Goal: Patient/Family Education  7/12/2019 1321 by Melodie Dick  Outcome: Resolved/Met  7/12/2019 0843 by Melodie Dick  Outcome: Progressing Towards Goal   Patient discharged to home per MD order. IV removed all of patient's belongings with patient and wife. Patient and wife expressed understanding of discharge instructions.

## 2019-07-12 NOTE — DISCHARGE SUMMARY
Hospitalist Discharge Summary     Patient ID:  Stacy Flower  [id number]  69 y.o.  1940  7/8/2019    PCP on record: Saroj Kasper MD    Admit date: 7/8/2019  Discharge date and time: 7/12/2019    DISCHARGE DIAGNOSIS:      Acute metabolic encephalopathy  E. coli urinary tract infection, present on admission  Underlying progressive dementia  Vomiting, present on admission secondary to urinary tract infection  History of coronary artery disease status post bypass  Hypertension, uncontrolled  Elevated troponin, present on admission secondary to demand ischemia  Type 2 diabetes uncontrolled with hyperglycemia as well as some mild episodes of hypoglycemia  Chronic kidney disease stage III  History of prostate cancer  Obesity with a BMI of 31  Mild leukopenia, needs follow-up CBC      CONSULTATIONS:  IP CONSULT TO CARDIOLOGY  IP CONSULT TO HOSPITALIST    Excerpted HPI from H&P of Noe Boyd MD:  CHIEF COMPLAINT:   AMS     HISTORY OF PRESENT ILLNESS:     Lana Spicer is a 69 y.o.  male with a history of CAD, CABG, HTN, DM, CKD and prostate cancer who presents via EMS because of AMS. Patient got up to use the rest room this morning and as he was coming back to bed, wife noticed that he was shaking. She checked his BG and it was 146. Patient vomited twice and he appeared confused or altered so EMS was dispatched. ER evaluation was remarkable for UTI, low grad temp, elevated troponin.   We were asked to admit for work up and evaluation of the above problems.            ______________________________________________________________________  DISCHARGE SUMMARY/HOSPITAL COURSE:  for full details see H&P, daily progress notes, labs, consult notes.          Acute metabolic encephalopathy   due to e. Coli UTI, POA  Underlying dementia  -exam non focal and MS seems near baseline.  No fall or trauma so hold on CT  -treat UTI.    - Continue rocephin started by ER.    -PT MARTIN martinez and treat- no recs  -Blood cultures negative so far  -Urine with gram-negative rods, e coli -S to ceftriaxone  -Continue ceftriaxone until afeb for 24hrs  -Mentation now baseline, suspect metabolic encephalopathy present on admission  -No head CT was done on admission but now close to baseline so we will hold off on further imaging unless patient worsens  -cont IV abx until no fever for 24hrs  - CT abd/p to r/o abscess/infected stone- neg     Vomiting   -more likely related to UTI than to cardiac ischemia.   Abd exam benign  -allow diet.  Antiemetics prn.  Monitor  -gentle hydration  -resolved, tolerating diet     CAD, s/p CABG 5V  HTN  Troponin elevation  EKG changes vs 2016  -continue ASA, toprol, isordil, statin   -Appreciate cardiology evaluation  -Likely demand ischemia secondary to acute infection  -Continue blood pressure control  -Echocardiogram EF 60-65%     DM 2, uncontrolled with hyperglycemia  -SSI prn.  7.8 A1c  -takes 70/30 at home, resume at dc at lower dose to assure no hypoglycemia  -cont nph now and adjust as diet improves  - humalog with meals     CKD3  -follow Cr, near baseline  -stopped IVFs, encourage pos      Prostate cancer, s/p prostatectomy and radiation  -follows with Dr Monique Cardona  -resumed home med     Recommended Disposition:     Patient be discharged home today with home health. Home health will monitor his blood pressure and his diabetes management with the physical therapy safety eval.  Physical therapy saw him here and did not feel he needed continued ongoing therapy at home and was back to his baseline. He will need to follow-up with his primary care physician in the next 3 to 5 days. He will need to follow-up with cardiology in 1 to 2 weeks. Patient was treated for an E. coli urinary tract infection. His blood cultures remain negative.   However, he continued to spike a fever daily and so weak proceeded with a CT scan of his abdomen and pelvis to make sure there was no abscess or stones to explain continued fever. His CT scan showed no acute disease no stone or hydronephrosis there were some renal cyst similar in appearance to prior CT in 2018. Patient has now been afebrile for over 24 hours. His mentation is back to his baseline. We will transition him from ceftriaxone to Ceftin p.o. to complete a 10-day course. Family home health will need to be vigilant for any signs of recurrent infection. We will have home health follow his lab work CBC and BMP on Wednesday to be sent to his primary care physician to make sure that his white count remained stable as well as his renal function. Patient has some mild leukopenia today but improving with a white count of 2.9.    _______________________________________________________________________  Patient seen and examined by me on discharge day. Pertinent Findings:  Patient denies any pain or shortness of breath. His wife is at the bedside he has been eating and drinking fine and moving around in the room without difficulty per the wife. Patient is awake alert oriented to self. Nontoxic-appearing on room air no distress. Mucous membranes moist cardiovascular regular rate lungs are clear abdomen bowel sounds present soft nontender. Extremities no clubbing cyanosis  _______________________________________________________________________  DISCHARGE MEDICATIONS:   Current Discharge Medication List      START taking these medications    Details   cefUROXime (CEFTIN) 250 mg tablet Take 1 Tab by mouth every twelve (12) hours for 6 days. Qty: 12 Tab, Refills: 0      L. acidoph & paracasei- S therm- Bifido (BRYAN-Q/RISAQUAD) 8 billion cell cap cap Take 1 Cap by mouth daily for 14 days.   Qty: 14 Cap, Refills: 0         CONTINUE these medications which have CHANGED    Details   !! insulin aspart protamine/insulin aspart (NOVOLOG MIX 70-30 U-100 INSULN) 100 unit/mL (70-30) injection 20 Units by SubCUTAneous route Daily (before breakfast). Qty: 10 mL, Refills: 0      !! insulin aspart protamine/insulin aspart (NOVOLOG MIX 70-30 U-100 INSULN) 100 unit/mL (70-30) injection 20 Units by SubCUTAneous route Daily (before dinner). Qty: 10 mL, Refills: 0       !! - Potential duplicate medications found. Please discuss with provider. CONTINUE these medications which have NOT CHANGED    Details   amLODIPine (NORVASC) 10 mg tablet Take 10 mg by mouth nightly. memantine-donepezil (NAMZARIC) 28-10 mg CSpX Take 1 Cap by mouth nightly. cholecalciferol (VITAMIN D3) 1,000 unit tablet Take 1,000 Units by mouth daily. atorvastatin (LIPITOR) 20 mg tablet Take 20 mg by mouth daily. isosorbide mononitrate ER (IMDUR) 30 mg tablet Take 30 mg by mouth daily. QUEtiapine (SEROQUEL) 50 mg tablet Take 50 mg by mouth nightly. apalutamide (ERLEADA) 60 mg tablet Take 120 mg by mouth two (2) times a day. metoprolol succinate (TOPROL-XL) 100 mg tablet Take 100 mg by mouth daily. aspirin 81 mg tablet Take 81 mg by mouth daily. Patient Follow Up Instructions: Follow-up Information     Follow up With Specialties Details Why Contact Info    At [address] [place] [address] is your home health agency. They will call you to set up time to come out.   [phone]    Patient's Own Medication is located in the Patient's:  Silvana Julien MD Internal Medicine Go on 7/15/2019 Please follow up on July 15, 2019 at 2:15 [id number]  [address] [place] [address] Drive,  [id number]      Lucas Chanel MD Cardiology Go on 7/22/2019 at 11:00 as previously scheduled [address] [place] [address] Drive  [phone]          ________________________________________________________________    Risk of deterioration: Moderate    Condition at Discharge:  Stable  __________________________________________________________________    Disposition  Home with family and home health services    ____________________________________________________________________    Code Status: Full Code  ___________________________________________________________________      Total time in minutes spent coordinating this discharge (includes going over instructions, follow-up, prescriptions, and preparing report for sign off to her PCP) :  35 minutes    Signed:  Chavo Rose MD

## 2019-07-12 NOTE — PROGRESS NOTES
Problem: Falls - Risk of  Goal: *Absence of Falls  Description  Document Keyana Duncan Fall Risk and appropriate interventions in the flowsheet. Outcome: Progressing Towards Goal     Problem: Patient Education: Go to Patient Education Activity  Goal: Patient/Family Education  Outcome: Progressing Towards Goal     Problem: Diabetes Self-Management  Goal: *Disease process and treatment process  Description  Define diabetes and identify own type of diabetes; list 3 options for treating diabetes. Outcome: Progressing Towards Goal  Goal: *Incorporating physical activity into lifestyle  Description  State effect of exercise on blood glucose levels. Outcome: Progressing Towards Goal  Goal: *Using medications safely  Description  State effect of diabetes medications on diabetes; name diabetes medication taking, action and side effects.   Outcome: Progressing Towards Goal

## 2019-07-12 NOTE — PROGRESS NOTES
BRI Plan--Home with family and home health services. Patient is being discharged to home today with family and [facility] [address] will be following him for nursing and physical therapy. His wife is here and will be transporting him home today. Called [facility] [address] and they are aware patient will be discharging home and will follow up with him when he arrives home. Evin with [facility] [address] aware of orders for labs to be done on Wednesday and send results to pcp. Orders faxed to [facility] with confirmation. Care Management Interventions  PCP Verified by CM: Yes  Mode of Transport at Discharge:  Other (see comment)(Patient is being discharged today. )  Transition of Care Consult (CM Consult): [address]: No  [facility]: Unable to staff case  Discharge Durable Medical Equipment: (Patient has cane at home. )  Physical Therapy Consult: Yes  Occupational Therapy Consult: Yes  Current Support Network: Lives with Spouse  Confirm Follow Up Transport: Family  Plan discussed with Pt/Family/Caregiver: Yes  Freedom of Choice Offered: Yes  Discharge Location  Discharge Placement: Home with home health

## 2019-07-12 NOTE — PROGRESS NOTES
Problem: Falls - Risk of  Goal: *Absence of Falls  Description  Document [facility] Fall Risk and appropriate interventions in the flowsheet. Outcome: Progressing Towards Goal  Note:   Fall Risk Interventions:  Mobility Interventions: Bed/chair exit alarm    Mentation Interventions: Bed/chair exit alarm    Medication Interventions: Bed/chair exit alarm    Elimination Interventions: Bed/chair exit alarm              Problem: Patient Education: Go to Patient Education Activity  Goal: Patient/Family Education  Outcome: Progressing Towards Goal     Problem: Diabetes Self-Management  Goal: *Disease process and treatment process  Description  Define diabetes and identify own type of diabetes; list 3 options for treating diabetes. Outcome: Progressing Towards Goal  Goal: *Incorporating nutritional management into lifestyle  Description  Describe effect of type, amount and timing of food on blood glucose; list 3 methods for planning meals. Outcome: Progressing Towards Goal  Goal: *Incorporating physical activity into lifestyle  Description  State effect of exercise on blood glucose levels. Outcome: Progressing Towards Goal  Goal: *Developing strategies to promote health/change behavior  Description  Define the ABC's of diabetes; identify appropriate screenings, schedule and personal plan for screenings. Outcome: Progressing Towards Goal  Goal: *Using medications safely  Description  State effect of diabetes medications on diabetes; name diabetes medication taking, action and side effects. Outcome: Progressing Towards Goal  Goal: *Monitoring blood glucose, interpreting and using results  Description  Identify recommended blood glucose targets  and personal targets. Outcome: Progressing Towards Goal  Goal: *Prevention, detection, treatment of acute complications  Description  List symptoms of hyper- and hypoglycemia; describe how to treat low blood sugar and actions for lowering  high blood glucose level.   Outcome: Progressing Towards Goal  Goal: *Prevention, detection and treatment of chronic complications  Description  Define the natural course of diabetes and describe the relationship of blood glucose levels to long term complications of diabetes. Outcome: Progressing Towards Goal  Goal: *Developing strategies to address psychosocial issues  Description  Describe feelings about living with diabetes; identify support needed and support network  Outcome: Progressing Towards Goal  Goal: *Insulin pump training  Outcome: Progressing Towards Goal  Goal: *Sick day guidelines  Outcome: Progressing Towards Goal  Goal: *Patient Specific Goal (EDIT GOAL, INSERT TEXT)  Outcome: Progressing Towards Goal     Problem: Patient Education: Go to Patient Education Activity  Goal: Patient/Family Education  Outcome: Progressing Towards Goal     Problem: Pressure Injury - Risk of  Goal: *Prevention of pressure injury  Description  Document Finn Scale and appropriate interventions in the flowsheet.   Outcome: Progressing Towards Goal     Problem: Patient Education: Go to Patient Education Activity  Goal: Patient/Family Education  Outcome: Progressing Towards Goal

## 2019-07-12 NOTE — DISCHARGE INSTRUCTIONS
HOSPITALIST DISCHARGE INSTRUCTIONS    NAME: Gabriel Grey   :  1940   MRN:  [id number]     Date/Time:  2019 11:44 AM    ADMIT DATE: 2019   DISCHARGE DATE: 2019         · It is important that you take the medication exactly as they are prescribed. · Keep your medication in the bottles provided by the pharmacist and keep a list of the medication names, dosages, and times to be taken in your wallet. · Do not take other medications without consulting your doctor. What to do at Home    Recommended diet:  Diabetic Diet    Recommended activity: Activity as tolerated and PT/OT per Home Health      If you have questions regarding the hospital related prescriptions or hospital related issues please call [facility] at [phone]. You can always direct your questions to your primary care doctor if you are unable to reach your hospital physician; your PCP works as an extension of your hospital doctor just like your hospital doctor is an extension of your PCP for your time at the hospital [facility])    If you experience any of the following symptoms then please call your primary care physician or return to the emergency room if you cannot get hold of your doctor:    Fever, chills, nausea, vomiting, or persistent diarrhea  Worsening weakness or new problems with your speech or balance  Dark stools or visible blood in your stools  New Leg swelling or shortness of breath as these could be signs of a clot    Additional Instructions:      Bring these papers with you to your follow up appointments.  The papers will help your doctors be sure to continue the care plan from the hospital.      F/u with pcp in 3-5 days  F/u with cards in 1-2 weeks  Call MD or return to the er if fever chills, difficulty urinating, shortness of breath, chest pain  Follow finger sticks closely, call MD if uncontrolled blood sugar, high or low  Home health can help follow and adjust meds for diabetes and bp        Information obtained by :  I understand that if any problems occur once I am at home I am to contact my physician. I understand and acknowledge receipt of the instructions indicated above.                                                                                                                                            Physician's or R.N.'s Signature                                                                  Date/Time                                                                                                                                              Patient or Representative Signature

## 2019-07-13 LAB
BACTERIA SPEC CULT: NORMAL
SERVICE CMNT-IMP: NORMAL

## 2019-12-11 ENCOUNTER — OFFICE VISIT (OUTPATIENT)
Dept: NEUROLOGY | Age: 79
End: 2019-12-11

## 2019-12-11 VITALS
DIASTOLIC BLOOD PRESSURE: 70 MMHG | HEART RATE: 73 BPM | WEIGHT: 202 LBS | OXYGEN SATURATION: 98 % | HEIGHT: 67 IN | SYSTOLIC BLOOD PRESSURE: 140 MMHG | BODY MASS INDEX: 31.71 KG/M2

## 2019-12-11 DIAGNOSIS — F03.90 DEMENTIA WITHOUT BEHAVIORAL DISTURBANCE, UNSPECIFIED DEMENTIA TYPE: Primary | ICD-10-CM

## 2019-12-11 RX ORDER — QUETIAPINE FUMARATE 50 MG/1
50 TABLET, FILM COATED ORAL
Qty: 90 TAB | Refills: 3 | Status: SHIPPED | OUTPATIENT
Start: 2019-12-11 | End: 2021-01-06

## 2019-12-11 NOTE — PATIENT INSTRUCTIONS

## 2019-12-11 NOTE — PROGRESS NOTES
Neurology follow-up note    Chief Complaint   Patient presents with    Follow-up     memory       [facility] Danelle Blanco is a 69 y.o. male who presented to the neurology office for management of memory problem. He does have dementia and and is on Namzaric 28/10 mg daily. Mild decline since the last visit. Interval history:  - He was in the hospital in July as he did have a UTI and was in the hospital for 4 days. - There has been progression with dementia. He forgets conversations. Current Outpatient Medications   Medication Sig    insulin aspart protamine/insulin aspart (NOVOLOG MIX 70-30 U-100 INSULN) 100 unit/mL (70-30) injection 20 Units by SubCUTAneous route Daily (before breakfast).  insulin aspart protamine/insulin aspart (NOVOLOG MIX 70-30 U-100 INSULN) 100 unit/mL (70-30) injection 20 Units by SubCUTAneous route Daily (before dinner).  amLODIPine (NORVASC) 10 mg tablet Take 10 mg by mouth nightly.  memantine-donepezil (NAMZARIC) 28-10 mg CSpX Take 1 Cap by mouth nightly.  cholecalciferol (VITAMIN D3) 1,000 unit tablet Take 1,000 Units by mouth daily.  atorvastatin (LIPITOR) 20 mg tablet Take 20 mg by mouth daily.  isosorbide mononitrate ER (IMDUR) 30 mg tablet Take 30 mg by mouth daily.  QUEtiapine (SEROQUEL) 50 mg tablet Take 50 mg by mouth nightly.  apalutamide (ERLEADA) 60 mg tablet Take 120 mg by mouth two (2) times a day.  metoprolol succinate (TOPROL-XL) 100 mg tablet Take 100 mg by mouth daily.  aspirin 81 mg tablet Take 81 mg by mouth daily. No current facility-administered medications for this visit.         Past Medical History:   Diagnosis Date    CAD (coronary artery disease)     Cancer ([facility] [facility]     prostate    Diabetes ([facility]     GERD (gastroesophageal reflux disease)     Hypertension     Other ill-defined conditions(799.89)     elevated cholesterol     Past Surgical History:   Procedure Laterality Date    CARDIAC SURG PROCEDURE UNLIST cabg x5 vessels,cardiologist  and david at [facility]    COLONOSCOPY N/A 2016    COLONOSCOPY performed by Sveta Pickens MD at [facility]   [facility] SCRN; HI RISK IND  2016         HX PROSTATECTOMY      IN COLONOSCOPY FLX DX W/COLLJ SPEC WHEN PFRMD  2011          No family history on file. Social History     Tobacco Use    Smoking status: Former Smoker     Packs/day: 0.50     Years: 20.00     Pack years: 10.00     Last attempt to quit: 1982     Years since quittin.6    Smokeless tobacco: Never Used   Substance Use Topics    Alcohol use: No    Drug use: No       REVIEW OF SYSTEMS:   A ten system review of constitutional, cardiovascular, respiratory, musculoskeletal, endocrine, skin, SHEENT, genitourinary, psychiatric and neurologic systems was obtained and is unremarkable except memory loss    EXAMINATION:   Visit Vitals  Ht 5' 7\" (1.702 m)   BMI 31.11 kg/m²        General:   General appearance: Pt is in no acute distress   Distal pulses are preserved    Neurological Examination:   Mental Status: AAO x2. Speech is fluent. Follows commands, has abnormal fund of knowledge, attention, short term recall, comprehension and insight. Cranial Nerves: Visual fields are full. PERRL, Extraocular movements are full. Facial sensation intact. Facial movement intact. Hearing intact to conversation. Palate elevates symmetrically. Shoulder shrug symmetric. Tongue midline. Motor: Strength is 5/5 in all 4 ext. Sensation: Normal to light touch    Coordination/Cerebellar: Intact to finger-nose-finger     Skin: No significant bruising or lacerations.     Mini Mental State Exam 2019   What is the Year 0   What is the Season 0   What is the Date 0   What is the Day 0   What is the Month 0   Where are we State 1   Where are we Country 1   Where are we [place] 1   Where are we Floor 0   Name three objects, then ask the patient to say them 1   Serial sevens Subtract 7 from 100 in increments 1   Ask for the three objects repeated above 0   Name a pencil 1   Name a watch 1   Have the patient repeat this phrase \"No ifs, ands, or buts\" 0   Three stage command: Take the paper in your right hand 0   Fold the paper in half 1   Put the paper on the floor 1   Read and obey the following: CLOSE YOUR EYES 1   Have the patient write a sentence 1   Have the patient copy a figure 1   Mini Mental Score 12   Some recent data might be hidden        Laboratory review:   Results for orders placed or performed during the hospital encounter of 07/08/19   CULTURE, BLOOD, PAIRED   Result Value Ref Range    Special Requests: NO SPECIAL REQUESTS      Culture result: NO GROWTH 5 DAYS     CULTURE, URINE   Result Value Ref Range    Special Requests: NO SPECIAL REQUESTS  Reflexed from [id number]        [place] Count >100,000  COLONIES/mL        Culture result: ESCHERICHIA COLI (A)         Susceptibility    Escherichia coli - LISS     Amikacin ($) <=16 Susceptible ug/mL     Ampicillin ($) <=8 Susceptible ug/mL     Ampicillin/sulbactam ($) <=8/4 Susceptible ug/mL     Aztreonam ($$$$) <=4 Susceptible ug/mL     Cefazolin ($) <=8 Susceptible ug/mL     Cefepime ($$) <=4 Susceptible ug/mL     Cefotaxime <=2 Susceptible ug/mL     Ceftazidime ($) <=1 Susceptible ug/mL     Ceftriaxone ($) <=1 Susceptible ug/mL     Cefuroxime ($) 8 Susceptible ug/mL     Ciprofloxacin ($) >2 Resistant ug/mL     Gentamicin ($) <=4 Susceptible ug/mL     Imipenem <=1 Susceptible ug/mL     Levofloxacin ($) >4 Resistant ug/mL     Meropenem ($$) <=1 Susceptible ug/mL     Nitrofurantoin <=32 Susceptible ug/mL     Piperacillin/Tazobac ($) <=16 Susceptible ug/mL     Tobramycin ($) <=4 Susceptible ug/mL     Trimeth/Sulfa >2/38 Resistant ug/mL   SAMPLES BEING HELD   Result Value Ref Range    SAMPLES BEING HELD 1RED 1BLU     COMMENT        Add-on orders for these samples will be processed based on acceptable specimen integrity and analyte stability, which may vary by analyte. URINALYSIS W/ REFLEX CULTURE   Result Value Ref Range    Color YELLOW/STRAW      Appearance CLOUDY (A) CLEAR      Specific gravity 1.014 1.003 - 1.030      pH (UA) 5.5 5.0 - 8.0      Protein 30 (A) NEG mg/dL    Glucose 100 (A) NEG mg/dL    Ketone NEGATIVE  NEG mg/dL    Bilirubin NEGATIVE  NEG      Blood LARGE (A) NEG      Urobilinogen 0.2 0.2 - 1.0 EU/dL    Nitrites POSITIVE (A) NEG      Leukocyte Esterase MODERATE (A) NEG      WBC  0 - 4 /hpf    RBC 10-20 0 - 5 /hpf    Epithelial cells FEW FEW /lpf    Bacteria 4+ (A) NEG /hpf    UA:UC IF INDICATED URINE CULTURE ORDERED (A) CNI      Hyaline cast 0-2 0 - 5 /lpf   CBC WITH AUTOMATED DIFF   Result Value Ref Range    WBC 10.1 4.1 - 11.1 K/uL    RBC 4.55 4.10 - 5.70 M/uL    HGB 13.3 12.1 - 17.0 g/dL    HCT 39.6 36.6 - 50.3 %    MCV 87.0 80.0 - 99.0 FL    MCH 29.2 26.0 - 34.0 PG    MCHC 33.6 30.0 - 36.5 g/dL    RDW 13.6 11.5 - 14.5 %    PLATELET 882 911 - 861 K/uL    MPV 9.7 8.9 - 12.9 FL    NRBC 0.0 0  WBC    ABSOLUTE NRBC 0.00 0.00 - 0.01 K/uL    NEUTROPHILS 89 (H) 32 - 75 %    BAND NEUTROPHILS 4 %    LYMPHOCYTES 5 (L) 12 - 49 %    MONOCYTES 1 (L) 5 - 13 %    EOSINOPHILS 0 0 - 7 %    BASOPHILS 1 0 - 1 %    IMMATURE GRANULOCYTES 0 0.0 - 0.5 %    ABS. NEUTROPHILS 9.4 (H) 1.8 - 8.0 K/UL    ABS. LYMPHOCYTES 0.5 (L) 0.8 - 3.5 K/UL    ABS. MONOCYTES 0.1 0.0 - 1.0 K/UL    ABS. EOSINOPHILS 0.0 0.0 - 0.4 K/UL    ABS. BASOPHILS 0.1 0.0 - 0.1 K/UL    ABS. IMM.  GRANS. 0.0 0.00 - 0.04 K/UL    DF MANUAL      RBC COMMENTS NORMOCYTIC, NORMOCHROMIC      WBC COMMENTS VACUOLATED POLYS     METABOLIC PANEL, COMPREHENSIVE   Result Value Ref Range    Sodium 136 136 - 145 mmol/L    Potassium 4.2 3.5 - 5.1 mmol/L    Chloride 105 97 - 108 mmol/L    CO2 25 21 - 32 mmol/L    Anion gap 6 5 - 15 mmol/L    Glucose 219 (H) 65 - 100 mg/dL    BUN 23 (H) 6 - 20 MG/DL    Creatinine 1.79 (H) 0.70 - 1.30 MG/DL    BUN/Creatinine ratio 13 12 - 20 GFR est AA 45 (L) >60 ml/min/1.73m2    GFR est non-AA 37 (L) >60 ml/min/1.73m2    Calcium 9.1 8.5 - 10.1 MG/DL    Bilirubin, total 0.6 0.2 - 1.0 MG/DL    ALT (SGPT) 26 12 - 78 U/L    AST (SGOT) 17 15 - 37 U/L    Alk.  phosphatase 88 45 - 117 U/L    Protein, total 7.6 6.4 - 8.2 g/dL    Albumin 3.3 (L) 3.5 - 5.0 g/dL    Globulin 4.3 (H) 2.0 - 4.0 g/dL    A-G Ratio 0.8 (L) 1.1 - 2.2     TROPONIN I   Result Value Ref Range    Troponin-I, Qt. 0.08 (H) <0.05 ng/mL   TROPONIN I   Result Value Ref Range    Troponin-I, Qt. 0.18 (H) <0.05 ng/mL   LACTIC ACID   Result Value Ref Range    Lactic acid 1.5 0.4 - 2.0 MMOL/L   METABOLIC PANEL, BASIC   Result Value Ref Range    Sodium 139 136 - 145 mmol/L    Potassium 4.2 3.5 - 5.1 mmol/L    Chloride 104 97 - 108 mmol/L    CO2 27 21 - 32 mmol/L    Anion gap 8 5 - 15 mmol/L    Glucose 205 (H) 65 - 100 mg/dL    BUN 26 (H) 6 - 20 MG/DL    Creatinine 1.81 (H) 0.70 - 1.30 MG/DL    BUN/Creatinine ratio 14 12 - 20      GFR est AA 44 (L) >60 ml/min/1.73m2    GFR est non-AA 37 (L) >60 ml/min/1.73m2    Calcium 9.5 8.5 - 10.1 MG/DL   MAGNESIUM   Result Value Ref Range    Magnesium 2.4 1.6 - 2.4 mg/dL   HEMOGLOBIN A1C WITH EAG   Result Value Ref Range    Hemoglobin A1c 7.8 (H) 4.2 - 6.3 %    Est. average glucose 177 mg/dL   LIPID PANEL   Result Value Ref Range    LIPID PROFILE          Cholesterol, total 145 <200 MG/DL    Triglyceride 62 <150 MG/DL    HDL Cholesterol 59 MG/DL    LDL, calculated 73.6 0 - 100 MG/DL    VLDL, calculated 12.4 MG/DL    CHOL/HDL Ratio 2.5 0.0 - 5.0     TROPONIN I   Result Value Ref Range    Troponin-I, Qt. 0.11 (H) <0.05 ng/mL   CBC WITH AUTOMATED DIFF   Result Value Ref Range    WBC 5.6 4.1 - 11.1 K/uL    RBC 4.87 4.10 - 5.70 M/uL    HGB 13.7 12.1 - 17.0 g/dL    HCT 42.6 36.6 - 50.3 %    MCV 87.5 80.0 - 99.0 FL    MCH 28.1 26.0 - 34.0 PG    MCHC 32.2 30.0 - 36.5 g/dL    RDW 13.5 11.5 - 14.5 %    PLATELET 391 820 - 024 K/uL    MPV 9.4 8.9 - 12.9 FL    NRBC 0.0 0 PER 100 WBC    ABSOLUTE NRBC 0.00 0.00 - 0.01 K/uL    NEUTROPHILS 75 32 - 75 %    LYMPHOCYTES 13 12 - 49 %    MONOCYTES 9 5 - 13 %    EOSINOPHILS 3 0 - 7 %    BASOPHILS 0 0 - 1 %    IMMATURE GRANULOCYTES 1 (H) 0.0 - 0.5 %    ABS. NEUTROPHILS 4.2 1.8 - 8.0 K/UL    ABS. LYMPHOCYTES 0.7 (L) 0.8 - 3.5 K/UL    ABS. MONOCYTES 0.5 0.0 - 1.0 K/UL    ABS. EOSINOPHILS 0.1 0.0 - 0.4 K/UL    ABS. BASOPHILS 0.0 0.0 - 0.1 K/UL    ABS. IMM. GRANS. 0.0 0.00 - 0.04 K/UL    DF AUTOMATED     METABOLIC PANEL, BASIC   Result Value Ref Range    Sodium 135 (L) 136 - 145 mmol/L    Potassium 4.4 3.5 - 5.1 mmol/L    Chloride 105 97 - 108 mmol/L    CO2 23 21 - 32 mmol/L    Anion gap 7 5 - 15 mmol/L    Glucose 141 (H) 65 - 100 mg/dL    BUN 24 (H) 6 - 20 MG/DL    Creatinine 1.53 (H) 0.70 - 1.30 MG/DL    BUN/Creatinine ratio 16 12 - 20      GFR est AA 54 (L) >60 ml/min/1.73m2    GFR est non-AA 44 (L) >60 ml/min/1.73m2    Calcium 8.8 8.5 - 10.1 MG/DL   MAGNESIUM   Result Value Ref Range    Magnesium 2.2 1.6 - 2.4 mg/dL   PHOSPHORUS   Result Value Ref Range    Phosphorus 3.0 2.6 - 4.7 MG/DL   CBC WITH AUTOMATED DIFF   Result Value Ref Range    WBC 2.5 (L) 4.1 - 11.1 K/uL    RBC 4.82 4.10 - 5.70 M/uL    HGB 13.7 12.1 - 17.0 g/dL    HCT 42.2 36.6 - 50.3 %    MCV 87.6 80.0 - 99.0 FL    MCH 28.4 26.0 - 34.0 PG    MCHC 32.5 30.0 - 36.5 g/dL    RDW 13.4 11.5 - 14.5 %    PLATELET 461 522 - 099 K/uL    MPV 9.1 8.9 - 12.9 FL    NRBC 0.0 0  WBC    ABSOLUTE NRBC 0.00 0.00 - 0.01 K/uL    NEUTROPHILS 55 32 - 75 %    LYMPHOCYTES 27 12 - 49 %    MONOCYTES 13 5 - 13 %    EOSINOPHILS 4 0 - 7 %    BASOPHILS 1 0 - 1 %    IMMATURE GRANULOCYTES 0 0.0 - 0.5 %    ABS. NEUTROPHILS 1.4 (L) 1.8 - 8.0 K/UL    ABS. LYMPHOCYTES 0.7 (L) 0.8 - 3.5 K/UL    ABS. MONOCYTES 0.3 0.0 - 1.0 K/UL    ABS. EOSINOPHILS 0.1 0.0 - 0.4 K/UL    ABS. BASOPHILS 0.0 0.0 - 0.1 K/UL    ABS. IMM.  GRANS. 0.0 0.00 - 0.04 K/UL    DF AUTOMATED      RBC COMMENTS NORMOCYTIC, NORMOCHROMIC     METABOLIC PANEL, BASIC   Result Value Ref Range    Sodium 137 136 - 145 mmol/L    Potassium 4.2 3.5 - 5.1 mmol/L    Chloride 102 97 - 108 mmol/L    CO2 28 21 - 32 mmol/L    Anion gap 7 5 - 15 mmol/L    Glucose 182 (H) 65 - 100 mg/dL    BUN 17 6 - 20 MG/DL    Creatinine 1.41 (H) 0.70 - 1.30 MG/DL    BUN/Creatinine ratio 12 12 - 20      GFR est AA 59 (L) >60 ml/min/1.73m2    GFR est non-AA 49 (L) >60 ml/min/1.73m2    Calcium 9.3 8.5 - 10.1 MG/DL   MAGNESIUM   Result Value Ref Range    Magnesium 2.3 1.6 - 2.4 mg/dL   CBC WITH AUTOMATED DIFF   Result Value Ref Range    WBC 2.9 (L) 4.1 - 11.1 K/uL    RBC 5.03 4. 10 - 5.70 M/uL    HGB 14.3 12.1 - 17.0 g/dL    HCT 43.8 36.6 - 50.3 %    MCV 87.1 80.0 - 99.0 FL    MCH 28.4 26.0 - 34.0 PG    MCHC 32.6 30.0 - 36.5 g/dL    RDW 13.4 11.5 - 14.5 %    PLATELET 570 941 - 854 K/uL    MPV 9.1 8.9 - 12.9 FL    NRBC 0.0 0  WBC    ABSOLUTE NRBC 0.00 0.00 - 0.01 K/uL    NEUTROPHILS 44 32 - 75 %    LYMPHOCYTES 32 12 - 49 %    MONOCYTES 18 (H) 5 - 13 %    EOSINOPHILS 5 0 - 7 %    BASOPHILS 1 0 - 1 %    IMMATURE GRANULOCYTES 0 0.0 - 0.5 %    ABS. NEUTROPHILS 1.3 (L) 1.8 - 8.0 K/UL    ABS. LYMPHOCYTES 0.9 0.8 - 3.5 K/UL    ABS. MONOCYTES 0.5 0.0 - 1.0 K/UL    ABS. EOSINOPHILS 0.1 0.0 - 0.4 K/UL    ABS. BASOPHILS 0.0 0.0 - 0.1 K/UL    ABS. IMM.  GRANS. 0.0 0.00 - 0.04 K/UL    DF AUTOMATED     METABOLIC PANEL, BASIC   Result Value Ref Range    Sodium 137 136 - 145 mmol/L    Potassium 4.0 3.5 - 5.1 mmol/L    Chloride 103 97 - 108 mmol/L    CO2 29 21 - 32 mmol/L    Anion gap 5 5 - 15 mmol/L    Glucose 82 65 - 100 mg/dL    BUN 16 6 - 20 MG/DL    Creatinine 1.47 (H) 0.70 - 1.30 MG/DL    BUN/Creatinine ratio 11 (L) 12 - 20      GFR est AA 56 (L) >60 ml/min/1.73m2    GFR est non-AA 46 (L) >60 ml/min/1.73m2    Calcium 9.5 8.5 - 10.1 MG/DL   MAGNESIUM   Result Value Ref Range    Magnesium 2.2 1.6 - 2.4 mg/dL   GLUCOSE, POC   Result Value Ref Range    Glucose (POC) 186 (H) 65 - 100 mg/dL    Performed by Keara Villanueva    GLUCOSE, POC   Result Value Ref Range    Glucose (POC) 127 (H) 65 - 100 mg/dL    Performed by Ping Del Valle RN    GLUCOSE, POC   Result Value Ref Range    Glucose (POC) 280 (H) 65 - 100 mg/dL    Performed by Vanessa Campos (PCT)    GLUCOSE, POC   Result Value Ref Range    Glucose (POC) 169 (H) 65 - 100 mg/dL    Performed by Vanessa Campos (PCT)    GLUCOSE, POC   Result Value Ref Range    Glucose (POC) 251 (H) 65 - 100 mg/dL    Performed by Lg Heath (PCT)    GLUCOSE, POC   Result Value Ref Range    Glucose (POC) 190 (H) 65 - 100 mg/dL    Performed by Rachel Smith (PCT)    GLUCOSE, POC   Result Value Ref Range    Glucose (POC) 263 (H) 65 - 100 mg/dL    Performed by Rian Bridges (PCT)    GLUCOSE, POC   Result Value Ref Range    Glucose (POC) 96 65 - 100 mg/dL    Performed by Deniz Pompa (PCT)    GLUCOSE, POC   Result Value Ref Range    Glucose (POC) 325 (H) 65 - 100 mg/dL    Performed by Lg Heath (PCT)    GLUCOSE, POC   Result Value Ref Range    Glucose (POC) 143 (H) 65 - 100 mg/dL    Performed by Lg Heath (PCT)    GLUCOSE, POC   Result Value Ref Range    Glucose (POC) 173 (H) 65 - 100 mg/dL    Performed by Deniz Pompa (PCT)    GLUCOSE, POC   Result Value Ref Range    Glucose (POC) 113 (H) 65 - 100 mg/dL    Performed by Moreno Mota    GLUCOSE, POC   Result Value Ref Range    Glucose (POC) 123 (H) 65 - 100 mg/dL    Performed by Sonali Urban    GLUCOSE, POC   Result Value Ref Range    Glucose (POC) 186 (H) 65 - 100 mg/dL    Performed by Lg Heath (PCT)    GLUCOSE, POC   Result Value Ref Range    Glucose (POC) 170 (H) 65 - 100 mg/dL    Performed by Lg Heath (PCT)    GLUCOSE, POC   Result Value Ref Range    Glucose (POC) 60 (L) 65 - 100 mg/dL    Performed by Rachel Smith (PCT)    GLUCOSE, POC   Result Value Ref Range    Glucose (POC) 107 (H) 65 - 100 mg/dL    Performed by Rachel Smith (PCT)    GLUCOSE, POC   Result Value Ref Range    Glucose (POC) 90 65 - 100 mg/dL Performed by Lynn Garcia (PCT)    GLUCOSE, POC   Result Value Ref Range    Glucose (POC) 207 (H) 65 - 100 mg/dL    Performed by Bhargavi Prater (PCT)    EKG, 12 LEAD, INITIAL   Result Value Ref Range    Ventricular Rate 107 BPM    Atrial Rate 107 BPM    P-R Interval 192 ms    QRS Duration 94 ms    Q-T Interval 348 ms    QTC Calculation (Bezet) 464 ms    Calculated P Axis 32 degrees    Calculated R Axis 155 degrees    Calculated T Axis -33 degrees    Diagnosis       Sinus tachycardia  Possible Left atrial enlargement  Incomplete right bundle branch block  Left posterior fascicular block  Inferior infarct , age undetermined  Anteroseptal infarct , age undetermined  When compared with ECG of 27-FEB-2016 13:20,  Significant changes have occurred  Confirmed by Morris Gonzalez ([id number]) on 7/8/2019 1:11:30 PM     ECHO ADULT COMPLETE   Result Value Ref Range    Aortic Valve Systolic Peak Velocity 950.99 cm/s    Aortic Valve Area by Continuity of Peak Velocity 2.3 cm2    AoV PG 6.6 mmHg    LVIDd 3.56 (A) 4.2 - 5.9 cm    LVPWd 1.42 (A) 0.6 - 1.0 cm    LVIDs 2.92 cm    IVSd 1.80 (A) 0.6 - 1.0 cm    LVOT d 1.82 cm    LVOT Peak Velocity 114.72 cm/s    LVOT Peak Gradient 5.3 mmHg    LVOT VTI 24.16 cm    MVA (PHT) 4.2 cm2    MV A Rolan 74.19 cm/s    MV E Rolan 52.45 cm/s    MV E/A 0.71     Left Atrium to Aortic Root Ratio 0.92     TV PG 23.6 mmHg    LA Vol 4C 49.82 18 - 58 mL    LA Area 4C 17.9 cm2    LV Mass .3 (A) 88 - 224 g    LV Mass AL Index 130.6 49 - 115 g/m2    LVPWs 1.12 cm    Est. RA Pressure 10.0 mmHg    Mitral Regurgitant Peak Velocity 133.76 cm/s    Mitral Valve E Wave Deceleration Time 180.1 ms    Mitral Valve Pressure Half-time 52.2 ms    Left Atrium Major Axis 3.03 cm    Tricuspid Valve Max Velocity 242.79 cm/s    LVOT SV 63.1 ml    Right Atrial Volume 27.1 ml    LA Vol Index 24.71 16 - 28 ml/m2    MR Peak Gradient 7.2 mmHg    Ao Root D 3.28 cm    KATERINE/BSA Pk Rolan 1.1 cm2/m2    AV Cusp 0.00 cm      Imaging review:  MRI brain  Normal    Documentation review:  None    Assessment/Plan:   Arminda Diaz is a 69 y.o. male who presented to the neurology office for management of dementia. The patient is already on max dose of Namzaric. No behavioral changes. We will have to continue and observe. Family understands that there will be gradual progression over time. Continue Namzaric and Seroquel 50 mg p.o. nightly    Follow-up in 12 months    Over 25 minutes was spent with the patient of which > 50% of the visit was spent counseling on diagnosis, management, and treatment of the diagnosis. I did discuss about dementia and medications        3 most recent PHQ Screens 9/7/2018   Little interest or pleasure in doing things Not at all   Feeling down, depressed, irritable, or hopeless Not at all   Total Score PHQ 2 0     Primary care to address possible depression if PHQ-9 score is more than 9. ICD-10-CM ICD-9-CM    1. Dementia without behavioral disturbance, unspecified dementia type [facility]) F03.90 294.20         Neri Phillips MD  Neurologist    CC: Joaquina Cifuentes MD  Fax: [phone]    This note was created using voice recognition software. Despite editing, there may be syntax errors.

## 2020-07-13 ENCOUNTER — HOSPITAL ENCOUNTER (OUTPATIENT)
Dept: NUCLEAR MEDICINE | Age: 80
Discharge: HOME | End: 2020-07-13
Attending: UROLOGY
Payer: MEDICARE

## 2020-07-13 DIAGNOSIS — M25.559 HIP PAIN: ICD-10-CM

## 2020-07-13 DIAGNOSIS — C61 PROSTATE CANCER (HCC): ICD-10-CM

## 2020-07-13 PROCEDURE — 78306 BONE IMAGING WHOLE BODY: CPT

## 2020-07-24 ENCOUNTER — HOSPITAL ENCOUNTER (OUTPATIENT)
Dept: GENERAL RADIOLOGY | Age: 80
Discharge: HOME | End: 2020-07-24
Attending: UROLOGY
Payer: MEDICARE

## 2020-07-24 DIAGNOSIS — C61 PROSTATE CA (HCC): ICD-10-CM

## 2020-07-24 PROCEDURE — 73060 X-RAY EXAM OF HUMERUS: CPT

## 2020-11-19 ENCOUNTER — TRANSCRIBE ORDER (OUTPATIENT)
Dept: SCHEDULING | Age: 80
End: 2020-11-19

## 2020-11-19 DIAGNOSIS — C61 PROSTATE CANCER (HCC): Primary | ICD-10-CM

## 2020-12-01 ENCOUNTER — HOSPITAL ENCOUNTER (OUTPATIENT)
Dept: NUCLEAR MEDICINE | Age: 80
Discharge: HOME | End: 2020-12-01
Attending: UROLOGY
Payer: MEDICARE

## 2020-12-01 DIAGNOSIS — C61 PROSTATE CANCER (HCC): ICD-10-CM

## 2020-12-01 PROCEDURE — 78306 BONE IMAGING WHOLE BODY: CPT

## 2020-12-30 DIAGNOSIS — F03.90 DEMENTIA WITHOUT BEHAVIORAL DISTURBANCE, UNSPECIFIED DEMENTIA TYPE: ICD-10-CM

## 2021-01-01 NOTE — PROGRESS NOTES
Hospitalist Progress Note    NAME:  Hanna Tiwari  :  1940  MRN:  [id number]  PCP:  Cherylene Lesser, MD  Date of Service:  2018    Assessment & Plan:     Hyperosmolar Non ketotic Hyperglycemia in Type 2 DM uncontrolled without coma POA  HERMILO/dehydration POA  DM type 2: better controlled  Psueudohyponatremia POA, resolved  - Due to medication noncompliance for over 6 weeks per wife. - Diabetic management with accuchecks with ISS coverage and basal coverage  - Increased lantus to 75 units and change to QHS: BS control slightly better  - Monitor labs  - DTC consulted A1c is 12.7  ( but no meds for 6 weeks )   Discussed with DTC. Will try to stop all insulin and change to Glucotrol and Tradjenta and monitor BS prior to discharge     HERMILO:  worsening  Hypokalemia  - monitor labs  - Restart IVFs change to 1/2 NS  - Encourage PO fluids  - replace lytes as needed     Acute encephalopathy POA  Baseline previously  undiagnosed dementia ( noted Forgetfulness x 1 year with progressive Slow mental function per wife and stopped meds and cannot be influenced by wife so she could assist with DM  Mgt.  - No change  - Unlikely CVA as there is no neurological deficit other than confusion  - CT head neg, ammonia neg, no evidence of infection  - RPR negative  - UA neg  - CT head neg  -  MRI brain: Mild atrophy & nonspecific white matter disease. No acute findings. - Start seroquel, ativan prn, melatonin qhs prn  - Continue tele-sitter  - Neurology consulted  - Wife requested letter of incompetence at this time to handle his affairs and pay bills. Written see previous progress note.   Will ORDER a formal Psych eval  For Competency  Spoke to wife at length today who states that patient will NOT allow her or daughter to assist with Insulin and SHE does not believe that he will comply to what ever Insulin regimen we give not because he does not want to but he unable to mentally \" because they have been trying to help him at home the last few months but he wont let them. She is also very concerned about his driving, discussed. Left message with wife today      HTN  CAD s/p CABG  Hypercholesterolemia  - Cont amlodipine, indapamide, Metoprolol  - Continue ASA, statin, isordil      H/o Prostate Ca s/p resection, s/p radiation therapy  - Cont Imipramine as at home  - Pt is on Lupron shot monthyly with urologist (Dr Michaelle Siddiqui) as per the wife  - OP follow up as before. Edema right hand and forearm from infiltrated IVF  - Elevate right arm  - Change IV site  - if note improved order RUE venous doppler US    Overweight:  Body mass index is 28.76 kg/(m^2)     Code Status: Full  Surrogate Decision Maker: wife Zunilda Goes # [phone]   DVT Prophylaxis: Sq heparin  GI Prophylaxis: not indicated   Baseline: Pt is independent at home with ADLs, lives with wife  . Reason for visit:   Recheck AMS, DM, HERMILO    Interval history / Subjective:   5/4   I had a standing off with my \" what is the name of the person that passes on my prescriptions to the Pharmacy\"  ( meaning his PCP  5/03 I wanted to re enlist in the [address] of the things the Doctors were doing  Clearly confused  5/02  No changes but no complaints. IVF infiltrated right arm.    5/01  Still confused but denies complaints. BS elevated. 4/30  Seen and examined. He is in NAD but very confused. MRI done with no acute changes. Review of Systems:     Symptom Y/N Comments   Symptom Y/N Comments   Fever/Chills n     Chest Pain n      Poor Appetite       Edema        Cough       Abdominal Pain n      Sputum       Joint Pain        SOB/ANGUIANO n     Pruritis/Rash        Nausea/vomit  n     Tolerating PT/OT        Diarrhea      Tolerating Diet        Constipation       Other           Could NOT obtain due to:       Vital Signs:   Last 24hrs VS reviewed since prior progress note.  Most recent are:  Visit Vitals    /80 (BP 1 Location: Left arm, BP Patient Position: At rest)    Pulse 96    Temp 98.2 °F (36.8 °C)    Resp 18    Ht 5' 8\" (1.727 m)    Wt 86.1 kg (189 lb 13.1 oz)    SpO2 98%    BMI 28.86 kg/m2       No intake or output data in the 24 hours ending 05/04/18 1804     Physical Examination:   General: Sleeping but wakes up, cooperative, NAD, confused   EENT:          EOMI. Anicteric sclerae. MMM  Resp:  CTA bilaterally with normal effort and good BS  CV:                 S1-S2, RRR, No edema  GI:                 SNTBS+, No HSM  Neurologic: Oriented only to self, normal speech  Psych:         Not anxious nor agitated  Skin:        No rashes. No jaundice  Additional pertinent findings: swelling right arm. Data Review:   Review and/or order of clinical lab test    Labs:     Recent Labs      05/03/18 0327 05/02/18 0343   WBC  4.7  5.4   HGB  13.1  12.9   HCT  39.7  38.7   PLT  174  192     Recent Labs      05/04/18   0436  05/03/18 0327 05/02/18   0343   NA  135*  137  137   K  3.8  3.8  4.2   CL  102  103  103   CO2  27  27  26   BUN  25*  23*  30*   CREA  1.52*  1.48*  1.77*   GLU  212*  167*  156*   CA  9.2  9.4  9.4   MG  2.1  2.0  2.1   PHOS   --    --   4.0     No results for input(s): SGOT, GPT, ALT, AP, TBIL, TBILI, TP, ALB, GLOB, GGT, AML, LPSE in the last 72 hours. No lab exists for component: AMYP, HLPSE  No results for input(s): INR, PTP, APTT in the last 72 hours. No lab exists for component: INREXT, INREXT   No results for input(s): FE, TIBC, PSAT, FERR in the last 72 hours. No results found for: FOL, RBCF   No results for input(s): PH, PCO2, PO2 in the last 72 hours. No results for input(s): CPK, CKNDX, TROIQ in the last 72 hours.     No lab exists for component: CPKMB  No results found for: CHOL, CHOLX, CHLST, CHOLV, HDL, LDL, LDLC, DLDLP, TGLX, TRIGL, TRIGP, CHHD, CHHDX  Lab Results   Component Value Date/Time    Glucose (POC) 242 (H) 05/04/2018 04:18 PM    Glucose (POC) 248 (H) 05/04/2018 11:34 AM    Glucose (POC) 138 (H) 05/04/2018 07:21 AM Glucose (POC) 267 (H) 05/03/2018 10:52 PM    Glucose (POC) 287 (H) 05/03/2018 04:30 PM     Lab Results   Component Value Date/Time    Color YELLOW/STRAW 04/27/2018 01:32 PM    Appearance CLEAR 04/27/2018 01:32 PM    Specific gravity 1.029 04/27/2018 01:32 PM    pH (UA) 5.0 04/27/2018 01:32 PM    Protein TRACE (A) 04/27/2018 01:32 PM    Glucose >1000 (A) 04/27/2018 01:32 PM    Ketone NEGATIVE  04/27/2018 01:32 PM    Bilirubin NEGATIVE  04/27/2018 01:32 PM    Urobilinogen 0.2 04/27/2018 01:32 PM    Nitrites NEGATIVE  04/27/2018 01:32 PM    Leukocyte Esterase NEGATIVE  04/27/2018 01:32 PM    Epithelial cells FEW 04/27/2018 01:32 PM    Bacteria NEGATIVE  04/27/2018 01:32 PM    WBC 0-4 04/27/2018 01:32 PM    RBC 0-5 04/27/2018 01:32 PM       Medications Reviewed:     Current Facility-Administered Medications   Medication Dose Route Frequency    [START ON 5/5/2018] glipiZIDE (GLUCOTROL) tablet 20 mg  20 mg Oral ACB&D    LORazepam (ATIVAN) injection 1 mg  1 mg IntraMUSCular Q6H PRN    0.45% sodium chloride infusion  50 mL/hr IntraVENous CONTINUOUS    QUEtiapine (SEROquel) tablet 50 mg  50 mg Oral QHS    LORazepam (ATIVAN) injection 1 mg  1 mg IntraVENous Q6H PRN    melatonin tablet 6 mg  6 mg Oral QHS PRN    insulin lispro (HUMALOG) injection   SubCUTAneous AC&HS    glucose chewable tablet 16 g  4 Tab Oral PRN    dextrose (D50W) injection syrg 12.5-25 g  12.5-25 g IntraVENous PRN    glucagon (GLUCAGEN) injection 1 mg  1 mg IntraMUSCular PRN    metoprolol (LOPRESSOR) injection 1.25 mg  1.25 mg IntraVENous Q6H PRN    amLODIPine (NORVASC) tablet 10 mg  10 mg Oral DAILY    aspirin chewable tablet 81 mg  81 mg Oral DAILY    atorvastatin (LIPITOR) tablet 10 mg  10 mg Oral DAILY    indapamide (LOZOL) tablet 2.5 mg  2.5 mg Oral DAILY    isosorbide dinitrate (ISORDIL) tablet 30 mg  30 mg Oral DAILY    metoprolol succinate (TOPROL-XL) XL tablet 100 mg  100 mg Oral DAILY    sodium chloride (NS) flush 5-10 mL  5-10 mL IntraVENous Q8H    sodium chloride (NS) flush 5-10 mL  5-10 mL IntraVENous PRN    heparin (porcine) injection 5,000 Units  5,000 Units SubCUTAneous Q8H    imipramine (TOFRANIL) tablet 10 mg  (Patient Supplied)  10 mg Oral TID     ______________________________________________________________________  EXPECTED LENGTH OF STAY: 3d 19h  ACTUAL LENGTH OF STAY:          7                 Kush Rainey MD Statement Selected

## 2021-01-06 RX ORDER — MEMANTINE HYDROCHLORIDE AND DONEPEZIL HYDROCHLORIDE 28; 10 MG/1; MG/1
CAPSULE ORAL
Qty: 90 CAP | Refills: 3 | Status: SHIPPED | OUTPATIENT
Start: 2021-01-06 | End: 2021-11-03 | Stop reason: SDUPTHER

## 2021-01-06 RX ORDER — QUETIAPINE FUMARATE 50 MG/1
TABLET, FILM COATED ORAL
Qty: 90 TAB | Refills: 3 | Status: SHIPPED | OUTPATIENT
Start: 2021-01-06 | End: 2021-11-03 | Stop reason: SDUPTHER

## 2021-07-06 ENCOUNTER — HOSPITAL ENCOUNTER (EMERGENCY)
Age: 81
Discharge: HOME | End: 2021-07-06
Attending: EMERGENCY MEDICINE
Payer: MEDICARE

## 2021-07-06 VITALS
DIASTOLIC BLOOD PRESSURE: 67 MMHG | SYSTOLIC BLOOD PRESSURE: 132 MMHG | HEART RATE: 82 BPM | HEIGHT: 67 IN | OXYGEN SATURATION: 98 % | BODY MASS INDEX: 32.53 KG/M2 | WEIGHT: 207.23 LBS | TEMPERATURE: 98.6 F | RESPIRATION RATE: 16 BRPM

## 2021-07-06 DIAGNOSIS — N39.0 URINARY TRACT INFECTION WITHOUT HEMATURIA, SITE UNSPECIFIED: Primary | ICD-10-CM

## 2021-07-06 LAB
ALBUMIN SERPL-MCNC: 2.9 G/DL (ref 3.5–5)
ALBUMIN/GLOB SERPL: 0.8 {RATIO} (ref 1.1–2.2)
ALP SERPL-CCNC: 84 U/L (ref 45–117)
ALT SERPL-CCNC: 24 U/L (ref 12–78)
ANION GAP SERPL CALC-SCNC: 5 MMOL/L (ref 5–15)
APPEARANCE UR: ABNORMAL
AST SERPL-CCNC: 18 U/L (ref 15–37)
BACTERIA URNS QL MICRO: ABNORMAL /HPF
BASOPHILS # BLD: 0 K/UL (ref 0–0.1)
BASOPHILS NFR BLD: 1 % (ref 0–1)
BILIRUB SERPL-MCNC: 0.8 MG/DL (ref 0.2–1)
BILIRUB UR QL: NEGATIVE
BUN SERPL-MCNC: 23 MG/DL (ref 6–20)
BUN/CREAT SERPL: 16 (ref 12–20)
CALCIUM SERPL-MCNC: 8.9 MG/DL (ref 8.5–10.1)
CHLORIDE SERPL-SCNC: 108 MMOL/L (ref 97–108)
CO2 SERPL-SCNC: 28 MMOL/L (ref 21–32)
COLOR UR: ABNORMAL
CREAT SERPL-MCNC: 1.44 MG/DL (ref 0.7–1.3)
DIFFERENTIAL METHOD BLD: ABNORMAL
EOSINOPHIL # BLD: 0.1 K/UL (ref 0–0.4)
EOSINOPHIL NFR BLD: 2 % (ref 0–7)
EPITH CASTS URNS QL MICRO: ABNORMAL /LPF
ERYTHROCYTE [DISTWIDTH] IN BLOOD BY AUTOMATED COUNT: 13.5 % (ref 11.5–14.5)
GLOBULIN SER CALC-MCNC: 3.8 G/DL (ref 2–4)
GLUCOSE SERPL-MCNC: 265 MG/DL (ref 65–100)
GLUCOSE UR STRIP.AUTO-MCNC: >1000 MG/DL
HCT VFR BLD AUTO: 41.5 % (ref 36.6–50.3)
HGB BLD-MCNC: 13.3 G/DL (ref 12.1–17)
HGB UR QL STRIP: ABNORMAL
IMM GRANULOCYTES # BLD AUTO: 0 K/UL (ref 0–0.04)
IMM GRANULOCYTES NFR BLD AUTO: 1 % (ref 0–0.5)
KETONES UR QL STRIP.AUTO: NEGATIVE MG/DL
LEUKOCYTE ESTERASE UR QL STRIP.AUTO: ABNORMAL
LYMPHOCYTES # BLD: 0.9 K/UL (ref 0.8–3.5)
LYMPHOCYTES NFR BLD: 15 % (ref 12–49)
MCH RBC QN AUTO: 29.5 PG (ref 26–34)
MCHC RBC AUTO-ENTMCNC: 32 G/DL (ref 30–36.5)
MCV RBC AUTO: 92 FL (ref 80–99)
MONOCYTES # BLD: 0.5 K/UL (ref 0–1)
MONOCYTES NFR BLD: 8 % (ref 5–13)
NEUTS SEG # BLD: 4.6 K/UL (ref 1.8–8)
NEUTS SEG NFR BLD: 73 % (ref 32–75)
NITRITE UR QL STRIP.AUTO: NEGATIVE
NRBC # BLD: 0 K/UL (ref 0–0.01)
NRBC BLD-RTO: 0 PER 100 WBC
PH UR STRIP: 6 [PH] (ref 5–8)
PLATELET # BLD AUTO: 237 K/UL (ref 150–400)
PMV BLD AUTO: 9.5 FL (ref 8.9–12.9)
POTASSIUM SERPL-SCNC: 4 MMOL/L (ref 3.5–5.1)
PROT SERPL-MCNC: 6.7 G/DL (ref 6.4–8.2)
PROT UR STRIP-MCNC: ABNORMAL MG/DL
RBC # BLD AUTO: 4.51 M/UL (ref 4.1–5.7)
RBC #/AREA URNS HPF: ABNORMAL /HPF (ref 0–5)
SODIUM SERPL-SCNC: 141 MMOL/L (ref 136–145)
SP GR UR REFRACTOMETRY: 1.02 (ref 1–1.03)
UA: UC IF INDICATED,UAUC: ABNORMAL
UROBILINOGEN UR QL STRIP.AUTO: 1 EU/DL (ref 0.2–1)
WBC # BLD AUTO: 6.2 K/UL (ref 4.1–11.1)
WBC URNS QL MICRO: >100 /HPF (ref 0–4)

## 2021-07-06 PROCEDURE — 87086 URINE CULTURE/COLONY COUNT: CPT

## 2021-07-06 PROCEDURE — 87077 CULTURE AEROBIC IDENTIFY: CPT

## 2021-07-06 PROCEDURE — 87186 SC STD MICRODIL/AGAR DIL: CPT

## 2021-07-06 PROCEDURE — 99283 EMERGENCY DEPT VISIT LOW MDM: CPT

## 2021-07-06 PROCEDURE — 74011000258 HC RX REV CODE- 258: Performed by: EMERGENCY MEDICINE

## 2021-07-06 PROCEDURE — 85025 COMPLETE CBC W/AUTO DIFF WBC: CPT

## 2021-07-06 PROCEDURE — 81001 URINALYSIS AUTO W/SCOPE: CPT

## 2021-07-06 PROCEDURE — 36415 COLL VENOUS BLD VENIPUNCTURE: CPT

## 2021-07-06 PROCEDURE — 96365 THER/PROPH/DIAG IV INF INIT: CPT

## 2021-07-06 PROCEDURE — 74011250636 HC RX REV CODE- 250/636: Performed by: EMERGENCY MEDICINE

## 2021-07-06 PROCEDURE — 80053 COMPREHEN METABOLIC PANEL: CPT

## 2021-07-06 RX ORDER — CEFDINIR 300 MG/1
300 CAPSULE ORAL 2 TIMES DAILY
Qty: 20 CAPSULE | Refills: 0 | Status: SHIPPED | OUTPATIENT
Start: 2021-07-06 | End: 2022-03-10

## 2021-07-06 RX ADMIN — CEFTRIAXONE 1 G: 1 INJECTION, POWDER, FOR SOLUTION INTRAMUSCULAR; INTRAVENOUS at 12:31

## 2021-07-06 NOTE — ED NOTES
Pt. Presents to ED today for complaints of blood in urine. Patients wife reports that he has had some blood in his depends since Sunday. She reports an episode of confusion yesterday evening. Upon arrival to ED patient denies symptoms. PT. Placed in position of comfort with call bell in reach.

## 2021-07-06 NOTE — DISCHARGE INSTRUCTIONS
It was a pleasure taking care of you in our Emergency Department today. We know that when you come to [facility], you are entrusting us with your health, comfort, and safety. Our physicians and nurses honor that trust, and truly appreciate the opportunity to care for you and your loved ones. We also value your feedback. If you receive a survey about your Emergency Department experience today, please fill it out. We care about our patients' feedback, and we listen to what you have to say. Thank you! Please take antibiotics as prescribed. Please return to the ED immediately with any fevers, chills, worsening mental status, vomiting, or any other new concerns.

## 2021-07-06 NOTE — ED NOTES
Patient discharged by Dr. Marisela Ojeda. Patient provided with discharge instructions Rx and instructions on follow up care. Patient out of ED ambulatory accompanied by family.

## 2021-07-06 NOTE — ED PROVIDER NOTES
EMERGENCY DEPARTMENT HISTORY AND PHYSICAL EXAM      Date: 7/6/2021  Patient Name: Flako Miguel    History of Presenting Illness     Chief Complaint   Patient presents with    Altered mental status     pt with hx of dementia has been more confused than normal x 1 week and started with blood in his urine on Sunday    Blood in Urine       History Provided By: Patient    HPI: Flako Miguel, 79 y.o. male with a past medical history significant for CAD, prostate cancer, diabetes, dementia, medical problems as stated below presents to the ED with cc of moderate confusion over the last week with some noted blood in the urine. Patient has not been exactly himself over the last few days. He has not had any fevers or chills. He has had no chest pain or trouble breathing. He has no productive cough. He has had no diarrhea. He has had similar symptoms in the past when he had a urinary tract infection and his wife is concerned about this. No other associated symptoms. No other exacerbating ameliorating factors. There are no other complaints, changes, or physical findings at this time. PCP: Allyssa Shipman MD    No current facility-administered medications on file prior to encounter. Current Outpatient Medications on File Prior to Encounter   Medication Sig Dispense Refill    QUEtiapine (SEROquel) 50 mg tablet TAKE 1 TABLET NIGHTLY 90 Tab 3    Namzaric 28-10 mg CSpX TAKE 1 CAPSULE NIGHTLY 90 Cap 3    insulin aspart protamine/insulin aspart (NOVOLOG MIX 70-30 U-100 INSULN) 100 unit/mL (70-30) injection 20 Units by SubCUTAneous route Daily (before breakfast). 10 mL 0    insulin aspart protamine/insulin aspart (NOVOLOG MIX 70-30 U-100 INSULN) 100 unit/mL (70-30) injection 20 Units by SubCUTAneous route Daily (before dinner). 10 mL 0    amLODIPine (NORVASC) 10 mg tablet Take 10 mg by mouth nightly.  cholecalciferol (VITAMIN D3) 1,000 unit tablet Take 1,000 Units by mouth daily.       atorvastatin (LIPITOR) 20 mg tablet Take 20 mg by mouth daily.  isosorbide mononitrate ER (IMDUR) 30 mg tablet Take 30 mg by mouth daily.  apalutamide (ERLEADA) 60 mg tablet Take 120 mg by mouth two (2) times a day.  metoprolol succinate (TOPROL-XL) 100 mg tablet Take 100 mg by mouth daily.  aspirin 81 mg tablet Take 81 mg by mouth daily. Past History     Past Medical History:  Past Medical History:   Diagnosis Date    CAD (coronary artery disease)     Cancer [facility]     prostate    Diabetes ([facility]     GERD (gastroesophageal reflux disease)     Hypertension     Other ill-defined conditions(799.89)     elevated cholesterol       Past Surgical History:  Past Surgical History:   Procedure Laterality Date    COLONOSCOPY N/A 2016    COLONOSCOPY performed by Aydee Levin MD at [address] [place] [address]; HI RISK IND  2016         HX PROSTATECTOMY      LA CARDIAC SURG PROCEDURE UNLIST      cabg x5 vessels,cardiologist  and david at [facility]    LA COLONOSCOPY FLX DX W/COLLJ SPEC WHEN PFRMD  2011            Family History:  History reviewed. No pertinent family history. Social History:  Social History     Tobacco Use    Smoking status: Former Smoker     Packs/day: 0.50     Years: 20.00     Pack years: 10.00     Quit date: 1982     Years since quittin.2    Smokeless tobacco: Never Used   Substance Use Topics    Alcohol use: No    Drug use: No       Allergies:  No Known Allergies      Review of Systems   Review of Systems   Unable to perform ROS: Dementia       Physical Exam   Physical Exam  Vitals and nursing note reviewed. Constitutional:       General: He is not in acute distress. Appearance: He is well-developed. He is not ill-appearing. HENT:      Head: Normocephalic and atraumatic. Mouth/Throat:      Pharynx: No oropharyngeal exudate.    Eyes:      Conjunctiva/sclera: Conjunctivae normal.      Pupils: Pupils are equal, round, and reactive to light. Cardiovascular:      Rate and Rhythm: Normal rate and regular rhythm. Heart sounds: No murmur heard. Pulmonary:      Effort: Pulmonary effort is normal. No respiratory distress. Breath sounds: Normal breath sounds. No wheezing. Abdominal:      General: Bowel sounds are normal. There is no distension. Palpations: Abdomen is soft. Tenderness: There is no abdominal tenderness. Musculoskeletal:         General: No deformity. Normal range of motion. Cervical back: Normal range of motion. Skin:     General: Skin is warm and dry. Findings: No rash. Neurological:      Mental Status: He is alert and oriented to person, place, and time. Coordination: Coordination normal.   Psychiatric:         Behavior: Behavior normal.         Diagnostic Study Results     Labs -     Recent Results (from the past 24 hour(s))   CBC WITH AUTOMATED DIFF    Collection Time: 07/06/21 10:53 AM   Result Value Ref Range    WBC 6.2 4.1 - 11.1 K/uL    RBC 4.51 4.10 - 5.70 M/uL    HGB 13.3 12.1 - 17.0 g/dL    HCT 41.5 36.6 - 50.3 %    MCV 92.0 80.0 - 99.0 FL    MCH 29.5 26.0 - 34.0 PG    MCHC 32.0 30.0 - 36.5 g/dL    RDW 13.5 11.5 - 14.5 %    PLATELET 797 215 - 651 K/uL    MPV 9.5 8.9 - 12.9 FL    NRBC 0.0 0  WBC    ABSOLUTE NRBC 0.00 0.00 - 0.01 K/uL    NEUTROPHILS 73 32 - 75 %    LYMPHOCYTES 15 12 - 49 %    MONOCYTES 8 5 - 13 %    EOSINOPHILS 2 0 - 7 %    BASOPHILS 1 0 - 1 %    IMMATURE GRANULOCYTES 1 (H) 0.0 - 0.5 %    ABS. NEUTROPHILS 4.6 1.8 - 8.0 K/UL    ABS. LYMPHOCYTES 0.9 0.8 - 3.5 K/UL    ABS. MONOCYTES 0.5 0.0 - 1.0 K/UL    ABS. EOSINOPHILS 0.1 0.0 - 0.4 K/UL    ABS. BASOPHILS 0.0 0.0 - 0.1 K/UL    ABS. IMM.  GRANS. 0.0 0.00 - 0.04 K/UL    DF AUTOMATED     URINALYSIS W/ REFLEX CULTURE    Collection Time: 07/06/21 11:41 AM    Specimen: Urine   Result Value Ref Range    Color YELLOW/STRAW      Appearance CLOUDY (A) CLEAR      Specific gravity 1.024 1.003 - 1.030      pH (UA) 6.0 5.0 - 8.0      Protein TRACE (A) NEG mg/dL    Glucose >1,000 (A) NEG mg/dL    Ketone Negative NEG mg/dL    Bilirubin Negative NEG      Blood SMALL (A) NEG      Urobilinogen 1.0 0.2 - 1.0 EU/dL    Nitrites Negative NEG      Leukocyte Esterase MODERATE (A) NEG      WBC >100 (H) 0 - 4 /hpf    RBC 5-10 0 - 5 /hpf    Epithelial cells FEW FEW /lpf    Bacteria 1+ (A) NEG /hpf    UA:UC IF INDICATED URINE CULTURE ORDERED (A) CNI     METABOLIC PANEL, COMPREHENSIVE    Collection Time: 07/06/21 12:01 PM   Result Value Ref Range    Sodium 141 136 - 145 mmol/L    Potassium 4.0 3.5 - 5.1 mmol/L    Chloride 108 97 - 108 mmol/L    CO2 28 21 - 32 mmol/L    Anion gap 5 5 - 15 mmol/L    Glucose 265 (H) 65 - 100 mg/dL    BUN 23 (H) 6 - 20 MG/DL    Creatinine 1.44 (H) 0.70 - 1.30 MG/DL    BUN/Creatinine ratio 16 12 - 20      GFR est AA 57 (L) >60 ml/min/1.73m2    GFR est non-AA 47 (L) >60 ml/min/1.73m2    Calcium 8.9 8.5 - 10.1 MG/DL    Bilirubin, total 0.8 0.2 - 1.0 MG/DL    ALT (SGPT) 24 12 - 78 U/L    AST (SGOT) 18 15 - 37 U/L    Alk. phosphatase 84 45 - 117 U/L    Protein, total 6.7 6.4 - 8.2 g/dL    Albumin 2.9 (L) 3.5 - 5.0 g/dL    Globulin 3.8 2.0 - 4.0 g/dL    A-G Ratio 0.8 (L) 1.1 - 2.2         Radiologic Studies -   No orders to display     CT Results  (Last 48 hours)    None        CXR Results  (Last 48 hours)    None            Medical Decision Making   I am the first provider for this patient. I reviewed the vital signs, available nursing notes, past medical history, past surgical history, family history and social history. Vital Signs-Reviewed the patient's vital signs. Patient Vitals for the past 12 hrs:   Temp Pulse Resp BP SpO2   07/06/21 1050    (!) 142/82    07/06/21 1008 98.6 °F (37 °C) 82 16 (!) 141/70 93 %       Records Reviewed: Nursing records and medical records reviewed    MDM:  Patient presenting with altered mental status.  Pt has stable vitals and POC glucose was checked immediately upon arrival. DDx: medication toxicity, infection, anemia, electrolyte/metabolic anomoly, hypercapnea, stroke/bleed/mass, dehydration, illicit drug intoxication. Will obtain labwork, UA, EKG and CT imaging of the head, chest xray. Will consider adding toxicologic workup if history unclear or warrants further investigation of toxic source. Will continue to monitor and reassess for admission. Provider Notes (Medical Decision Making):   Patient is a 73-year-old male with history of dementia presenting with symptoms consistent with acute urinary tract infection. No signs of severe sepsis or septic shock. Patient is at his baseline. Started on antibiotics and will have him follow-up with his primary care doctor for reassessment and reevaluation. ED Course:   Initial assessment performed. The patients presenting problems have been discussed, and they are in agreement with the care plan formulated and outlined with them. I have encouraged them to ask questions as they arise throughout their visit. Medications Administered     cefTRIAXone (ROCEPHIN) 1 g in 0.9% sodium chloride (MBP/ADV) 50 mL MBP     Admin Date  07/06/2021 Action  New Bag Dose  1 g Rate  100 mL/hr Route  IntraVENous Administered By  Nidia Rivera RN                    Critical Care:  None      Disposition:  4:10 PM  Nicolas Hamm's  results have been reviewed with him. He has been counseled regarding his diagnosis. He verbally conveys understanding and agreement of the signs, symptoms, diagnosis, treatment and prognosis and additionally agrees to follow up as recommended with Dr. Gwendolyn Valenzuela MD in 24 - 48 hours. He also agrees with the care-plan and conveys that all of his questions have been answered.   I have also put together some discharge instructions for him that include: 1) educational information regarding their diagnosis, 2) how to care for their diagnosis at home, as well a 3) list of reasons why they would want to return to the ED prior to their follow-up appointment, should their condition change. DISCHARGE PLAN:  1. Current Discharge Medication List      START taking these medications    Details   cefdinir (OMNICEF) 300 mg capsule Take 1 Capsule by mouth two (2) times a day. Qty: 20 Capsule, Refills: 0  Start date: 7/6/2021           2. Follow-up Information     Follow up With Specialties Details Why Contact Info    Pedro Law MD Internal Medicine In 3 days For a follow-up evaluation. Ahmed Points  [facility]  [phone]      \[facility] [place] [facility]\"" EMERGENCY DEPT Emergency Medicine In 2 days If symptoms worsen [address]  [address] [facility]  [phone]        3. Return to ED if worse     Diagnosis     Clinical Impression:   1. Urinary tract infection without hematuria, site unspecified        Attestations:    Harpreet Bellamy MD    Please note that this dictation was completed with [employer], the computer voice recognition software. Quite often unanticipated grammatical, syntax, homophones, and other interpretive errors are inadvertently transcribed by the computer software. Please disregard these errors. Please excuse any errors that have escaped final proofreading. Thank you.

## 2021-07-08 LAB
BACTERIA SPEC CULT: ABNORMAL
CC UR VC: ABNORMAL
SERVICE CMNT-IMP: ABNORMAL

## 2021-07-30 ENCOUNTER — APPOINTMENT (OUTPATIENT)
Dept: CT IMAGING | Age: 81
End: 2021-07-30
Attending: PHYSICIAN ASSISTANT
Payer: MEDICARE

## 2021-07-30 ENCOUNTER — HOSPITAL ENCOUNTER (EMERGENCY)
Age: 81
Discharge: HOME | End: 2021-07-30
Attending: EMERGENCY MEDICINE
Payer: MEDICARE

## 2021-07-30 VITALS
SYSTOLIC BLOOD PRESSURE: 158 MMHG | WEIGHT: 211.2 LBS | RESPIRATION RATE: 16 BRPM | BODY MASS INDEX: 33.15 KG/M2 | HEART RATE: 75 BPM | OXYGEN SATURATION: 97 % | DIASTOLIC BLOOD PRESSURE: 85 MMHG | TEMPERATURE: 99 F | HEIGHT: 67 IN

## 2021-07-30 DIAGNOSIS — N30.01 ACUTE CYSTITIS WITH HEMATURIA: Primary | ICD-10-CM

## 2021-07-30 LAB
ALBUMIN SERPL-MCNC: 3.4 G/DL (ref 3.5–5)
ALBUMIN/GLOB SERPL: 0.9 {RATIO} (ref 1.1–2.2)
ALP SERPL-CCNC: 84 U/L (ref 45–117)
ALT SERPL-CCNC: 32 U/L (ref 12–78)
ANION GAP SERPL CALC-SCNC: 5 MMOL/L (ref 5–15)
APPEARANCE UR: ABNORMAL
AST SERPL-CCNC: 15 U/L (ref 15–37)
BACTERIA URNS QL MICRO: ABNORMAL /HPF
BASOPHILS # BLD: 0 K/UL (ref 0–0.1)
BASOPHILS NFR BLD: 1 % (ref 0–1)
BILIRUB SERPL-MCNC: 1.2 MG/DL (ref 0.2–1)
BILIRUB UR QL: NEGATIVE
BUN SERPL-MCNC: 22 MG/DL (ref 6–20)
BUN/CREAT SERPL: 15 (ref 12–20)
CALCIUM SERPL-MCNC: 9.4 MG/DL (ref 8.5–10.1)
CHLORIDE SERPL-SCNC: 107 MMOL/L (ref 97–108)
CO2 SERPL-SCNC: 29 MMOL/L (ref 21–32)
COLOR UR: ABNORMAL
CREAT SERPL-MCNC: 1.48 MG/DL (ref 0.7–1.3)
DIFFERENTIAL METHOD BLD: ABNORMAL
EOSINOPHIL # BLD: 0.1 K/UL (ref 0–0.4)
EOSINOPHIL NFR BLD: 1 % (ref 0–7)
EPITH CASTS URNS QL MICRO: ABNORMAL /LPF
ERYTHROCYTE [DISTWIDTH] IN BLOOD BY AUTOMATED COUNT: 14.1 % (ref 11.5–14.5)
GLOBULIN SER CALC-MCNC: 3.8 G/DL (ref 2–4)
GLUCOSE SERPL-MCNC: 225 MG/DL (ref 65–100)
GLUCOSE UR STRIP.AUTO-MCNC: 250 MG/DL
HCT VFR BLD AUTO: 42.9 % (ref 36.6–50.3)
HGB BLD-MCNC: 13.6 G/DL (ref 12.1–17)
HGB UR QL STRIP: ABNORMAL
IMM GRANULOCYTES # BLD AUTO: 0 K/UL (ref 0–0.04)
IMM GRANULOCYTES NFR BLD AUTO: 1 % (ref 0–0.5)
KETONES UR QL STRIP.AUTO: NEGATIVE MG/DL
LACTATE BLD-SCNC: 1 MMOL/L (ref 0.4–2)
LEUKOCYTE ESTERASE UR QL STRIP.AUTO: ABNORMAL
LYMPHOCYTES # BLD: 1.1 K/UL (ref 0.8–3.5)
LYMPHOCYTES NFR BLD: 17 % (ref 12–49)
MCH RBC QN AUTO: 29.6 PG (ref 26–34)
MCHC RBC AUTO-ENTMCNC: 31.7 G/DL (ref 30–36.5)
MCV RBC AUTO: 93.3 FL (ref 80–99)
MONOCYTES # BLD: 0.5 K/UL (ref 0–1)
MONOCYTES NFR BLD: 7 % (ref 5–13)
NEUTS SEG # BLD: 4.8 K/UL (ref 1.8–8)
NEUTS SEG NFR BLD: 73 % (ref 32–75)
NITRITE UR QL STRIP.AUTO: POSITIVE
NRBC # BLD: 0 K/UL (ref 0–0.01)
NRBC BLD-RTO: 0 PER 100 WBC
PH UR STRIP: 6 [PH] (ref 5–8)
PLATELET # BLD AUTO: 194 K/UL (ref 150–400)
PMV BLD AUTO: 9.6 FL (ref 8.9–12.9)
POTASSIUM SERPL-SCNC: 4.2 MMOL/L (ref 3.5–5.1)
PROT SERPL-MCNC: 7.2 G/DL (ref 6.4–8.2)
PROT UR STRIP-MCNC: 30 MG/DL
RBC # BLD AUTO: 4.6 M/UL (ref 4.1–5.7)
RBC #/AREA URNS HPF: ABNORMAL /HPF (ref 0–5)
SODIUM SERPL-SCNC: 141 MMOL/L (ref 136–145)
SP GR UR REFRACTOMETRY: 1.02 (ref 1–1.03)
UA: UC IF INDICATED,UAUC: ABNORMAL
UROBILINOGEN UR QL STRIP.AUTO: 1 EU/DL (ref 0.2–1)
WBC # BLD AUTO: 6.4 K/UL (ref 4.1–11.1)
WBC URNS QL MICRO: >100 /HPF (ref 0–4)
YEAST URNS QL MICRO: PRESENT

## 2021-07-30 PROCEDURE — 36415 COLL VENOUS BLD VENIPUNCTURE: CPT

## 2021-07-30 PROCEDURE — 74176 CT ABD & PELVIS W/O CONTRAST: CPT

## 2021-07-30 PROCEDURE — 80053 COMPREHEN METABOLIC PANEL: CPT

## 2021-07-30 PROCEDURE — 85025 COMPLETE CBC W/AUTO DIFF WBC: CPT

## 2021-07-30 PROCEDURE — 87186 SC STD MICRODIL/AGAR DIL: CPT

## 2021-07-30 PROCEDURE — 87077 CULTURE AEROBIC IDENTIFY: CPT

## 2021-07-30 PROCEDURE — 83605 ASSAY OF LACTIC ACID: CPT

## 2021-07-30 PROCEDURE — 74011250636 HC RX REV CODE- 250/636: Performed by: PHYSICIAN ASSISTANT

## 2021-07-30 PROCEDURE — 81001 URINALYSIS AUTO W/SCOPE: CPT

## 2021-07-30 PROCEDURE — 87086 URINE CULTURE/COLONY COUNT: CPT

## 2021-07-30 PROCEDURE — 96366 THER/PROPH/DIAG IV INF ADDON: CPT

## 2021-07-30 PROCEDURE — 99284 EMERGENCY DEPT VISIT MOD MDM: CPT

## 2021-07-30 PROCEDURE — 96365 THER/PROPH/DIAG IV INF INIT: CPT

## 2021-07-30 RX ORDER — LEVOFLOXACIN 750 MG/1
750 TABLET ORAL DAILY
Qty: 6 TABLET | Refills: 0 | Status: SHIPPED | OUTPATIENT
Start: 2021-07-31 | End: 2021-08-06

## 2021-07-30 RX ORDER — LEVOFLOXACIN 5 MG/ML
750 INJECTION, SOLUTION INTRAVENOUS
Status: COMPLETED | OUTPATIENT
Start: 2021-07-30 | End: 2021-07-30

## 2021-07-30 RX ADMIN — LEVOFLOXACIN 750 MG: 5 INJECTION, SOLUTION INTRAVENOUS at 14:03

## 2021-07-30 NOTE — ED PROVIDER NOTES
EMERGENCY DEPARTMENT HISTORY AND PHYSICAL EXAM      Date: 7/30/2021  Patient Name: Jasmin Godinez    History of Presenting Illness     Chief Complaint   Patient presents with    Blood in Urine     couple of days; thinks maybe he has a uti like a month ago but worse. per wife; History Provided By: Patient and Patient's Wife    HPI: Jasmin Godinez, 79 y.o. male with PMHx significant for CAD, prostate cancer, diabetes, dementia, presents to the ED with cc of hematuria onset yesterday. This is been intermittent. His wife noted blood on his pad when he woke up yesterday and also blood in the toilet after using the bathroom. He has not complained of any pain. He does have a history of dementia but she feels that he is slightly more confused than usual and has been sleeping a lot over the last week. Of note, he was seen here almost 4 weeks ago with similar symptoms and was diagnosed with a UTI at that time. He was given a dose of ceftriaxone in the ED and prescribed cefdinir, which he took as prescribed. No fever, vomiting, abdominal pain. There are no other complaints, changes, or physical findings at this time. PCP: Dave Cosby MD    No current facility-administered medications on file prior to encounter. Current Outpatient Medications on File Prior to Encounter   Medication Sig Dispense Refill    cefdinir (OMNICEF) 300 mg capsule Take 1 Capsule by mouth two (2) times a day. 20 Capsule 0    QUEtiapine (SEROquel) 50 mg tablet TAKE 1 TABLET NIGHTLY 90 Tab 3    Namzaric 28-10 mg CSpX TAKE 1 CAPSULE NIGHTLY 90 Cap 3    insulin aspart protamine/insulin aspart (NOVOLOG MIX 70-30 U-100 INSULN) 100 unit/mL (70-30) injection 20 Units by SubCUTAneous route Daily (before breakfast). 10 mL 0    insulin aspart protamine/insulin aspart (NOVOLOG MIX 70-30 U-100 INSULN) 100 unit/mL (70-30) injection 20 Units by SubCUTAneous route Daily (before dinner).  10 mL 0    amLODIPine (NORVASC) 10 mg tablet Take 10 mg by mouth nightly.  cholecalciferol (VITAMIN D3) 1,000 unit tablet Take 1,000 Units by mouth daily.  atorvastatin (LIPITOR) 20 mg tablet Take 20 mg by mouth daily.  isosorbide mononitrate ER (IMDUR) 30 mg tablet Take 30 mg by mouth daily.  apalutamide (ERLEADA) 60 mg tablet Take 120 mg by mouth two (2) times a day.  metoprolol succinate (TOPROL-XL) 100 mg tablet Take 100 mg by mouth daily.  aspirin 81 mg tablet Take 81 mg by mouth daily. Past History     Past Medical History:  Past Medical History:   Diagnosis Date    CAD (coronary artery disease)     Cancer ([facility]     prostate    Diabetes ([facility]     GERD (gastroesophageal reflux disease)     Hypertension     Other ill-defined conditions(799.89)     elevated cholesterol       Past Surgical History:  Past Surgical History:   Procedure Laterality Date    COLONOSCOPY N/A 2016    COLONOSCOPY performed by Ines Tatum MD at [address] [place] [address]; HI RISK IND  2016         HX PROSTATECTOMY      AL CARDIAC SURG PROCEDURE UNLIST      cabg x5 vessels,cardiologist  and [facility]id at [facility]    AL COLONOSCOPY FLX DX W/COLLJ SPEC WHEN PFRMD  2011            Family History:  No family history on file. Social History:  Social History     Tobacco Use    Smoking status: Former Smoker     Packs/day: 0.50     Years: 20.00     Pack years: 10.00     Quit date: 1982     Years since quittin.3    Smokeless tobacco: Never Used   Substance Use Topics    Alcohol use: No    Drug use: No       Allergies:  No Known Allergies      Review of Systems   Review of Systems   Unable to perform ROS: Dementia         Physical Exam   Physical Exam  Constitutional:       Appearance: He is not toxic-appearing. Comments: Interactive, pleasant, well appearing male. HENT:      Head: Normocephalic and atraumatic.       Mouth/Throat:      Mouth: Mucous membranes are moist.   Eyes: Extraocular Movements: Extraocular movements intact. Pupils: Pupils are equal, round, and reactive to light. Cardiovascular:      Rate and Rhythm: Normal rate and regular rhythm. Heart sounds: Normal heart sounds. No murmur heard. Pulmonary:      Effort: Pulmonary effort is normal. No respiratory distress. Breath sounds: Normal breath sounds. No stridor. No wheezing, rhonchi or rales. Abdominal:      Comments: Abdomen is soft. No tenderness to palpation. No rebound or guarding. No CVA tenderness. Musculoskeletal:         General: No deformity. Normal range of motion. Cervical back: Normal range of motion and neck supple. Skin:     General: Skin is warm and dry. Neurological:      General: No focal deficit present. Mental Status: He is alert. Comments: Patient is oriented to person. He is not oriented to time or location.    Psychiatric:         Behavior: Behavior normal.           Diagnostic Study Results     Labs -     Recent Results (from the past 12 hour(s))   URINALYSIS W/ REFLEX CULTURE    Collection Time: 07/30/21 10:47 AM    Specimen: Urine   Result Value Ref Range    Color YELLOW/STRAW      Appearance CLOUDY (A) CLEAR      Specific gravity 1.020 1.003 - 1.030      pH (UA) 6.0 5.0 - 8.0      Protein 30 (A) NEG mg/dL    Glucose 250 (A) NEG mg/dL    Ketone Negative NEG mg/dL    Bilirubin Negative NEG      Blood MODERATE (A) NEG      Urobilinogen 1.0 0.2 - 1.0 EU/dL    Nitrites Positive (A) NEG      Leukocyte Esterase LARGE (A) NEG      WBC >100 (H) 0 - 4 /hpf    RBC  0 - 5 /hpf    Epithelial cells FEW FEW /lpf    Bacteria 4+ (A) NEG /hpf    UA:UC IF INDICATED URINE CULTURE ORDERED (A) CNI      Yeast PRESENT (A) NEG     CBC WITH AUTOMATED DIFF    Collection Time: 07/30/21 12:09 PM   Result Value Ref Range    WBC 6.4 4.1 - 11.1 K/uL    RBC 4.60 4.10 - 5.70 M/uL    HGB 13.6 12.1 - 17.0 g/dL    HCT 42.9 36.6 - 50.3 %    MCV 93.3 80.0 - 99.0 FL    MCH 29.6 26.0 - 34.0 PG    MCHC 31.7 30.0 - 36.5 g/dL    RDW 14.1 11.5 - 14.5 %    PLATELET 210 264 - 766 K/uL    MPV 9.6 8.9 - 12.9 FL    NRBC 0.0 0  WBC    ABSOLUTE NRBC 0.00 0.00 - 0.01 K/uL    NEUTROPHILS 73 32 - 75 %    LYMPHOCYTES 17 12 - 49 %    MONOCYTES 7 5 - 13 %    EOSINOPHILS 1 0 - 7 %    BASOPHILS 1 0 - 1 %    IMMATURE GRANULOCYTES 1 (H) 0.0 - 0.5 %    ABS. NEUTROPHILS 4.8 1.8 - 8.0 K/UL    ABS. LYMPHOCYTES 1.1 0.8 - 3.5 K/UL    ABS. MONOCYTES 0.5 0.0 - 1.0 K/UL    ABS. EOSINOPHILS 0.1 0.0 - 0.4 K/UL    ABS. BASOPHILS 0.0 0.0 - 0.1 K/UL    ABS. IMM. GRANS. 0.0 0.00 - 0.04 K/UL    DF AUTOMATED     METABOLIC PANEL, COMPREHENSIVE    Collection Time: 07/30/21 12:09 PM   Result Value Ref Range    Sodium 141 136 - 145 mmol/L    Potassium 4.2 3.5 - 5.1 mmol/L    Chloride 107 97 - 108 mmol/L    CO2 29 21 - 32 mmol/L    Anion gap 5 5 - 15 mmol/L    Glucose 225 (H) 65 - 100 mg/dL    BUN 22 (H) 6 - 20 MG/DL    Creatinine 1.48 (H) 0.70 - 1.30 MG/DL    BUN/Creatinine ratio 15 12 - 20      GFR est AA 56 (L) >60 ml/min/1.73m2    GFR est non-AA 46 (L) >60 ml/min/1.73m2    Calcium 9.4 8.5 - 10.1 MG/DL    Bilirubin, total 1.2 (H) 0.2 - 1.0 MG/DL    ALT (SGPT) 32 12 - 78 U/L    AST (SGOT) 15 15 - 37 U/L    Alk. phosphatase 84 45 - 117 U/L    Protein, total 7.2 6.4 - 8.2 g/dL    Albumin 3.4 (L) 3.5 - 5.0 g/dL    Globulin 3.8 2.0 - 4.0 g/dL    A-G Ratio 0.9 (L) 1.1 - 2.2     POC LACTIC ACID    Collection Time: 07/30/21 12:30 PM   Result Value Ref Range    Lactic Acid (POC) 1.00 0.40 - 2.00 mmol/L       Radiologic Studies -   CT ABD PELV WO CONT   Final Result   No acute findings. CT Results  (Last 48 hours)               07/30/21 1153  CT ABD PELV WO CONT Final result    Impression:  No acute findings. Narrative:  EXAM: CT ABD PELV WO CONT       INDICATION: hematuria, recurrent UTI       COMPARISON: 2019       CONTRAST:  None. TECHNIQUE:    Thin axial images were obtained through the abdomen and pelvis.  Coronal and sagittal reformats were generated. Oral contrast was not administered. CT dose   reduction was achieved through use of a standardized protocol tailored for this   examination and automatic exposure control for dose modulation. The absence of intravenous contrast material reduces the sensitivity for   evaluation of the vasculature and solid organs. FINDINGS:    LOWER THORAX: Mild atelectasis or scar in the right lower lobe. LIVER: No mass. BILIARY TREE: Gallbladder is within normal limits. CBD is not dilated. SPLEEN: within normal limits. PANCREAS: No focal abnormality. ADRENALS: Unremarkable. KIDNEYS/URETERS: Bilateral renal cysts which do not require follow-up. STOMACH: Unremarkable. SMALL BOWEL: No dilatation or wall thickening. COLON: No dilatation or wall thickening. APPENDIX:   PERITONEUM: No ascites or pneumoperitoneum. RETROPERITONEUM: No lymphadenopathy or aortic aneurysm. REPRODUCTIVE ORGANS: Prostatectomy. URINARY BLADDER: No mass or calculus. BONES: Improved osseous sclerotic lesions. ABDOMINAL WALL: No mass or hernia. ADDITIONAL COMMENTS: N/A               CXR Results  (Last 48 hours)    None            Medical Decision Making   I am the first provider for this patient. I reviewed the vital signs, available nursing notes, past medical history, past surgical history, family history and social history. Vital Signs-Reviewed the patient's vital signs. Patient Vitals for the past 12 hrs:   Temp Pulse Resp BP SpO2   07/30/21 1033 99 °F (37.2 °C) 75 16 135/70 99 %         Records Reviewed: Nursing Notes and Old Medical Records      Provider Notes (Medical Decision Making):   DDx: urinary tract infection, pyelonephritis, sepsis, acute kidney injury, BPH, bladder cancer    Patient presents with hematuria that began yesterday in the setting of UTI diagnosis 3-1/2 weeks ago.   He is afebrile and clinically well-appearing on exam.  Urinalysis is consistent with urinary tract infection with hematuria. CBC shows no leukocytosis. His creatinine appears to be at about his baseline. CT abdomen and pelvis shows no evidence of ureteral stones or obstruction. Case discussed with Dr. Emigdio Ruiz, supervising physician, who also evaluated the patient. We reviewed his urine culture results from his last visit. Plan to treat with Levaquin as it was resistant to oral cephalosporins. Discussed that he will need follow-up with his PCP and urologist for a recheck, both of which he has appointment scheduled with over the next few weeks. We discussed return precautions, including fever, unable to tolerate p.o., worsening symptoms. ED Course:   Initial assessment performed. The patients presenting problems have been discussed, and they are in agreement with the care plan formulated and outlined with them. I have encouraged them to ask questions as they arise throughout their visit. Disposition:  2:37 PM  The patient has been re-evaluated and is ready for discharge. Reviewed available results with patient. Counseled patient on diagnosis and care plan. Patient has expressed understanding, and all questions have been answered. Patient agrees with plan and agrees to follow up as recommended, or to return to the ED if their symptoms worsen. Discharge instructions have been provided and explained to the patient, along with reasons to return to the ED. PLAN:  1. Discharge Medication List as of 7/30/2021  2:37 PM      START taking these medications    Details   levoFLOXacin (Levaquin) 750 mg tablet Take 1 Tablet by mouth daily for 6 days. , Normal, Disp-6 Tablet, R-0         CONTINUE these medications which have NOT CHANGED    Details   cefdinir (OMNICEF) 300 mg capsule Take 1 Capsule by mouth two (2) times a day., Normal, Disp-20 Capsule, R-0      QUEtiapine (SEROquel) 50 mg tablet TAKE 1 TABLET NIGHTLY, Normal, Disp-90 Tab, R-3      Namzaric 28-10 mg CSpX TAKE 1 CAPSULE NIGHTLY, Normal, Disp-90 Cap, R-3      !! insulin aspart protamine/insulin aspart (NOVOLOG MIX 70-30 U-100 INSULN) 100 unit/mL (70-30) injection 20 Units by SubCUTAneous route Daily (before breakfast). , Normal, Disp-10 mL, R-0      !! insulin aspart protamine/insulin aspart (NOVOLOG MIX 70-30 U-100 INSULN) 100 unit/mL (70-30) injection 20 Units by SubCUTAneous route Daily (before dinner). , No Print, Disp-10 mL, R-0      amLODIPine (NORVASC) 10 mg tablet Take 10 mg by mouth nightly., Historical Med      cholecalciferol (VITAMIN D3) 1,000 unit tablet Take 1,000 Units by mouth daily. , Historical Med      atorvastatin (LIPITOR) 20 mg tablet Take 20 mg by mouth daily. , Historical Med      isosorbide mononitrate ER (IMDUR) 30 mg tablet Take 30 mg by mouth daily. , Historical Med      apalutamide (ERLEADA) 60 mg tablet Take 120 mg by mouth two (2) times a day., Historical Med      metoprolol succinate (TOPROL-XL) 100 mg tablet Take 100 mg by mouth daily. , Historical Med      aspirin 81 mg tablet Take 81 mg by mouth daily. , Historical Med       !! - Potential duplicate medications found. Please discuss with provider. 2.   Follow-up Information     Follow up With Specialties Details Why Contact Info    His primary care doctor  Go to  as scheduled     His urologist  Go to  as scheduled     [facility] EMERGENCY DEPT Emergency Medicine Go to  If symptoms worsen [address] [place] [address]  [phone]        Return to ED if worse     Diagnosis     Clinical Impression:   1. Acute cystitis with hematuria            Allison Samayoa.  MARC Strickland

## 2021-08-01 LAB
BACTERIA SPEC CULT: ABNORMAL
CC UR VC: ABNORMAL
SERVICE CMNT-IMP: ABNORMAL

## 2021-11-03 DIAGNOSIS — F03.90 DEMENTIA WITHOUT BEHAVIORAL DISTURBANCE, UNSPECIFIED DEMENTIA TYPE: ICD-10-CM

## 2021-11-03 RX ORDER — QUETIAPINE FUMARATE 50 MG/1
TABLET, FILM COATED ORAL
Qty: 90 TABLET | Refills: 3 | Status: SHIPPED | OUTPATIENT
Start: 2021-11-03

## 2021-11-03 RX ORDER — MEMANTINE HYDROCHLORIDE AND DONEPEZIL HYDROCHLORIDE 28; 10 MG/1; MG/1
CAPSULE ORAL
Qty: 90 CAPSULE | Refills: 3 | Status: SHIPPED | OUTPATIENT
Start: 2021-11-03

## 2022-01-07 ENCOUNTER — HOSPITAL ENCOUNTER (EMERGENCY)
Age: 82
Discharge: HOME | End: 2022-01-07
Attending: STUDENT IN AN ORGANIZED HEALTH CARE EDUCATION/TRAINING PROGRAM
Payer: MEDICARE

## 2022-01-07 ENCOUNTER — APPOINTMENT (OUTPATIENT)
Dept: GENERAL RADIOLOGY | Age: 82
End: 2022-01-07
Attending: STUDENT IN AN ORGANIZED HEALTH CARE EDUCATION/TRAINING PROGRAM
Payer: MEDICARE

## 2022-01-07 VITALS
SYSTOLIC BLOOD PRESSURE: 156 MMHG | TEMPERATURE: 99.4 F | HEART RATE: 97 BPM | BODY MASS INDEX: 34.53 KG/M2 | HEIGHT: 67 IN | OXYGEN SATURATION: 98 % | DIASTOLIC BLOOD PRESSURE: 80 MMHG | RESPIRATION RATE: 18 BRPM | WEIGHT: 220.02 LBS

## 2022-01-07 DIAGNOSIS — R73.9 HYPERGLYCEMIA: ICD-10-CM

## 2022-01-07 DIAGNOSIS — E86.0 DEHYDRATION: ICD-10-CM

## 2022-01-07 DIAGNOSIS — N17.9 AKI (ACUTE KIDNEY INJURY) (HCC): Primary | ICD-10-CM

## 2022-01-07 DIAGNOSIS — Z86.59 HISTORY OF DEMENTIA: ICD-10-CM

## 2022-01-07 LAB
ALBUMIN SERPL-MCNC: 3.7 G/DL (ref 3.5–5)
ALBUMIN/GLOB SERPL: 0.8 {RATIO} (ref 1.1–2.2)
ALP SERPL-CCNC: 113 U/L (ref 45–117)
ALT SERPL-CCNC: 32 U/L (ref 12–78)
ANION GAP SERPL CALC-SCNC: 6 MMOL/L (ref 5–15)
APPEARANCE UR: CLEAR
AST SERPL-CCNC: 18 U/L (ref 15–37)
ATRIAL RATE: 98 BPM
BACTERIA URNS QL MICRO: NEGATIVE /HPF
BASOPHILS # BLD: 0 K/UL (ref 0–0.1)
BASOPHILS NFR BLD: 1 % (ref 0–1)
BILIRUB SERPL-MCNC: 1.7 MG/DL (ref 0.2–1)
BILIRUB UR QL: NEGATIVE
BUN SERPL-MCNC: 15 MG/DL (ref 6–20)
BUN/CREAT SERPL: 9 (ref 12–20)
CALCIUM SERPL-MCNC: 9.9 MG/DL (ref 8.5–10.1)
CALCULATED P AXIS, ECG09: 19 DEGREES
CALCULATED R AXIS, ECG10: -6 DEGREES
CALCULATED T AXIS, ECG11: 85 DEGREES
CHLORIDE SERPL-SCNC: 104 MMOL/L (ref 97–108)
CO2 SERPL-SCNC: 31 MMOL/L (ref 21–32)
COLOR UR: ABNORMAL
COVID-19 RAPID TEST, COVR: NOT DETECTED
CREAT SERPL-MCNC: 1.75 MG/DL (ref 0.7–1.3)
DIAGNOSIS, 93000: NORMAL
DIFFERENTIAL METHOD BLD: ABNORMAL
EOSINOPHIL # BLD: 0.1 K/UL (ref 0–0.4)
EOSINOPHIL NFR BLD: 1 % (ref 0–7)
EPITH CASTS URNS QL MICRO: ABNORMAL /LPF
ERYTHROCYTE [DISTWIDTH] IN BLOOD BY AUTOMATED COUNT: 14.2 % (ref 11.5–14.5)
GLOBULIN SER CALC-MCNC: 4.5 G/DL (ref 2–4)
GLUCOSE BLD STRIP.AUTO-MCNC: 363 MG/DL (ref 65–117)
GLUCOSE SERPL-MCNC: 357 MG/DL (ref 65–100)
GLUCOSE UR STRIP.AUTO-MCNC: >1000 MG/DL
HCT VFR BLD AUTO: 50 % (ref 36.6–50.3)
HGB BLD-MCNC: 16.1 G/DL (ref 12.1–17)
HGB UR QL STRIP: ABNORMAL
HYALINE CASTS URNS QL MICRO: ABNORMAL /LPF (ref 0–5)
IMM GRANULOCYTES # BLD AUTO: 0 K/UL (ref 0–0.04)
IMM GRANULOCYTES NFR BLD AUTO: 1 % (ref 0–0.5)
KETONES UR QL STRIP.AUTO: 40 MG/DL
LACTATE BLD-SCNC: 1.96 MMOL/L (ref 0.4–2)
LEUKOCYTE ESTERASE UR QL STRIP.AUTO: NEGATIVE
LYMPHOCYTES # BLD: 1 K/UL (ref 0.8–3.5)
LYMPHOCYTES NFR BLD: 15 % (ref 12–49)
MCH RBC QN AUTO: 29.7 PG (ref 26–34)
MCHC RBC AUTO-ENTMCNC: 32.2 G/DL (ref 30–36.5)
MCV RBC AUTO: 92.1 FL (ref 80–99)
MONOCYTES # BLD: 0.5 K/UL (ref 0–1)
MONOCYTES NFR BLD: 8 % (ref 5–13)
NEUTS SEG # BLD: 5 K/UL (ref 1.8–8)
NEUTS SEG NFR BLD: 74 % (ref 32–75)
NITRITE UR QL STRIP.AUTO: NEGATIVE
NRBC # BLD: 0 K/UL (ref 0–0.01)
NRBC BLD-RTO: 0 PER 100 WBC
P-R INTERVAL, ECG05: 188 MS
PH UR STRIP: 6 [PH] (ref 5–8)
PLATELET # BLD AUTO: 190 K/UL (ref 150–400)
PMV BLD AUTO: 9.5 FL (ref 8.9–12.9)
POTASSIUM SERPL-SCNC: 4.8 MMOL/L (ref 3.5–5.1)
PROT SERPL-MCNC: 8.2 G/DL (ref 6.4–8.2)
PROT UR STRIP-MCNC: 30 MG/DL
Q-T INTERVAL, ECG07: 380 MS
QRS DURATION, ECG06: 88 MS
QTC CALCULATION (BEZET), ECG08: 485 MS
RBC # BLD AUTO: 5.43 M/UL (ref 4.1–5.7)
RBC #/AREA URNS HPF: ABNORMAL /HPF (ref 0–5)
SERVICE CMNT-IMP: ABNORMAL
SODIUM SERPL-SCNC: 141 MMOL/L (ref 136–145)
SOURCE, COVRS: NORMAL
SP GR UR REFRACTOMETRY: 1.03 (ref 1–1.03)
UA: UC IF INDICATED,UAUC: ABNORMAL
UROBILINOGEN UR QL STRIP.AUTO: 1 EU/DL (ref 0.2–1)
VENTRICULAR RATE, ECG03: 98 BPM
WBC # BLD AUTO: 6.6 K/UL (ref 4.1–11.1)
WBC URNS QL MICRO: ABNORMAL /HPF (ref 0–4)

## 2022-01-07 PROCEDURE — 81001 URINALYSIS AUTO W/SCOPE: CPT

## 2022-01-07 PROCEDURE — 96376 TX/PRO/DX INJ SAME DRUG ADON: CPT

## 2022-01-07 PROCEDURE — 96374 THER/PROPH/DIAG INJ IV PUSH: CPT

## 2022-01-07 PROCEDURE — 74011250636 HC RX REV CODE- 250/636: Performed by: STUDENT IN AN ORGANIZED HEALTH CARE EDUCATION/TRAINING PROGRAM

## 2022-01-07 PROCEDURE — 71045 X-RAY EXAM CHEST 1 VIEW: CPT

## 2022-01-07 PROCEDURE — 74011250637 HC RX REV CODE- 250/637: Performed by: STUDENT IN AN ORGANIZED HEALTH CARE EDUCATION/TRAINING PROGRAM

## 2022-01-07 PROCEDURE — 82962 GLUCOSE BLOOD TEST: CPT

## 2022-01-07 PROCEDURE — 74011636637 HC RX REV CODE- 636/637: Performed by: STUDENT IN AN ORGANIZED HEALTH CARE EDUCATION/TRAINING PROGRAM

## 2022-01-07 PROCEDURE — 83605 ASSAY OF LACTIC ACID: CPT

## 2022-01-07 PROCEDURE — 93005 ELECTROCARDIOGRAM TRACING: CPT

## 2022-01-07 PROCEDURE — 80053 COMPREHEN METABOLIC PANEL: CPT

## 2022-01-07 PROCEDURE — 85025 COMPLETE CBC W/AUTO DIFF WBC: CPT

## 2022-01-07 PROCEDURE — 36415 COLL VENOUS BLD VENIPUNCTURE: CPT

## 2022-01-07 PROCEDURE — 99285 EMERGENCY DEPT VISIT HI MDM: CPT

## 2022-01-07 PROCEDURE — 87635 SARS-COV-2 COVID-19 AMP PRB: CPT

## 2022-01-07 PROCEDURE — 96361 HYDRATE IV INFUSION ADD-ON: CPT

## 2022-01-07 RX ORDER — ACETAMINOPHEN 500 MG
1000 TABLET ORAL
Status: COMPLETED | OUTPATIENT
Start: 2022-01-07 | End: 2022-01-07

## 2022-01-07 RX ORDER — ACETAMINOPHEN 325 MG/1
650 TABLET ORAL
Status: DISCONTINUED | OUTPATIENT
Start: 2022-01-07 | End: 2022-01-07

## 2022-01-07 RX ORDER — ONDANSETRON 2 MG/ML
4 INJECTION INTRAMUSCULAR; INTRAVENOUS
Status: DISCONTINUED | OUTPATIENT
Start: 2022-01-07 | End: 2022-01-07

## 2022-01-07 RX ADMIN — Medication 5 UNITS: at 14:16

## 2022-01-07 RX ADMIN — ACETAMINOPHEN 1000 MG: 500 TABLET ORAL at 11:36

## 2022-01-07 RX ADMIN — SODIUM CHLORIDE 500 ML: 9 INJECTION, SOLUTION INTRAVENOUS at 14:21

## 2022-01-07 RX ADMIN — Medication 5 UNITS: at 15:44

## 2022-01-07 RX ADMIN — SODIUM CHLORIDE 500 ML: 9 INJECTION, SOLUTION INTRAVENOUS at 13:10

## 2022-01-07 NOTE — ED PROVIDER NOTES
EMERGENCY DEPARTMENT HISTORY AND PHYSICAL EXAM      Date: 1/7/2022  Patient Name: April Santana    History of Presenting Illness     Chief Complaint   Patient presents with    Lethargy     Pt arrived via [facility] for complaints of generalized weakness. Usually up and walking around and now is not. Pt wife reports that pt had a recent fall on wednesday and no obvious injuries other than back pain; however, pt has hx of arthritis in back verified by several times of scans. Pt also has prostate cancer and takes a chemo drug. History Provided By: Wife    HPI: April Santana, [de-identified] y.o. male with past medical history of type 2 diabetes, dementia, prostate cancer on oral chemotherapy, presents to the ED with cc of increased lethargy. Per wife, patient began experiencing increased lethargy and generalized weakness 2 days prior. States that patient has been sleeping a lot more, and has not been getting up and walking around like he usually does. Patient also appears to be slightly more disoriented than usual today. Wife is concerned for possibility of UTIs as patient has history of being susceptible to this. She reports that his urine has appeared darker than usual, and has smelled more foul. Patient currently denies any complaints himself. He has not had any chest pain, shortness of breath. No abdominal pain, nausea, vomiting, diarrhea. No fevers or chills. Of note, wife reports that patient had a fall several days ago. Reports that patient did not suffer any injury secondary to this fall. He was ambulatory immediately after and throughout the day even after the fall. She states that he did not hit his head during the fall or lose consciousness. He does have a known history of severe arthritis in the back, and per wife continues to complain of similar back pain. She thinks that his arthritis is acting up. States that patient has had numerous scans of the back recently.     PCP: Madhuri Brantley, MD    No current facility-administered medications on file prior to encounter. Current Outpatient Medications on File Prior to Encounter   Medication Sig Dispense Refill    QUEtiapine (SEROquel) 50 mg tablet TAKE 1 TABLET NIGHTLY 90 Tablet 3    memantine-donepeziL (Namzaric) 28-10 mg CSpX TAKE 1 CAPSULE NIGHTLY 90 Capsule 3    cefdinir (OMNICEF) 300 mg capsule Take 1 Capsule by mouth two (2) times a day. 20 Capsule 0    insulin aspart protamine/insulin aspart (NOVOLOG MIX 70-30 U-100 INSULN) 100 unit/mL (70-30) injection 20 Units by SubCUTAneous route Daily (before breakfast). 10 mL 0    insulin aspart protamine/insulin aspart (NOVOLOG MIX 70-30 U-100 INSULN) 100 unit/mL (70-30) injection 20 Units by SubCUTAneous route Daily (before dinner). 10 mL 0    amLODIPine (NORVASC) 10 mg tablet Take 10 mg by mouth nightly.  cholecalciferol (VITAMIN D3) 1,000 unit tablet Take 1,000 Units by mouth daily.  atorvastatin (LIPITOR) 20 mg tablet Take 20 mg by mouth daily.  isosorbide mononitrate ER (IMDUR) 30 mg tablet Take 30 mg by mouth daily.  apalutamide (ERLEADA) 60 mg tablet Take 120 mg by mouth two (2) times a day.  metoprolol succinate (TOPROL-XL) 100 mg tablet Take 100 mg by mouth daily.  aspirin 81 mg tablet Take 81 mg by mouth daily.          Past History     Past Medical History:  Past Medical History:   Diagnosis Date    CAD (coronary artery disease)     Cancer ([facility] [address]     prostate    Diabetes ([facility] [address]     GERD (gastroesophageal reflux disease)     Hypertension     Other ill-defined conditions(799.89)     elevated cholesterol       Past Surgical History:  Past Surgical History:   Procedure Laterality Date    COLONOSCOPY N/A 8/31/2016    COLONOSCOPY performed by Jeneane Schirmer., MD at [facility]; HI RISK IND  8/31/2016         HX PROSTATECTOMY      [place] CARDIAC SURG PROCEDURE UNLIST      cabg x5 vessels,cardiologist  and david at [facility]    PA COLONOSCOPY FLX DX W/COLLJ SPEC WHEN PFRMD  2011            Family History:  No family history on file. Social History:  Social History     Tobacco Use    Smoking status: Former Smoker     Packs/day: 0.50     Years: 20.00     Pack years: 10.00     Quit date: 1982     Years since quittin.7    Smokeless tobacco: Never Used   Substance Use Topics    Alcohol use: No    Drug use: No       Allergies:  No Known Allergies      Review of Systems   Review of Systems   Unable to perform ROS: Dementia       Physical Exam   Physical Exam  Vitals and nursing note reviewed. Constitutional:       General: He is not in acute distress. Appearance: Normal appearance. He is not ill-appearing or toxic-appearing. HENT:      Head: Normocephalic and atraumatic. Nose: Nose normal.      Mouth/Throat:      Mouth: Mucous membranes are dry. Eyes:      Extraocular Movements: Extraocular movements intact. Pupils: Pupils are equal, round, and reactive to light. Cardiovascular:      Rate and Rhythm: Normal rate and regular rhythm. Pulses: Normal pulses. Pulmonary:      Effort: Pulmonary effort is normal. No respiratory distress. Breath sounds: Normal breath sounds. No stridor. No wheezing or rhonchi. Abdominal:      General: Abdomen is flat. There is no distension. Palpations: There is no mass. Tenderness: There is no abdominal tenderness. Musculoskeletal:         General: Normal range of motion. Cervical back: Normal range of motion and neck supple. Right lower leg: No edema. Left lower leg: No edema. Skin:     General: Skin is warm and dry. Neurological:      General: No focal deficit present. Mental Status: Mental status is at baseline. He is lethargic. Sensory: No sensory deficit. Motor: No weakness.          Diagnostic Study Results     Labs -     Recent Results (from the past 24 hour(s))   EKG, 12 LEAD, INITIAL Collection Time: 01/07/22 10:28 AM   Result Value Ref Range    Ventricular Rate 98 BPM    Atrial Rate 98 BPM    P-R Interval 188 ms    QRS Duration 88 ms    Q-T Interval 380 ms    QTC Calculation (Bezet) 485 ms    Calculated P Axis 19 degrees    Calculated R Axis -6 degrees    Calculated T Axis 85 degrees    Diagnosis       Sinus rhythm with premature supraventricular complexes  Marked ST abnormality, possible lateral subendocardial injury  Prolonged QT  When compared with ECG of 08-JUL-2019 12:37,  premature supraventricular complexes are now present  Left posterior fascicular block is no longer present  Criteria for Anteroseptal infarct are no longer present  Criteria for Inferior infarct are no longer present  Confirmed by Floored, P.V. ([id number]) on 1/7/2022 12:42:37 PM     CBC WITH AUTOMATED DIFF    Collection Time: 01/07/22 10:32 AM   Result Value Ref Range    WBC 6.6 4.1 - 11.1 K/uL    RBC 5.43 4.10 - 5.70 M/uL    HGB 16.1 12.1 - 17.0 g/dL    HCT 50.0 36.6 - 50.3 %    MCV 92.1 80.0 - 99.0 FL    MCH 29.7 26.0 - 34.0 PG    MCHC 32.2 30.0 - 36.5 g/dL    RDW 14.2 11.5 - 14.5 %    PLATELET 695 806 - 939 K/uL    MPV 9.5 8.9 - 12.9 FL    NRBC 0.0 0  WBC    ABSOLUTE NRBC 0.00 0.00 - 0.01 K/uL    NEUTROPHILS 74 32 - 75 %    LYMPHOCYTES 15 12 - 49 %    MONOCYTES 8 5 - 13 %    EOSINOPHILS 1 0 - 7 %    BASOPHILS 1 0 - 1 %    IMMATURE GRANULOCYTES 1 (H) 0.0 - 0.5 %    ABS. NEUTROPHILS 5.0 1.8 - 8.0 K/UL    ABS. LYMPHOCYTES 1.0 0.8 - 3.5 K/UL    ABS. MONOCYTES 0.5 0.0 - 1.0 K/UL    ABS. EOSINOPHILS 0.1 0.0 - 0.4 K/UL    ABS. BASOPHILS 0.0 0.0 - 0.1 K/UL    ABS. IMM.  GRANS. 0.0 0.00 - 0.04 K/UL    DF AUTOMATED     COVID-19 RAPID TEST    Collection Time: 01/07/22 10:32 AM   Result Value Ref Range    Specimen source Nasopharyngeal      COVID-19 rapid test Not detected NOTD     POC LACTIC ACID    Collection Time: 01/07/22 10:57 AM   Result Value Ref Range    Lactic Acid (POC) 1.96 0.40 - 2.00 mmol/L   URINALYSIS W/ REFLEX CULTURE    Collection Time: 01/07/22 12:00 PM    Specimen: Urine   Result Value Ref Range    Color YELLOW/STRAW      Appearance CLEAR CLEAR      Specific gravity 1.026 1.003 - 1.030      pH (UA) 6.0 5.0 - 8.0      Protein 30 (A) NEG mg/dL    Glucose >1,000 (A) NEG mg/dL    Ketone 40 (A) NEG mg/dL    Bilirubin Negative NEG      Blood MODERATE (A) NEG      Urobilinogen 1.0 0.2 - 1.0 EU/dL    Nitrites Negative NEG      Leukocyte Esterase Negative NEG      WBC 0-4 0 - 4 /hpf    RBC 20-50 0 - 5 /hpf    Epithelial cells FEW FEW /lpf    Bacteria Negative NEG /hpf    UA:UC IF INDICATED CULTURE NOT INDICATED BY UA RESULT CNI      Hyaline cast 0-2 0 - 5 /lpf   METABOLIC PANEL, COMPREHENSIVE    Collection Time: 01/07/22 12:00 PM   Result Value Ref Range    Sodium 141 136 - 145 mmol/L    Potassium 4.8 3.5 - 5.1 mmol/L    Chloride 104 97 - 108 mmol/L    CO2 31 21 - 32 mmol/L    Anion gap 6 5 - 15 mmol/L    Glucose 357 (H) 65 - 100 mg/dL    BUN 15 6 - 20 MG/DL    Creatinine 1.75 (H) 0.70 - 1.30 MG/DL    BUN/Creatinine ratio 9 (L) 12 - 20      GFR est AA 46 (L) >60 ml/min/1.73m2    GFR est non-AA 38 (L) >60 ml/min/1.73m2    Calcium 9.9 8.5 - 10.1 MG/DL    Bilirubin, total 1.7 (H) 0.2 - 1.0 MG/DL    ALT (SGPT) 32 12 - 78 U/L    AST (SGOT) 18 15 - 37 U/L    Alk. phosphatase 113 45 - 117 U/L    Protein, total 8.2 6.4 - 8.2 g/dL    Albumin 3.7 3.5 - 5.0 g/dL    Globulin 4.5 (H) 2.0 - 4.0 g/dL    A-G Ratio 0.8 (L) 1.1 - 2.2     GLUCOSE, POC    Collection Time: 01/07/22  3:19 PM   Result Value Ref Range    Glucose (POC) 363 (H) 65 - 117 mg/dL    Performed by Vinny Brownlee RN        Radiologic Studies -   XR CHEST PORT   Final Result   No acute cardiopulmonary disease. CT Results  (Last 48 hours)    None        CXR Results  (Last 48 hours)               01/07/22 1117  XR CHEST PORT Final result    Impression:  No acute cardiopulmonary disease. Narrative:  INDICATION: Lethargy, evaluate for pneumonia.        Portable AP view of the chest.       Direct comparison made to prior chest x-ray dated July 2019. Cardiomediastinal silhouette is stable. Median sternotomy wires are noted. Lungs   are clear bilaterally. Pleural spaces are normal and there is no pneumothorax. Osseous structures are intact. Medical Decision Making   I, Teodoro Vance MD am the first provider for this patient, and I am the attending of record for this patient encounter. I reviewed the vital signs, available nursing notes, past medical history, past surgical history, family history and social history. Vital Signs-Reviewed the patient's vital signs. Patient Vitals for the past 24 hrs:   Temp Pulse Resp BP SpO2   01/07/22 1536    (!) 156/80 98 %   01/07/22 1436    133/68 98 %   01/07/22 1406    (!) 150/69 98 %   01/07/22 1351     99 %   01/07/22 1336    (!) 149/78 99 %   01/07/22 1306    (!) 148/72 96 %   01/07/22 1220 99.4 °F (37.4 °C)       01/07/22 1205    (!) 158/77 100 %   01/07/22 1135    (!) 166/84    01/07/22 1120     98 %   01/07/22 1105    (!) 164/84 99 %   01/07/22 1050     98 %   01/07/22 1020    (!) 154/81    01/07/22 1007 100.3 °F (37.9 °C) 97 18 (!) 172/83 94 %       EKG interpretation: (Preliminary)  Sinus rhythm with PAC, 98 bpm, nonspecific ST changes without STEMI. QTC slightly prolonged at 485. Diffuse motion artifact limiting process interpretation. EKG interpretation by Teodoro Vance MD    Records Reviewed: Nursing Notes and Old Medical Records    Provider Notes (Medical Decision Making):   Differential diagnosis considered: UTI, pneumonia, dehydration, progression of dementia, arthritis, sepsis, etc.    On exam, patient is currently at his baseline mental status, able to follow commands and answer all questions. No focal neurologic deficits. He does appear rather fatigued. Patient initially had a borderline temp of 100.3 °F documented from triage.   Repeat temp of 99.4 °F.    We will check EKG, basic labs, lactic acid, UA, COVID-19, and chest x-ray. He appears dry on exam, will hydrate with 500 cc of IV fluids while awaiting results. Work-up in the ED is remarkable for HERMILO on CKD with creatinine of 1.75, BUN of 15. He has elevated glucose of 357, no anion gap. UA is negative for infection. He has 40 ketones in urine, consistent with dehydration. Remainder of his work-up is largely unremarkable. I suspect patient's symptoms today are likely secondary to dehydration. He is ordered to be given another 500 cc of IV fluids, totaling 1 L of fluids received in the ED. His results were discussed with the wife. She confirms that patient has not been drinking enough fluids at home over the past few days. On my repeat evaluation, patient appears much more awake and alert at this time after receiving a full liter of fluids. Discussed with the wife that patient is to increase his p.o. hydration level at home. His elevated glucose is treated with initially 5 units of IV insulin, with repeat glucose not significantly changed. Wife shares that patient did not get his home insulin treatments today due to being in the ED, I suspect this likely has played a role in his elevated glucose levels. Will administer another 5 units of IV insulin prior to discharge. Advised for wife to check his blood glucose after getting home, and prior to administering home insulin. Wife felt comfortable with plan for discharge. Advised to follow-up with PCP early. Reasons to return to the ED were discussed. Patient left the ED in stable condition. ED Course:   Initial assessment performed. The patient's presenting problems have been discussed, and they are in agreement with the care plan formulated and outlined with them. I have encouraged them to ask questions as they arise throughout their visit. Cuauhtemoc Butt MD      Disposition:  Discharge      DISCHARGE PLAN:  1.    Discharge Medication List as of 1/7/2022  3:05 PM        2. Follow-up Information     Follow up With Specialties Details Why Contact Info    Madhuri Brantley MD Internal Medicine In 3 days  [address] [address]  [address]  [phone]      [facility] EMERGENCY DEPT Emergency Medicine  If symptoms worsen [address] [facility]  [address] [facility]  [phone]        3. Return to ED if worse     Diagnosis     Clinical Impression:   1. HERMILO (acute kidney injury) ([facility] [address]    2. Dehydration    3. Hyperglycemia    4. History of dementia        Attestations:    Kinsey Kidd MD    Please note that this dictation was completed with [employer], the computer voice recognition software. Quite often unanticipated grammatical, syntax, homophones, and other interpretive errors are inadvertently transcribed by the computer software. Please disregard these errors. Please excuse any errors that have escaped final proofreading. Thank you.

## 2022-01-07 NOTE — ED NOTES
Mayuri Shahid RN reviewed discharge instructions with the patient. The patient verbalized understanding. All questions and concerns were addressed. The patient is discharged via wheelchair in the care of family members with instructions and prescriptions in hand. Pt is alert and oriented x 4. Respirations are clear and unlabored.

## 2022-03-05 ENCOUNTER — HOSPITAL ENCOUNTER (INPATIENT)
Age: 82
LOS: 3 days | Discharge: NURSING, REHAB OR LONG-TERM CARE | DRG: 637 | End: 2022-03-10
Attending: STUDENT IN AN ORGANIZED HEALTH CARE EDUCATION/TRAINING PROGRAM | Admitting: INTERNAL MEDICINE
Payer: MEDICARE

## 2022-03-05 DIAGNOSIS — R77.8 ELEVATED TROPONIN: Primary | ICD-10-CM

## 2022-03-05 DIAGNOSIS — R45.1 AGITATION REQUIRING SEDATION PROTOCOL: ICD-10-CM

## 2022-03-05 DIAGNOSIS — E16.2 HYPOGLYCEMIA: ICD-10-CM

## 2022-03-05 DIAGNOSIS — Z86.59 HISTORY OF DEMENTIA: ICD-10-CM

## 2022-03-05 LAB
ALBUMIN SERPL-MCNC: 3.7 G/DL (ref 3.5–5)
ALBUMIN/GLOB SERPL: 0.9 {RATIO} (ref 1.1–2.2)
ALP SERPL-CCNC: 94 U/L (ref 45–117)
ALT SERPL-CCNC: 19 U/L (ref 12–78)
ANION GAP SERPL CALC-SCNC: 4 MMOL/L (ref 5–15)
AST SERPL-CCNC: 17 U/L (ref 15–37)
BASOPHILS # BLD: 0 K/UL (ref 0–0.1)
BASOPHILS NFR BLD: 1 % (ref 0–1)
BILIRUB SERPL-MCNC: 1.1 MG/DL (ref 0.2–1)
BUN SERPL-MCNC: 18 MG/DL (ref 6–20)
BUN/CREAT SERPL: 12 (ref 12–20)
CALCIUM SERPL-MCNC: 9.4 MG/DL (ref 8.5–10.1)
CHLORIDE SERPL-SCNC: 106 MMOL/L (ref 97–108)
CO2 SERPL-SCNC: 30 MMOL/L (ref 21–32)
COMMENT, HOLDF: NORMAL
CREAT SERPL-MCNC: 1.51 MG/DL (ref 0.7–1.3)
DIFFERENTIAL METHOD BLD: ABNORMAL
EOSINOPHIL # BLD: 0.1 K/UL (ref 0–0.4)
EOSINOPHIL NFR BLD: 1 % (ref 0–7)
ERYTHROCYTE [DISTWIDTH] IN BLOOD BY AUTOMATED COUNT: 14 % (ref 11.5–14.5)
GLOBULIN SER CALC-MCNC: 3.9 G/DL (ref 2–4)
GLUCOSE BLD STRIP.AUTO-MCNC: 180 MG/DL (ref 65–117)
GLUCOSE BLD STRIP.AUTO-MCNC: 194 MG/DL (ref 65–117)
GLUCOSE BLD STRIP.AUTO-MCNC: 47 MG/DL (ref 65–117)
GLUCOSE BLD STRIP.AUTO-MCNC: 51 MG/DL (ref 65–117)
GLUCOSE BLD STRIP.AUTO-MCNC: 60 MG/DL (ref 65–117)
GLUCOSE SERPL-MCNC: 179 MG/DL (ref 65–100)
HCT VFR BLD AUTO: 42.1 % (ref 36.6–50.3)
HGB BLD-MCNC: 13.6 G/DL (ref 12.1–17)
IMM GRANULOCYTES # BLD AUTO: 0 K/UL (ref 0–0.04)
IMM GRANULOCYTES NFR BLD AUTO: 0 % (ref 0–0.5)
LYMPHOCYTES # BLD: 0.8 K/UL (ref 0.8–3.5)
LYMPHOCYTES NFR BLD: 15 % (ref 12–49)
MCH RBC QN AUTO: 28.9 PG (ref 26–34)
MCHC RBC AUTO-ENTMCNC: 32.3 G/DL (ref 30–36.5)
MCV RBC AUTO: 89.6 FL (ref 80–99)
MONOCYTES # BLD: 0.4 K/UL (ref 0–1)
MONOCYTES NFR BLD: 7 % (ref 5–13)
NEUTS SEG # BLD: 4.2 K/UL (ref 1.8–8)
NEUTS SEG NFR BLD: 76 % (ref 32–75)
NRBC # BLD: 0 K/UL (ref 0–0.01)
NRBC BLD-RTO: 0 PER 100 WBC
PLATELET # BLD AUTO: 227 K/UL (ref 150–400)
PMV BLD AUTO: 9.2 FL (ref 8.9–12.9)
POTASSIUM SERPL-SCNC: 3.6 MMOL/L (ref 3.5–5.1)
PROT SERPL-MCNC: 7.6 G/DL (ref 6.4–8.2)
RBC # BLD AUTO: 4.7 M/UL (ref 4.1–5.7)
SAMPLES BEING HELD,HOLD: NORMAL
SERVICE CMNT-IMP: ABNORMAL
SODIUM SERPL-SCNC: 140 MMOL/L (ref 136–145)
TROPONIN-HIGH SENSITIVITY: 110 NG/L (ref 0–76)
TROPONIN-HIGH SENSITIVITY: 137 NG/L (ref 0–76)
WBC # BLD AUTO: 5.5 K/UL (ref 4.1–11.1)

## 2022-03-05 PROCEDURE — 83036 HEMOGLOBIN GLYCOSYLATED A1C: CPT

## 2022-03-05 PROCEDURE — 84484 ASSAY OF TROPONIN QUANT: CPT

## 2022-03-05 PROCEDURE — 74011000250 HC RX REV CODE- 250

## 2022-03-05 PROCEDURE — 36415 COLL VENOUS BLD VENIPUNCTURE: CPT

## 2022-03-05 PROCEDURE — 93005 ELECTROCARDIOGRAM TRACING: CPT

## 2022-03-05 PROCEDURE — 96374 THER/PROPH/DIAG INJ IV PUSH: CPT

## 2022-03-05 PROCEDURE — 96372 THER/PROPH/DIAG INJ SC/IM: CPT

## 2022-03-05 PROCEDURE — 74011250637 HC RX REV CODE- 250/637: Performed by: STUDENT IN AN ORGANIZED HEALTH CARE EDUCATION/TRAINING PROGRAM

## 2022-03-05 PROCEDURE — 96365 THER/PROPH/DIAG IV INF INIT: CPT

## 2022-03-05 PROCEDURE — 96375 TX/PRO/DX INJ NEW DRUG ADDON: CPT

## 2022-03-05 PROCEDURE — 74011250636 HC RX REV CODE- 250/636: Performed by: STUDENT IN AN ORGANIZED HEALTH CARE EDUCATION/TRAINING PROGRAM

## 2022-03-05 PROCEDURE — G0378 HOSPITAL OBSERVATION PER HR: HCPCS

## 2022-03-05 PROCEDURE — 85025 COMPLETE CBC W/AUTO DIFF WBC: CPT

## 2022-03-05 PROCEDURE — 82962 GLUCOSE BLOOD TEST: CPT

## 2022-03-05 PROCEDURE — 99285 EMERGENCY DEPT VISIT HI MDM: CPT

## 2022-03-05 PROCEDURE — 74011250637 HC RX REV CODE- 250/637: Performed by: NURSE PRACTITIONER

## 2022-03-05 PROCEDURE — 80053 COMPREHEN METABOLIC PANEL: CPT

## 2022-03-05 PROCEDURE — 74011000250 HC RX REV CODE- 250: Performed by: NURSE PRACTITIONER

## 2022-03-05 RX ORDER — ACETAMINOPHEN 325 MG/1
650 TABLET ORAL
Status: DISCONTINUED | OUTPATIENT
Start: 2022-03-05 | End: 2022-03-10 | Stop reason: HOSPADM

## 2022-03-05 RX ORDER — MEMANTINE HYDROCHLORIDE 5 MG/1
10 TABLET ORAL 2 TIMES DAILY
Refills: 3 | Status: DISCONTINUED | OUTPATIENT
Start: 2022-03-05 | End: 2022-03-10 | Stop reason: HOSPADM

## 2022-03-05 RX ORDER — ONDANSETRON 2 MG/ML
4 INJECTION INTRAMUSCULAR; INTRAVENOUS
Status: DISCONTINUED | OUTPATIENT
Start: 2022-03-05 | End: 2022-03-05

## 2022-03-05 RX ORDER — DEXTROSE MONOHYDRATE 100 MG/ML
250 INJECTION, SOLUTION INTRAVENOUS ONCE
Status: COMPLETED | OUTPATIENT
Start: 2022-03-05 | End: 2022-03-05

## 2022-03-05 RX ORDER — DONEPEZIL HYDROCHLORIDE 5 MG/1
10 TABLET, FILM COATED ORAL
Status: DISCONTINUED | OUTPATIENT
Start: 2022-03-05 | End: 2022-03-10 | Stop reason: HOSPADM

## 2022-03-05 RX ORDER — HALOPERIDOL 5 MG/ML
3 INJECTION INTRAMUSCULAR
Status: DISCONTINUED | OUTPATIENT
Start: 2022-03-05 | End: 2022-03-06

## 2022-03-05 RX ORDER — ONDANSETRON 4 MG/1
4 TABLET, ORALLY DISINTEGRATING ORAL
Status: DISCONTINUED | OUTPATIENT
Start: 2022-03-05 | End: 2022-03-10 | Stop reason: HOSPADM

## 2022-03-05 RX ORDER — ACETAMINOPHEN 650 MG/1
650 SUPPOSITORY RECTAL
Status: DISCONTINUED | OUTPATIENT
Start: 2022-03-05 | End: 2022-03-10 | Stop reason: HOSPADM

## 2022-03-05 RX ORDER — METOPROLOL SUCCINATE 50 MG/1
100 TABLET, EXTENDED RELEASE ORAL DAILY
Status: DISCONTINUED | OUTPATIENT
Start: 2022-03-06 | End: 2022-03-10 | Stop reason: HOSPADM

## 2022-03-05 RX ORDER — SODIUM CHLORIDE 0.9 % (FLUSH) 0.9 %
5-40 SYRINGE (ML) INJECTION AS NEEDED
Status: DISCONTINUED | OUTPATIENT
Start: 2022-03-05 | End: 2022-03-10 | Stop reason: HOSPADM

## 2022-03-05 RX ORDER — GUAIFENESIN 100 MG/5ML
81 LIQUID (ML) ORAL DAILY
Status: DISCONTINUED | OUTPATIENT
Start: 2022-03-06 | End: 2022-03-10 | Stop reason: HOSPADM

## 2022-03-05 RX ORDER — ACETAMINOPHEN 325 MG/1
650 TABLET ORAL
Status: DISCONTINUED | OUTPATIENT
Start: 2022-03-05 | End: 2022-03-05

## 2022-03-05 RX ORDER — ONDANSETRON 2 MG/ML
4 INJECTION INTRAMUSCULAR; INTRAVENOUS
Status: DISCONTINUED | OUTPATIENT
Start: 2022-03-05 | End: 2022-03-10 | Stop reason: HOSPADM

## 2022-03-05 RX ORDER — DEXTROSE MONOHYDRATE 100 MG/ML
INJECTION, SOLUTION INTRAVENOUS
Status: COMPLETED
Start: 2022-03-05 | End: 2022-03-05

## 2022-03-05 RX ORDER — INSULIN LISPRO 100 [IU]/ML
INJECTION, SOLUTION INTRAVENOUS; SUBCUTANEOUS
Status: DISCONTINUED | OUTPATIENT
Start: 2022-03-05 | End: 2022-03-10 | Stop reason: HOSPADM

## 2022-03-05 RX ORDER — AMLODIPINE BESYLATE 5 MG/1
10 TABLET ORAL
Status: DISCONTINUED | OUTPATIENT
Start: 2022-03-05 | End: 2022-03-10 | Stop reason: HOSPADM

## 2022-03-05 RX ORDER — HEPARIN SODIUM 5000 [USP'U]/ML
5000 INJECTION, SOLUTION INTRAVENOUS; SUBCUTANEOUS EVERY 8 HOURS
Status: DISCONTINUED | OUTPATIENT
Start: 2022-03-06 | End: 2022-03-10

## 2022-03-05 RX ORDER — SODIUM CHLORIDE 0.9 % (FLUSH) 0.9 %
5-40 SYRINGE (ML) INJECTION EVERY 8 HOURS
Status: DISCONTINUED | OUTPATIENT
Start: 2022-03-05 | End: 2022-03-10 | Stop reason: HOSPADM

## 2022-03-05 RX ORDER — HALOPERIDOL 5 MG/ML
3 INJECTION INTRAMUSCULAR
Status: COMPLETED | OUTPATIENT
Start: 2022-03-05 | End: 2022-03-05

## 2022-03-05 RX ORDER — DEXTROSE MONOHYDRATE 100 MG/ML
0-250 INJECTION, SOLUTION INTRAVENOUS AS NEEDED
Status: DISCONTINUED | OUTPATIENT
Start: 2022-03-05 | End: 2022-03-10 | Stop reason: HOSPADM

## 2022-03-05 RX ORDER — MAGNESIUM SULFATE 100 %
4 CRYSTALS MISCELLANEOUS AS NEEDED
Status: DISCONTINUED | OUTPATIENT
Start: 2022-03-05 | End: 2022-03-10 | Stop reason: HOSPADM

## 2022-03-05 RX ORDER — SODIUM CHLORIDE 450 MG/100ML
75 INJECTION, SOLUTION INTRAVENOUS CONTINUOUS
Status: DISCONTINUED | OUTPATIENT
Start: 2022-03-06 | End: 2022-03-06

## 2022-03-05 RX ORDER — ATORVASTATIN CALCIUM 20 MG/1
20 TABLET, FILM COATED ORAL DAILY
Status: DISCONTINUED | OUTPATIENT
Start: 2022-03-06 | End: 2022-03-10 | Stop reason: HOSPADM

## 2022-03-05 RX ORDER — QUETIAPINE FUMARATE 25 MG/1
50 TABLET, FILM COATED ORAL
Status: DISCONTINUED | OUTPATIENT
Start: 2022-03-05 | End: 2022-03-10 | Stop reason: HOSPADM

## 2022-03-05 RX ORDER — ASPIRIN 325 MG
325 TABLET ORAL
Status: COMPLETED | OUTPATIENT
Start: 2022-03-05 | End: 2022-03-05

## 2022-03-05 RX ORDER — LORAZEPAM 2 MG/ML
1 INJECTION INTRAMUSCULAR
Status: COMPLETED | OUTPATIENT
Start: 2022-03-05 | End: 2022-03-05

## 2022-03-05 RX ORDER — DEXTROSE MONOHYDRATE 100 MG/ML
0-250 INJECTION, SOLUTION INTRAVENOUS AS NEEDED
Status: CANCELLED | OUTPATIENT
Start: 2022-03-05

## 2022-03-05 RX ORDER — POLYETHYLENE GLYCOL 3350 17 G/17G
17 POWDER, FOR SOLUTION ORAL DAILY PRN
Status: DISCONTINUED | OUTPATIENT
Start: 2022-03-05 | End: 2022-03-10 | Stop reason: HOSPADM

## 2022-03-05 RX ADMIN — ASPIRIN 325 MG ORAL TABLET 325 MG: 325 PILL ORAL at 18:59

## 2022-03-05 RX ADMIN — DEXTROSE 250 ML: 10 SOLUTION INTRAVENOUS at 15:33

## 2022-03-05 RX ADMIN — HALOPERIDOL LACTATE 3 MG: 5 INJECTION, SOLUTION INTRAMUSCULAR at 16:43

## 2022-03-05 RX ADMIN — DONEPEZIL HYDROCHLORIDE 10 MG: 5 TABLET, FILM COATED ORAL at 23:28

## 2022-03-05 RX ADMIN — AMLODIPINE BESYLATE 10 MG: 5 TABLET ORAL at 23:28

## 2022-03-05 RX ADMIN — QUETIAPINE FUMARATE 50 MG: 25 TABLET ORAL at 23:28

## 2022-03-05 RX ADMIN — SODIUM CHLORIDE, PRESERVATIVE FREE 10 ML: 5 INJECTION INTRAVENOUS at 23:29

## 2022-03-05 RX ADMIN — LORAZEPAM 1 MG: 2 INJECTION INTRAMUSCULAR; INTRAVENOUS at 16:43

## 2022-03-05 RX ADMIN — DEXTROSE MONOHYDRATE 250 ML: 100 INJECTION, SOLUTION INTRAVENOUS at 15:33

## 2022-03-05 NOTE — ED PROVIDER NOTES
EMERGENCY DEPARTMENT HISTORY AND PHYSICAL EXAM      Date: 3/5/2022  Patient Name: Casa Edge    History of Presenting Illness     Chief Complaint   Patient presents with    Low Blood Sugar     Per EMS pts wife reported low blood sugar, EMS reports sugar of 39 originally last sugar EMS recieved was 44. Pt has hx of dementia, currently only answering yes or no questions. EMS reports administering 3 oral glucoses and 1 mg glucagon. History Provided By: EMS, patient    HPI: Casa Edge, [de-identified] y.o. male with past medical history of dementia, type 2 diabetes, presents to the ED with cc of altered mental status in setting of low blood sugar. On EMS arrival, patient had an initial blood glucose of 39. They administered 3 oral glucoses and 1 mg glucagon. On arrival to the ED, initial blood glucose was 47. Patient a poor historian, unclear baseline. He is only answering yes or no questions at this time. Awake. Does not appear to be in any distress. He was immediately administered 250 mL of D10. PCP: Maury Fontenot MD    No current facility-administered medications on file prior to encounter. Current Outpatient Medications on File Prior to Encounter   Medication Sig Dispense Refill    QUEtiapine (SEROquel) 50 mg tablet TAKE 1 TABLET NIGHTLY 90 Tablet 3    memantine-donepeziL (Namzaric) 28-10 mg CSpX TAKE 1 CAPSULE NIGHTLY 90 Capsule 3    cefdinir (OMNICEF) 300 mg capsule Take 1 Capsule by mouth two (2) times a day. 20 Capsule 0    insulin aspart protamine/insulin aspart (NOVOLOG MIX 70-30 U-100 INSULN) 100 unit/mL (70-30) injection 20 Units by SubCUTAneous route Daily (before breakfast). 10 mL 0    insulin aspart protamine/insulin aspart (NOVOLOG MIX 70-30 U-100 INSULN) 100 unit/mL (70-30) injection 20 Units by SubCUTAneous route Daily (before dinner). 10 mL 0    amLODIPine (NORVASC) 10 mg tablet Take 10 mg by mouth nightly.       cholecalciferol (VITAMIN D3) 1,000 unit tablet Take 1,000 Units by mouth daily.  atorvastatin (LIPITOR) 20 mg tablet Take 20 mg by mouth daily.  isosorbide mononitrate ER (IMDUR) 30 mg tablet Take 30 mg by mouth daily.  apalutamide (ERLEADA) 60 mg tablet Take 120 mg by mouth two (2) times a day.  metoprolol succinate (TOPROL-XL) 100 mg tablet Take 100 mg by mouth daily.  aspirin 81 mg tablet Take 81 mg by mouth daily. Past History     Past Medical History:  Past Medical History:   Diagnosis Date    CAD (coronary artery disease)     [facility] ([facility] [address]     prostate    Diabetes ([facility] [address]     GERD (gastroesophageal reflux disease)     Hypertension     Other ill-defined conditions(799.89)     elevated cholesterol       Past Surgical History:  Past Surgical History:   Procedure Laterality Date    COLONOSCOPY N/A 2016    COLONOSCOPY performed by Rajesh Kitchen MD at [address] [place] [address]; HI RISK IND  2016         HX PROSTATECTOMY      MD CARDIAC SURG PROCEDURE UNLIST      cabg x5 vessels,cardiologist  and david at [facility]    MD COLONOSCOPY FLX DX W/COLLJ SPEC WHEN PFRMD  2011            Family History:  No family history on file. Social History:  Social History     Tobacco Use    Smoking status: Former Smoker     Packs/day: 0.50     Years: 20.00     Pack years: 10.00     Quit date: 1982     Years since quittin.9    Smokeless tobacco: Never Used   Substance Use Topics    Alcohol use: No    Drug use: No       Allergies:  No Known Allergies      Review of Systems   Review of Systems   Unable to perform ROS: Mental status change       Physical Exam   Physical Exam  Vitals and nursing note reviewed. Constitutional:       General: He is not in acute distress. Appearance: Normal appearance. He is not ill-appearing or toxic-appearing. HENT:      Head: Normocephalic and atraumatic.       Nose: Nose normal.      Mouth/Throat:      Mouth: Mucous membranes are moist. Eyes:      Extraocular Movements: Extraocular movements intact. Pupils: Pupils are equal, round, and reactive to light. Cardiovascular:      Rate and Rhythm: Normal rate and regular rhythm. Pulses: Normal pulses. Pulmonary:      Effort: Pulmonary effort is normal. No respiratory distress. Breath sounds: Normal breath sounds. No stridor. No wheezing or rhonchi. Abdominal:      General: Abdomen is flat. There is no distension. Palpations: There is no mass. Tenderness: There is no abdominal tenderness. Musculoskeletal:         General: Normal range of motion. Cervical back: Normal range of motion and neck supple. Skin:     General: Skin is warm and dry. Neurological:      General: No focal deficit present. Mental Status: He is alert. Mental status is at baseline. Sensory: No sensory deficit. Motor: No weakness. Diagnostic Study Results     Labs -     Recent Results (from the past 24 hour(s))   GLUCOSE, POC    Collection Time: 03/05/22  3:24 PM   Result Value Ref Range    Glucose (POC) 47 (LL) 65 - 117 mg/dL    Performed by Vernel Caprice (EDT)    GLUCOSE, POC    Collection Time: 03/05/22  3:26 PM   Result Value Ref Range    Glucose (POC) 60 (L) 65 - 117 mg/dL    Performed by Vernel Caprice (EDT)    GLUCOSE, POC    Collection Time: 03/05/22  3:28 PM   Result Value Ref Range    Glucose (POC) 51 (LL) 65 - 117 mg/dL    Performed by Vernel Caprice (EDT)    CBC WITH AUTOMATED DIFF    Collection Time: 03/05/22  3:48 PM   Result Value Ref Range    WBC 5.5 4.1 - 11.1 K/uL    RBC 4.70 4. 10 - 5.70 M/uL    HGB 13.6 12.1 - 17.0 g/dL    HCT 42.1 36.6 - 50.3 %    MCV 89.6 80.0 - 99.0 FL    MCH 28.9 26.0 - 34.0 PG    MCHC 32.3 30.0 - 36.5 g/dL    RDW 14.0 11.5 - 14.5 %    PLATELET 419 408 - 723 K/uL    MPV 9.2 8.9 - 12.9 FL    NRBC 0.0 0  WBC    ABSOLUTE NRBC 0.00 0.00 - 0.01 K/uL    NEUTROPHILS 76 (H) 32 - 75 %    LYMPHOCYTES 15 12 - 49 %    MONOCYTES 7 5 - 13 % EOSINOPHILS 1 0 - 7 %    BASOPHILS 1 0 - 1 %    IMMATURE GRANULOCYTES 0 0.0 - 0.5 %    ABS. NEUTROPHILS 4.2 1.8 - 8.0 K/UL    ABS. LYMPHOCYTES 0.8 0.8 - 3.5 K/UL    ABS. MONOCYTES 0.4 0.0 - 1.0 K/UL    ABS. EOSINOPHILS 0.1 0.0 - 0.4 K/UL    ABS. BASOPHILS 0.0 0.0 - 0.1 K/UL    ABS. IMM. GRANS. 0.0 0.00 - 0.04 K/UL    DF AUTOMATED     METABOLIC PANEL, COMPREHENSIVE    Collection Time: 03/05/22  3:48 PM   Result Value Ref Range    Sodium 140 136 - 145 mmol/L    Potassium 3.6 3.5 - 5.1 mmol/L    Chloride 106 97 - 108 mmol/L    CO2 30 21 - 32 mmol/L    Anion gap 4 (L) 5 - 15 mmol/L    Glucose 179 (H) 65 - 100 mg/dL    BUN 18 6 - 20 MG/DL    Creatinine 1.51 (H) 0.70 - 1.30 MG/DL    BUN/Creatinine ratio 12 12 - 20      GFR est AA 54 (L) >60 ml/min/1.73m2    GFR est non-AA 45 (L) >60 ml/min/1.73m2    Calcium 9.4 8.5 - 10.1 MG/DL    Bilirubin, total 1.1 (H) 0.2 - 1.0 MG/DL    ALT (SGPT) 19 12 - 78 U/L    AST (SGOT) 17 15 - 37 U/L    Alk. phosphatase 94 45 - 117 U/L    Protein, total 7.6 6.4 - 8.2 g/dL    Albumin 3.7 3.5 - 5.0 g/dL    Globulin 3.9 2.0 - 4.0 g/dL    A-G Ratio 0.9 (L) 1.1 - 2.2     TROPONIN-HIGH SENSITIVITY    Collection Time: 03/05/22  3:48 PM   Result Value Ref Range    Troponin-High Sensitivity 110 (HH) 0 - 76 ng/L   SAMPLES BEING HELD    Collection Time: 03/05/22  3:48 PM   Result Value Ref Range    SAMPLES BEING HELD LAV, 2RED     COMMENT        Add-on orders for these samples will be processed based on acceptable specimen integrity and analyte stability, which may vary by analyte.    GLUCOSE, POC    Collection Time: 03/05/22  4:13 PM   Result Value Ref Range    Glucose (POC) 194 (H) 65 - 117 mg/dL    Performed by Jennifer Layton RN    EKG, 12 LEAD, INITIAL    Collection Time: 03/05/22  5:03 PM   Result Value Ref Range    Ventricular Rate 86 BPM    Atrial Rate 86 BPM    P-R Interval 190 ms    QRS Duration 96 ms    Q-T Interval 420 ms    QTC Calculation (Bezet) 502 ms    Calculated P Axis 41 degrees Calculated R Axis 2 degrees    Calculated T Axis 18 degrees    Diagnosis       Normal sinus rhythm  Possible Left atrial enlargement  Septal infarct , age undetermined  Prolonged QT  When compared with ECG of 07-JAN-2022 10:28,  premature supraventricular complexes are no longer present  Septal infarct is now present  ST no longer depressed in Lateral leads  Nonspecific T wave abnormality, improved in Inferior leads  T wave inversion no longer evident in Lateral leads     TROPONIN-HIGH SENSITIVITY    Collection Time: 03/05/22  5:40 PM   Result Value Ref Range    Troponin-High Sensitivity 137 (HH) 0 - 76 ng/L       Radiologic Studies -   No orders to display     CT Results  (Last 48 hours)    None        CXR Results  (Last 48 hours)    None          Medical Decision Making   I, Marissa Melgar MD am the first provider for this patient, and I am the attending of record for this patient encounter. I reviewed the vital signs, available nursing notes, past medical history, past surgical history, family history and social history. Vital Signs-Reviewed the patient's vital signs. Patient Vitals for the past 24 hrs:   Temp Pulse Resp BP SpO2   03/05/22 1539 97.8 °F (36.6 °C) 76 12 134/75 96 %       EKG interpretation: (Preliminary)  Normal sinus rhythm, 86 bpm, no significant acute ST changes concerning for ischemia. QTc prolonged at 502. EKG interpretation by Marissa Melgar MD    Records Reviewed: Nursing Notes and Old Medical Records    Provider Notes (Medical Decision Making):   Patient has hypoglycemia in setting of known type 2 diabetes. Given oral glucose by EMS, with persistent hypoglycemia in the ED. He was administered 250 mL of D10 shortly after arrival in the ED. Repeat fingerstick glucose is now improved. Patient has been awake throughout his ED course, even while hypoglycemic. He does appear to be more active after correction of blood glucose, and tells me on my repeat exam that he is feeling \"much better. \" We will feed patient meal at this time. Plan for serial blood glucose checks, and DC if stable. I was alerted by RN that patient's troponin is elevated at 110. Patient is not complaining of any chest pain. Unbeknownst to me, RN had apparently sent troponin as part of initial work-up when patient first arrived to the ED without me specifically ordering this study. His EKG is without concerning acute ST changes. We will plan for 2-hour repeat troponin. I have lower suspicion for NSTEMI type I. In the middle of patient's ED course, he became increasingly more agitated, taking off his leads, standing up in the room, speaking at me angrily, pointing his fingers in my direction and yelling angrily with threats of walking out. Patient is unable to be verbally deescalated due to baseline dementia and confusion about why he is in the ED. Wife is currently at bedside, appearing to be frustrated with his current state. Wife reports that she would like for patient to be given medication to calm down so that he can undergo full medical work-up. Patient again denied having any chest pain at this time. Considering wife is POA, and patient has no capacity to make medical decisions for himself, will administer 3 mg of IV Haldol and 1 mg of IV Ativan for chemical restraint. Patient tolerated this well, and was much more calm and cooperative with care after medications. His repeat 2-hour troponin returned even more elevated at 137, which is a greater than 20% change from initial troponin. Considering trop elevation with known CAD and numerous CV risk factors, will have to admit patient for further work-up to definitively rule out for ACS. He is administered full dose aspirin. Patient's case was discussed with the hospitalist, Dr. Kalina Chicas, who requested I reach out to cardiology for further recommendations, but has otherwise accepted the patient for admission in stable condition.     ED Course as of 03/05/22 1940   Sat Mar 05, 2022 1859 Patient discussed with cardiology, Dr. Moncho Lafleur, who reports cardiology team will evaluate the patient tomorrow. [JM]      ED Course User Index  [JM] Oswaldo Laughlin MD     CRITICAL CARE NOTE:    Authorized and Performed by: Keyana Anaya MD    IMPENDING DETERIORATION -Cardiovascular and Metabolic  ASSOCIATED RISK FACTORS - Hypotension, Shock, Dysrhythmia, Metabolic changes, Vascular Compromise and CNS Decompensation  MANAGEMENT- Bedside Assessment  INTERPRETATION -  ECG, Blood Pressure, Cardiac Output Measures, pulse ox, and all labs  INTERVENTIONS - vascular control and Metobolic interventions  CASE REVIEW - Hospitalist/Intensivist, Medical Sub-Specialist, Nursing and Family  TREATMENT RESPONSE -Stable  PERFORMED BY - Self    I have spent 75 minutes of critical care time involved in obtaining a history, examining the patient, lab review, arranging urgent treatment with development of a management plan, consultations with other providers, family decision- making, bedside attention and documentation. During this entire length of time I was immediately available to the patient . Critical Care: The reason for providing this level of medical care for this critically ill patient was due to a critical illness that impaired one or more vital organ systems, such that there was a high probability of imminent or life threatening deterioration in the patient's condition. This care involved high complexity decision making to assess, manipulate, and support vital system functions, to treat this degree of vital organ system failure, and to prevent further life threatening deterioration of the patients condition. Critical care time was exclusive of separately billable procedures. ED Course:   Initial assessment performed. The patient's presenting problems have been discussed, and they are in agreement with the care plan formulated and outlined with them.   I have encouraged them to ask questions as they arise throughout their visit. Courtney Abel MD      Disposition:  Admit    Diagnosis     Clinical Impression:   1. Elevated troponin    2. Hypoglycemia    3. Agitation requiring sedation protocol    4. History of dementia        Attestations:    Courtney Abel MD    Please note that this dictation was completed with [employer], the computer voice recognition software. Quite often unanticipated grammatical, syntax, homophones, and other interpretive errors are inadvertently transcribed by the computer software. Please disregard these errors. Please excuse any errors that have escaped final proofreading. Thank you.

## 2022-03-06 LAB
ANION GAP SERPL CALC-SCNC: 4 MMOL/L (ref 5–15)
APPEARANCE UR: CLEAR
ATRIAL RATE: 86 BPM
BACTERIA URNS QL MICRO: NEGATIVE /HPF
BILIRUB UR QL: NEGATIVE
BUN SERPL-MCNC: 14 MG/DL (ref 6–20)
BUN/CREAT SERPL: 12 (ref 12–20)
CALCIUM SERPL-MCNC: 9.2 MG/DL (ref 8.5–10.1)
CALCULATED P AXIS, ECG09: 41 DEGREES
CALCULATED R AXIS, ECG10: 2 DEGREES
CALCULATED T AXIS, ECG11: 18 DEGREES
CHLORIDE SERPL-SCNC: 108 MMOL/L (ref 97–108)
CO2 SERPL-SCNC: 26 MMOL/L (ref 21–32)
COLOR UR: ABNORMAL
CREAT SERPL-MCNC: 1.17 MG/DL (ref 0.7–1.3)
DIAGNOSIS, 93000: NORMAL
EPITH CASTS URNS QL MICRO: ABNORMAL /LPF
EST. AVERAGE GLUCOSE BLD GHB EST-MCNC: 186 MG/DL
GLUCOSE BLD STRIP.AUTO-MCNC: 101 MG/DL (ref 65–117)
GLUCOSE BLD STRIP.AUTO-MCNC: 126 MG/DL (ref 65–117)
GLUCOSE BLD STRIP.AUTO-MCNC: 296 MG/DL (ref 65–117)
GLUCOSE BLD STRIP.AUTO-MCNC: 98 MG/DL (ref 65–117)
GLUCOSE SERPL-MCNC: 88 MG/DL (ref 65–100)
GLUCOSE UR STRIP.AUTO-MCNC: NEGATIVE MG/DL
HBA1C MFR BLD: 8.1 % (ref 4–5.6)
HGB UR QL STRIP: ABNORMAL
HYALINE CASTS URNS QL MICRO: ABNORMAL /LPF (ref 0–5)
INR PPP: 1 (ref 0.9–1.1)
KETONES UR QL STRIP.AUTO: NEGATIVE MG/DL
LEUKOCYTE ESTERASE UR QL STRIP.AUTO: NEGATIVE
MAGNESIUM SERPL-MCNC: 2.3 MG/DL (ref 1.6–2.4)
NITRITE UR QL STRIP.AUTO: NEGATIVE
P-R INTERVAL, ECG05: 190 MS
PH UR STRIP: 7 [PH] (ref 5–8)
POTASSIUM SERPL-SCNC: 3.5 MMOL/L (ref 3.5–5.1)
PROT UR STRIP-MCNC: NEGATIVE MG/DL
PROTHROMBIN TIME: 10.9 SEC (ref 9–11.1)
Q-T INTERVAL, ECG07: 420 MS
QRS DURATION, ECG06: 96 MS
QTC CALCULATION (BEZET), ECG08: 502 MS
RBC #/AREA URNS HPF: ABNORMAL /HPF (ref 0–5)
SERVICE CMNT-IMP: ABNORMAL
SERVICE CMNT-IMP: ABNORMAL
SERVICE CMNT-IMP: NORMAL
SERVICE CMNT-IMP: NORMAL
SODIUM SERPL-SCNC: 138 MMOL/L (ref 136–145)
SP GR UR REFRACTOMETRY: 1.01 (ref 1–1.03)
TROPONIN-HIGH SENSITIVITY: 215 NG/L (ref 0–76)
UA: UC IF INDICATED,UAUC: ABNORMAL
UROBILINOGEN UR QL STRIP.AUTO: 1 EU/DL (ref 0.2–1)
VENTRICULAR RATE, ECG03: 86 BPM
WBC URNS QL MICRO: ABNORMAL /HPF (ref 0–4)

## 2022-03-06 PROCEDURE — 74011250637 HC RX REV CODE- 250/637: Performed by: STUDENT IN AN ORGANIZED HEALTH CARE EDUCATION/TRAINING PROGRAM

## 2022-03-06 PROCEDURE — 74011250636 HC RX REV CODE- 250/636: Performed by: INTERNAL MEDICINE

## 2022-03-06 PROCEDURE — 82962 GLUCOSE BLOOD TEST: CPT

## 2022-03-06 PROCEDURE — 96372 THER/PROPH/DIAG INJ SC/IM: CPT

## 2022-03-06 PROCEDURE — 74011636637 HC RX REV CODE- 636/637: Performed by: NURSE PRACTITIONER

## 2022-03-06 PROCEDURE — 96376 TX/PRO/DX INJ SAME DRUG ADON: CPT

## 2022-03-06 PROCEDURE — 74011250637 HC RX REV CODE- 250/637: Performed by: NURSE PRACTITIONER

## 2022-03-06 PROCEDURE — 85610 PROTHROMBIN TIME: CPT

## 2022-03-06 PROCEDURE — 83735 ASSAY OF MAGNESIUM: CPT

## 2022-03-06 PROCEDURE — 84484 ASSAY OF TROPONIN QUANT: CPT

## 2022-03-06 PROCEDURE — 81001 URINALYSIS AUTO W/SCOPE: CPT

## 2022-03-06 PROCEDURE — 80048 BASIC METABOLIC PNL TOTAL CA: CPT

## 2022-03-06 PROCEDURE — 36415 COLL VENOUS BLD VENIPUNCTURE: CPT

## 2022-03-06 PROCEDURE — 74011000250 HC RX REV CODE- 250: Performed by: NURSE PRACTITIONER

## 2022-03-06 PROCEDURE — G0378 HOSPITAL OBSERVATION PER HR: HCPCS

## 2022-03-06 PROCEDURE — 74011250636 HC RX REV CODE- 250/636: Performed by: NURSE PRACTITIONER

## 2022-03-06 RX ORDER — LORAZEPAM 2 MG/ML
0.5 INJECTION INTRAMUSCULAR
Status: DISCONTINUED | OUTPATIENT
Start: 2022-03-06 | End: 2022-03-10 | Stop reason: HOSPADM

## 2022-03-06 RX ORDER — ISOSORBIDE MONONITRATE 30 MG/1
30 TABLET, EXTENDED RELEASE ORAL DAILY
Status: DISCONTINUED | OUTPATIENT
Start: 2022-03-06 | End: 2022-03-10 | Stop reason: HOSPADM

## 2022-03-06 RX ORDER — LORAZEPAM 2 MG/ML
1 INJECTION INTRAMUSCULAR
Status: DISCONTINUED | OUTPATIENT
Start: 2022-03-06 | End: 2022-03-06

## 2022-03-06 RX ORDER — DEXTROSE MONOHYDRATE 50 MG/ML
75 INJECTION, SOLUTION INTRAVENOUS CONTINUOUS
Status: DISCONTINUED | OUTPATIENT
Start: 2022-03-06 | End: 2022-03-06

## 2022-03-06 RX ADMIN — ISOSORBIDE MONONITRATE 30 MG: 30 TABLET, EXTENDED RELEASE ORAL at 11:23

## 2022-03-06 RX ADMIN — HALOPERIDOL LACTATE 3 MG: 5 INJECTION, SOLUTION INTRAMUSCULAR at 09:06

## 2022-03-06 RX ADMIN — HEPARIN SODIUM 5000 UNITS: 5000 INJECTION INTRAVENOUS; SUBCUTANEOUS at 08:33

## 2022-03-06 RX ADMIN — SODIUM CHLORIDE, PRESERVATIVE FREE 10 ML: 5 INJECTION INTRAVENOUS at 13:41

## 2022-03-06 RX ADMIN — QUETIAPINE FUMARATE 50 MG: 25 TABLET ORAL at 21:34

## 2022-03-06 RX ADMIN — HEPARIN SODIUM 5000 UNITS: 5000 INJECTION INTRAVENOUS; SUBCUTANEOUS at 16:01

## 2022-03-06 RX ADMIN — SODIUM CHLORIDE 75 ML/HR: 4.5 INJECTION, SOLUTION INTRAVENOUS at 00:03

## 2022-03-06 RX ADMIN — SODIUM CHLORIDE, PRESERVATIVE FREE 10 ML: 5 INJECTION INTRAVENOUS at 21:34

## 2022-03-06 RX ADMIN — METOPROLOL SUCCINATE 100 MG: 50 TABLET, FILM COATED, EXTENDED RELEASE ORAL at 08:32

## 2022-03-06 RX ADMIN — DONEPEZIL HYDROCHLORIDE 10 MG: 5 TABLET, FILM COATED ORAL at 21:34

## 2022-03-06 RX ADMIN — AMLODIPINE BESYLATE 10 MG: 5 TABLET ORAL at 21:34

## 2022-03-06 RX ADMIN — SODIUM CHLORIDE, PRESERVATIVE FREE 10 ML: 5 INJECTION INTRAVENOUS at 05:16

## 2022-03-06 RX ADMIN — ASPIRIN 81 MG: 81 TABLET, CHEWABLE ORAL at 08:32

## 2022-03-06 RX ADMIN — ATORVASTATIN CALCIUM 20 MG: 20 TABLET, FILM COATED ORAL at 08:32

## 2022-03-06 RX ADMIN — MEMANTINE HYDROCHLORIDE 10 MG: 10 TABLET ORAL at 17:56

## 2022-03-06 RX ADMIN — HEPARIN SODIUM 5000 UNITS: 5000 INJECTION INTRAVENOUS; SUBCUTANEOUS at 00:13

## 2022-03-06 RX ADMIN — MEMANTINE HYDROCHLORIDE 10 MG: 10 TABLET ORAL at 00:04

## 2022-03-06 RX ADMIN — MEMANTINE HYDROCHLORIDE 10 MG: 10 TABLET ORAL at 10:35

## 2022-03-06 RX ADMIN — Medication 3 UNITS: at 21:34

## 2022-03-06 RX ADMIN — DEXTROSE MONOHYDRATE 75 ML/HR: 50 INJECTION, SOLUTION INTRAVENOUS at 08:38

## 2022-03-06 NOTE — ED NOTES
Dr. Krupa Tapia at bedside. Gave verbal order to give pt food. Pt drank 8oz orange juice and one kunal cracker. Wife now at bedside.
Patient is being transferred to [address], Room # [id number]. Report given to Torito Martinesmireya on [facility] [address] for routine progression of care. Report consisted of the following information SBAR, ED Summary, Intake/Output and MAR. Patient transferred to receiving unit by: Transport (RN or tech name). Outstanding consults needed: No     Next labs due: No     The following personal items will be sent with the patient during transfer to the floor: All valuables:    Cardiac monitoring ordered: Yes    The following CURRENT information was reported to the receiving RN:    Code status: Full Code at time of transfer    Last set of vital signs:  Vital Signs  Level of Consciousness: Alert (0) (03/05/22 2130)  Temp: 97.8 °F (36.6 °C) (03/05/22 1539)  Temp Source: Rectal (03/05/22 1539)  Pulse (Heart Rate): 85 (03/05/22 2130)  Heart Rate Source: Monitor (03/05/22 1539)  Resp Rate: 14 (03/05/22 2130)  BP: (!) 153/92 (03/05/22 2130)  MAP (Monitor): 104 (03/05/22 2130)  MAP (Calculated): 112 (03/05/22 2130)  BP 1 Location: Right upper arm (03/05/22 1539)  BP 1 Method: Automatic (03/05/22 1539)  BP Patient Position: At rest (03/05/22 1539)  MEWS Score: 0 (03/05/22 1539)         Oxygen Therapy  O2 Sat (%): 100 % (03/05/22 2130)  Pulse via Oximetry: 95 beats per minute (03/05/22 2130)  O2 Device: None (Room air) (03/05/22 2130)      Last pain assessment:         Wounds: No     Urinary catheter: incontinent  Is there a maurer order: No     LDAs:       Peripheral IV Left Antecubital (Active)         Opportunity for questions and clarification was provided.     Jocelyne Aparicio RN
Per wife, pts BG was 56 upon wakening this am. Wife reports she administered 60 units of humalog and provided breakfast. Wife reports around 1300 he became sweaty and she then checked BG levels which were found to be in the 30s, so she called EMS. Wife also reports that the pt is unable to answer most questions at baseline and that at this time the pt is acting at baseline.
Pt standing in room, stating \"I need to get out of here\", Pt not following commands, rn attempting to explain to pt that MD has not discharged pt. Dr. Sang Newell at bedside speaking with pt and wife.
Pt willingly took ativan and halidol, pt now resting comfortably, chest rise and fall seen, vitals stable, no distress noted. Wife at bedside.
baseline

## 2022-03-06 NOTE — CONSULTS
IP Cardiology Consult       Date of consult:  03/06/22  Date of admission: 3/5/2022  Primary Cardiologist: Seen by Dr Magdaleno Drummond before in 2018   Physician Requesting consult: Dr Kvng Garcia       Assessment:    Problem list:   1. Hypoglycemia   2. Diabetes   3. Dementia - orient to self only   4. Elevated trop, likely type 2, no chest pain or significant STT changes   5. CAD s/p CABG in 2006  6. HTN  7. HLP  8. CKD  9. Prostate CA   10. Obesity      Problem list from 2018 clinic note   This patient had bypass surgery in 2006. He has hypertension and hyperlipidemia and recently was discovered to have prostate cancer and underwent radiation therapy. He has diabetes mellitus and in 2018 started having dementia. Recommendations:   Trop elevation is likely type 2 With underlying CAD, do not suspect ACS, no CP or significant EKG abn -    1. Cont aspirin   2. Cont lipitor   3. Cont metoprolol, amlodipine   4. BP high, add home imdur   5. DM mx per primary team   6. 2d echo pending            [x]        High complexity decision making was performed      CC / Reason for consult: elevated trop     History of the presenting illness:  Ana Tatum is a [de-identified] y.o. male with h/o CAD, CABG, DM presented with hypoglycemia. He has dementia and history is not reliable. When asked about chest pain - says there is always something going on. But no chest pain by report and no chest pain today. Trop minimally elevated   EKG showed no significant STT abn     Past Medical History:   Diagnosis Date    CAD (coronary artery disease)     Cancer ([address]     prostate    Diabetes ([address]     GERD (gastroesophageal reflux disease)     Hypertension     Other ill-defined conditions(799.89)     elevated cholesterol       Prior to Admission medications    Medication Sig Start Date End Date Taking?  Authorizing Provider   QUEtiapine (SEROquel) 50 mg tablet TAKE 1 TABLET NIGHTLY 11/3/21   Erendira Recinos NP   memantine-donepeziL [facility]) 28-10 mg CSpX TAKE 1 CAPSULE NIGHTLY 11/3/21   Ruth Ann Mccoy NP   cefdinir (OMNICEF) 300 mg capsule Take 1 Capsule by mouth two (2) times a day. 21   Lauren Copeland MD   insulin aspart protamine/insulin aspart (NOVOLOG MIX 70-30 U-100 INSULN) 100 unit/mL (70-30) injection 20 Units by SubCUTAneous route Daily (before breakfast). 19   Elsie Addison MD   insulin aspart protamine/insulin aspart (NOVOLOG MIX 70-30 U-100 INSULN) 100 unit/mL (70-30) injection 20 Units by SubCUTAneous route Daily (before dinner). 19   Elsie Addison MD   amLODIPine (NORVASC) 10 mg tablet Take 10 mg by mouth nightly. Provider, Historical   cholecalciferol (VITAMIN D3) 1,000 unit tablet Take 1,000 Units by mouth daily. Provider, Historical   atorvastatin (LIPITOR) 20 mg tablet Take 20 mg by mouth daily. Provider, Historical   isosorbide mononitrate ER (IMDUR) 30 mg tablet Take 30 mg by mouth daily. Provider, Historical   apalutamide (ERLEADA) 60 mg tablet Take 120 mg by mouth two (2) times a day. Provider, Historical   metoprolol succinate (TOPROL-XL) 100 mg tablet Take 100 mg by mouth daily. Other, MD Sarah   aspirin 81 mg tablet Take 81 mg by mouth daily.  11   Provider, Historical       Family History   Problem Relation Age of Onset    Diabetes Mother     Hypertension Mother     Dementia Father     Heart Disease Sister     Cancer Sister     Dementia Maternal Grandfather     Dementia Sister     Stroke Son     Hypertension Son         Social History     Socioeconomic History    Marital status:      Spouse name: Not on file    Number of children: Not on file    Years of education: Not on file    Highest education level: Not on file   Occupational History    Not on file   Tobacco Use    Smoking status: Former Smoker     Packs/day: 0.50     Years: 20.00     Pack years: 10.00     Quit date: 1982     Years since quittin.9    Smokeless tobacco: Never Used Substance and Sexual Activity    Alcohol use: No    Drug use: No    Sexual activity: Not on file   Other Topics Concern    Not on file   Social History Narrative    Not on file     Social Determinants of Health     Financial Resource Strain:     Difficulty of Paying Living Expenses: Not on file   Food Insecurity:     Worried About Running Out of Food in the Last Year: Not on file    Leticia of Food in the Last Year: Not on file   Transportation Needs:     Lack of Transportation (Medical): Not on file    Lack of Transportation (Non-Medical):  Not on file   Physical Activity:     Days of Exercise per Week: Not on file    Minutes of Exercise per Session: Not on file   Stress:     Feeling of Stress : Not on file   Social Connections:     Frequency of Communication with Friends and Family: Not on file    Frequency of Social Gatherings with Friends and Family: Not on file    Attends [employer] Services: Not on file    Active Member of [address] [place] [address] or Organizations: Not on file    Attends Club or Organization Meetings: Not on file    Marital Status: Not on file   Intimate Partner Violence:     Fear of Current or Ex-Partner: Not on file    Emotionally Abused: Not on file    Physically Abused: Not on file    Sexually Abused: Not on file   Housing Stability:     Unable to Pay for Housing in the Last Year: Not on file    Number of [place] in the Last Year: Not on file    Unstable Housing in the Last Year: Not on file         ROS      Total of 12 systems reviewed, all systems review was negative except Pertinent Positives included in HPI     Visit Vitals  BP (!) 161/90 (BP 1 Location: Left upper arm, BP Patient Position: At rest)   Pulse 82   Temp 98.4 °F (36.9 °C)   Resp 18   Wt 92.1 kg (203 lb)   SpO2 96%   BMI 31.79 kg/m²       Physical Exam  Examination:     General: Alert + Oriented x1, no distress   HEENT: Normocephalic aromatic, MMM   Neck: Supple, JVP- not well appreciated   RS: Non labored, clear   CVS: Regular rate and rhythm, S1S2, no murmur   Abd: Soft, non tender, non distended   Lower extremity: Warm to touch, Edema- None   Skin: Warm and dry, No significant bruises or rash   CNS: Oriented x1 no focal neuro deficit     Lab review:  BMP:   Lab Results   Component Value Date/Time     03/06/2022 03:23 AM    K 3.5 03/06/2022 03:23 AM     03/06/2022 03:23 AM    CO2 26 03/06/2022 03:23 AM    AGAP 4 (L) 03/06/2022 03:23 AM    GLU 88 03/06/2022 03:23 AM    BUN 14 03/06/2022 03:23 AM    CREA 1.17 03/06/2022 03:23 AM    GFRAA >60 03/06/2022 03:23 AM    GFRNA 60 (L) 03/06/2022 03:23 AM        CBC:  Lab Results   Component Value Date/Time    WBC 5.5 03/05/2022 03:48 PM    HGB 13.6 03/05/2022 03:48 PM    HCT 42.1 03/05/2022 03:48 PM    PLATELET 295 39/58/1335 03:48 PM    MCV 89.6 03/05/2022 03:48 PM       All Cardiac Markers in the last 24 hours:  No results found for: CPK, CK, CKMMB, CKMB, RCK3, CKMBT, CKMBPOC, CKNDX, CKND1, BEAU, TROPT, TROIQ, MARYAM, TROPT, TNIPOC, BNP, BNPP, BNPNT    Data review:    Tele: NSR    EKG tracing personally reviewed:   NSR, No significant STT abn, PRWP, Septal MI can not exclude     Echocardiogram:  2019   Result status: Final result  · Left Ventricle: Normal cavity size, wall thickness, systolic function (ejection fraction normal) and diastolic function. Estimated left ventricular ejection fraction is 61 - 65%. · Mitral Valve: Trace mitral valve regurgitation. Other cardiac testing:    Other imaging:  CXR     IMPRESSION  No acute cardiopulmonary disease.       Signed:  Max Pineda MD  Interventional Cardiology  3/6/2022      ________________________________________________________________    Clinic Note from 6/21/18     Return Office Visit    June 21, 2018      Young Deweyuissusan  69year old M (1940)  Account #: [id number]  PRIMARY CARE PHYSICIAN:  Megan Davis MD    REASON FOR VISIT:  This 69year old male presents for CAD, Hypertension and hyperlipidemia. HISTORY OF PRESENT ILLNESS:   This patient had bypass surgery in . He has hypertension and hyperlipidemia and recently was discovered to have prostate cancer and underwent radiation therapy. He has diabetes mellitus and in 2018 started having dementia. The patient and his wife come here after a recent hospitalization at [facility]. He was obtained on did an evidently was in DKA or hyperosmolar coma. His mental status was abnormal and according to the wife, he just stopped taking his insulin. He is now on a new diabetic regimen. He is doing better but perhaps most importantly he has turned over his medicine ingestion over to his wife. During this hospitalization he was not having any cardiac problems despite being very ill. He is 12 years out of his bypass surgery. He does not have exertional pain or shortness of breath. CARDIAC HISTORY  RISK FACTORS:      1 Hypertension   2 Dyslipidemia   3 Type 2 Diabetes   4 Family History of CAD [Less than 61years of age]       CARDIOVASCULAR PROCEDURES  CATH LAB:     Cath (Obstructive atherosclerotic disease, tight proximal left anterior  descending, occluded obtuse marginal and intermediate branches. Normal left ventricular function. Patent saphenous vein grafts to an intermediate and obtuse marginal and a patent saphenous vein graft to the distal) - 2017   ELECTROPHYSIOLOGY:     EKG (Normal ECG) - 2017       ACTIVE ALLERGIES:  Ingredient Reaction Comment   NO KNOWN ALLERGIES       None      PAST MEDICAL/SURGICAL HISTORY  (Detailed)    Disease/disorder Onset Date Management Date Comments   Cardiovascular Disease       Diabetes       Prostate Cancer         Family History  (Detailed)  Relationship Family Member Name  Age at Death Condition Onset Age Cause of Death   Brother    Diabetes     Brother    High Blood Pressure     Mother    Diabetes     Mother    High Blood Pressure     Sister    High Blood Pressure     Sister Diabetes       SOCIAL HISTORY  (Detailed)  Tobacco use reviewed. Preferred language is [place]. EDUCATION/EMPLOYMENT/OCCUPATION  Employment History Status Retired Restrictions     retired       MARITAL STATUS/FAMILY/SOCIAL SUPPORT  Currently . SMOKING STATUS  Use Status Type Smoking Status Usage Per Day Years Used Total Pack Years   yes  Former smoker      Smoking status: Former smoker. ALCOHOL  There is no history of alcohol use. CAFFEINE  The patient uses caffeine: soda. REVIEW OF SYMPTOMS:    CONST - Negative for weight gain, weight loss, fever. EYES - Negative for visual changes. ENT - Negative for hearing loss. RESP - Negative for snoring, hemoptysis, dyspnea. CARD - Negative for chest pain, diaphoresis, orthopnea, palpitation, syncope, pnd. VASC - Negative for claudication, edema. GI - Negative for nausea, reflux, bleeding.  - Negative for hematuria, nocturia. REPROD - Negative for erectile dysfunction. ENDO - Negative for goiter, tremors. NEURO - Negative for dizziness, memory loss, seizures. PSYCH - Negative for depression, hallucinations. DERM - Negative for rash, skin sores. M/S - Negative for joint pain, myalgia. HEMAT - Negative for acute anemia, thrombocytopenia. VITAL SIGNS  Time BP mm/Hg Pulse /min Resp /min Temp F Ht ft Ht in Ht cm Wt lb Wt kg BMI kg/m2 BSA m2 O2 Sat%   10:29 /78 80   5.0 8.00 172.72 190.00 86.183 28.89         PHYSICAL EXAM:  Exam Findings Details   Const Neg Level of Distress - Awake / Alert. Nourishment - Well Nourished. Appearance - Well Developed. Resp Neg Respirations - Nonlabored. Breath Sounds - Clear Throughout. Rales - Absent. Wheezes - Absent. Rhonchi - Absent. Cardiac Neg Rhythm - Regular. Palpation - PMI Normal.  Heart Sounds - S1 Normal, S2 Normal, No S3, No S4. Extra Sounds - None. Murmurs - None. Vasc Neg Carotid - Bilateral Normal Pulse. Dorsalis Pedis - Bilateral Normal Pulse.    Vasc Pos Aorta - Nonpalpable. Abd Neg Tenderness - None. EXT Neg Clubbing - Absent. Lower Extremity Edema - Absent. Psych Neg Orientation - Oriented to Time, Person, Place. IMPRESSION AND PLAN     01. (I25.810) Atherosclerosis of coronary artery bypass graft(s) without angina pectoris:  The patient is on chronic medical therapy. I have made no changes to the present regimen. I am pleased that he did not have any cardiac issues during the stressful hospitalization. 02. (E78.2) Mixed hyperlipidemia:  The patient is on chronic medical therapy. I have made no changes to the present regimen. 03. (I10) Essential (primary) hypertension:  Adequately controlled. I have made no changes to the present regimen. 04. (Z79.82) Long term (current) use of aspirin   05. (Z68.28) Body mass index (BMI) 28.0-28.9, adult:  The patient was instructed on AHA diet and regular exercise. ORDERS:       1 The patient was instructed on AHA diet and regular exercise. FINAL MEDICATION LIST  Medication Sig Description   amlodipine 10 mg-benazepril 40 mg capsule take 1 capsule by oral route  every day   aspirin 81 mg tablet,delayed release take 1 tablet by oral route  every day   atorvastatin 10 mg tablet take 1 tablet by oral route  every day   indapamide 2.5 mg tablet take 1 tablet by oral route  every day in the morning   isosorbide mononitrate ER 30 mg tablet,extended release 24 hr take 1 tablet by oral route  every day in the morning   megestrol 20 mg tablet take 1 tablet by oral route 4 times every day   metformin  mg tablet,extended release 24 hr take 1 tablet by oral route  every day with the evening meal   metoprolol succinate  mg tablet,extended release 24 hr take 1 tablet by oral route  every day   Tradjenta 5 mg tablet take 1 tablet by oral route  every day   Tresiba FlexTouch U-100 inject by subcutaneous route as per insulin protocol       Electronically signed by:        Humberto Mcnally MD 06/21/2018  @ 3:37 PM    Document generated by:  Pam Rowland 06/21/2018

## 2022-03-06 NOTE — ACP (ADVANCE CARE PLANNING)
Advance Care Planning Note      NAME: Kristin Armendariz   :  1940   MRN:  [id number]     Date/Time:  3/5/2022 9:22 PM    Active Diagnoses:  Hospital Problems  Date Reviewed: 2018          Codes Class Noted POA    Hypoglycemia ICD-10-CM: E16.2  ICD-9-CM: 251.2  3/5/2022 Unknown        Elevated troponin ICD-10-CM: R77.8  ICD-9-CM: 790.6  3/5/2022 Unknown              These active diagnoses are of sufficient risk that focused discussion on advance care planning is indicated in order to allow the patient to thoughtfully consider personal goals of care, and if situations arise that prevent the ability to personally give input, to ensure appropriate representation of their personal desires for different levels and aggressiveness of care. Discussion:   Code status addressed and wants to be a Full Code. Patient wants central line and vasopressors if needed. Patient would also want a feeding tube, if needed, for nutritional support. Patient  Decision maker is Chico Rojas, spouse ([phone]). Persons present and participating in discussion: Rory Hart NP, Chico Rojas      Time Spent:   Total time spent face-to-face in education and discussion:  16  minutes.          Joanie Amezcua NP   Hospitalist

## 2022-03-06 NOTE — PROGRESS NOTES
0700: Bedside and Verbal shift change report given to Bethany Hawkins RN (oncoming nurse) by Eneida Castanon (offgoing nurse). Report included the following information SBAR, Kardex, Intake/Output, MAR, Recent Results and Cardiac Rhythm NSR.     0900: Pt very agitated trying to leave and pulling at lines. IM haldol given. 1120: STAND screening complete. No difficulty noted. Will start soft diet. 1400: Pt continually attempting to get out of bed. Wife at bedside but unable to prevent pt from getting up.     1420: Avasys placed. Avasys Telesitter Monitor initiated on 3/6/2022 at 1420 for the following reason(s): Confusion, pulling lines and getting out of bed . Patient educated on use of camera and is in agreement. If patient unable to verbalize understanding of camera necessity, the responsible party notified and educated:  Ria Barber (wife)     1900:   End of Shift Note    Bedside shift change report given to Facundo Deluca (oncoming nurse) by Bethany Hawkins RN (offgoing nurse). Report included the following information SBAR, Kardex, Intake/Output, MAR and Cardiac Rhythm NSR    Shift worked:  [id number]     Shift summary and any significant changes:     See above. Concerns for physician to address:  none     Zone phone for oncoming shift:          Activity:  Activity Level:  Up with Assistance  Number times ambulated in hallways past shift: 0  Number of times OOB to chair past shift: 0    Cardiac:   Cardiac Monitoring: Yes      Cardiac Rhythm: Sinus Tachy    Access:   Current line(s): PIV     Genitourinary:   Urinary status: external catheter    Respiratory:   O2 Device: None (Room air)  Chronic home O2 use?: NO  Incentive spirometer at bedside: NO       GI:     Current diet:  ADULT DIET Dysphagia - Soft & Bite Sized  Passing flatus: YES  Tolerating current diet: YES       Pain Management:   Patient states pain is manageable on current regimen: YES    Skin:  Finn Score: 17  Interventions: increase time out of bed, PT/OT consult, limit briefs and internal/external urinary devices    Patient Safety:  Fall Score:  Total Score: 4  Interventions: bed/chair alarm, assistive device (walker, cane, etc), gripper socks, pt to call before getting OOB and stay with me (per policy)  High Fall Risk: Yes    Length of Stay:  Expected LOS: - - -  Actual LOS: 0      Alice Rivers RN                          \

## 2022-03-06 NOTE — PROGRESS NOTES
Hospitalist Progress Note    NAME: Vishal Lugo   :  1940   MRN:  [id number]       Assessment / Plan:  DM  Hypoglycemia  Likely due to poor po intake with insulin use  Hold PTA insulin. Check A1C  Allow diet  PT/OT to eval    ?dysphagia  SLP to eval  Soft diet for now if pass STAND    Elevated trop likely due CAD and CKD  No acute ischemia on EKG. Trop peaked at 215. No cp. Cont' cardiac meds  Cardiology evaluated, no plan for procedure  Allow diet    CKD3  Baseline cr ~1.5, monitor and avoid nephrotoxic agents    Prostate ca  Obesity  Dementia  HLD  Cont' home meds  Avoid qtc prolonging meds. Will add low dose ativan prn. May need tele sitter          Estimated discharge date: 3/7  Barriers:    Code status: Full  Prophylaxis: Hep SQ  Recommended Disposition: Home w/Family     Subjective:     Chief Complaint / Reason for Physician Visit  Awake, following some commands. He was confused his AM, required haldol. Discussed with RN events overnight. Review of Systems:  Symptom Y/N Comments  Symptom Y/N Comments   Fever/Chills    Chest Pain     Poor Appetite    Edema     Cough    Abdominal Pain     Sputum    Joint Pain     SOB/ANGUIANO    Pruritis/Rash     Nausea/vomit    Tolerating PT/OT     Diarrhea    Tolerating Diet     Constipation    Other       Could NOT obtain due to:      Objective:     VITALS:   Last 24hrs VS reviewed since prior progress note.  Most recent are:  Patient Vitals for the past 24 hrs:   Temp Pulse Resp BP SpO2   22 0715 98.4 °F (36.9 °C) 82 18 (!) 161/90 96 %   22 0400 98.2 °F (36.8 °C) 79 18 119/72 96 %   22 2306 98.4 °F (36.9 °C) 81 12 (!) 155/85 98 %   22 2130  85 14 (!) 153/92 100 %   22 1539 97.8 °F (36.6 °C) 76 12 134/75 96 %       Intake/Output Summary (Last 24 hours) at 3/6/2022 1042  Last data filed at 3/6/2022 0715  Gross per 24 hour   Intake 250 ml   Output 500 ml   Net -250 ml        I had a face to face encounter and independently examined this patient on 3/6/2022, as outlined below:  PHYSICAL EXAM:  General: WD, WN. Alert, cooperative, no acute distress    EENT:  EOMI. Anicteric sclerae. MMM  Resp:  CTA bilaterally, no wheezing or rales. No accessory muscle use  CV:  Regular  rhythm,  No edema  GI:  Soft, Non distended, Non tender. +Bowel sounds  Neurologic:  Confused, followed some commands. Moving all exts  Psych:   Not anxious nor agitated  Skin:  No rashes. No jaundice    Reviewed most current lab test results and cultures  YES  Reviewed most current radiology test results   YES  Review and summation of old records today    NO  Reviewed patient's current orders and MAR    YES  PMH/SH reviewed - no change compared to H&P  ________________________________________________________________________  Care Plan discussed with:    Comments   Patient x    Family      RN x    Care Manager     Consultant                        Multidiciplinary team rounds were held today with , nursing, pharmacist and clinical coordinator. Patient's plan of care was discussed; medications were reviewed and discharge planning was addressed. ________________________________________________________________________  Total NON critical care TIME:  35  Minutes    Total CRITICAL CARE TIME Spent:   Minutes non procedure based      Comments   >50% of visit spent in counseling and coordination of care     ________________________________________________________________________  Nathaniel Reed MD     Procedures: see electronic medical records for all procedures/Xrays and details which were not copied into this note but were reviewed prior to creation of Plan. LABS:  I reviewed today's most current labs and imaging studies.   Pertinent labs include:  Recent Labs     03/05/22  1548   WBC 5.5   HGB 13.6   HCT 42.1        Recent Labs     03/06/22  0323 03/05/22  1548    140   K 3.5 3.6    106   CO2 26 30   GLU 88 179*   BUN 14 18   CREA 1.17 1.51*   CA 9.2 9.4   MG 2.3  --    ALB  --  3.7   TBILI  --  1.1*   ALT  --  19   INR 1.0  --        Signed: Brittany Cortez MD

## 2022-03-06 NOTE — H&P
Hospitalist Admission Note    NAME: Arnulfo Granda   :  1940   MRN:  [id number]     Date/Time:  3/5/2022 8:03 PM    Patient PCP: Khanh Brantley MD  ______________________________________________________________________  Given the patient's current clinical presentation, I have a high level of concern for decompensation if discharged from the emergency department. Complex decision making was performed, which includes reviewing the patient's available past medical records, laboratory results, and x-ray films. My assessment of this patient's clinical condition and my plan of care is as follows. Assessment / Plan:      Hypoglycemia, POA  DM, POA  - BG checks q 6 while NPO  - hypoglycemia protocol    Elevated troponin, POA  ECG: Normal sinus rhythm. Possible Left atrial enlargement   Septal infarct , age undetermined. Prolonged QT  Troponin: 110, 137, trend  - cardiology consult  - NPO after midnight    Hypertension, POA  - resume Amlodipine and Metoprolol    Hyperlipidemia, POA  - resume Atorvastatin    Dementia, POA  - resume home meds  - has aggressive outbursts, Haldol PRN    CKD, POA (Crea 1.51, baseline 1.44-1.75)  - avoid nephrotoxin  - gentle IV hydration    Prostate cancer, POA  - on oral chemo    Obesity, POA  - dietary modification    Code Status: Full  Surrogate Decision Maker: Puma Maher, spouse ([phone])    DVT Prophylaxis: Heparin SQ  GI Prophylaxis: not indicated    Baseline: lives with wife, does not use assistive device      Subjective:   CHIEF COMPLAINT: hypoglycemia (BG 30's), diaphoresis    HISTORY OF PRESENT ILLNESS:     Edyta Read is a [de-identified] y.o.  male who presents with BG in the 30's, diaphoresis. As per wife early AM BG was 56, feed him breakfast and gave scheduled Lantus 60 units. Around 1300, patient becomes sweaty and was just laying in bed. She checked his BG and it was in the 30's prompting her to call the EMS.  Patient is minimally verbal at baseline. Wife did not notice any obvious behavioral or functional changes recently, but also noted gradual decline of mental function over the course of time. Past medical history is significant for Dementia, hypertension, hyperlipidemia, T2DM, CAD and prostate cancer. We were asked to admit for work up and evaluation of the above problems. Past Medical History:   Diagnosis Date    CAD (coronary artery disease)     Cancer ([facility] [address]     prostate    Diabetes ([facility] [address]     GERD (gastroesophageal reflux disease)     Hypertension     Other ill-defined conditions(799.89)     elevated cholesterol        Past Surgical History:   Procedure Laterality Date    COLONOSCOPY N/A 2016    COLONOSCOPY performed by Robert Blake MD at [address]; HI RISK IND  2016         HX PROSTATECTOMY      LA CARDIAC SURG PROCEDURE UNLIST      cabg x5 vessels,cardiologist  and david [facility]    LA COLONOSCOPY FLX DX W/COLLJ SPEC WHEN PFRMD  2011            Social History     Tobacco Use    Smoking status: Former Smoker     Packs/day: 0.50     Years: 20.00     Pack years: 10.00     Quit date: 1982     Years since quittin.9    Smokeless tobacco: Never Used   Substance Use Topics    Alcohol use: No        Family History:  Maternal Grandfather: Dementia  Father: Dementia  Mother: Diabetes, Hypertension  Sister: Heart disease, cancer  Sister: Dementia  Son: Hypertension, stroke      No Known Allergies     Prior to Admission medications    Medication Sig Start Date End Date Taking? Authorizing Provider   QUEtiapine (SEROquel) 50 mg tablet TAKE 1 TABLET NIGHTLY 11/3/21   Anthony Burkett NP   memantine-donepeziL (Namzaric) 28-10 mg CSpX TAKE 1 CAPSULE NIGHTLY 11/3/21   Anthony Burkett NP   cefdinir (OMNICEF) 300 mg capsule Take 1 Capsule by mouth two (2) times a day.  21   Lee Cartwright MD   insulin aspart protamine/insulin aspart (NOVOLOG MIX 70-30 U-100 INSULN) 100 unit/mL (70-30) injection 20 Units by SubCUTAneous route Daily (before breakfast). 7/12/19   Elsie Addison MD   insulin aspart protamine/insulin aspart (NOVOLOG MIX 70-30 U-100 INSULN) 100 unit/mL (70-30) injection 20 Units by SubCUTAneous route Daily (before dinner). 7/12/19   Elsie Addison MD   amLODIPine (NORVASC) 10 mg tablet Take 10 mg by mouth nightly. Provider, Historical   cholecalciferol (VITAMIN D3) 1,000 unit tablet Take 1,000 Units by mouth daily. Provider, Historical   atorvastatin (LIPITOR) 20 mg tablet Take 20 mg by mouth daily. Provider, Historical   isosorbide mononitrate ER (IMDUR) 30 mg tablet Take 30 mg by mouth daily. Provider, Historical   apalutamide (ERLEADA) 60 mg tablet Take 120 mg by mouth two (2) times a day. Provider, Historical   metoprolol succinate (TOPROL-XL) 100 mg tablet Take 100 mg by mouth daily. Other, MD Sarah   aspirin 81 mg tablet Take 81 mg by mouth daily. 4/12/11   Provider, Historical       REVIEW OF SYSTEMS:     I am not able to complete the review of systems because:    The patient is intubated and sedated    The patient has altered mental status due to his acute medical problems    The patient has baseline aphasia from prior stroke(s)   Y The patient has baseline dementia and is not reliable historian    The patient is in acute medical distress and unable to provide information           Total of 12 systems reviewed as follows:       POSITIVE= bolded text  Negative = text not bolded  General:  fever, chills, sweats, generalized weakness, weight loss/gain,      loss of appetite   Eyes:    blurred vision, eye pain, loss of vision, double vision  ENT:    rhinorrhea, pharyngitis   Respiratory:   cough, sputum production, SOB, ANGUIANO, wheezing, pleuritic pain   Cardiology:   chest pain, palpitations, orthopnea, PND, edema, syncope   Gastrointestinal:  abdominal pain , N/V, diarrhea, dysphagia, constipation, bleeding Genitourinary:  frequency, urgency, dysuria, hematuria, incontinence   Muskuloskeletal :  arthralgia, myalgia, back pain  Hematology:  easy bruising, nose or gum bleeding, lymphadenopathy   Dermatological: rash, ulceration, pruritis, color change / jaundice  Endocrine:   hot flashes or polydipsia   Neurological:  headache, dizziness, confusion, focal weakness, paresthesia,     Speech difficulties, memory loss, gait difficulty  Psychological: Feelings of anxiety, depression, agitation    Objective:   VITALS:    Visit Vitals  /75 (BP 1 Location: Right upper arm, BP Patient Position: At rest)   Pulse 76   Temp 97.8 °F (36.6 °C)   Resp 12   SpO2 96%       PHYSICAL EXAM:    General:    Alert, cooperative, no distress, appears stated age. HEENT: Atraumatic, anicteric sclerae, pink conjunctivae     No oral ulcers, mucosa moist, throat clear, dentition fair  Neck:  Supple, symmetrical,  thyroid: non tender  Lungs:   Clear to auscultation bilaterally. No Wheezing or Rhonchi. No rales. Chest wall:  No tenderness  No Accessory muscle use. Heart:   Regular  rhythm,  No  murmur   No edema  Abdomen:   Soft, non-tender. Not distended. Bowel sounds normal  Extremities: No cyanosis. No clubbing,      Skin turgor normal, Capillary refill normal, Radial dial pulse 2+  Skin:     Not pale. Not Jaundiced  No rashes   Psych:  Good insight. Not depressed. Not anxious or agitated. Neurologic: EOMs intact. No facial asymmetry. Minimal verbalization. Symmetrical strength, Sensation grossly intact.  Alert and oriented X 1.     _______________________________________________________________________  Care Plan discussed with:    Comments   Patient y    Family  y wife   RN y    Care Manager                    Consultant:      _______________________________________________________________________  Expected  Disposition:   Home with Family y   HH/PT/OT/RN    SNF/LTC    MARVIN ________________________________________________________________________        Comments    y Reviewed previous records   >50% of visit spent in counseling and coordination of care y Discussion with patient and/or family and questions answered       ________________________________________________________________________  Signed: Ana Paula Simons NP    Procedures: see electronic medical records for all procedures/Xrays and details which were not copied into this note but were reviewed prior to creation of Plan. LAB DATA REVIEWED:    Recent Results (from the past 24 hour(s))   GLUCOSE, POC    Collection Time: 03/05/22  3:24 PM   Result Value Ref Range    Glucose (POC) 47 (LL) 65 - 117 mg/dL    Performed by Glendyradha Guerrero (EDT)    GLUCOSE, POC    Collection Time: 03/05/22  3:26 PM   Result Value Ref Range    Glucose (POC) 60 (L) 65 - 117 mg/dL    Performed by Glendy Guerrero (EDT)    GLUCOSE, POC    Collection Time: 03/05/22  3:28 PM   Result Value Ref Range    Glucose (POC) 51 (LL) 65 - 117 mg/dL    Performed by Glendy Guerrero (EDT)    CBC WITH AUTOMATED DIFF    Collection Time: 03/05/22  3:48 PM   Result Value Ref Range    WBC 5.5 4.1 - 11.1 K/uL    RBC 4.70 4. 10 - 5.70 M/uL    HGB 13.6 12.1 - 17.0 g/dL    HCT 42.1 36.6 - 50.3 %    MCV 89.6 80.0 - 99.0 FL    MCH 28.9 26.0 - 34.0 PG    MCHC 32.3 30.0 - 36.5 g/dL    RDW 14.0 11.5 - 14.5 %    PLATELET 957 470 - 755 K/uL    MPV 9.2 8.9 - 12.9 FL    NRBC 0.0 0  WBC    ABSOLUTE NRBC 0.00 0.00 - 0.01 K/uL    NEUTROPHILS 76 (H) 32 - 75 %    LYMPHOCYTES 15 12 - 49 %    MONOCYTES 7 5 - 13 %    EOSINOPHILS 1 0 - 7 %    BASOPHILS 1 0 - 1 %    IMMATURE GRANULOCYTES 0 0.0 - 0.5 %    ABS. NEUTROPHILS 4.2 1.8 - 8.0 K/UL    ABS. LYMPHOCYTES 0.8 0.8 - 3.5 K/UL    ABS. MONOCYTES 0.4 0.0 - 1.0 K/UL    ABS. EOSINOPHILS 0.1 0.0 - 0.4 K/UL    ABS. BASOPHILS 0.0 0.0 - 0.1 K/UL    ABS. IMM.  GRANS. 0.0 0.00 - 0.04 K/UL    DF AUTOMATED     METABOLIC PANEL, COMPREHENSIVE    Collection Time: 03/05/22  3:48 PM   Result Value Ref Range    Sodium 140 136 - 145 mmol/L    Potassium 3.6 3.5 - 5.1 mmol/L    Chloride 106 97 - 108 mmol/L    CO2 30 21 - 32 mmol/L    Anion gap 4 (L) 5 - 15 mmol/L    Glucose 179 (H) 65 - 100 mg/dL    BUN 18 6 - 20 MG/DL    Creatinine 1.51 (H) 0.70 - 1.30 MG/DL    BUN/Creatinine ratio 12 12 - 20      GFR est AA 54 (L) >60 ml/min/1.73m2    GFR est non-AA 45 (L) >60 ml/min/1.73m2    Calcium 9.4 8.5 - 10.1 MG/DL    Bilirubin, total 1.1 (H) 0.2 - 1.0 MG/DL    ALT (SGPT) 19 12 - 78 U/L    AST (SGOT) 17 15 - 37 U/L    Alk. phosphatase 94 45 - 117 U/L    Protein, total 7.6 6.4 - 8.2 g/dL    Albumin 3.7 3.5 - 5.0 g/dL    Globulin 3.9 2.0 - 4.0 g/dL    A-G Ratio 0.9 (L) 1.1 - 2.2     TROPONIN-HIGH SENSITIVITY    Collection Time: 03/05/22  3:48 PM   Result Value Ref Range    Troponin-High Sensitivity 110 (HH) 0 - 76 ng/L   SAMPLES BEING HELD    Collection Time: 03/05/22  3:48 PM   Result Value Ref Range    SAMPLES BEING HELD LAV, 2RED     COMMENT        Add-on orders for these samples will be processed based on acceptable specimen integrity and analyte stability, which may vary by analyte.    GLUCOSE, POC    Collection Time: 03/05/22  4:13 PM   Result Value Ref Range    Glucose (POC) 194 (H) 65 - 117 mg/dL    Performed by Wendy Dooley RN    EKG, 12 LEAD, INITIAL    Collection Time: 03/05/22  5:03 PM   Result Value Ref Range    Ventricular Rate 86 BPM    Atrial Rate 86 BPM    P-R Interval 190 ms    QRS Duration 96 ms    Q-T Interval 420 ms    QTC Calculation (Bezet) 502 ms    Calculated P Axis 41 degrees    Calculated R Axis 2 degrees    Calculated T Axis 18 degrees    Diagnosis       Normal sinus rhythm  Possible Left atrial enlargement  Septal infarct , age undetermined  Prolonged QT  When compared with ECG of 07-JAN-2022 10:28,  premature supraventricular complexes are no longer present  Septal infarct is now present  ST no longer depressed in Lateral leads  Nonspecific T wave abnormality, improved in Inferior leads  T wave inversion no longer evident in Lateral leads     TROPONIN-HIGH SENSITIVITY    Collection Time: 03/05/22  5:40 PM   Result Value Ref Range    Troponin-High Sensitivity 137 (HH) 0 - 76 ng/L

## 2022-03-06 NOTE — PROGRESS NOTES
Problem: Falls - Risk of  Goal: *Absence of Falls  Description: Document Garcia Blades Fall Risk and appropriate interventions in the flowsheet. Outcome: Progressing Towards Goal  Note: Fall Risk Interventions:  Mobility Interventions: Bed/chair exit alarm,Patient to call before getting OOB    Mentation Interventions: Adequate sleep, hydration, pain control,Bed/chair exit alarm,Increase mobility,Reorient patient,Room close to nurse's station    Medication Interventions: Bed/chair exit alarm,Teach patient to arise slowly,Patient to call before getting OOB    Elimination Interventions: Bed/chair exit alarm,Call light in reach,Stay With Me (per policy),Toilet paper/wipes in reach,Toileting schedule/hourly rounds              Problem: Patient Education: Go to Patient Education Activity  Goal: Patient/Family Education  Outcome: Progressing Towards Goal     Problem: Diabetes Self-Management  Goal: *Disease process and treatment process  Description: Define diabetes and identify own type of diabetes; list 3 options for treating diabetes. Outcome: Progressing Towards Goal  Goal: *Incorporating nutritional management into lifestyle  Description: Describe effect of type, amount and timing of food on blood glucose; list 3 methods for planning meals. Outcome: Progressing Towards Goal  Goal: *Incorporating physical activity into lifestyle  Description: State effect of exercise on blood glucose levels. Outcome: Progressing Towards Goal  Goal: *Developing strategies to promote health/change behavior  Description: Define the ABC's of diabetes; identify appropriate screenings, schedule and personal plan for screenings. Outcome: Progressing Towards Goal  Goal: *Using medications safely  Description: State effect of diabetes medications on diabetes; name diabetes medication taking, action and side effects.   Outcome: Progressing Towards Goal  Goal: *Monitoring blood glucose, interpreting and using results  Description: Identify recommended blood glucose targets  and personal targets. Outcome: Progressing Towards Goal  Goal: *Prevention, detection, treatment of acute complications  Description: List symptoms of hyper- and hypoglycemia; describe how to treat low blood sugar and actions for lowering  high blood glucose level. Outcome: Progressing Towards Goal  Goal: *Prevention, detection and treatment of chronic complications  Description: Define the natural course of diabetes and describe the relationship of blood glucose levels to long term complications of diabetes.   Outcome: Progressing Towards Goal  Goal: *Developing strategies to address psychosocial issues  Description: Describe feelings about living with diabetes; identify support needed and support network  Outcome: Progressing Towards Goal  Goal: *Insulin pump training  Outcome: Progressing Towards Goal  Goal: *Sick day guidelines  Outcome: Progressing Towards Goal  Goal: *Patient Specific Goal (EDIT GOAL, INSERT TEXT)  Outcome: Progressing Towards Goal     Problem: Patient Education: Go to Patient Education Activity  Goal: Patient/Family Education  Outcome: Progressing Towards Goal

## 2022-03-06 NOTE — PROGRESS NOTES
TRANSFER - IN REPORT:    Verbal report received from [facility], RN(name) on Jhonathan Arnold  being received from ER(unit) for routine progression of care      Report consisted of patients Situation, Background, Assessment and   Recommendations(SBAR). Information from the following report(s) SBAR was reviewed with the receiving nurse. Opportunity for questions and clarification was provided. Assessment completed upon patients arrival to unit and care assumed.

## 2022-03-07 ENCOUNTER — APPOINTMENT (OUTPATIENT)
Dept: NON INVASIVE DIAGNOSTICS | Age: 82
DRG: 637 | End: 2022-03-07
Attending: STUDENT IN AN ORGANIZED HEALTH CARE EDUCATION/TRAINING PROGRAM
Payer: MEDICARE

## 2022-03-07 LAB
ANION GAP SERPL CALC-SCNC: 7 MMOL/L (ref 5–15)
BUN SERPL-MCNC: 15 MG/DL (ref 6–20)
BUN/CREAT SERPL: 10 (ref 12–20)
CALCIUM SERPL-MCNC: 9.8 MG/DL (ref 8.5–10.1)
CHLORIDE SERPL-SCNC: 102 MMOL/L (ref 97–108)
CO2 SERPL-SCNC: 28 MMOL/L (ref 21–32)
CREAT SERPL-MCNC: 1.48 MG/DL (ref 0.7–1.3)
ECHO AO ASC DIAM: 2.7 CM
ECHO AO ASCENDING AORTA INDEX: 1.34 CM/M2
ECHO AV AREA PEAK VELOCITY: 3.4 CM2
ECHO AV AREA VTI: 4.3 CM2
ECHO AV AREA/BSA PEAK VELOCITY: 1.7 CM2/M2
ECHO AV AREA/BSA VTI: 2.1 CM2/M2
ECHO AV MEAN GRADIENT: 4 MMHG
ECHO AV MEAN VELOCITY: 0.9 M/S
ECHO AV PEAK GRADIENT: 7 MMHG
ECHO AV PEAK VELOCITY: 1.3 M/S
ECHO AV VTI: 15.5 CM
ECHO LA DIAMETER INDEX: 1.68 CM/M2
ECHO LA DIAMETER: 3.4 CM
ECHO LV E' LATERAL VELOCITY: 7 CM/S
ECHO LV E' SEPTAL VELOCITY: 6 CM/S
ECHO LV FRACTIONAL SHORTENING: 13 % (ref 28–44)
ECHO LV INTERNAL DIMENSION DIASTOLE INDEX: 1.53 CM/M2
ECHO LV INTERNAL DIMENSION DIASTOLIC: 3.1 CM (ref 4.2–5.9)
ECHO LV INTERNAL DIMENSION SYSTOLIC INDEX: 1.34 CM/M2
ECHO LV INTERNAL DIMENSION SYSTOLIC: 2.7 CM
ECHO LV IVSD: 1.5 CM (ref 0.6–1)
ECHO LV MASS 2D: 183.9 G (ref 88–224)
ECHO LV MASS INDEX 2D: 91 G/M2 (ref 49–115)
ECHO LV POSTERIOR WALL DIASTOLIC: 1.7 CM (ref 0.6–1)
ECHO LV RELATIVE WALL THICKNESS RATIO: 1.1
ECHO LVOT AREA: 3.1 CM2
ECHO LVOT AV VTI INDEX: 1.38
ECHO LVOT DIAM: 2 CM
ECHO LVOT MEAN GRADIENT: 4 MMHG
ECHO LVOT PEAK GRADIENT: 5 MMHG
ECHO LVOT PEAK GRADIENT: 8 MMHG
ECHO LVOT PEAK VELOCITY: 1.1 M/S
ECHO LVOT PEAK VELOCITY: 1.4 M/S
ECHO LVOT STROKE VOLUME INDEX: 33.3 ML/M2
ECHO LVOT SV: 67.2 ML
ECHO LVOT VTI: 21.4 CM
ECHO MV A VELOCITY: 0.71 M/S
ECHO MV AREA VTI: 4.5 CM2
ECHO MV E DECELERATION TIME (DT): 97.5 MS
ECHO MV E VELOCITY: 0.48 M/S
ECHO MV E/A RATIO: 0.68
ECHO MV E/E' LATERAL: 6.86
ECHO MV E/E' RATIO (AVERAGED): 7.43
ECHO MV E/E' SEPTAL: 8
ECHO MV LVOT VTI INDEX: 0.7
ECHO MV MAX VELOCITY: 1.1 M/S
ECHO MV MEAN GRADIENT: 1 MMHG
ECHO MV MEAN VELOCITY: 0.5 M/S
ECHO MV PEAK GRADIENT: 4 MMHG
ECHO MV VTI: 14.9 CM
ECHO PV MAX VELOCITY: 1.2 M/S
ECHO PV PEAK GRADIENT: 6 MMHG
GLUCOSE BLD STRIP.AUTO-MCNC: 204 MG/DL (ref 65–117)
GLUCOSE BLD STRIP.AUTO-MCNC: 233 MG/DL (ref 65–117)
GLUCOSE BLD STRIP.AUTO-MCNC: 243 MG/DL (ref 65–117)
GLUCOSE BLD STRIP.AUTO-MCNC: 277 MG/DL (ref 65–117)
GLUCOSE SERPL-MCNC: 253 MG/DL (ref 65–100)
MAGNESIUM SERPL-MCNC: 2.2 MG/DL (ref 1.6–2.4)
POTASSIUM SERPL-SCNC: 3.8 MMOL/L (ref 3.5–5.1)
SERVICE CMNT-IMP: ABNORMAL
SODIUM SERPL-SCNC: 137 MMOL/L (ref 136–145)

## 2022-03-07 PROCEDURE — 65660000000 HC RM CCU STEPDOWN

## 2022-03-07 PROCEDURE — 97162 PT EVAL MOD COMPLEX 30 MIN: CPT

## 2022-03-07 PROCEDURE — 83735 ASSAY OF MAGNESIUM: CPT

## 2022-03-07 PROCEDURE — 96372 THER/PROPH/DIAG INJ SC/IM: CPT

## 2022-03-07 PROCEDURE — 82962 GLUCOSE BLOOD TEST: CPT

## 2022-03-07 PROCEDURE — 97530 THERAPEUTIC ACTIVITIES: CPT

## 2022-03-07 PROCEDURE — 74011250636 HC RX REV CODE- 250/636: Performed by: INTERNAL MEDICINE

## 2022-03-07 PROCEDURE — 74011250637 HC RX REV CODE- 250/637: Performed by: STUDENT IN AN ORGANIZED HEALTH CARE EDUCATION/TRAINING PROGRAM

## 2022-03-07 PROCEDURE — 92610 EVALUATE SWALLOWING FUNCTION: CPT | Performed by: SPEECH-LANGUAGE PATHOLOGIST

## 2022-03-07 PROCEDURE — 80048 BASIC METABOLIC PNL TOTAL CA: CPT

## 2022-03-07 PROCEDURE — 74011250637 HC RX REV CODE- 250/637: Performed by: NURSE PRACTITIONER

## 2022-03-07 PROCEDURE — 96376 TX/PRO/DX INJ SAME DRUG ADON: CPT

## 2022-03-07 PROCEDURE — 74011636637 HC RX REV CODE- 636/637: Performed by: INTERNAL MEDICINE

## 2022-03-07 PROCEDURE — 96375 TX/PRO/DX INJ NEW DRUG ADDON: CPT

## 2022-03-07 PROCEDURE — 74011636637 HC RX REV CODE- 636/637: Performed by: NURSE PRACTITIONER

## 2022-03-07 PROCEDURE — 97165 OT EVAL LOW COMPLEX 30 MIN: CPT

## 2022-03-07 PROCEDURE — 97161 PT EVAL LOW COMPLEX 20 MIN: CPT

## 2022-03-07 PROCEDURE — 74011000250 HC RX REV CODE- 250: Performed by: NURSE PRACTITIONER

## 2022-03-07 PROCEDURE — G0378 HOSPITAL OBSERVATION PER HR: HCPCS

## 2022-03-07 PROCEDURE — 93306 TTE W/DOPPLER COMPLETE: CPT

## 2022-03-07 PROCEDURE — 65270000029 HC RM PRIVATE

## 2022-03-07 PROCEDURE — 74011250636 HC RX REV CODE- 250/636: Performed by: NURSE PRACTITIONER

## 2022-03-07 RX ADMIN — SODIUM CHLORIDE, PRESERVATIVE FREE 10 ML: 5 INJECTION INTRAVENOUS at 05:48

## 2022-03-07 RX ADMIN — AMLODIPINE BESYLATE 10 MG: 5 TABLET ORAL at 22:26

## 2022-03-07 RX ADMIN — HEPARIN SODIUM 5000 UNITS: 5000 INJECTION INTRAVENOUS; SUBCUTANEOUS at 00:24

## 2022-03-07 RX ADMIN — ASPIRIN 81 MG: 81 TABLET, CHEWABLE ORAL at 09:55

## 2022-03-07 RX ADMIN — Medication 10 UNITS: at 16:30

## 2022-03-07 RX ADMIN — HEPARIN SODIUM 5000 UNITS: 5000 INJECTION INTRAVENOUS; SUBCUTANEOUS at 09:55

## 2022-03-07 RX ADMIN — LORAZEPAM 0.5 MG: 2 INJECTION INTRAMUSCULAR; INTRAVENOUS at 14:03

## 2022-03-07 RX ADMIN — Medication 3 UNITS: at 22:25

## 2022-03-07 RX ADMIN — Medication 10 UNITS: at 12:14

## 2022-03-07 RX ADMIN — Medication 3 UNITS: at 09:56

## 2022-03-07 RX ADMIN — MEMANTINE HYDROCHLORIDE 10 MG: 10 TABLET ORAL at 18:30

## 2022-03-07 RX ADMIN — Medication 3 UNITS: at 18:24

## 2022-03-07 RX ADMIN — Medication 3 UNITS: at 12:14

## 2022-03-07 RX ADMIN — QUETIAPINE FUMARATE 50 MG: 25 TABLET ORAL at 22:25

## 2022-03-07 RX ADMIN — MEMANTINE HYDROCHLORIDE 10 MG: 10 TABLET ORAL at 09:55

## 2022-03-07 RX ADMIN — METOPROLOL SUCCINATE 100 MG: 50 TABLET, FILM COATED, EXTENDED RELEASE ORAL at 09:55

## 2022-03-07 RX ADMIN — ISOSORBIDE MONONITRATE 30 MG: 30 TABLET, EXTENDED RELEASE ORAL at 09:55

## 2022-03-07 RX ADMIN — ATORVASTATIN CALCIUM 20 MG: 20 TABLET, FILM COATED ORAL at 09:55

## 2022-03-07 RX ADMIN — DONEPEZIL HYDROCHLORIDE 10 MG: 5 TABLET, FILM COATED ORAL at 22:25

## 2022-03-07 RX ADMIN — SODIUM CHLORIDE, PRESERVATIVE FREE 10 ML: 5 INJECTION INTRAVENOUS at 22:26

## 2022-03-07 RX ADMIN — HEPARIN SODIUM 5000 UNITS: 5000 INJECTION INTRAVENOUS; SUBCUTANEOUS at 18:23

## 2022-03-07 RX ADMIN — SODIUM CHLORIDE, PRESERVATIVE FREE 10 ML: 5 INJECTION INTRAVENOUS at 14:04

## 2022-03-07 NOTE — PROGRESS NOTES
MD notified patient had a 13 beat v tach run, order to check magnesium, no need to notify cardiology unless patient is symptomatic.

## 2022-03-07 NOTE — PROGRESS NOTES
Progress Note      3/7/2022 10:07 AM  NAME: Delisa Avila   MRN:  [id number]   Admit Diagnosis: Elevated troponin [R77.8]  Hypoglycemia [E16.2]    Primary Cardiologist: Seen by Dr Blanca Hayes before in 2018   Physician Requesting consult: Dr Keith Grimes         Assessment:     Problem list:   1. Hypoglycemia   2. Diabetes   3. Dementia - orient to self only   4. Elevated trop, likely type 2, no chest pain or significant STT changes   5. CAD s/p CABG in 2006  6. HTN  7. HLP  8. CKD  9. Prostate CA   10. Obesity        Problem list from 2018 clinic note   This patient had bypass surgery in 2006. He has hypertension and hyperlipidemia and recently was discovered to have prostate cancer and underwent radiation therapy. He has diabetes mellitus and in 2018 started having dementia.      Recommendations:   Trop elevation is likely type 2 With underlying CAD, do not suspect ACS, no CP or significant EKG abn      1. Cont aspirin   2. Cont lipitor   3. Cont metoprolol, amlodipine, imdur   4. DM mx per primary team   5. 2d echo pending   No plan for ischemic work up              [x]? High complexity decision making was performed      Subjective:     HPI:     No CP  Poor historian due to significant dementia     Objective:      Physical Exam:    Last 24hrs VS reviewed since prior progress note.  Most recent are:    Visit Vitals  BP (!) 161/93   Pulse (!) 113   Temp 98.4 °F (36.9 °C)   Resp 16   Wt 90.7 kg (200 lb)   SpO2 97%   BMI 31.32 kg/m²       Intake/Output Summary (Last 24 hours) at 3/7/2022 1007  Last data filed at 3/7/2022 6916  Gross per 24 hour   Intake 497.5 ml   Output 1250 ml   Net -752.5 ml           General: Alert and oriented x1  Neck: Supple   Respiratory: No respiratory distress, clear lung sound   Cardiovascular: Regular rate rhythm, S1S2, no murmur   Abd omen: soft, non tender, non distended   Skin: warm and dry   Extremity: no edema, warm to touch      Data Review    Telemetry: normal sinus rhythm Lab Data Personally Reviewed:    Recent Labs     03/05/22  1548   WBC 5.5   HGB 13.6   HCT 42.1        Recent Labs     03/06/22  0323   INR 1.0   PTP 10.9      Recent Labs     03/07/22  0040 03/06/22  0323 03/05/22  1548    138 140   K 3.8 3.5 3.6    108 106   CO2 28 26 30   BUN 15 14 18   CREA 1.48* 1.17 1.51*   * 88 179*   CA 9.8 9.2 9.4   MG  --  2.3  --      No results for input(s): CPK, CKNDX, TROIQ in the last 72 hours. No lab exists for component: CPKMB  Lab Results   Component Value Date/Time    Cholesterol, total 145 07/09/2019 12:46 AM    HDL Cholesterol 59 07/09/2019 12:46 AM    LDL, calculated 73.6 07/09/2019 12:46 AM    Triglyceride 62 07/09/2019 12:46 AM    CHOL/HDL Ratio 2.5 07/09/2019 12:46 AM       Recent Labs     03/05/22  1548   AP 94   TP 7.6   ALB 3.7   GLOB 3.9     No results for input(s): PH, PCO2, PO2 in the last 72 hours.     Medications Personally Reviewed:    Current Facility-Administered Medications   Medication Dose Route Frequency    insulin NPH (NOVOLIN N, HUMULIN N) injection 10 Units  10 Units SubCUTAneous ACB&D    isosorbide mononitrate ER (IMDUR) tablet 30 mg  30 mg Oral DAILY    LORazepam (ATIVAN) injection 0.5 mg  0.5 mg IntraVENous TID PRN    sodium chloride (NS) flush 5-40 mL  5-40 mL IntraVENous Q8H    sodium chloride (NS) flush 5-40 mL  5-40 mL IntraVENous PRN    acetaminophen (TYLENOL) tablet 650 mg  650 mg Oral Q6H PRN    Or    acetaminophen (TYLENOL) suppository 650 mg  650 mg Rectal Q6H PRN    polyethylene glycol (MIRALAX) packet 17 g  17 g Oral DAILY PRN    ondansetron (ZOFRAN ODT) tablet 4 mg  4 mg Oral Q8H PRN    Or    ondansetron (ZOFRAN) injection 4 mg  4 mg IntraVENous Q6H PRN    insulin lispro (HUMALOG) injection   SubCUTAneous AC&HS    glucose chewable tablet 16 g  4 Tablet Oral PRN    glucagon (GLUCAGEN) injection 1 mg  1 mg IntraMUSCular PRN    dextrose 10% infusion 0-250 mL  0-250 mL IntraVENous PRN    amLODIPine (NORVASC) tablet 10 mg  10 mg Oral QHS    aspirin chewable tablet 81 mg  81 mg Oral DAILY    atorvastatin (LIPITOR) tablet 20 mg  20 mg Oral DAILY    memantine (NAMENDA) tablet 10 mg  10 mg Oral BID    metoprolol succinate (TOPROL-XL) XL tablet 100 mg  100 mg Oral DAILY    QUEtiapine (SEROquel) tablet 50 mg  50 mg Oral QHS    donepeziL (ARICEPT) tablet 10 mg  10 mg Oral QHS    heparin (porcine) injection 5,000 Units  5,000 Units SubCUTAneous Caty Reese MD

## 2022-03-07 NOTE — PROGRESS NOTES
Occupational Therapy note:    Chart reviewed and discussed with nursing. Patient currently AJ for echo. Will defer and continue to follow.     Heather Valenzuela, OTR/L

## 2022-03-07 NOTE — PROGRESS NOTES
End of Shift Note    Bedside shift change report given to Edie Hanley (oncoming nurse) by Brian Foster (offgoing nurse). Report included the following information SBAR and Kardex    Shift worked:  [id number]     Shift summary and any significant changes:          Concerns for physician to address:       Zone phone for oncoming shift:          Activity:  Activity Level: Up with Assistance  Number times ambulated in hallways past shift: 0  Number of times OOB to chair past shift: 0    Cardiac:   Cardiac Monitoring: Yes      Cardiac Rhythm: Sinus Rhythm    Access:   Current line(s): PIV     Genitourinary:   Urinary status: voiding, incontinent and external catheter    Respiratory:   O2 Device: None (Room air)  Chronic home O2 use?: NO  Incentive spirometer at bedside: NO       GI:  Last Bowel Movement Date: 03/06/22  Current diet:  ADULT DIET Dysphagia - Soft & Bite Sized  Passing flatus: YES  Tolerating current diet: YES       Pain Management:   Patient states pain is manageable on current regimen: YES    Skin:  Finn Score: 17  Interventions: increase time out of bed, PT/OT consult and internal/external urinary devices    Patient Safety:  Fall Score:  Total Score: 4  Interventions: bed/chair alarm, assistive device (walker, cane, etc), gripper socks, pt to call before getting OOB and stay with me (per policy)  High Fall Risk: Yes    Length of Stay:  Expected LOS: - - -  Actual LOS: 0      Brian Foster

## 2022-03-07 NOTE — PROGRESS NOTES
Problem: Self Care Deficits Care Plan (Adult)  Goal: *Acute Goals and Plan of Care (Insert Text)  Description: FUNCTIONAL STATUS PRIOR TO ADMISSION: Patient unable to provide functional status secondary to drowsiness and minimally interactive. HOME SUPPORT: Patient unable to provide but, per chart reviewed, lived with wife in 2019. Occupational Therapy Goals  Initiated 3/7/2022  1. Patient will perform simple grooming task sitting EOB with supervision/set-up within 7 day(s). 2.  Patient will perform upper body dressing with minimal assistance/contact guard assist within 7 day(s). 3.  Patient will perform supine <> sit EOB to prepare of OOB ADLs with minimal assistance/contact guard assist within 7 day(s). 4.  Patient will perform toilet transfers to [facility] with minimal assistance/contact guard assist within 7 day(s). 5.  Patient will perform all aspects of toileting with minimal assistance/contact guard assist within 7 day(s). 6.  Patient will participate in upper extremity therapeutic exercise/activities with supervision/set-up for 5 minutes within 7 day(s). 7.  Patient will utilize energy conservation techniques during functional activities with verbal cues within 7 day(s). Outcome: Not Met   OCCUPATIONAL THERAPY EVALUATION  Patient: Kristin Armendariz (22 y.o. male)  Date: 3/7/2022  Primary Diagnosis: Elevated troponin [R77.8]  Hypoglycemia [E16.2]        Precautions: Fall,Bed Alarm    ASSESSMENT  Based on the objective data described below, the patient presents with decreased independence in self-care and functional mobility secondary to general weakness, impaired balance, impaired cognition, drowsiness, decreased command following, and decreased activity tolerance. Patient unable to provide prior functional status but appears to be functioning below his baseline for self-care and functional mobility.  Overall, patient is now completing self-care with min to total assist and functional mobility with min x2 to max assist. Patient received semisupine in bed and appeared drowsy after receiving ativan. Patient unable to state name or answer orientation questions. Patient was able to come EOB with max assist and initially demonstrated posterior lean. Patient;s sitting balance improved and he tolerated sitting for approx 5 mins. Patient demonstrated delayed processing when attempting to follow commands and required simple, one step commands. Patient stood with min assist x2 but unable to take steps this session. Patient returned to supine with min assist and repositioned for comfort. Patient was left semisupine in bed with all needs met, VSS, and telesitter present in room. Patient would benefit from skilled OT services during acute hospital stay. Anticipate patient will need SNF rehab prior to returning home, pending progress. Current Level of Function Impacting Discharge (ADLs/self-care): min to total assist for self-care, min to max assist for functional mobility     Functional Outcome Measure: The patient scored 10/100 on the Barthel Index outcome measure which is indicative of being totally dependent. Other factors to consider for discharge: fall risk, confused      Patient will benefit from skilled therapy intervention to address the above noted impairments. PLAN :  Recommendations and Planned Interventions: self care training, functional mobility training, therapeutic exercise, balance training, therapeutic activities, endurance activities, patient education, home safety training and family training/education    Frequency/Duration: Patient will be followed by occupational therapy 3 times a week to address goals.     Recommendation for discharge: (in order for the patient to meet his/her long term goals)  Therapy up to 5 days/week in SNF setting    This discharge recommendation:  Has not yet been discussed the attending provider and/or case management    IF patient discharges home will need the following DME: TBD in SNF rehab       SUBJECTIVE:   Patient stated Pradip.    OBJECTIVE DATA SUMMARY:   HISTORY:   Past Medical History:   Diagnosis Date    CAD (coronary artery disease)     Cancer ([facility] [address]     prostate    Diabetes ([facility] [address] [facility]     GERD (gastroesophageal reflux disease)     Hypertension     Other ill-defined conditions(799.89)     elevated cholesterol     Past Surgical History:   Procedure Laterality Date    COLONOSCOPY N/A 8/31/2016    COLONOSCOPY performed by Carmen Vogel MD at [address] [place] [address]; HI RISK IND  8/31/2016         HX PROSTATECTOMY      GA CARDIAC SURG PROCEDURE UNLIST      cabg x5 vessels,cardiologist  and david at [facility]    GA COLONOSCOPY FLX DX W/COLLJ SPEC WHEN PFRMD  4/13/2011            Expanded or extensive additional review of patient history:     Home Situation  Home Environment: Private residence  One/Two Story Residence: One story  Living Alone: No  Support Systems: Spouse/Significant Other  Patient Expects to be Discharged to[de-identified] Home  Current DME Used/Available at Home: Blood pressure cuff,Glucometer,Walker    EXAMINATION OF PERFORMANCE DEFICITS:  Cognitive/Behavioral Status:  Neurologic State: Drowsy  Orientation Level: Unable to verbalize  Cognition: Decreased command following;Poor safety awareness  Perception: Cues to attend to left side of body  Perseveration: No perseveration noted  Safety/Judgement: Lack of insight into deficits    Hearing:   Auditory  Auditory Impairment: None    Vision/Perceptual:    Acuity:  (unable to formally assess)      Range of Motion:  AROM: Generally decreased, functional    Strength:  Strength: Generally decreased, functional    Coordination:  Coordination: Generally decreased, functional  Fine Motor Skills-Upper: Left Impaired;Right Impaired (delayed movement)    Gross Motor Skills-Upper: Left Impaired;Right Impaired    Tone & Sensation:  Tone: Normal    Balance:  Sitting: Impaired; Without support  Sitting - Static: Good (unsupported)  Sitting - Dynamic: Fair (occasional)  Standing: Impaired; With support  Standing - Static: Good;Constant support  Standing - Dynamic : Not tested    Functional Mobility and Transfers for ADLs:  Bed Mobility:  Rolling: Maximum assistance  Supine to Sit: Maximum assistance  Sit to Supine: Minimum assistance  Scooting: Maximum assistance    Transfers:  Sit to Stand: Minimum assistance;Assist x2  Stand to Sit: Minimum assistance;Assist x2    ADL Assessment:  Feeding: Minimum assistance  Oral Facial Hygiene/Grooming: Minimum assistance  Bathing: Maximum assistance  Upper Body Dressing: Minimum assistance  Lower Body Dressing: Total assistance  Toileting: Total assistance    ADL Intervention and task modifications:    Lower Body Dressing Assistance  Socks: Total assistance (dependent)  Leg Crossed Method Used: No  Position Performed: Seated edge of bed  Cues: Don;Doff;Physical assistance    Cognitive Retraining  Safety/Judgement: Lack of insight into deficits    Functional Measure:    Barthel Index:  Bathin  Bladder: 0  Bowels: 5  Groomin  Dressin  Feedin  Mobility: 0  Stairs: 0  Toilet Use: 0  Transfer (Bed to Chair and Back): 5  Total: 10/100      The Barthel ADL Index: Guidelines  1. The index should be used as a record of what a patient does, not as a record of what a patient could do. 2. The main aim is to establish degree of independence from any help, physical or verbal, however minor and for whatever reason. 3. The need for supervision renders the patient not independent. 4. A patient's performance should be established using the best available evidence. Asking the patient, friends/relatives and nurses are the usual sources, but direct observation and common sense are also important. However direct testing is not needed.   5. Usually the patient's performance over the preceding 24-48 hours is important, but occasionally longer periods will be relevant. 6. Middle categories imply that the patient supplies over 50 per cent of the effort. 7. Use of aids to be independent is allowed. Score Interpretation (from [address])    Independent   60-79 Minimally independent   40-59 Partially dependent   20-39 Very dependent   <20 Totally dependent     -Colleen Gregg., Barthel, D.W. (1965). Functional evaluation: the Barthel Index. [address] ([address] (1997). The Barthel activities of daily living index: self-reporting versus actual performance in the old (> or = 75 years). Journal of [address] [place] [address] 45(7), [facility], GLORY, Justin Bianchi., Humberto Sanchez. (1999). Measuring the change in disability after inpatient rehabilitation; comparison of the responsiveness of the Barthel Index and Functional [place] Measure. Journal of Neurology, Neurosurgery, and Psychiatry, 66(4), 002-972. Maurice Vazquez, N.J.A, SOMMER Wilkinson, & Navya Guerra M.A. (2004) Assessment of post-stroke quality of life in cost-effectiveness studies: The usefulness of the Barthel Index and the EuroQoL-5D. Quality of Life Research, 13, 227-17      Based on the above components, the patient evaluation is determined to be of the following complexity level: MEDIUM  Pain Rating:  Patient did not c/o pain during session. Activity Tolerance:   Fair and Poor    After treatment patient left in no apparent distress:    Supine in bed, Call bell within reach, and Side rails x 3    COMMUNICATION/EDUCATION:   The patients plan of care was discussed with: Physical therapist and Registered nurse. Home safety education was provided and the patient/caregiver indicated understanding., Patient/family have participated as able in goal setting and plan of care. , and Patient/family agree to work toward stated goals and plan of care. This patients plan of care is appropriate for delegation to [facility].     Thank you for this referral.  Thor Bridges, OTR/L  Time Calculation: 18 mins

## 2022-03-07 NOTE — PROGRESS NOTES
Problem: Dysphagia (Adult)  Goal: *Acute Goals and Plan of Care (Insert Text)  3/7/2022 1252 by RAGINI Kapoor  Outcome: Progressing Towards Goal  3/7/2022 1248 by RAGINI Kapoor  Note: Speech Therapy Goals  Initiated 3/7/2022  1. Patient will tolerate regular or easy to chew diet with thin liquids without signs/symptoms of aspiration given no cues within 7 day(s). SPEECH LANGUAGE PATHOLOGY BEDSIDE SWALLOW EVALUATION  Patient: Flo Miller (65 y.o. male)  Date: 3/7/2022  Primary Diagnosis: Elevated troponin [R77.8]  Hypoglycemia [E16.2]        Precautions:      ASSESSMENT :  Based on the objective data described below, the patient presents with mild oral phase deficits, limited interaction and intake. He is conversant and alert but with slow processing, limited command following. Does not like some food items and needed encouragement for PO/feeding, though able to feed self eventually. Patient reported some difficulty eating in the past but unable to give details, unsure of reliability. Will follow  . Patient will benefit from skilled intervention to address the above impairments. Patients rehabilitation potential is considered to be Good     PLAN :  Recommendations and Planned Interventions:  Continue soft and bite sized diet  SLP following  Expect he can resume home diet at discharge  Frequency/Duration: Patient will be followed by speech-language pathology 3 times a week to address goals.   Discharge Recommendations: None     SUBJECTIVE:   Patient stated This isn't talking to me to express he didn't like food on tray    OBJECTIVE:     Past Medical History:   Diagnosis Date    CAD (coronary artery disease)     Cancer ([facility] [address]     prostate    Diabetes ([facility] [address]     GERD (gastroesophageal reflux disease)     Hypertension     Other ill-defined conditions(799.89)     elevated cholesterol     Past Surgical History:   Procedure Laterality Date    COLONOSCOPY N/A 8/31/2016 COLONOSCOPY performed by [facility]    COLORECTAL SCRN; HI RISK IND  8/31/2016         HX PROSTATECTOMY      ME CARDIAC SURG PROCEDURE UNLIST      cabg x5 vessels,cardiologist  and david at [facility]    ME COLONOSCOPY FLX DX W/COLLJ SPEC WHEN PFRMD  4/13/2011          Prior Level of Function/Home Situation:   Home Situation  Home Environment: Private residence  One/Two Story Residence: One story  Living Alone: No  Support Systems: Spouse/Significant Other  Patient Expects to be Discharged to[de-identified] Home  Current DME Used/Available at Home: Blood pressure cuff,Glucometer,Walker  Diet prior to admission: unknown, assumed unrestricted  Current Diet:  soft and bote sized   Cognitive and Communication Status:  Neurologic State: Alert  Orientation Level: Unable to verbalize  Cognition: No command following (Patient is conversant but no response to commands out of context)     Perseveration: No perseveration noted  Safety/Judgement: Awareness of environment  Oral Assessment:  Oral Assessment  Labial: No impairment  Dentition: Full;Natural  Lingual: Macroglossia (no command following, observed with speech and swallow)  Mandible: No impairment  P.O. Trials:     Vocal quality prior to P.O.: Low volume  Consistency Presented: Puree; Solid; Thin liquid  How Presented: Cup/gulp; Spoon     Bolus Acceptance: No impairment  Bolus Formation/Control: Impaired  Type of Impairment: Delayed  Propulsion: No impairment  Oral Residue: None     Laryngeal Elevation: Functional  Aspiration Signs/Symptoms: None  Pharyngeal Phase Characteristics: No impairment, issues, or problems   Effective Modifications: None                  NOMS:   The NOMS functional outcome measure was used to quantify this patient's level of swallowing impairment.   Based on the NOMS, the patient was determined to be at level 5 for swallow function       NOMS Swallowing Levels:  Level 1 (CN): NPO  Level 2 (CM): NPO but takes consistency in therapy  Level 3 (CL): Takes less than 50% of nutrition p.o. and continues with nonoral feedings; and/or safe with mod cues; and/or max diet restriction  Level 4 (CK): Safe swallow but needs mod cues; and/or mod diet restriction; and/or still requires some nonoral feeding/supplements  Level 5 (CJ): Safe swallow with min diet restriction; and/or needs min cues  Level 6 (CI): Independent with p.o.; rare cues; usually self cues; may need to avoid some foods or needs extra time  Level 7 ([address] [place] [address]): Independent for all p.o.  ROLANDO. (2003). National Outcomes Measurement System (NOMS): Adult Speech-Language Pathology User's Guide. Pain:  Pain Scale 1: Numeric (0 - 10)  Pain Intensity 1: 0       After treatment:   Nursing notified    COMMUNICATION/EDUCATION:   Patient was educated regarding reasons for SLP eval and importance of upright posture for safe swallow. Patient expressed understanding and agreement. The patient's plan of care including recommendations, planned interventions, and recommended diet changes were discussed with: Registered nurse. Patient/family agree to work toward stated goals and plan of care.     Thank you for this referral.  Toma Holstein, SLP  Time Calculation: 15 mins

## 2022-03-07 NOTE — PROGRESS NOTES
Problem: Mobility Impaired (Adult and Pediatric)  Goal: *Acute Goals and Plan of Care (Insert Text)  Description: FUNCTIONAL STATUS PRIOR TO ADMISSION: Unable to confirm PLOF, patient with minimal communication during eval.     HOME SUPPORT PRIOR TO ADMISSION: Per chart, patient lived with spouse. Physical Therapy Goals  Initiated 3/7/2022  1. Patient will move from supine to sit and sit to supine, scoot up and down, and roll side to side in bed with supervision/set-up within 7 day(s). 2.  Patient will transfer from bed to chair and chair to bed with minimal assistance/contact guard assist using the least restrictive device within 7 day(s). 3.  Patient will perform sit to stand with minimal assistance/contact guard assist within 7 day(s). 4.  Patient will ambulate with minimal assistance/contact guard assist for 25 feet with the least restrictive device within 7 day(s). Outcome: Not Met   PHYSICAL THERAPY EVALUATION  Patient: Suzanne Desai (4 y.o. male)  Date: 3/7/2022  Primary Diagnosis: Elevated troponin [R77.8]  Hypoglycemia [E16.2]       Precautions:  Fall,Bed Alarm    ASSESSMENT  Based on the objective data described below, the patient presents with delayed processing and command following, minimal communication during assessment, impaired bed mobility, impaired sitting and standing balance, impaired transfers, inability to ambulate, and decreased overall independence following admission for hypoglycemia and upward trending troponin. Patient unable to state name when asked and only saying a few words while therapist present though able to intermittently follow commands, mostly impaired by delayed processing; RN reports patient received Ativan 1 hr prior. Completes supine>sit Max A, sit>supine Min Ax2, sit<>stand Min Ax2, and unable to ambulate. Patient initially with posterior LOB while sitting EOB but able to progress to supervision-CGA.  Demonstrates fair standing balance with bilat UE support on RW, instructed patient to take a step with patient unable to clear feet from floor. Returned to supine with all needs in reach. Patient is requiring increased assistance with basic mobility and will require skilled PT services to address functional impairments. Pending progress, recommend SNF level rehab at discharge. Current Level of Function Impacting Discharge (mobility/balance): Min-Max A supine<>sit; Min A x2 sit<>stand    Functional Outcome Measure: The patient scored 10/100 on the Barthel Index outcome measure which is indicative of significantly impaired functional ability. Patient will benefit from skilled therapy intervention to address the above noted impairments. PLAN :  Recommendations and Planned Interventions: bed mobility training, transfer training, gait training, therapeutic exercises, patient and family training/education and therapeutic activities      Frequency/Duration: Patient will be followed by physical therapy:  3 times a week to address goals. Recommendation for discharge: (in order for the patient to meet his/her long term goals)  Therapy up to 5 days/week in SNF setting    This discharge recommendation:  Has been made in collaboration with the attending provider and/or case management    IF patient discharges home will need the following DME: to be determined (TBD)     SUBJECTIVE:   Patient stated I. .. umm. ...    OBJECTIVE DATA SUMMARY:   HISTORY:    Past Medical History:   Diagnosis Date    CAD (coronary artery disease)     Cancer ([facility] [address]     prostate    Diabetes ([facility] [address]     GERD (gastroesophageal reflux disease)     Hypertension     Other ill-defined conditions(799.89)     elevated cholesterol     Past Surgical History:   Procedure Laterality Date    COLONOSCOPY N/A 8/31/2016    COLONOSCOPY performed by Randell Tilley MD at [facility] ENDOSCOPY    COLORECTAL SCRN; HI RISK IND  8/31/2016         HX PROSTATECTOMY      [place] CARDIAC SURG PROCEDURE UNLIST cabg x5 vessels,cardiologist  and david at [facility]    AR COLONOSCOPY FLX DX W/COLLJ SPEC WHEN PFRMD  2011          Personal factors and/or comorbidities impacting plan of care: Dementia, HTN, DM, HTN    Home Situation  Home Environment: Private residence  One/Two Story Residence: One story  Living Alone: No  Support Systems: Spouse/Significant Other  Patient Expects to be Discharged to[de-identified] Home  Current DME Used/Available at Home: Blood pressure cuff,Glucometer,Walker    EXAMINATION/PRESENTATION/DECISION MAKING:   Critical Behavior:  Neurologic State: Drowsy  Orientation Level: Unable to verbalize  Cognition: Decreased command following,Poor safety awareness  Safety/Judgement: Lack of insight into deficits  Hearing: Auditory  Auditory Impairment: None    Range Of Motion:  AROM: Generally decreased, functional  Strength:    Strength: Generally decreased, functional  Tone & Sensation:   Tone: Normal    Coordination:  Coordination: Generally decreased, functional  Vision:   Acuity:  (unable to formally assess)  Functional Mobility:  Bed Mobility:  Rolling: Maximum assistance  Supine to Sit: Maximum assistance  Sit to Supine: Minimum assistance  Scooting: Maximum assistance  Transfers:  Sit to Stand: Minimum assistance;Assist x2  Stand to Sit: Minimum assistance;Assist x2  Balance:   Sitting: Impaired; Without support  Sitting - Static: Good (unsupported)  Sitting - Dynamic: Fair (occasional)  Standing: Impaired; With support  Standing - Static: Good;Constant support  Standing - Dynamic : Not tested  Ambulation/Gait Training:    Attempted, patient unable to process movement in order to clear feet from floor    Functional Measure:  Barthel Index:    Bathin  Bladder: 0  Bowels: 5  Groomin  Dressin  Feedin  Mobility: 0  Stairs: 0  Toilet Use: 0  Transfer (Bed to Chair and Back): 5  Total: 10/100       The Barthel ADL Index: Guidelines  1.  The index should be used as a record of what a patient does, not as a record of what a patient could do. 2. The main aim is to establish degree of independence from any help, physical or verbal, however minor and for whatever reason. 3. The need for supervision renders the patient not independent. 4. A patient's performance should be established using the best available evidence. Asking the patient, friends/relatives and nurses are the usual sources, but direct observation and common sense are also important. However direct testing is not needed. 5. Usually the patient's performance over the preceding 24-48 hours is important, but occasionally longer periods will be relevant. 6. Middle categories imply that the patient supplies over 50 per cent of the effort. 7. Use of aids to be independent is allowed. Errol Sprague., Barthel, D.W. ([id number]). Functional evaluation: the Barthel Index. [facility] (14)2. GLORY Villa, Corbin Ortiz., Hema Lang, [place], [facility] (1999). Measuring the change indisability after inpatient rehabilitation; comparison of the responsiveness of the Barthel Index and Functional [place] Measure. Journal of Neurology, Neurosurgery, and Psychiatry, 66(4), 550-884. Margarito Bateman, N.J.A, SHWETHA Wilkinson.HUGH, & Sagrario Espinal, M.A. (2004.) Assessment of post-stroke quality of life in cost-effectiveness studies: The usefulness of the Barthel Index and the EuroQoL-5D.  Quality of Life Research, 15, 353-02      Physical Therapy Evaluation Charge Determination   History Examination Presentation Decision-Making   HIGH Complexity :3+ comorbidities / personal factors will impact the outcome/ POC  MEDIUM Complexity : 3 Standardized tests and measures addressing body structure, function, activity limitation and / or participation in recreation  MEDIUM Complexity : Evolving with changing characteristics  Other outcome measures Barthel Index 10/100  HIGH       Based on the above components, the patient evaluation is determined to be of the following complexity level: MEDIUM    Pain Rating:  Does not c/o pain during session    Activity Tolerance:   Fair and SpO2 stable on RA    After treatment patient left in no apparent distress:   Supine in bed, Call bell within reach and Side rails x 3    COMMUNICATION/EDUCATION:   The patients plan of care was discussed with: Occupational therapist and Registered nurse. Fall prevention education was provided and the patient/caregiver indicated understanding., Patient/family have participated as able in goal setting and plan of care. and Patient/family agree to work toward stated goals and plan of care.     Thank you for this referral.  Britt Beckford, PT   Time Calculation: 20 mins

## 2022-03-07 NOTE — PROGRESS NOTES
Problem: Falls - Risk of  Goal: *Absence of Falls  Description: Document Priay Joe Fall Risk and appropriate interventions in the flowsheet. Outcome: Progressing Towards Goal  Note: Fall Risk Interventions:  Mobility Interventions: Bed/chair exit alarm,Communicate number of staff needed for ambulation/transfer,OT consult for ADLs,Patient to call before getting OOB,PT Consult for mobility concerns,PT Consult for assist device competence,Utilize gait belt for transfers/ambulation,Utilize walker, cane, or other assistive device,Strengthening exercises (ROM-active/passive)    Mentation Interventions: Adequate sleep, hydration, pain control,Bed/chair exit alarm,Door open when patient unattended,Increase mobility,More frequent rounding,Reorient patient,Room close to nurse's station,Toileting rounds,Update white board    Medication Interventions: Bed/chair exit alarm,Patient to call before getting OOB,Teach patient to arise slowly    Elimination Interventions: Bed/chair exit alarm,Call light in reach,Stay With Me (per policy),Patient to call for help with toileting needs,Toilet paper/wipes in reach,Toileting schedule/hourly rounds              Problem: Patient Education: Go to Patient Education Activity  Goal: Patient/Family Education  Outcome: Progressing Towards Goal     Problem: Diabetes Self-Management  Goal: *Disease process and treatment process  Description: Define diabetes and identify own type of diabetes; list 3 options for treating diabetes. Outcome: Progressing Towards Goal  Goal: *Incorporating nutritional management into lifestyle  Description: Describe effect of type, amount and timing of food on blood glucose; list 3 methods for planning meals. Outcome: Progressing Towards Goal  Goal: *Incorporating physical activity into lifestyle  Description: State effect of exercise on blood glucose levels.   Outcome: Progressing Towards Goal  Goal: *Developing strategies to promote health/change behavior  Description: Define the ABC's of diabetes; identify appropriate screenings, schedule and personal plan for screenings. Outcome: Progressing Towards Goal  Goal: *Using medications safely  Description: State effect of diabetes medications on diabetes; name diabetes medication taking, action and side effects. Outcome: Progressing Towards Goal  Goal: *Monitoring blood glucose, interpreting and using results  Description: Identify recommended blood glucose targets  and personal targets. Outcome: Progressing Towards Goal  Goal: *Prevention, detection, treatment of acute complications  Description: List symptoms of hyper- and hypoglycemia; describe how to treat low blood sugar and actions for lowering  high blood glucose level. Outcome: Progressing Towards Goal  Goal: *Prevention, detection and treatment of chronic complications  Description: Define the natural course of diabetes and describe the relationship of blood glucose levels to long term complications of diabetes. Outcome: Progressing Towards Goal  Goal: *Developing strategies to address psychosocial issues  Description: Describe feelings about living with diabetes; identify support needed and support network  Outcome: Progressing Towards Goal  Goal: *Insulin pump training  Outcome: Progressing Towards Goal  Goal: *Sick day guidelines  Outcome: Progressing Towards Goal  Goal: *Patient Specific Goal (EDIT GOAL, INSERT TEXT)  Outcome: Progressing Towards Goal     Problem: Patient Education: Go to Patient Education Activity  Goal: Patient/Family Education  Outcome: Progressing Towards Goal     Problem: Pressure Injury - Risk of  Goal: *Prevention of pressure injury  Description: Document Finn Scale and appropriate interventions in the flowsheet.   Outcome: Progressing Towards Goal  Note: Pressure Injury Interventions:  Sensory Interventions: Assess changes in LOC,Assess need for specialty bed,Check visual cues for pain,Discuss PT/OT consult with provider,Float heels,Keep linens dry and wrinkle-free,Maintain/enhance activity level,Minimize linen layers,Pressure redistribution bed/mattress (bed type)    Moisture Interventions: Absorbent underpads,Check for incontinence Q2 hours and as needed,Internal/External urinary devices,Maintain skin hydration (lotion/cream),Minimize layers,Moisture barrier,Offer toileting Q_hr    Activity Interventions: Assess need for specialty bed,Increase time out of bed,Pressure redistribution bed/mattress(bed type),PT/OT evaluation    Mobility Interventions: Assess need for specialty bed,HOB 30 degrees or less,Pressure redistribution bed/mattress (bed type),PT/OT evaluation    Nutrition Interventions: Document food/fluid/supplement intake,Offer support with meals,snacks and hydration    Friction and Shear Interventions: Apply protective barrier, creams and emollients,HOB 30 degrees or less,Lift sheet,Minimize layers                Problem: Patient Education: Go to Patient Education Activity  Goal: Patient/Family Education  Outcome: Progressing Towards Goal

## 2022-03-07 NOTE — PROGRESS NOTES
Hospitalist Progress Note    NAME: Gilda Naranjo   :  1940   MRN:  [id number]       Assessment / Plan:  DM  Hypoglycemia  Likely due to poor po intake with insulin use. Per pt's wife, pt doesn't eat much at home. His insulin regimen is humalog 75-25 at 90 units bid but she only gives him 60 units bid  A1C 8.1  No insulin ordered due to BG 36 en route. BG now back up to >200. Will restart NPH at 10 units BID. PT/OT to eval  Updated pt's wife via phone 3/7. ? Dysphagia  SLP to eval  Soft diet for now    Elevated trop likely due CAD and CKD  No acute ischemia on EKG. Trop peaked at 215. No cp. Cont' cardiac meds  Cardiology evaluated, no plan for procedure    CKD3  Baseline cr ~1.5, monitor and avoid nephrotoxic agents    Prostate ca  Obesity  Dementia  HLD  Cont' home meds  Avoid qtc prolonging meds. cont' low dose ativan prn. Tele sitter          Estimated discharge date: 3/8 pending clinical improvement    Code status: Full  Prophylaxis: Hep SQ  Recommended Disposition: Home w/Family     Subjective:     Chief Complaint / Reason for Physician Visit  NAD. Discussed with RN events overnight. Review of Systems:  Symptom Y/N Comments  Symptom Y/N Comments   Fever/Chills    Chest Pain     Poor Appetite    Edema     Cough    Abdominal Pain     Sputum    Joint Pain     SOB/ANGUIANO    Pruritis/Rash     Nausea/vomit    Tolerating PT/OT     Diarrhea    Tolerating Diet     Constipation    Other       Could NOT obtain due to:      Objective:     VITALS:   Last 24hrs VS reviewed since prior progress note.  Most recent are:  Patient Vitals for the past 24 hrs:   Temp Pulse Resp BP SpO2   22 0827 98.4 °F (36.9 °C) 100 16 (!) 151/87 97 %   22 0243 99.2 °F (37.3 °C) 92 18 127/78 98 %   22 2311 99.2 °F (37.3 °C) (!) 103 18 (!) 144/87 98 %   22 2134  97  (!) 162/104    22 1917 98.4 °F (36.9 °C) (!) 105 20 (!) 163/85 96 %   22 1500 98.9 °F (37.2 °C) (!) 110 17 (!) 139/91 98 %   03/06/22 1043 98.1 °F (36.7 °C) 88 18 (!) 114/59 97 %       Intake/Output Summary (Last 24 hours) at 3/7/2022 2657  Last data filed at 3/7/2022 7585  Gross per 24 hour   Intake 497.5 ml   Output 1250 ml   Net -752.5 ml        I had a face to face encounter and independently examined this patient on 3/7/2022, as outlined below:  PHYSICAL EXAM:  General: WD, WN. Alert, cooperative, no acute distress    EENT:  EOMI. Anicteric sclerae. MMM  Resp:  CTA bilaterally, no wheezing or rales. No accessory muscle use  CV:  Regular  rhythm,  No edema  GI:  Soft, Non distended, Non tender. +Bowel sounds  Neurologic:  Confused, followed some commands. Moving all exts  Psych:   Not anxious nor agitated  Skin:  No rashes. No jaundice    Reviewed most current lab test results and cultures  YES  Reviewed most current radiology test results   YES  Review and summation of old records today    NO  Reviewed patient's current orders and MAR    YES  PMH/SH reviewed - no change compared to H&P  ________________________________________________________________________  Care Plan discussed with:    Comments   Patient x    Family  x Pt's wife   RN x    Care Manager     Consultant                        Multidiciplinary team rounds were held today with , nursing, pharmacist and clinical coordinator. Patient's plan of care was discussed; medications were reviewed and discharge planning was addressed. ________________________________________________________________________  Total NON critical care TIME:  35  Minutes    Total CRITICAL CARE TIME Spent:   Minutes non procedure based      Comments   >50% of visit spent in counseling and coordination of care     ________________________________________________________________________  Philly Aguilar MD     Procedures: see electronic medical records for all procedures/Xrays and details which were not copied into this note but were reviewed prior to creation of Plan.       LABS:  I reviewed today's most current labs and imaging studies.   Pertinent labs include:  Recent Labs     03/05/22  1548   WBC 5.5   HGB 13.6   HCT 42.1        Recent Labs     03/07/22  0040 03/06/22  0323 03/05/22  1548    138 140   K 3.8 3.5 3.6    108 106   CO2 28 26 30   * 88 179*   BUN 15 14 18   CREA 1.48* 1.17 1.51*   CA 9.8 9.2 9.4   MG  --  2.3  --    ALB  --   --  3.7   TBILI  --   --  1.1*   ALT  --   --  19   INR  --  1.0  --        Signed: Estefania Barrett MD

## 2022-03-07 NOTE — PROGRESS NOTES
Physical Therapy:  Orders received, chart reviewed, and evaluation attempted. Patient is leaving the floor for a test, unavailable for evaluation. Will defer and re-attempt later.     Britt Dumont PT, DPT

## 2022-03-08 LAB
ANION GAP SERPL CALC-SCNC: 3 MMOL/L (ref 5–15)
BUN SERPL-MCNC: 23 MG/DL (ref 6–20)
BUN/CREAT SERPL: 14 (ref 12–20)
CALCIUM SERPL-MCNC: 9.4 MG/DL (ref 8.5–10.1)
CHLORIDE SERPL-SCNC: 106 MMOL/L (ref 97–108)
CO2 SERPL-SCNC: 28 MMOL/L (ref 21–32)
CREAT SERPL-MCNC: 1.69 MG/DL (ref 0.7–1.3)
GLUCOSE BLD STRIP.AUTO-MCNC: 170 MG/DL (ref 65–117)
GLUCOSE BLD STRIP.AUTO-MCNC: 180 MG/DL (ref 65–117)
GLUCOSE BLD STRIP.AUTO-MCNC: 188 MG/DL (ref 65–117)
GLUCOSE BLD STRIP.AUTO-MCNC: 205 MG/DL (ref 65–117)
GLUCOSE BLD STRIP.AUTO-MCNC: 210 MG/DL (ref 65–117)
GLUCOSE SERPL-MCNC: 201 MG/DL (ref 65–100)
POTASSIUM SERPL-SCNC: 3.7 MMOL/L (ref 3.5–5.1)
SERVICE CMNT-IMP: ABNORMAL
SODIUM SERPL-SCNC: 137 MMOL/L (ref 136–145)

## 2022-03-08 PROCEDURE — 82962 GLUCOSE BLOOD TEST: CPT

## 2022-03-08 PROCEDURE — 51798 US URINE CAPACITY MEASURE: CPT

## 2022-03-08 PROCEDURE — 65270000029 HC RM PRIVATE

## 2022-03-08 PROCEDURE — 74011250637 HC RX REV CODE- 250/637: Performed by: NURSE PRACTITIONER

## 2022-03-08 PROCEDURE — 80048 BASIC METABOLIC PNL TOTAL CA: CPT

## 2022-03-08 PROCEDURE — 36415 COLL VENOUS BLD VENIPUNCTURE: CPT

## 2022-03-08 PROCEDURE — 74011250637 HC RX REV CODE- 250/637: Performed by: STUDENT IN AN ORGANIZED HEALTH CARE EDUCATION/TRAINING PROGRAM

## 2022-03-08 PROCEDURE — 74011250636 HC RX REV CODE- 250/636: Performed by: NURSE PRACTITIONER

## 2022-03-08 PROCEDURE — 96372 THER/PROPH/DIAG INJ SC/IM: CPT

## 2022-03-08 PROCEDURE — 74011636637 HC RX REV CODE- 636/637: Performed by: INTERNAL MEDICINE

## 2022-03-08 PROCEDURE — 74011636637 HC RX REV CODE- 636/637: Performed by: NURSE PRACTITIONER

## 2022-03-08 PROCEDURE — 74011000250 HC RX REV CODE- 250: Performed by: NURSE PRACTITIONER

## 2022-03-08 PROCEDURE — 65660000000 HC RM CCU STEPDOWN

## 2022-03-08 RX ADMIN — ISOSORBIDE MONONITRATE 30 MG: 30 TABLET, EXTENDED RELEASE ORAL at 09:05

## 2022-03-08 RX ADMIN — ATORVASTATIN CALCIUM 20 MG: 20 TABLET, FILM COATED ORAL at 09:04

## 2022-03-08 RX ADMIN — MEMANTINE HYDROCHLORIDE 10 MG: 10 TABLET ORAL at 09:19

## 2022-03-08 RX ADMIN — SODIUM CHLORIDE, PRESERVATIVE FREE 10 ML: 5 INJECTION INTRAVENOUS at 13:15

## 2022-03-08 RX ADMIN — ASPIRIN 81 MG: 81 TABLET, CHEWABLE ORAL at 09:05

## 2022-03-08 RX ADMIN — Medication 3 UNITS: at 09:04

## 2022-03-08 RX ADMIN — SODIUM CHLORIDE, PRESERVATIVE FREE 10 ML: 5 INJECTION INTRAVENOUS at 06:14

## 2022-03-08 RX ADMIN — METOPROLOL SUCCINATE 100 MG: 50 TABLET, FILM COATED, EXTENDED RELEASE ORAL at 09:05

## 2022-03-08 RX ADMIN — SODIUM CHLORIDE, PRESERVATIVE FREE 10 ML: 5 INJECTION INTRAVENOUS at 22:30

## 2022-03-08 RX ADMIN — Medication 2 UNITS: at 17:13

## 2022-03-08 RX ADMIN — MEMANTINE HYDROCHLORIDE 10 MG: 10 TABLET ORAL at 18:33

## 2022-03-08 RX ADMIN — HEPARIN SODIUM 5000 UNITS: 5000 INJECTION INTRAVENOUS; SUBCUTANEOUS at 17:13

## 2022-03-08 RX ADMIN — DONEPEZIL HYDROCHLORIDE 10 MG: 5 TABLET, FILM COATED ORAL at 22:31

## 2022-03-08 RX ADMIN — QUETIAPINE FUMARATE 50 MG: 25 TABLET ORAL at 22:30

## 2022-03-08 RX ADMIN — Medication 2 UNITS: at 13:14

## 2022-03-08 RX ADMIN — HEPARIN SODIUM 5000 UNITS: 5000 INJECTION INTRAVENOUS; SUBCUTANEOUS at 09:05

## 2022-03-08 RX ADMIN — AMLODIPINE BESYLATE 10 MG: 5 TABLET ORAL at 22:30

## 2022-03-08 RX ADMIN — HEPARIN SODIUM 5000 UNITS: 5000 INJECTION INTRAVENOUS; SUBCUTANEOUS at 00:15

## 2022-03-08 RX ADMIN — Medication 18 UNITS: at 17:12

## 2022-03-08 NOTE — PROGRESS NOTES
Progress Note      3/8/2022 10:07 AM  NAME: Raman Dumont   MRN:  [id number]   Admit Diagnosis: Elevated troponin [R77.8]  Hypoglycemia [E16.2]    Primary Cardiologist: Seen by Dr Can Mancilla before in 2018   Physician Requesting consult: Dr Jannette Alberts         Assessment:     Problem list:   1. Hypoglycemia   2. Diabetes   3. Dementia - orient to self only   4. Elevated trop, likely type 2, no chest pain or significant STT changes   5. CAD s/p CABG in 2006  6. HTN  7. HLP  8. CKD  9. Prostate CA   10. Obesity        Problem list from 2018 clinic note   This patient had bypass surgery in 2006. He has hypertension and hyperlipidemia and recently was discovered to have prostate cancer and underwent radiation therapy. He has diabetes mellitus and in 2018 started having dementia.      Recommendations:   Trop elevation is likely type 2 With underlying CAD, do not suspect ACS, no CP or significant EKG abn       1. Cont aspirin   2. Cont lipitor   3. Cont metoprolol, amlodipine, imdur   4. DM mx per primary team   5. Echo showed normal LVEF     No plan for ischemic work up , will sign off, call if any question             [x]? High complexity decision making was performed      Subjective:     HPI:     No CP  Poor historian due to significant dementia     Objective:      Physical Exam:    Last 24hrs VS reviewed since prior progress note.  Most recent are:    Visit Vitals  /82 (BP 1 Location: Right upper arm, BP Patient Position: At rest)   Pulse 88   Temp 97.9 °F (36.6 °C)   Resp 13   Ht 5' 7\" (1.702 m)   Wt 90.9 kg (200 lb 8 oz)   SpO2 96%   BMI 31.40 kg/m²       Intake/Output Summary (Last 24 hours) at 3/8/2022 1708  Last data filed at 3/8/2022 0620  Gross per 24 hour   Intake 660 ml   Output 100 ml   Net 560 ml           General: Alert and oriented x1  Neck: Supple   Respiratory: No respiratory distress, clear lung sound   Cardiovascular: Regular rate rhythm, S1S2, no murmur   Abd omen: soft, non tender, non distended   Skin: warm and dry   Extremity: no edema, warm to touch      Data Review    Telemetry: normal sinus rhythm        Lab Data Personally Reviewed:    No results for input(s): WBC, HGB, HCT, PLT, HGBEXT, HCTEXT, PLTEXT, HGBEXT, HCTEXT, PLTEXT in the last 72 hours. Recent Labs     03/06/22  0323   INR 1.0   PTP 10.9      Recent Labs     03/08/22  0245 03/07/22  1858 03/07/22  0040 03/06/22  0323     --  137 138   K 3.7  --  3.8 3.5     --  102 108   CO2 28  --  28 26   BUN 23*  --  15 14   CREA 1.69*  --  1.48* 1.17   *  --  253* 88   CA 9.4  --  9.8 9.2   MG  --  2.2  --  2.3     No results for input(s): CPK, CKNDX, TROIQ in the last 72 hours. No lab exists for component: CPKMB  Lab Results   Component Value Date/Time    Cholesterol, total 145 07/09/2019 12:46 AM    HDL Cholesterol 59 07/09/2019 12:46 AM    LDL, calculated 73.6 07/09/2019 12:46 AM    Triglyceride 62 07/09/2019 12:46 AM    CHOL/HDL Ratio 2.5 07/09/2019 12:46 AM       No results for input(s): AP, TBIL, TP, ALB, GLOB, GGT, AML, LPSE in the last 72 hours. No lab exists for component: SGOT, GPT, AMYP, HLPSE  No results for input(s): PH, PCO2, PO2 in the last 72 hours.     Medications Personally Reviewed:    Current Facility-Administered Medications   Medication Dose Route Frequency    insulin NPH (NOVOLIN N, HUMULIN N) injection 18 Units  18 Units SubCUTAneous ACB&D    isosorbide mononitrate ER (IMDUR) tablet 30 mg  30 mg Oral DAILY    LORazepam (ATIVAN) injection 0.5 mg  0.5 mg IntraVENous TID PRN    sodium chloride (NS) flush 5-40 mL  5-40 mL IntraVENous Q8H    sodium chloride (NS) flush 5-40 mL  5-40 mL IntraVENous PRN    acetaminophen (TYLENOL) tablet 650 mg  650 mg Oral Q6H PRN    Or    acetaminophen (TYLENOL) suppository 650 mg  650 mg Rectal Q6H PRN    polyethylene glycol (MIRALAX) packet 17 g  17 g Oral DAILY PRN    ondansetron (ZOFRAN ODT) tablet 4 mg  4 mg Oral Q8H PRN    Or    ondansetron (ZOFRAN) injection 4 mg  4 mg IntraVENous Q6H PRN    insulin lispro (HUMALOG) injection   SubCUTAneous AC&HS    glucose chewable tablet 16 g  4 Tablet Oral PRN    glucagon (GLUCAGEN) injection 1 mg  1 mg IntraMUSCular PRN    dextrose 10% infusion 0-250 mL  0-250 mL IntraVENous PRN    amLODIPine (NORVASC) tablet 10 mg  10 mg Oral QHS    aspirin chewable tablet 81 mg  81 mg Oral DAILY    atorvastatin (LIPITOR) tablet 20 mg  20 mg Oral DAILY    memantine (NAMENDA) tablet 10 mg  10 mg Oral BID    metoprolol succinate (TOPROL-XL) XL tablet 100 mg  100 mg Oral DAILY    QUEtiapine (SEROquel) tablet 50 mg  50 mg Oral QHS    donepeziL (ARICEPT) tablet 10 mg  10 mg Oral QHS    heparin (porcine) injection 5,000 Units  5,000 Units SubCUTAneous Manan Kinney MD

## 2022-03-08 NOTE — PROGRESS NOTES
Hospitalist Progress Note    NAME: Ana Tatum   :  1940   MRN:  [id number]       Assessment / Plan:  DM  Hypoglycemia  Likely due to poor po intake with insulin use. Per pt's wife, pt doesn't eat much at home. His insulin regimen is humalog 75-25 at 90 units bid but she only gives him 60 units bid  A1C 8.1  No insulin ordered due to BG 36 en route. BG now back up to >200. Will incr NPH at 18 units BID. Titrate prn. SSI  PT/OT to eval  Updated pt's wife via phone 3/7. Pt/ot evaluated, recommended SNF. CM to help with dispo. Pt is medically stable for discharge  Discontinue tele sitter today 3/8    ? Dysphagia  SLP evaluated,cont' soft and bite sized diet. Elevated trop likely due CAD and CKD  No acute ischemia on EKG. Trop peaked at 215. No cp. Cont' cardiac meds  Cardiology evaluated, no plan for invasive procedure    CKD3  Baseline cr ~1.5, monitor and avoid nephrotoxic agents    Prostate ca  Obesity  Dementia  HLD  Cont' home meds  Avoid qtc prolonging meds. cont' low dose ativan prn. Estimated discharge date: 3/8 pending clinical improvement    Code status: Full  Prophylaxis: Hep SQ  Recommended Disposition: Home w/Family     Subjective:     Chief Complaint / Reason for Physician Visit  NAD. Discussed with RN events overnight. Review of Systems:  Symptom Y/N Comments  Symptom Y/N Comments   Fever/Chills    Chest Pain     Poor Appetite    Edema     Cough    Abdominal Pain     Sputum    Joint Pain     SOB/ANGUIANO    Pruritis/Rash     Nausea/vomit    Tolerating PT/OT     Diarrhea    Tolerating Diet     Constipation    Other       Could NOT obtain due to:      Objective:     VITALS:   Last 24hrs VS reviewed since prior progress note.  Most recent are:  Patient Vitals for the past 24 hrs:   Temp Pulse Resp BP SpO2   22 0742  92 14 (!) 149/90    22 0303 97.9 °F (36.6 °C) 86 12 (!) 150/76 94 %   22 0025 99.7 °F (37.6 °C) 94 16 (!) 142/82 94 %   22 2304 98.2 °F (36.8 °C) 94 15 132/82 93 %   03/07/22 2225  92  (!) 155/90    03/07/22 1933 98.3 °F (36.8 °C) 93 17 132/88 97 %   03/07/22 1645  97 16 (!) 139/92 96 %   03/07/22 1455 98.2 °F (36.8 °C) 96 14 119/83 96 %   03/07/22 1158 98.6 °F (37 °C) (!) 107 16 131/81 98 %   03/07/22 1105    136/84    03/07/22 1028 98.6 °F (37 °C) (!) 114 16 136/84 97 %   03/07/22 0955  (!) 113  (!) 161/93        Intake/Output Summary (Last 24 hours) at 3/8/2022 0858  Last data filed at 3/8/2022 7525  Gross per 24 hour   Intake 660 ml   Output 100 ml   Net 560 ml        I had a face to face encounter and independently examined this patient on 3/8/2022, as outlined below:  PHYSICAL EXAM:  General: WD, WN. Alert, cooperative, no acute distress    EENT:  EOMI. Anicteric sclerae. MMM  Resp:  CTA bilaterally, no wheezing or rales. No accessory muscle use  CV:  Regular  rhythm,  No edema  GI:  Soft, Non distended, Non tender. +BS  Neurologic:  Confused, followed some commands. Moving all exts  Psych:   Not anxious nor agitated  Skin:  No rashes. No jaundice    Reviewed most current lab test results and cultures  YES  Reviewed most current radiology test results   YES  Review and summation of old records today    NO  Reviewed patient's current orders and MAR    YES  PMH/SH reviewed - no change compared to H&P  ________________________________________________________________________  Care Plan discussed with:    Comments   Patient x    Family      RN x    Care Manager     Consultant                        Multidiciplinary team rounds were held today with , nursing, pharmacist and clinical coordinator. Patient's plan of care was discussed; medications were reviewed and discharge planning was addressed.      ________________________________________________________________________  Total NON critical care TIME:  35  Minutes    Total CRITICAL CARE TIME Spent:   Minutes non procedure based      Comments   >50% of visit spent in counseling and coordination of care     ________________________________________________________________________  Gracie Green MD     Procedures: see electronic medical records for all procedures/Xrays and details which were not copied into this note but were reviewed prior to creation of Plan. LABS:  I reviewed today's most current labs and imaging studies. Pertinent labs include:  Recent Labs     03/05/22  1548   WBC 5.5   HGB 13.6   HCT 42.1        Recent Labs     03/08/22  0245 03/07/22  1858 03/07/22  0040 03/06/22  0323 03/05/22  1548 03/05/22  1548     --  137 138   < > 140   K 3.7  --  3.8 3.5   < > 3.6     --  102 108   < > 106   CO2 28  --  28 26   < > 30   *  --  253* 88   < > 179*   BUN 23*  --  15 14   < > 18   CREA 1.69*  --  1.48* 1.17   < > 1.51*   CA 9.4  --  9.8 9.2   < > 9.4   MG  --  2.2  --  2.3  --   --    ALB  --   --   --   --   --  3.7   TBILI  --   --   --   --   --  1.1*   ALT  --   --   --   --   --  19   INR  --   --   --  1.0  --   --     < > = values in this interval not displayed.        Signed: Gracie Green MD

## 2022-03-08 NOTE — PROGRESS NOTES
Transition of Care Plan:    RUR: 12  Disposition: SNF  Follow up appointments: PCP  DME needed:Pt has access cane and RW  Transportation at Discharge: [employer] or means to access home:   Spouse has access to keys    IM Medicare Letter: Will provided upon d/c   Is patient a BCPI-A Bundle:  NA        If yes, was Bundle Letter given?:  NA  Is patient a  and connected with the [place]? NA             If yes, was [employer] transfer form completed and [facility] notified? NA  Caregiver Contact: Davi eKndall spouse [phone]  Discharge Caregiver contacted prior to discharge? Yes  Care Conference needed?:        No    Reason for Admission: Elavated [place]                       RUR Score:       12              Plan for utilizing home health:      Yes    PCP: First and Last name:  Jeniffer Billy MD     Name of Practice:    Are you a current patient: Yes/No: Yes    Approximate date of last visit: 2/24/2022   Can you participate in a virtual visit with your PCP:                     Current Advanced Directive/Advance Care Plan: Full Code      Healthcare Decision Maker:   Click here to complete "[employer] including selection of the Healthcare Decision Maker Relationship (ie \"Primary\")                             Transition of Care Plan:        SNF    Care Management Interventions  PCP Verified by CM:  Yes  Mode of Transport at Discharge: BLS  Transition of Care Consult (CM Consult): SNF (Pt had previous [facility] via At home care [facility])  Partner SNF: Yes  Discharge Durable Medical Equipment:  (Pt has access to RW and cane)  Physical Therapy Consult: Yes  Occupational Therapy Consult: Yes  Speech Therapy Consult: Yes  Support Systems: Spouse/Significant Other,Child(sherice)  Confirm Follow Up Transport: Family  The Plan for Transition of Care is Related to the Following Treatment Goals : SNF  The Patient and/or Patient Representative was Provided with a Choice of Provider and Agrees with the Discharge Plan?: Yes  Name of the Patient Representative Who was Provided with a Choice of Provider and Agrees with the Discharge Plan: Spouse - Hedy Benavidez  [place] of Choice List was Provided with Basic Dialogue that Supports the Patient's Individualized Plan of Care/Goals, Treatment Preferences and Shares the Quality Data Associated with the Providers?: Yes  Discharge Location  Patient Expects to be Discharged to[de-identified] [facility] (ACP) Conversation      Date of Conversation: 3/5/2022  Conducted with: Patient with Decision Making Capacity    Healthcare Decision Maker:   No healthcare decision makers have been documented. Click here to complete [address] including selection of the Healthcare Decision Maker Relationship (ie \"Primary\")      Content/Action Overview:   DECLINED ACP conversation - will revisit periodically   Reviewed DNR/DNI and patient elects Full Code (Attempt Resuscitation)    Length of Voluntary ACP Conversation in minutes:  16 minutes    Kush Werner CM spoke to pt's spouse and completed CM initial assessment. Pt was indepedent with ADLs and IADLs prior to admission. Pt lives with spouse in a single level home has 2 steps at entrance. Pt had previous [facility] services via At home care. Pt is an insuline dependent. Disposition plan discussed. SNF list provided. Spouse selected [facility] and rehab from the list. Review send to [facility]. Tmrw spouse will visit pt at hospital, floor CM  contact number were provided. SNF referral send via CC link.      Kush Werner MSW  ED Case Manager   Ext [phone]

## 2022-03-08 NOTE — PROGRESS NOTES
Problem: Falls - Risk of  Goal: *Absence of Falls  Description: Document Martha Ground Fall Risk and appropriate interventions in the flowsheet. Outcome: Progressing Towards Goal  Note: Fall Risk Interventions:  Mobility Interventions: Bed/chair exit alarm,Communicate number of staff needed for ambulation/transfer,OT consult for ADLs,Patient to call before getting OOB,PT Consult for mobility concerns,PT Consult for assist device competence,Strengthening exercises (ROM-active/passive),Utilize walker, cane, or other assistive device,Utilize gait belt for transfers/ambulation    Mentation Interventions: Adequate sleep, hydration, pain control,Bed/chair exit alarm,Door open when patient unattended,Increase mobility,More frequent rounding,Reorient patient,Room close to nurse's station,Toileting rounds,Update white board    Medication Interventions: Bed/chair exit alarm,Patient to call before getting OOB,Teach patient to arise slowly    Elimination Interventions: Bed/chair exit alarm,Call light in reach,Patient to call for help with toileting needs,Stay With Me (per policy),Toilet paper/wipes in reach,Toileting schedule/hourly rounds              Problem: Patient Education: Go to Patient Education Activity  Goal: Patient/Family Education  Outcome: Progressing Towards Goal     Problem: Diabetes Self-Management  Goal: *Disease process and treatment process  Description: Define diabetes and identify own type of diabetes; list 3 options for treating diabetes. Outcome: Progressing Towards Goal  Goal: *Incorporating nutritional management into lifestyle  Description: Describe effect of type, amount and timing of food on blood glucose; list 3 methods for planning meals. Outcome: Progressing Towards Goal  Goal: *Incorporating physical activity into lifestyle  Description: State effect of exercise on blood glucose levels.   Outcome: Progressing Towards Goal  Goal: *Developing strategies to promote health/change behavior  Description: Define the ABC's of diabetes; identify appropriate screenings, schedule and personal plan for screenings. Outcome: Progressing Towards Goal  Goal: *Using medications safely  Description: State effect of diabetes medications on diabetes; name diabetes medication taking, action and side effects. Outcome: Progressing Towards Goal  Goal: *Monitoring blood glucose, interpreting and using results  Description: Identify recommended blood glucose targets  and personal targets. Outcome: Progressing Towards Goal  Goal: *Prevention, detection, treatment of acute complications  Description: List symptoms of hyper- and hypoglycemia; describe how to treat low blood sugar and actions for lowering  high blood glucose level. Outcome: Progressing Towards Goal  Goal: *Prevention, detection and treatment of chronic complications  Description: Define the natural course of diabetes and describe the relationship of blood glucose levels to long term complications of diabetes. Outcome: Progressing Towards Goal  Goal: *Developing strategies to address psychosocial issues  Description: Describe feelings about living with diabetes; identify support needed and support network  Outcome: Progressing Towards Goal  Goal: *Insulin pump training  Outcome: Progressing Towards Goal  Goal: *Sick day guidelines  Outcome: Progressing Towards Goal  Goal: *Patient Specific Goal (EDIT GOAL, INSERT TEXT)  Outcome: Progressing Towards Goal     Problem: Patient Education: Go to Patient Education Activity  Goal: Patient/Family Education  Outcome: Progressing Towards Goal     Problem: Pressure Injury - Risk of  Goal: *Prevention of pressure injury  Description: Document Finn Scale and appropriate interventions in the flowsheet.   Outcome: Progressing Towards Goal  Note: Pressure Injury Interventions:  Sensory Interventions: Assess changes in LOC,Assess need for specialty bed,Check visual cues for pain,Discuss PT/OT consult with provider,Float heels,Keep linens dry and wrinkle-free,Maintain/enhance activity level,Minimize linen layers,Pressure redistribution bed/mattress (bed type)    Moisture Interventions: Absorbent underpads,Apply protective barrier, creams and emollients,Assess need for specialty bed,Check for incontinence Q2 hours and as needed,Internal/External urinary devices,Maintain skin hydration (lotion/cream),Moisture barrier,Offer toileting Q_hr    Activity Interventions: Assess need for specialty bed,Increase time out of bed,Pressure redistribution bed/mattress(bed type),PT/OT evaluation    Mobility Interventions: Assess need for specialty bed,HOB 30 degrees or less,Pressure redistribution bed/mattress (bed type),PT/OT evaluation    Nutrition Interventions: Document food/fluid/supplement intake,Offer support with meals,snacks and hydration    Friction and Shear Interventions: Apply protective barrier, creams and emollients,HOB 30 degrees or less,Lift sheet,Minimize layers                Problem: Patient Education: Go to Patient Education Activity  Goal: Patient/Family Education  Outcome: Progressing Towards Goal     Problem: Patient Education: Go to Patient Education Activity  Goal: Patient/Family Education  Outcome: Progressing Towards Goal     Problem: Patient Education: Go to Patient Education Activity  Goal: Patient/Family Education  Outcome: Progressing Towards Goal     Problem: Patient Education: Go to Patient Education Activity  Goal: Patient/Family Education  Outcome: Progressing Towards Goal

## 2022-03-08 NOTE — PROGRESS NOTES
Problem: Falls - Risk of  Goal: *Absence of Falls  Description: Document [place] Embs Fall Risk and appropriate interventions in the flowsheet. Outcome: Progressing Towards Goal  Note: Fall Risk Interventions:  Mobility Interventions: Assess mobility with egress test,Bed/chair exit alarm,Patient to call before getting OOB    Mentation Interventions: Adequate sleep, hydration, pain control,Bed/chair exit alarm,Evaluate medications/consider consulting pharmacy    Medication Interventions: Assess postural VS orthostatic hypotension,Patient to call before getting OOB    Elimination Interventions: Bed/chair exit alarm,Call light in reach,Patient to call for help with toileting needs              Problem: Patient Education: Go to Patient Education Activity  Goal: Patient/Family Education  Outcome: Progressing Towards Goal     Problem: Diabetes Self-Management  Goal: *Disease process and treatment process  Description: Define diabetes and identify own type of diabetes; list 3 options for treating diabetes. Outcome: Progressing Towards Goal  Goal: *Incorporating nutritional management into lifestyle  Description: Describe effect of type, amount and timing of food on blood glucose; list 3 methods for planning meals. Outcome: Progressing Towards Goal  Goal: *Incorporating physical activity into lifestyle  Description: State effect of exercise on blood glucose levels. Outcome: Progressing Towards Goal  Goal: *Developing strategies to promote health/change behavior  Description: Define the ABC's of diabetes; identify appropriate screenings, schedule and personal plan for screenings. Outcome: Progressing Towards Goal     Problem: Pressure Injury - Risk of  Goal: *Prevention of pressure injury  Description: Document Finn Scale and appropriate interventions in the flowsheet.   Outcome: Progressing Towards Goal  Note: Pressure Injury Interventions:  Sensory Interventions: Assess changes in LOC,Assess need for specialty bed,Avoid rigorous massage over bony prominences,Check visual cues for pain    Moisture Interventions: Absorbent underpads,Apply protective barrier, creams and emollients,Check for incontinence Q2 hours and as needed    Activity Interventions: Assess need for specialty bed,Chair cushion,Pressure redistribution bed/mattress(bed type)    Mobility Interventions: Assess need for specialty bed,Chair cushion,HOB 30 degrees or less    Nutrition Interventions: Document food/fluid/supplement intake    Friction and Shear Interventions: Apply protective barrier, creams and emollients,Feet elevated on foot rest,HOB 30 degrees or less                Problem: Patient Education: Go to Patient Education Activity  Goal: Patient/Family Education  Outcome: Progressing Towards Goal

## 2022-03-08 NOTE — PROGRESS NOTES
End of Shift Note    Bedside shift change report given to Radha (oncoming nurse) by Dino Burgos (offgoing nurse). Report included the following information SBAR and Kardex    Shift worked:  2502-6122     Shift summary and any significant changes:     0020 Patient diaphoretic and very difficult to arouse. Blood sugar is 210. VSS. Charge nurse aware. Will continue to monitor. Alisson air pump placed on bed and turn team added. Concerns for physician to address:  See above     Zone phone for oncoming shift:          Activity:  Activity Level: Up with Assistance  Number times ambulated in hallways past shift: 0  Number of times OOB to chair past shift: 0    Cardiac:   Cardiac Monitoring: Yes      Cardiac Rhythm: Sinus Rhythm    Access:   Current line(s): PIV     Genitourinary:   Urinary status: voiding, incontinent and external catheter    Respiratory:   O2 Device: None (Room air)  Chronic home O2 use?: NO         GI:  Last Bowel Movement Date: 03/07/22  Current diet:  ADULT DIET Dysphagia - Soft & Bite Sized  Passing flatus: YES  Tolerating current diet: YES       Pain Management:   Patient states pain is manageable on current regimen: YES    Skin:  Finn Score: 17  Interventions: turn team, speciality bed, float heels, increase time out of bed, PT/OT consult and internal/external urinary devices    Patient Safety:  Fall Score:  Total Score: 4  Interventions: bed/chair alarm, assistive device (walker, cane, etc), gripper socks, pt to call before getting OOB and stay with me (per policy)  High Fall Risk: Yes    Length of Stay:  Expected LOS: 2d 21h  Actual LOS: [facility]

## 2022-03-08 NOTE — PROGRESS NOTES
Occupational Therapy note:    Chart reviewed and cleared for therapy by RN. Patient received semisupine in bed with wife present in room. Wife provided PLOF and home set-up. Per wife, patient required set-up for ADLs and cueing for task completion. Patient was able to complete functional mobility without assist. When therapist attempt to wake patient up, he initially wouldn't open eyes. Gentle sternal rub applied and patient opened eyes stating, \"Do not wake me up! \" and \"Leave me be!\". Patient closed eyes and went back to sleep. Session aborted. Will continue to follow as appropriate.     Francy Kilgore, OTR/L

## 2022-03-08 NOTE — PROGRESS NOTES
0700: Bedside shift report received from Prasanna, [facility]. Report consisted of SBAR, Kardex, MAR, Recent Results, and cardiac rhythm. 1000: Avasys discontinued. End of Shift Note    Bedside shift change report given to NOAH Mims (oncoming nurse) by Juan Francisco Santiago RN (offgoing nurse). Report included the following information SBAR, Kardex, Intake/Output, MAR, Recent Results and Cardiac Rhythm sinus rhythm    Shift worked:  3349-3824     Shift summary and any significant changes:     no significant changes     Concerns for physician to address:       Zone phone for oncoming shift:          Activity:  Activity Level: Up with Assistance  Number times ambulated in hallways past shift: 0  Number of times OOB to chair past shift: 0    Cardiac:   Cardiac Monitoring: Yes      Cardiac Rhythm: Sinus Rhythm    Access:   Current line(s): PIV     Genitourinary:   Urinary status: voiding    Respiratory:   O2 Device: None (Room air)  Chronic home O2 use?: YES  Incentive spirometer at bedside: NO       GI:  Last Bowel Movement Date: 03/07/22  Current diet:  ADULT DIET Dysphagia - Soft & Bite Sized; 4 carb choices (60 gm/meal)  Passing flatus: YES  Tolerating current diet: YES       Pain Management:   Patient states pain is manageable on current regimen: YES    Skin:  Finn Score: 18  Interventions: turn team, speciality bed, float heels, increase time out of bed and PT/OT consult    Patient Safety:  Fall Score:  Total Score: 4  Interventions: bed/chair alarm, assistive device (walker, cane, etc), gripper socks, pt to call before getting OOB and stay with me (per policy)  High Fall Risk: Yes    Length of Stay:  Expected LOS: 2d 21h  Actual LOS: 1      Juan Francisco Santiago RN

## 2022-03-08 NOTE — PROGRESS NOTES
Physical Therapy:  Attempted PT session 12:03-12:13, patient sleeping sounding with spouse at bedside. Spouse able to provide clarifying information about PLOF and home set-up. Spouse reports he was able to ambulate without AD. He performed all basic mobility without assist, required prompting to complete ADLs. Therapist calling out to wake patient with no response, therapist performing gentle sternal rub. Patient quickly waking up and opening eyes and firmly states \"Don't you wake me up! Just let me be! \" and he quickly closes eyes and would not respond to further questioning. Aborted session and will continue to follow.     April Cony Sheridan PT, DPT

## 2022-03-08 NOTE — PROGRESS NOTES
Transition of Care Plan:     RUR: 12%  Disposition: home with [place]  Follow up appointments: PCP  DME needed:Pt has access cane and RW  Transportation at Discharge: [address] or means to access home:   Spouse has access to keys    IM Medicare Letter: Will provided upon d/c   Is patient a BCPI-A Bundle:  NA                   If yes, was Bundle Letter given?:  NA  Is patient a [place] and connected with the [place]? NA             If yes, was [employer] transfer form completed and VA notified? NA  Caregiver Contact: [place] How spouse [phone]  Discharge Caregiver contacted prior to discharge? Yes  Care Conference needed?:  No    Initial note:  Chart reviewed. CM met with the pt and his wife. They have changed their minds and want the pt to go home with [place]. [facility]errals [facility] to [facility], [facility], and [facility]. Awaiting response. Clarice Hunter with [facility]. CM to continue to monitor for d/c needs.     Addison Thomas, MSN  Care Manager  [phone]

## 2022-03-09 LAB
ANION GAP SERPL CALC-SCNC: 3 MMOL/L (ref 5–15)
BUN SERPL-MCNC: 20 MG/DL (ref 6–20)
BUN/CREAT SERPL: 14 (ref 12–20)
CALCIUM SERPL-MCNC: 9 MG/DL (ref 8.5–10.1)
CHLORIDE SERPL-SCNC: 107 MMOL/L (ref 97–108)
CO2 SERPL-SCNC: 29 MMOL/L (ref 21–32)
COVID-19 RAPID TEST, COVR: NOT DETECTED
CREAT SERPL-MCNC: 1.39 MG/DL (ref 0.7–1.3)
GLUCOSE BLD STRIP.AUTO-MCNC: 142 MG/DL (ref 65–117)
GLUCOSE BLD STRIP.AUTO-MCNC: 186 MG/DL (ref 65–117)
GLUCOSE BLD STRIP.AUTO-MCNC: 215 MG/DL (ref 65–117)
GLUCOSE BLD STRIP.AUTO-MCNC: 260 MG/DL (ref 65–117)
GLUCOSE SERPL-MCNC: 197 MG/DL (ref 65–100)
POTASSIUM SERPL-SCNC: 3.8 MMOL/L (ref 3.5–5.1)
SERVICE CMNT-IMP: ABNORMAL
SODIUM SERPL-SCNC: 139 MMOL/L (ref 136–145)
SOURCE, COVRS: NORMAL

## 2022-03-09 PROCEDURE — 96376 TX/PRO/DX INJ SAME DRUG ADON: CPT

## 2022-03-09 PROCEDURE — 87635 SARS-COV-2 COVID-19 AMP PRB: CPT

## 2022-03-09 PROCEDURE — 74011250637 HC RX REV CODE- 250/637: Performed by: STUDENT IN AN ORGANIZED HEALTH CARE EDUCATION/TRAINING PROGRAM

## 2022-03-09 PROCEDURE — 65660000000 HC RM CCU STEPDOWN

## 2022-03-09 PROCEDURE — 97530 THERAPEUTIC ACTIVITIES: CPT

## 2022-03-09 PROCEDURE — 74011250636 HC RX REV CODE- 250/636: Performed by: NURSE PRACTITIONER

## 2022-03-09 PROCEDURE — 74011636637 HC RX REV CODE- 636/637: Performed by: INTERNAL MEDICINE

## 2022-03-09 PROCEDURE — 97535 SELF CARE MNGMENT TRAINING: CPT

## 2022-03-09 PROCEDURE — 97116 GAIT TRAINING THERAPY: CPT

## 2022-03-09 PROCEDURE — 2709999900 HC NON-CHARGEABLE SUPPLY

## 2022-03-09 PROCEDURE — 74011636637 HC RX REV CODE- 636/637: Performed by: NURSE PRACTITIONER

## 2022-03-09 PROCEDURE — 74011250637 HC RX REV CODE- 250/637: Performed by: NURSE PRACTITIONER

## 2022-03-09 PROCEDURE — 74011250636 HC RX REV CODE- 250/636: Performed by: INTERNAL MEDICINE

## 2022-03-09 PROCEDURE — 36415 COLL VENOUS BLD VENIPUNCTURE: CPT

## 2022-03-09 PROCEDURE — 65270000029 HC RM PRIVATE

## 2022-03-09 PROCEDURE — 82962 GLUCOSE BLOOD TEST: CPT

## 2022-03-09 PROCEDURE — 96372 THER/PROPH/DIAG INJ SC/IM: CPT

## 2022-03-09 PROCEDURE — 80048 BASIC METABOLIC PNL TOTAL CA: CPT

## 2022-03-09 PROCEDURE — 74011000250 HC RX REV CODE- 250: Performed by: NURSE PRACTITIONER

## 2022-03-09 RX ORDER — DONEPEZIL HYDROCHLORIDE 10 MG/1
10 TABLET, FILM COATED ORAL
Qty: 30 TABLET | Refills: 0 | Status: SHIPPED | OUTPATIENT
Start: 2022-03-09

## 2022-03-09 RX ADMIN — Medication 18 UNITS: at 17:44

## 2022-03-09 RX ADMIN — Medication 18 UNITS: at 08:00

## 2022-03-09 RX ADMIN — ATORVASTATIN CALCIUM 20 MG: 20 TABLET, FILM COATED ORAL at 08:01

## 2022-03-09 RX ADMIN — SODIUM CHLORIDE, PRESERVATIVE FREE 10 ML: 5 INJECTION INTRAVENOUS at 05:00

## 2022-03-09 RX ADMIN — MEMANTINE HYDROCHLORIDE 10 MG: 10 TABLET ORAL at 17:52

## 2022-03-09 RX ADMIN — Medication 2 UNITS: at 21:28

## 2022-03-09 RX ADMIN — METOPROLOL SUCCINATE 100 MG: 50 TABLET, FILM COATED, EXTENDED RELEASE ORAL at 08:01

## 2022-03-09 RX ADMIN — ASPIRIN 81 MG: 81 TABLET, CHEWABLE ORAL at 08:01

## 2022-03-09 RX ADMIN — Medication 2 UNITS: at 12:26

## 2022-03-09 RX ADMIN — DONEPEZIL HYDROCHLORIDE 10 MG: 5 TABLET, FILM COATED ORAL at 21:27

## 2022-03-09 RX ADMIN — HEPARIN SODIUM 5000 UNITS: 5000 INJECTION INTRAVENOUS; SUBCUTANEOUS at 17:45

## 2022-03-09 RX ADMIN — HEPARIN SODIUM 5000 UNITS: 5000 INJECTION INTRAVENOUS; SUBCUTANEOUS at 08:01

## 2022-03-09 RX ADMIN — AMLODIPINE BESYLATE 10 MG: 5 TABLET ORAL at 21:27

## 2022-03-09 RX ADMIN — QUETIAPINE FUMARATE 50 MG: 25 TABLET ORAL at 21:27

## 2022-03-09 RX ADMIN — MEMANTINE HYDROCHLORIDE 10 MG: 10 TABLET ORAL at 08:12

## 2022-03-09 RX ADMIN — Medication 3 UNITS: at 08:00

## 2022-03-09 RX ADMIN — ISOSORBIDE MONONITRATE 30 MG: 30 TABLET, EXTENDED RELEASE ORAL at 08:03

## 2022-03-09 RX ADMIN — HEPARIN SODIUM 5000 UNITS: 5000 INJECTION INTRAVENOUS; SUBCUTANEOUS at 00:18

## 2022-03-09 RX ADMIN — Medication 2 UNITS: at 17:45

## 2022-03-09 RX ADMIN — SODIUM CHLORIDE, PRESERVATIVE FREE 10 ML: 5 INJECTION INTRAVENOUS at 14:00

## 2022-03-09 RX ADMIN — SODIUM CHLORIDE, PRESERVATIVE FREE 10 ML: 5 INJECTION INTRAVENOUS at 21:27

## 2022-03-09 RX ADMIN — LORAZEPAM 0.5 MG: 2 INJECTION INTRAMUSCULAR; INTRAVENOUS at 04:59

## 2022-03-09 NOTE — PROGRESS NOTES
Transition of Care Plan:     RUR: 12%    Disposition: SNF: [facility] 3/10/22 at 1 PM   Need RAPID COVID Test!  RN please call report to [facility]: [phone]  [facility] transport requested for 1 PM  time. PCS on bedside chart. CM will call and get room number in the morning. 3:36 PM   Sheri Dalal at [facility] stated that they could accept 3/10/22 in the afternoon. Instructed to set up transport for 1 PM.  [facility]: Accepted Can accept tomorrow after noon.  Care - Declined  [facility]: Declined    1:57 PM   No SNF responded yet. CM called  [facility]: [phone] and left VM for Admissions. CM called A Liaison: Andrew Karimi: [phone] and left VM  - [facility]: [phone] talked to [facility] and they are reviewing.   [facility]: [phone] CM left a VM for Admission [facility]. Karla Berumen: [phone]. CM left VM with admissions. 12:48 PM   Wife called back and gave a few more preferences for SNF:   [facility]: Pending  [facility] - Pending  [facility]: Pending  [facility]: Pending. 12 PM   CM talked to Pt wife and DTR on the phone and explained that he is needing 2 person assistance and has not ambulated yet. Family would like to see if SNF is an option. CM will send SNF choice list to [email or web address].  -They are familiar with [facility] and would like referral sent to them now and she will call me back with 3-4 more preferences after reviewing choice list.     -Pt wife stated that he has has two covid vac but no booster. 11:14 AM  Home with HH  -[facility] and [facility] both accepted. --Need to confirm with wife which agency she would like to use.   --CM attempted to call wife Zulma Kulkarni [phone] - no answer and no option to leave VM. MD tried to call her as well and no answer. Follow up appointments: PCP: Ingrid Erp:     DME needed:Pt has access cane and RW  Transportation at [address] will need to be arranged.    Keys or means to access home:   Spouse has access to keys    IM Medicare Letter: Will provided upon d/c   Is patient a BCPI-A Bundle:  NA                   If yes, was Bundle Letter given?:  NA  Is patient a [place] and connected with the VA?   NA             If yes, was [employer] transfer form completed and VA notified? NA  Caregiver Heather May spouse [phone]  Discharge Caregiver contacted prior to discharge?   11:14 AM no answer. . unable to leave a .   Care Conference needed?:  No  Karla Ybarra   [phone]

## 2022-03-09 NOTE — PROGRESS NOTES
0700: Bedside shift report received from [facility]. Report consisted of SBAR, Kardex, MAR, Recent Results, Intake/Output, and cardiac rhythm. 1730: Rapid COVID walked to lab. End of Shift Note    Bedside shift change report given to Julito Moraes RN (oncoming nurse) by Tevin Arizmendi RN (offgoing nurse). Report included the following information SBAR, Kardex, Intake/Output, MAR, Recent Results and Cardiac Rhythm sinus rhythm    Shift worked:  3696-1504     Shift summary and any significant changes:     no significant changes     Rapid covid sent to lab for SNF     Concerns for physician to address:       Zone phone for oncoming shift:          Activity:  Activity Level: Up with Assistance  Number times ambulated in hallways past shift: 0  Number of times OOB to chair past shift: 0    Cardiac:   Cardiac Monitoring: Yes      Cardiac Rhythm: Sinus Rhythm    Access:   Current line(s): PIV     Genitourinary:   Urinary status: voiding    Respiratory:   O2 Device: None (Room air)  Chronic home O2 use?: NO  Incentive spirometer at bedside: NO       GI:  Last Bowel Movement Date: 03/07/22  Current diet:  ADULT DIET Dysphagia - Soft & Bite Sized; 4 carb choices (60 gm/meal)  Passing flatus: YES  Tolerating current diet: YES       Pain Management:   Patient states pain is manageable on current regimen: YES    Skin:  Finn Score: 17  Interventions: turn team, speciality bed, float heels, increase time out of bed and PT/OT consult    Patient Safety:  Fall Score:  Total Score: 4  Interventions: bed/chair alarm, assistive device (walker, cane, etc), gripper socks and pt to call before getting OOB  High Fall Risk: Yes    Length of Stay:  Expected LOS: 2d 21h  Actual LOS: 2      Tevin Arizmendi RN                          '

## 2022-03-09 NOTE — PROGRESS NOTES
I called patient's spouse Davi Jorijaimie with no response. Addendum  I talked with patient's wife and she was updated on the plan, all questions were answered.

## 2022-03-09 NOTE — PROGRESS NOTES
Problem: Mobility Impaired (Adult and Pediatric)  Goal: *Acute Goals and Plan of Care (Insert Text)  Description: FUNCTIONAL STATUS PRIOR TO ADMISSION: Unable to confirm PLOF, patient with minimal communication during eval.     HOME SUPPORT PRIOR TO ADMISSION: Per chart, patient lived with spouse. Physical Therapy Goals  Initiated 3/7/2022  1. Patient will move from supine to sit and sit to supine, scoot up and down, and roll side to side in bed with supervision/set-up within 7 day(s). 2.  Patient will transfer from bed to chair and chair to bed with minimal assistance/contact guard assist using the least restrictive device within 7 day(s). 3.  Patient will perform sit to stand with minimal assistance/contact guard assist within 7 day(s). 4.  Patient will ambulate with minimal assistance/contact guard assist for 25 feet with the least restrictive device within 7 day(s). Outcome: Progressing Towards Goal   PHYSICAL THERAPY TREATMENT  Patient: Arnulfo Granda (60 y.o. male)  Date: 3/9/2022  Diagnosis: Elevated troponin [R77.8]  Hypoglycemia [E16.2] <principal problem not specified>       Precautions: Fall,Bed Alarm  Chart, physical therapy assessment, plan of care and goals were reviewed. ASSESSMENT  Patient continues with skilled PT services and is slowly progressing towards goals. Patient resting in bed upon arrival, agreeable to PT session. Patient with excessively delayed responses and has difficulty communicating needs, at times requiring 20-30 seconds to follow command/answer question. Requires Max Ax2 supine<>sit with cuing for sequencing. Initial sitting balance requiring Mod A but progressing to close supervision, demonstrates posterior tendency though able to maintain balance. Sit<>stand Mod A and significantly increased time with patient pulling from walker, does not follow direction to push from bed.  Ambulation x6ft with RW Min A demonstrating shuffling gait pattern, patient with freezing/staring off episode and unable to communicate needs. Following this, patient with incontinent stool in standing; able to transfer to [facility] with Mod A. BSC>bed transfer with Mod A and max cuing for sequencing, patient attempting to sit before being in safe position to complete transfer. Patient returned to supine with Max Ax2. Continue to recommend SNF level rehab at discharge. Current Level of Function Impacting Discharge (mobility/balance): Max Ax2 supine<>sit; Mod A sit<>stand; Min A amb x6ft         PLAN :  Patient continues to benefit from skilled intervention to address the above impairments. Continue treatment per established plan of care. to address goals. Recommendation for discharge: (in order for the patient to meet his/her long term goals)  Therapy up to 5 days/week in SNF setting    This discharge recommendation:  Has been made in collaboration with the attending provider and/or case management    IF patient discharges home will need the following DME: to be determined (TBD)     SUBJECTIVE:   Patient stated Ayan [place].     OBJECTIVE DATA SUMMARY:   Critical Behavior:  Neurologic State: Alert  Orientation Level: Oriented to person,Disoriented to situation,Disoriented to time,Disoriented to place  Cognition: Follows commands,Impaired decision making  Safety/Judgement: Lack of insight into deficits  Functional Mobility Training:  Bed Mobility:  Rolling: Maximum assistance  Supine to Sit: Maximum assistance  Sit to Supine: Maximum assistance  Scooting: Maximum assistance  Transfers:  Sit to Stand: Moderate assistance  Stand to Sit: Moderate assistance  Balance:  Sitting: Impaired  Sitting - Static: Good (unsupported)  Sitting - Dynamic: Fair (occasional)  Standing: Impaired; With support  Standing - Static: Good;Constant support  Standing - Dynamic : Fair;Constant support  Ambulation/Gait Training:  Distance (ft): 6 Feet (ft)  Assistive Device: Walker, rolling;Gait belt  Ambulation - Level of Assistance: Minimal assistance  Gait Abnormalities: Decreased step clearance;Shuffling gait  Base of Support: Narrowed  Speed/Melvina: Slow;Shuffled  Step Length: Right shortened;Left shortened    Activity Tolerance:   Fair, SpO2 stable on RA and observed SOB with activity    After treatment patient left in no apparent distress:   Supine in bed, Call bell within reach, Bed / chair alarm activated and Side rails x 3    COMMUNICATION/COLLABORATION:   The patients plan of care was discussed with: Occupational therapist and Registered nurse.      Britt Beckford, NEMO   Time Calculation: 35 mins

## 2022-03-09 NOTE — DISCHARGE SUMMARY
Hospitalist Discharge Summary     Patient ID:  Shameka Nelson  [id number]  37 y.o.  1940  3/5/2022    PCP on record: Gloria Mcdaniel MD    Admit date: 3/5/2022  Discharge date and time: 3/9/2022    DISCHARGE DIAGNOSIS:    DM type II  Hypoglycemia       ? Dysphagia       Elevated trop likely due CAD and CKD       CKD3       Prostate ca  Obesity  Dementia  HLD    CONSULTATIONS:  IP CONSULT TO CARDIOLOGY  IP CONSULT TO HOSPITALIST    Excerpted HPI from H&P of Drew Persaud MD:  Ferdinand Edge is a [de-identified] y.o.  male who presents with BG in the 30's, diaphoresis. As per wife early AM BG was 56, feed him breakfast and gave scheduled Lantus 60 units. Around 1300, patient becomes sweaty and was just laying in bed. She checked his BG and it was in the 30's prompting her to call the EMS. Patient is minimally verbal at baseline. Wife did not notice any obvious behavioral or functional changes recently, but also noted gradual decline of mental function over the course of time. Past medical history is significant for Dementia, hypertension, hyperlipidemia, T2DM, CAD and prostate cancer.    ______________________________________________________________________  DISCHARGE SUMMARY/HOSPITAL COURSE:  for full details see H&P, daily progress notes, labs, consult notes. DM type II  Hypoglycemia  Likely due to poor po intake with insulin use. Per pt's wife, pt doesn't eat much at home. His insulin regimen is humalog 75-25 at 90 units bid but she only gives him 60 units bid  A1C 8.1  It seems that he has been stable on NPH 18 units twice daily, will continue on that  He needs close follow-up as an outpatient with primary care physician  Pt/ot evaluated, recommended SNF. CM to help with dispo.       ?Dysphagia  SLP evaluated he was tolerating full diet when I saw him today     Elevated trop likely due CAD and CKD  No acute ischemia on EKG. Trop peaked at 215. No cp.   Cont' cardiac meds  Cardiology evaluated, no plan for invasive procedure     CKD3  Baseline cr ~1.5, monitor and avoid nephrotoxic agents  Creatinine baseline 1.39     Prostate ca  Obesity  Dementia  HLD  Cont' home meds  Improved      _______________________________________________________________________  Patient seen and examined by me on discharge day. Pertinent Findings:  Gen:    Not in distress  Chest: Clear lungs  CVS:   Regular rhythm. No edema  Abd:  Soft, not distended, not tender  Neuro:  Alert,   _______________________________________________________________________  DISCHARGE MEDICATIONS:   Current Discharge Medication List      START taking these medications    Details   donepeziL (ARICEPT) 10 mg tablet Take 1 Tablet by mouth nightly. Qty: 30 Tablet, Refills: 0  Start date: 3/9/2022      insulin NPH (NOVOLIN N, HUMULIN N) 100 unit/mL injection 18 Units by SubCUTAneous route every twelve (12) hours. Qty: 15 mL, Refills: 0  Start date: 3/9/2022         CONTINUE these medications which have NOT CHANGED    Details   QUEtiapine (SEROquel) 50 mg tablet TAKE 1 TABLET NIGHTLY  Qty: 90 Tablet, Refills: 3    Associated Diagnoses: Dementia without behavioral disturbance, unspecified dementia type (HCC)      memantine-donepeziL (Namzaric) 28-10 mg CSpX TAKE 1 CAPSULE NIGHTLY  Qty: 90 Capsule, Refills: 3    Associated Diagnoses: Dementia without behavioral disturbance, unspecified dementia type (HCC)      amLODIPine (NORVASC) 10 mg tablet Take 10 mg by mouth nightly. cholecalciferol (VITAMIN D3) 1,000 unit tablet Take 1,000 Units by mouth daily. atorvastatin (LIPITOR) 20 mg tablet Take 20 mg by mouth daily. isosorbide mononitrate ER (IMDUR) 30 mg tablet Take 30 mg by mouth daily. apalutamide (ERLEADA) 60 mg tablet Take 120 mg by mouth two (2) times a day. metoprolol succinate (TOPROL-XL) 100 mg tablet Take 100 mg by mouth daily. aspirin 81 mg tablet Take 81 mg by mouth daily.          STOP taking these medications       cefdinir (OMNICEF) 300 mg capsule Comments:   Reason for Stopping:         insulin aspart protamine/insulin aspart (NOVOLOG MIX 70-30 U-100 INSULN) 100 unit/mL (70-30) injection Comments:   Reason for Stopping:         insulin aspart protamine/insulin aspart (NOVOLOG MIX 70-30 U-100 INSULN) 100 unit/mL (70-30) injection Comments:   Reason for Stopping:                 Patient Follow Up Instructions: Activity: Activity as tolerated  Diet: Resume previous diet  Wound Care: None needed        Follow-up Information     Follow up With Specialties Details Why Claudia Zaman MD Internal Medicine Schedule an appointment as soon as possible for a visit in 3 days for PCP post hospital follow up appt. [address]   this is your home health agency.    [facility], [place], [address] [place] [address]  [phone]        ________________________________________________________________    Risk of deterioration: Low    Condition at Discharge:  Stable  __________________________________________________________________    Disposition  SNF/LTC    ____________________________________________________________________    Code Status: Full Code  ___________________________________________________________________      Total time in minutes spent coordinating this discharge (includes going over instructions, follow-up, prescriptions, and preparing report for sign off to her PCP) :  >30 minutes    Signed:  Mark Stanley MD

## 2022-03-09 NOTE — PROGRESS NOTES
End of Shift Note    Bedside shift change report given to Radha (oncoming nurse) by Loreto Peraza (offgoing nurse). Report included the following information SBAR and Kardex    Shift worked:  [id number]     Shift summary and any significant changes:     0450 Patient agitated and attempting to get out of bed. Patient unable to be redirected. Prn Ativan given. Concerns for physician to address:       Zone phone for oncoming shift:          Activity:  Activity Level: Up with Assistance  Number times ambulated in hallways past shift: 0  Number of times OOB to chair past shift: 0    Cardiac:   Cardiac Monitoring: Yes      Cardiac Rhythm: Sinus Rhythm    Access:   Current line(s): PIV     Genitourinary:   Urinary status: voiding, incontinent and external catheter    Respiratory:   O2 Device: None (Room air)  Chronic home O2 use?: NO  Incentive spirometer at bedside: NO       GI:  Last Bowel Movement Date: 03/07/22  Current diet:  ADULT DIET Dysphagia - Soft & Bite Sized; 4 carb choices (60 gm/meal)  Passing flatus: YES  Tolerating current diet: YES       Pain Management:   Patient states pain is manageable on current regimen: YES    Skin:  Finn Score: 17  Interventions: turn team, speciality bed, increase time out of bed, PT/OT consult and internal/external urinary devices    Patient Safety:  Fall Score:  Total Score: 4  Interventions: bed/chair alarm, assistive device (walker, cane, etc), gripper socks, pt to call before getting OOB and stay with me (per policy)  High Fall Risk: Yes    Length of Stay:  Expected LOS: 2d 21h  Actual LOS: 2      Loreto Peraza

## 2022-03-09 NOTE — PROGRESS NOTES
Problem: Self Care Deficits Care Plan (Adult)  Goal: *Acute Goals and Plan of Care (Insert Text)  Description: FUNCTIONAL STATUS PRIOR TO ADMISSION: Patient unable to provide functional status secondary to drowsiness and minimally interactive. HOME SUPPORT: Patient unable to provide but, per chart reviewed, lived with wife in 2019. Occupational Therapy Goals  Initiated 3/7/2022  1. Patient will perform simple grooming task sitting EOB with supervision/set-up within 7 day(s). 2.  Patient will perform upper body dressing with minimal assistance/contact guard assist within 7 day(s). 3.  Patient will perform supine <> sit EOB to prepare of OOB ADLs with minimal assistance/contact guard assist within 7 day(s). 4.  Patient will perform toilet transfers to [facility] with minimal assistance/contact guard assist within 7 day(s). 5.  Patient will perform all aspects of toileting with minimal assistance/contact guard assist within 7 day(s). 6.  Patient will participate in upper extremity therapeutic exercise/activities with supervision/set-up for 5 minutes within 7 day(s). 7.  Patient will utilize energy conservation techniques during functional activities with verbal cues within 7 day(s). Outcome: Progressing Towards Goal   OCCUPATIONAL THERAPY TREATMENT  Patient: Delisa Avila (51 y.o. male)  Date: 3/9/2022  Diagnosis: Elevated troponin [R77.8]  Hypoglycemia [E16.2] <principal problem not specified>       Precautions: Fall,Bed Alarm  Chart, occupational therapy assessment, plan of care, and goals were reviewed. ASSESSMENT  Patient continues with skilled OT services and is progressing towards goals. Patient received semisupine in bed and cleared for therapy by nursing. Patient completed supine > sit with max assist when coming EOB and demonstrated good static sitting balance.  Patient agreed to attempt standing and completed sit > stand with additional time and mod assist. When taking steps to sink using rolling walker, patient appeared more confused and noted patient was having bowel movement. BSC provided and patient transferred with min assist x2. Patient's condom catheter also noted to overflow with urine spilling onto floor. Patient required total assist for hygiene and LB dressing while seated on BSC but was able to wash his hands with min assist. Once finished, patient returned to bed and changed soiled gown with max assist. Cues and significant additional time provided for all tasks secondary to increased confused and delayed processing. Patient was left semisupine in bed with all needs met, VSS, and bed alarmed. Patient would continue to benefit from skilled OT services during acute hospital stay. Recommend patient discharge to SNF rehab. Current Level of Function Impacting Discharge (ADLs): min to total assist for self-care, min assist x2 to max assist for functional mobility using rolling walker     Other factors to consider for discharge: fall risk, confused         PLAN :  Patient continues to benefit from skilled intervention to address the above impairments. Continue treatment per established plan of care to address goals. Recommendation for discharge: (in order for the patient to meet his/her long term goals)  Therapy up to 5 days/week in SNF setting    This discharge recommendation:  Has been made in collaboration with the attending provider and/or case management    IF patient discharges home will need the following DME: TBD in SNF rehab       SUBJECTIVE:   Patient stated Yeah.    OBJECTIVE DATA SUMMARY:   Cognitive/Behavioral Status:  Neurologic State: Alert  Orientation Level: Oriented to person;Disoriented to situation;Disoriented to time;Disoriented to place  Cognition: Follows commands; Impaired decision making  Perception: Appears intact  Perseveration: No perseveration noted  Safety/Judgement: Lack of insight into deficits    Functional Mobility and Transfers for ADLs:  Bed Mobility:  Rolling: Maximum assistance  Supine to Sit: Maximum assistance  Sit to Supine: Maximum assistance  Scooting: Maximum assistance    Transfers:  Sit to Stand: Moderate assistance  Stand to Sit: Moderate assistance   Functional Transfers  Toilet Transfer : Minimum assistance;Assist x2  Cues: Physical assistance; Tactile cues provided;Verbal cues provided  Adaptive Equipment: Bedside commode    Balance:  Sitting: Impaired  Sitting - Static: Good (unsupported)  Sitting - Dynamic: Fair (occasional)  Standing: Impaired; With support  Standing - Static: Good;Constant support  Standing - Dynamic : Fair;Constant support    ADL Intervention:    Grooming  Position Performed:  (seated on [facility])  Washing Hands: Minimum assistance  Cues: Physical assistance; Tactile cues provided;Verbal cues provided    Upper Body [address] Gown: Maximum assistance  Cues: Physical assistance; Tactile cues provided;Verbal cues provided    Lower Body Dressing Assistance  Socks: Total assistance (dependent)  Leg Crossed Method Used: No  Position Performed: Other (comment) (seated on [facility])  Cues: Doff;Don;Physical assistance; Tactile cues provided;Verbal cues provided    Toileting  Bladder Hygiene: Total assistance (dependent)  Bowel Hygiene: Total assistance (dependent)  Cues: Tactile cues provided;Verbal cues provided    Cognitive Retraining  Safety/Judgement: Lack of insight into deficits    Pain:  Patient did not c/o pain during session. Activity Tolerance:   Fair and requires rest breaks    After treatment patient left in no apparent distress:   Supine in bed, Call bell within reach, Bed / chair alarm activated, and Side rails x 3    COMMUNICATION/COLLABORATION:   The patients plan of care was discussed with: Physical therapist and Registered nurse.      CESIA García/L  Time Calculation: 35 mins

## 2022-03-09 NOTE — PROGRESS NOTES
Problem: Falls - Risk of  Goal: *Absence of Falls  Description: Document Jesi Nap Fall Risk and appropriate interventions in the flowsheet. Outcome: Progressing Towards Goal  Note: Fall Risk Interventions:  Mobility Interventions: Bed/chair exit alarm,Communicate number of staff needed for ambulation/transfer,OT consult for ADLs,Patient to call before getting OOB,PT Consult for mobility concerns,PT Consult for assist device competence,Strengthening exercises (ROM-active/passive),Utilize walker, cane, or other assistive device,Utilize gait belt for transfers/ambulation    Mentation Interventions: Adequate sleep, hydration, pain control,Bed/chair exit alarm,Door open when patient unattended,Increase mobility,More frequent rounding,Reorient patient,Room close to nurse's station,Toileting rounds,Update white board    Medication Interventions: Bed/chair exit alarm,Patient to call before getting OOB,Teach patient to arise slowly    Elimination Interventions: Bed/chair exit alarm,Call light in reach,Patient to call for help with toileting needs,Stay With Me (per policy),Toilet paper/wipes in reach,Toileting schedule/hourly rounds    History of Falls Interventions: Bed/chair exit alarm,Door open when patient unattended,Room close to nurse's station         Problem: Patient Education: Go to Patient Education Activity  Goal: Patient/Family Education  Outcome: Progressing Towards Goal     Problem: Diabetes Self-Management  Goal: *Disease process and treatment process  Description: Define diabetes and identify own type of diabetes; list 3 options for treating diabetes. Outcome: Progressing Towards Goal  Goal: *Incorporating nutritional management into lifestyle  Description: Describe effect of type, amount and timing of food on blood glucose; list 3 methods for planning meals.   Outcome: Progressing Towards Goal  Goal: *Incorporating physical activity into lifestyle  Description: State effect of exercise on blood glucose levels. Outcome: Progressing Towards Goal  Goal: *Developing strategies to promote health/change behavior  Description: Define the ABC's of diabetes; identify appropriate screenings, schedule and personal plan for screenings. Outcome: Progressing Towards Goal  Goal: *Using medications safely  Description: State effect of diabetes medications on diabetes; name diabetes medication taking, action and side effects. Outcome: Progressing Towards Goal  Goal: *Monitoring blood glucose, interpreting and using results  Description: Identify recommended blood glucose targets  and personal targets. Outcome: Progressing Towards Goal  Goal: *Prevention, detection, treatment of acute complications  Description: List symptoms of hyper- and hypoglycemia; describe how to treat low blood sugar and actions for lowering  high blood glucose level. Outcome: Progressing Towards Goal  Goal: *Prevention, detection and treatment of chronic complications  Description: Define the natural course of diabetes and describe the relationship of blood glucose levels to long term complications of diabetes. Outcome: Progressing Towards Goal  Goal: *Developing strategies to address psychosocial issues  Description: Describe feelings about living with diabetes; identify support needed and support network  Outcome: Progressing Towards Goal  Goal: *Insulin pump training  Outcome: Progressing Towards Goal  Goal: *Sick day guidelines  Outcome: Progressing Towards Goal  Goal: *Patient Specific Goal (EDIT GOAL, INSERT TEXT)  Outcome: Progressing Towards Goal     Problem: Patient Education: Go to Patient Education Activity  Goal: Patient/Family Education  Outcome: Progressing Towards Goal     Problem: Pressure Injury - Risk of  Goal: *Prevention of pressure injury  Description: Document Finn Scale and appropriate interventions in the flowsheet.   Outcome: Progressing Towards Goal  Note: Pressure Injury Interventions:  Sensory Interventions: Assess changes in LOC,Assess need for specialty bed,Check visual cues for pain,Discuss PT/OT consult with provider,Keep linens dry and wrinkle-free,Maintain/enhance activity level,Minimize linen layers,Pressure redistribution bed/mattress (bed type),Turn and reposition approx. every two hours (pillows and wedges if needed)    Moisture Interventions: Absorbent underpads,Check for incontinence Q2 hours and as needed,Internal/External urinary devices,Maintain skin hydration (lotion/cream),Minimize layers,Moisture barrier,Offer toileting Q_hr    Activity Interventions: Assess need for specialty bed,Increase time out of bed,Pressure redistribution bed/mattress(bed type),PT/OT evaluation    Mobility Interventions: Assess need for specialty bed,HOB 30 degrees or less,Pressure redistribution bed/mattress (bed type),PT/OT evaluation,Turn and reposition approx.  every two hours(pillow and wedges)    Nutrition Interventions: Document food/fluid/supplement intake,Offer support with meals,snacks and hydration    Friction and Shear Interventions: HOB 30 degrees or less,Lift sheet,Minimize layers,Lift team/patient mobility team                Problem: Patient Education: Go to Patient Education Activity  Goal: Patient/Family Education  Outcome: Progressing Towards Goal     Problem: Patient Education: Go to Patient Education Activity  Goal: Patient/Family Education  Outcome: Progressing Towards Goal     Problem: Patient Education: Go to Patient Education Activity  Goal: Patient/Family Education  Outcome: Progressing Towards Goal     Problem: Patient Education: Go to Patient Education Activity  Goal: Patient/Family Education  Outcome: Progressing Towards Goal

## 2022-03-09 NOTE — DISCHARGE INSTRUCTIONS
Patient Education        Learning About Low Blood Sugar (Hypoglycemia) in Diabetes  What is low blood sugar (hypoglycemia)? Hypoglycemia means that your blood sugar is low and your body (especially your brain) is not getting enough fuel. If you have diabetes, your blood sugar can go too low if you take too much of some diabetes medicines. It can also go too low if you miss a meal. And it can happen if you exercise too hard without eating enough food. Some medicines used to treat other health problems can cause low blood sugar too. What are the symptoms? Symptoms of low blood sugar can start quickly. It may take just 10 to 15 minutes. If you have had diabetes for many years, you may not realize that your blood sugar is low until it drops very low. · If your blood sugar level drops below 70 (mild low blood sugar), you may feel tired, anxious, dizzy, weak, shaky, or sweaty. You may have a fast heartbeat or blurry vision. · If your blood sugar level continues to drop, your behavior may change. You may feel more irritable. You may find it hard to concentrate or talk. And you may feel unsteady when you stand or walk. You may become too weak or confused to eat something with sugar to raise your blood sugar level. · If your blood sugar level drops very low, you may pass out (lose consciousness). Or you may have a seizure or stroke. If you have symptoms of severe low blood sugar, you need to get medical care right away. If you had a low blood sugar level during the night, you may wake up tired or with a headache. Or you may sweat so much during the night that your pajamas or sheets are damp when you wake up. How is low blood sugar treated? You can treat low blood sugar by eating or drinking something that has 15 grams of carbohydrate. These should be quick-sugar foods.  Check your blood sugar level again 15 minutes after having a quick-sugar food to make sure your level is getting back to your target range.  Children usually need less than 15 grams of carbohydrate. Check with your doctor or diabetes educator for the amount that is right for your child. Here are examples of quick-sugar foods that have 15 grams of carbohydrate:  · 3 to 4 glucose tablets  · 1 tablespoon (3 teaspoons) of table sugar  · 1 tablespoon (3 teaspoons) honey  · ½ cup to ¾ cup (4 to 6 ounces) of fruit juice or regular (not diet) soda  · Hard candy (such as 6 Life Savers)  If you have problems with severe low blood sugar, someone else may have to give you glucagon. This is a hormone that raises blood sugar levels quickly. How can you prevent low blood sugar? You can take steps to prevent low blood sugar. · Follow your treatment plan. Take your insulin or other diabetes medicine exactly as your doctor prescribed it. Talk with your doctor if you're having low blood sugar often. Your medicine may need to be adjusted if it's causing your low blood sugar. · Check your blood sugar levels often. This helps you find early changes before an emergency happens. · Keep a quick-sugar food with you in case your blood sugar level drops low. · Eat small meals more often so that you don't get too hungry between meals. Don't skip meals. · Balance extra exercise with eating more. Check your blood sugar and learn how it changes after exercise. If your blood sugar stays at a normal level, you may not need to eat after you exercise. · Limit how much alcohol you drink. Alcohol can make low blood sugar go even lower. Don't drink alcohol if you have problems recognizing the early signs of low blood sugar. · Keep a diary of your symptoms. This helps you learn when changes in your body may signal low blood sugar. And keep track of how often you have low blood sugar, including when you last ate and what you ate. This will help you learn what causes your blood sugar to drop. · Learn about diabetes and low blood sugar.  Support groups or a diabetes education center can help you understand how medicines, diet, and exercise affect your blood sugar levels. Since low blood sugar levels can quickly become an emergency, be sure to wear medical alert jewelry, such as a medical alert bracelet. This is to let people know you have diabetes so they can get help for you. You can buy this at most drugstores. And make sure your family, friends, and coworkers know the symptoms of low blood sugar. Teach them what to do to get your sugar level up. Follow-up care is a key part of your treatment and safety. Be sure to make and go to all appointments, and call your doctor if you are having problems. It's also a good idea to know your test results and keep a list of the medicines you take. Where can you learn more? Go to http://www.gray.com/  Enter T511 in the search box to learn more about \"Learning About Low Blood Sugar (Hypoglycemia) in Diabetes. \"  Current as of: July 28, 2021               Content Version: 13.2  © 2006-2022 [employer]. Care instructions adapted under license by [employer] (which disclaims liability or warranty for this information). If you have questions about a medical condition or this instruction, always ask your healthcare professional. [address] any warranty or liability for your use of this information.

## 2022-03-10 VITALS
BODY MASS INDEX: 31.4 KG/M2 | HEART RATE: 95 BPM | SYSTOLIC BLOOD PRESSURE: 116 MMHG | TEMPERATURE: 98.7 F | RESPIRATION RATE: 18 BRPM | OXYGEN SATURATION: 93 % | DIASTOLIC BLOOD PRESSURE: 69 MMHG | HEIGHT: 67 IN | WEIGHT: 200.1 LBS

## 2022-03-10 LAB
GLUCOSE BLD STRIP.AUTO-MCNC: 200 MG/DL (ref 65–117)
GLUCOSE BLD STRIP.AUTO-MCNC: 222 MG/DL (ref 65–117)
SERVICE CMNT-IMP: ABNORMAL
SERVICE CMNT-IMP: ABNORMAL

## 2022-03-10 PROCEDURE — 82962 GLUCOSE BLOOD TEST: CPT

## 2022-03-10 PROCEDURE — 74011250637 HC RX REV CODE- 250/637: Performed by: NURSE PRACTITIONER

## 2022-03-10 PROCEDURE — 74011636637 HC RX REV CODE- 636/637: Performed by: NURSE PRACTITIONER

## 2022-03-10 PROCEDURE — 96372 THER/PROPH/DIAG INJ SC/IM: CPT

## 2022-03-10 PROCEDURE — 74011000250 HC RX REV CODE- 250: Performed by: NURSE PRACTITIONER

## 2022-03-10 PROCEDURE — 74011250637 HC RX REV CODE- 250/637: Performed by: STUDENT IN AN ORGANIZED HEALTH CARE EDUCATION/TRAINING PROGRAM

## 2022-03-10 PROCEDURE — 74011250636 HC RX REV CODE- 250/636: Performed by: INTERNAL MEDICINE

## 2022-03-10 PROCEDURE — 74011636637 HC RX REV CODE- 636/637: Performed by: INTERNAL MEDICINE

## 2022-03-10 RX ORDER — HEPARIN SODIUM 5000 [USP'U]/ML
5000 INJECTION, SOLUTION INTRAVENOUS; SUBCUTANEOUS EVERY 8 HOURS
Status: DISCONTINUED | OUTPATIENT
Start: 2022-03-10 | End: 2022-03-10 | Stop reason: HOSPADM

## 2022-03-10 RX ADMIN — SODIUM CHLORIDE, PRESERVATIVE FREE 10 ML: 5 INJECTION INTRAVENOUS at 06:39

## 2022-03-10 RX ADMIN — ASPIRIN 81 MG: 81 TABLET, CHEWABLE ORAL at 09:06

## 2022-03-10 RX ADMIN — Medication 18 UNITS: at 07:51

## 2022-03-10 RX ADMIN — ATORVASTATIN CALCIUM 20 MG: 20 TABLET, FILM COATED ORAL at 09:06

## 2022-03-10 RX ADMIN — METOPROLOL SUCCINATE 100 MG: 50 TABLET, FILM COATED, EXTENDED RELEASE ORAL at 09:06

## 2022-03-10 RX ADMIN — Medication 3 UNITS: at 07:51

## 2022-03-10 RX ADMIN — HEPARIN SODIUM 5000 UNITS: 5000 INJECTION INTRAVENOUS; SUBCUTANEOUS at 06:38

## 2022-03-10 RX ADMIN — MEMANTINE HYDROCHLORIDE 10 MG: 10 TABLET ORAL at 09:07

## 2022-03-10 RX ADMIN — ISOSORBIDE MONONITRATE 30 MG: 30 TABLET, EXTENDED RELEASE ORAL at 09:06

## 2022-03-10 RX ADMIN — Medication 3 UNITS: at 11:25

## 2022-03-10 NOTE — PROGRESS NOTES
Transition of Care Plan:     RUR: 12%     Disposition: SNF: -[facility] TODAY at 1 PM   RAPID COVID TEST NEGATIVE  RN please call report to [facility]: [phone]  [facility] transport: 1 PM  time. PCS on bedside chart. Room 83 B    9:30 AM   CM received room number 83B    9:09 AM  CM called [facility] today and spoke to [facility] and she indicated that she will call me back with the room number.       Follow up appointments: PCP: Miguelina Rich: after rehab.      DME needed:Pt has access cane and RW  Transportation at [address] transport arranged for 1 PM   Keys or means to access home:   Spouse has access to keys    IM Medicare Letter:delivered 3/9/22  Is patient a BCPI-A Bundle:  NA                   If yes, was Bundle Letter given?:  NA  Is patient a [place] and connected with the [place]  If yes, was [employer] transfer form completed and [address] spouse [phone]  Discharge Caregiver contacted prior to [address] with Pt wife and DTR. Care Conference needed?:  No    Transition of Care Plan to SNF/Rehab    SNF/Rehab Transition:  Patient has been accepted to [facility] and meets criteria for admission. Patient will transported by [facility] and expected to leave at 1 PM .    Communication to Patient/Family:  Met with patient and wife, Lianet Spicer and they are agreeable to the transition plan. Communication to SNF/Rehab:  Bedside RN, Barney Chapa has been notified to update the transition plan to the facility and call report (phone number [phone]). Discharge information has been updated on the AVS.     Discharge instructions to be fax'd to facility at [facility] # [phone]). Nursing Please include all hard scripts for controlled substances, med rec and dc summary, and AVS in packet.      Reviewed and confirmed with facility, [facility] at [facility],  can manage the patient care needs for the following:     David Flores with (X) only those applicable:    Medication:  [x]  Medications will be available at the facility  []  IV Antibiotics   []  Controlled Substance - hard copy to be sent with patient   []  Weekly Labs   Documents:  [] Hard RX  [x] MAR  [] Kardex  [x] AVS  []Transfer Summary  [x]Discharge   Equipment:  []  CPAP/BiPAP  []  Wound Vacuum  []  Cristina or Urinary Device  []  PICC/Central Line  []  Nebulizer  []  Ventilator   Treatment:  []Isolation (for MRSA, VRE, etc.)  []Surgical Drain Management  []Tracheostomy Care  []Dressing Changes  []Dialysis with transportation and chair time   []PEG Care  []Oxygen  []Daily Weights for Heart Failure   Dietary:  [x]Any diet limitations Diabetic  []Tube Feedings   []Total Parenteral Management (TPN)   Eligible for Medicaid Long Term Services and Supports  Yes:  [] Eligible for medical assistance or will become eligible within 180 days and UAI completed. [] Provider/Patient and/or support system has requested screening. [] UAI copy provided to patient or responsible party,   [] UAI unavailable at discharge will send once processed to SNF provider. [] UAI unavailable at discharged mailed to patient  No:   [x] Private pay and is not financially eligible for Medicaid within the next 180 days. [] Reside out-of-state.   [] A residents of a state owned/operated facility that is licensed  by [address] and [employer] [facility] or [facility]  [] Enrollment in [facility] hospice services  [] [address] [place] [address]  [x] Patient /Family declines to have screening completed or provide financial information for screening     Financial Resources:  Medicaid    [] Initiated and application pending   [] Full coverage     Advanced Care Plan:  []Surrogate Decision Maker of Care  []POA  [x]Communicated Code Status Full   Other       [address] [employer] Manager  [phone]

## 2022-03-10 NOTE — PROGRESS NOTES
0730: Bedside shift change report given to Anh Flores RN (oncoming nurse) by Maria Esther Thacker RN (offgoing nurse). Report included the following information SBAR, Kardex, Intake/Output, MAR and Recent Results. 1033: [facility] and gave report to Johnnie Reyes RN.

## 2022-03-10 NOTE — PROGRESS NOTES
[facility] to [address] [place] [address] St LOLY Hamm                                                                        [de-identified] y.o.   male    [facility]   Room: 2218/01    MR[facility] 2 CARDIOPULMONARY CARE  Unit Phone# :  [phone]      [facility]  [facility] 2 CARDIOPULMONARY CARE  [address]  Caren Fong [address]  Dept: [phone]  Loc: [phone]                    SITUATION     Admitted:  3/5/2022         Attending Provider:  Gustavo Goel MD       Consultations:  IP CONSULT TO CARDIOLOGY  IP CONSULT TO HOSPITALIST    PCP:  Geo Merino MD   [phone]    Treatment Team: Attending Provider: Gustavo Goel MD; Consulting Provider: Virgen Kulkarni MD; Utilization Review: Shahid Freeman RN; Utilization Review: Ryan Mccall RN; Care Manager: Oliva Kasper; Primary Nurse: Eduardo Simons RN    Admitting Dx:  Elevated troponin [R77.8]  Hypoglycemia [E16.2]       Principal Problem: <principal problem not specified>    * No surgery found * of      BY: * Surgery not found *             ON: * No surgery found *                  Code Status: Full Code                Advance Directives:   Advance Care Planning 3/5/2022   Patient's Healthcare Decision Maker is: -   Primary Decision Maker Name -   Primary Decision Maker Phone Number -   Primary Decision Maker Relationship to Patient -   Confirm Advance Directive None   Patient Would Like to Complete Advance Directive -    (Send w/patient)   No Doesnt Have       Isolation:  There are currently no Active Isolations       MDRO: No current active infections    Pain Medications given:  n/a    Last dose: n/a     Special Equipment needed: yes  Type of equipment: walker    (Not currently on dialysis)  (Not currently on dialysis)  (Not currently on dialysis)     BACKGROUND     Allergies:  No Known Allergies    Past Medical History:   Diagnosis Date    CAD (coronary artery disease)     Cancer ([facility] [address]     prostate    Diabetes ([facility] [address]     GERD (gastroesophageal reflux disease)     Hypertension     Other ill-defined conditions(799.89)     elevated cholesterol       Past Surgical History:   Procedure Laterality Date    COLONOSCOPY N/A 8/31/2016    COLONOSCOPY performed by Cristine Batista MD at [facility] ENDOSCOPY    COLORECTAL SCRN; HI RISK IND  8/31/2016         HX PROSTATECTOMY      CO CARDIAC SURG PROCEDURE UNLIST      cabg x5 vessels,cardiologist  and david at [facility]    CO COLONOSCOPY FLX DX W/COLLJ SPEC WHEN PFRMD  4/13/2011            Medications Prior to Admission   Medication Sig    QUEtiapine (SEROquel) 50 mg tablet TAKE 1 TABLET NIGHTLY    memantine-donepeziL (Namzaric) 28-10 mg CSpX TAKE 1 CAPSULE NIGHTLY    cefdinir (OMNICEF) 300 mg capsule Take 1 Capsule by mouth two (2) times a day.  insulin aspart protamine/insulin aspart (NOVOLOG MIX 70-30 U-100 INSULN) 100 unit/mL (70-30) injection 20 Units by SubCUTAneous route Daily (before breakfast).  insulin aspart protamine/insulin aspart (NOVOLOG MIX 70-30 U-100 INSULN) 100 unit/mL (70-30) injection 20 Units by SubCUTAneous route Daily (before dinner).  amLODIPine (NORVASC) 10 mg tablet Take 10 mg by mouth nightly.  cholecalciferol (VITAMIN D3) 1,000 unit tablet Take 1,000 Units by mouth daily.  atorvastatin (LIPITOR) 20 mg tablet Take 20 mg by mouth daily.  isosorbide mononitrate ER (IMDUR) 30 mg tablet Take 30 mg by mouth daily.  apalutamide (ERLEADA) 60 mg tablet Take 120 mg by mouth two (2) times a day.  metoprolol succinate (TOPROL-XL) 100 mg tablet Take 100 mg by mouth daily.  aspirin 81 mg tablet Take 81 mg by mouth daily. Hard scripts included in transfer packet no    Vaccinations: There is no immunization history on file for this patient. Readmission Risks:    Known Risks: Fall Risk        The Charlson CoMorbitiy Index tool is an evidenced based tool that has more automatic generated information. The tool looks at many different items such as the age of the patient, how many times they were admitted in the last calendar year, current length of stay in the hospital and their diagnosis. All of these items are pulled automatically from information documented in the chart from various places and will generate a score that predicts whether a patient is at low (less than 13), medium (13-20) or high (21 or greater) risk of being readmitted.         ASSESSMENT                Temp: 98.7 °F (37.1 °C) (03/10/22 0734) Pulse (Heart Rate): 99 (03/10/22 0906)     Resp Rate: 16 (03/10/22 0734)           BP: 121/82 (03/10/22 0906)     O2 Sat (%): 99 % (03/10/22 0734)     Weight: 90.8 kg (200 lb 1.6 oz)    Height: 5' 7\" (170.2 cm) (03/07/22 1105)       If above not within 1 hour of discharge:    BP:_____  P:____  R:____ T:_____ O2 Sat: ___%  O2: ______    Active Orders   Diet    ADULT DIET Dysphagia - Soft & Bite Sized; 4 carb choices (60 gm/meal)         Orientation: orientation to person, disoriented to time, place, and situation    Active Behaviors: None                                   Active Lines/Drains:  (Peg Tube / Cristina / CL or S/L?): no    Urinary Status: External catheter     Last BM: Last Bowel Movement Date: 03/07/22                   Mobility: Slightly limited   Weight Bearing Status: WBAT (Weight Bearing as Tolerated)      Gait Training  Assistive Device: Walker, rolling,Gait belt  Ambulation - Level of Assistance: Minimal assistance  Distance (ft): 6 Feet (ft)         Lab Results   Component Value Date/Time    Glucose 197 (H) 03/09/2022 12:32 AM    Hemoglobin A1c 8.1 (H) 03/05/2022 03:48 PM    INR 1.0 03/06/2022 03:23 AM    INR 1.0 02/27/2016 02:06 PM    HGB 13.6 03/05/2022 03:48 PM    HGB 16.1 01/07/2022 10:32 AM        RECOMMENDATION     See After Visit Summary (AVS) for:  · Discharge instructions  · After [address]   · Special equipment needed (entered pre-discharge by Care Management)  · Medication Reconciliation    · Follow up Appointment(s)         Report given/sent by:  Jeniffer Watts RN                    Verbal report given to: Ce Montanez  FAXED to:  [phone]       Estimated discharge time:  3/10/2022 at 1PM

## 2022-03-18 ENCOUNTER — PATIENT OUTREACH (OUTPATIENT)
Dept: CASE MANAGEMENT | Age: 82
End: 2022-03-18

## 2022-03-18 PROBLEM — E87.1 HYPONATREMIA: Status: ACTIVE | Noted: 2018-04-27

## 2022-03-18 PROBLEM — E16.2 HYPOGLYCEMIA: Status: ACTIVE | Noted: 2022-03-05

## 2022-03-18 PROBLEM — R77.8 ELEVATED TROPONIN: Status: ACTIVE | Noted: 2022-03-05

## 2022-03-19 PROBLEM — E11.00 UNCONTROLLED TYPE 2 DM WITH HYPEROSMOLAR NONKETOTIC HYPERGLYCEMIA (HCC): Status: ACTIVE | Noted: 2018-04-27

## 2022-03-19 PROBLEM — N17.9 AKI (ACUTE KIDNEY INJURY) (HCC): Status: ACTIVE | Noted: 2018-04-27

## 2022-03-19 PROBLEM — G93.40 ACUTE ENCEPHALOPATHY: Status: ACTIVE | Noted: 2018-04-27

## 2022-03-22 ENCOUNTER — PATIENT OUTREACH (OUTPATIENT)
Dept: CASE MANAGEMENT | Age: 82
End: 2022-03-22

## 2022-03-22 NOTE — PROGRESS NOTES
Post Acute Facility Update     *The information contained in this note was received during a weekly care coordination call with the post acute facility*    Current Facility: [address] [place] [address] ([facility])  Anticipated Discharge Date: TBD    Facility Nursing Update: no current updates  Facility Social Work Update: no current updates  Bundle Program Status: none  Upper Extremity Assistance: Maximum Assistance  Lower Extremity Assistance: Maximum Assistance  Bed Mobility: Dependent  Transfers: Dependent  Ambulation: non-ambulatory  How far (in feet) is the patient ambulating?  0  Device Used: wheelchair  Barriers to Discharge: TBD    Lg BARNES, RN  [employer]

## 2022-03-22 NOTE — PROGRESS NOTES
Post Acute Facility Update     *The information contained in this note was received during a weekly care coordination call with the post acute facility*    Current Facility: [address] [place] [address] ([facility])  Anticipated Discharge Date: 4/9/2022    Facility Nursing Update: Nursing staff did not provide medical updates. Per therpay team, pt needs hand held assist with ambulation;  has decreased task recognition during meals impacting quality PO intake; decreased initiation for self care tasks, max cues for sequencing and participation.   Facility Social Work Update: Plan to discharge home on 4/9/22  Bundle Program Status: none  Upper Extremity Assistance: Stand by Assist - Presence and Cueing  Lower Extremity Assistance: Minimal Assistance   Bed Mobility: Stand by Assist - Presence and Cueing  Transfers: Minimal Assistance   Ambulation: Minimal Assistance   How far (in feet) is the patient ambulating? 155ft  Device Used: None  Barriers to Discharge: BETZY Arroyo, MSW  [facility] Team  Taran Pinon,    This note is to let you know the results of your recent lab studies.    Your blood count/hemoglobin and blood sugar/glucose are normal.    Your cholesterol levels are up slightly from a year ago. Your bad fat of triglycerides is good and your HDL cholesterol, a good fat is also good.  You do not need to be on cholesterol-lowering medications at this time.  I would recommend the eat a healthy diet to good care of yourself.  I would like to recheck your cholesterol panel in 1 year.    Let me know if you have any questions.  Stay safe out there!    Faustina URIBE CNP

## 2022-03-29 ENCOUNTER — PATIENT OUTREACH (OUTPATIENT)
Dept: CASE MANAGEMENT | Age: 82
End: 2022-03-29

## 2022-03-29 NOTE — PROGRESS NOTES
[address] [place] [address] Dr Discharge Note    *The information contained in this note was received during a weekly care coordination call with the post acute facility*      Patient was discharged from [address] [place] [address] ([facility]) on 3/25/2022 to Home with family support. PCP: MD BRITTANIE Bob appointment: Scheduled PCP appointment with Dr. Martina Kinney. Toshia Minor 33, DO - 03/31/2022; 4:30PM - [facility] [address], [address] [facility]    Home Health/Outpatient orders at discharge: home health care  [address]: [address] ordered at discharge: None   [address] received prior to discharge: [address] [facility] Team will sign off at this time. Medications were not reconciled and general patient assessment was not completed during this skilled nursing facility outreach.      Isai Viveros MSJONI  [facility] Team

## 2022-04-01 ENCOUNTER — TELEPHONE (OUTPATIENT)
Dept: NEUROLOGY | Age: 82
End: 2022-04-01

## 2022-04-04 PROBLEM — R77.8 ELEVATED TROPONIN: Status: RESOLVED | Noted: 2022-03-05 | Resolved: 2022-04-04

## 2022-04-05 NOTE — TELEPHONE ENCOUNTER
I called the pharmacy about the Namzaric:  There is a duplication with   Namzaric 11/2021  And   Donepezil sent 3/9/2022 by another physician    I notified her that I will send this to Reed Tarlton to make sure that he can resume the Namzaric once this 30 day is over. Filled 3/15/22 for a 30 day supply.   Will just need a new script sent in as they cancelled it

## 2022-05-11 NOTE — INTERDISCIPLINARY ROUNDS
Bedside interdisciplinary rounds were held today to discuss patient plan of care and outcomes. The following members were present: Physician, Nurse, Clinical Care Leader, Pharmacy, Physical Therapy, and Case Management. Plan: 
 
Possible DC tomorrow if fever free for 24 hours and labs WNL Home w/ wife FYI

## 2023-05-21 RX ORDER — AMLODIPINE BESYLATE 10 MG/1
10 TABLET ORAL NIGHTLY
COMMUNITY

## 2023-05-21 RX ORDER — DONEPEZIL HYDROCHLORIDE 10 MG/1
1 TABLET, FILM COATED ORAL NIGHTLY
COMMUNITY
Start: 2022-03-09

## 2023-05-21 RX ORDER — QUETIAPINE FUMARATE 50 MG/1
1 TABLET, FILM COATED ORAL NIGHTLY
COMMUNITY
Start: 2021-11-03

## 2023-05-21 RX ORDER — METOPROLOL SUCCINATE 100 MG/1
100 TABLET, EXTENDED RELEASE ORAL DAILY
COMMUNITY

## 2023-05-21 RX ORDER — MEMANTINE HYDROCHLORIDE AND DONEPEZIL HYDROCHLORIDE 28; 10 MG/1; MG/1
CAPSULE ORAL
COMMUNITY
Start: 2021-11-03

## 2023-05-21 RX ORDER — ATORVASTATIN CALCIUM 20 MG/1
20 TABLET, FILM COATED ORAL DAILY
COMMUNITY

## 2023-05-21 RX ORDER — ISOSORBIDE MONONITRATE 30 MG/1
30 TABLET, EXTENDED RELEASE ORAL DAILY
COMMUNITY